# Patient Record
Sex: MALE | Race: WHITE | NOT HISPANIC OR LATINO | Employment: FULL TIME | URBAN - NONMETROPOLITAN AREA
[De-identification: names, ages, dates, MRNs, and addresses within clinical notes are randomized per-mention and may not be internally consistent; named-entity substitution may affect disease eponyms.]

---

## 2018-07-27 ENCOUNTER — HOSPITAL ENCOUNTER (OUTPATIENT)
Facility: HOSPITAL | Age: 63
Setting detail: HOSPITAL OUTPATIENT SURGERY
End: 2018-07-27
Attending: INTERNAL MEDICINE | Admitting: INTERNAL MEDICINE

## 2019-05-28 RX ORDER — NEBIVOLOL 10 MG/1
10 TABLET ORAL NIGHTLY
COMMUNITY

## 2019-05-30 ENCOUNTER — ANESTHESIA (OUTPATIENT)
Dept: GASTROENTEROLOGY | Facility: HOSPITAL | Age: 64
End: 2019-05-30

## 2019-05-30 ENCOUNTER — ANESTHESIA EVENT (OUTPATIENT)
Dept: GASTROENTEROLOGY | Facility: HOSPITAL | Age: 64
End: 2019-05-30

## 2019-05-30 ENCOUNTER — HOSPITAL ENCOUNTER (OUTPATIENT)
Facility: HOSPITAL | Age: 64
Setting detail: HOSPITAL OUTPATIENT SURGERY
Discharge: HOME | End: 2019-05-30
Attending: INTERNAL MEDICINE | Admitting: INTERNAL MEDICINE

## 2019-05-30 VITALS
TEMPERATURE: 96.9 F | RESPIRATION RATE: 17 BRPM | OXYGEN SATURATION: 94 % | DIASTOLIC BLOOD PRESSURE: 81 MMHG | SYSTOLIC BLOOD PRESSURE: 127 MMHG | BODY MASS INDEX: 32.43 KG/M2 | HEIGHT: 68 IN | WEIGHT: 214 LBS | HEART RATE: 62 BPM

## 2019-05-30 DIAGNOSIS — K22.2 STRICTURE OF ESOPHAGUS: ICD-10-CM

## 2019-05-30 DIAGNOSIS — Z86.010 PERSONAL HISTORY OF COLONIC POLYPS: ICD-10-CM

## 2019-05-30 PROCEDURE — 88305 TISSUE EXAM BY PATHOLOGIST: CPT | Performed by: PATHOLOGY

## 2019-05-30 PROCEDURE — 88341 IMHCHEM/IMCYTCHM EA ADD ANTB: CPT | Performed by: PATHOLOGY

## 2019-05-30 PROCEDURE — 88312 SPECIAL STAINS GROUP 1: CPT | Performed by: INTERNAL MEDICINE

## 2019-05-30 PROCEDURE — 88342 IMHCHEM/IMCYTCHM 1ST ANTB: CPT | Performed by: PATHOLOGY

## 2019-05-30 PROCEDURE — 88341 IMHCHEM/IMCYTCHM EA ADD ANTB: CPT | Performed by: INTERNAL MEDICINE

## 2019-05-30 PROCEDURE — 25010000002 FENTANYL CITRATE (PF) 100 MCG/2ML SOLUTION: Performed by: NURSE ANESTHETIST, CERTIFIED REGISTERED

## 2019-05-30 PROCEDURE — 88305 TISSUE EXAM BY PATHOLOGIST: CPT | Performed by: INTERNAL MEDICINE

## 2019-05-30 PROCEDURE — 88342 IMHCHEM/IMCYTCHM 1ST ANTB: CPT | Performed by: INTERNAL MEDICINE

## 2019-05-30 PROCEDURE — 25010000002 PROPOFOL 10 MG/ML EMULSION: Performed by: NURSE ANESTHETIST, CERTIFIED REGISTERED

## 2019-05-30 PROCEDURE — 88312 SPECIAL STAINS GROUP 1: CPT | Performed by: PATHOLOGY

## 2019-05-30 RX ORDER — PROPOFOL 10 MG/ML
VIAL (ML) INTRAVENOUS AS NEEDED
Status: DISCONTINUED | OUTPATIENT
Start: 2019-05-30 | End: 2019-05-30 | Stop reason: SURG

## 2019-05-30 RX ORDER — DEXTROSE AND SODIUM CHLORIDE 5; .45 G/100ML; G/100ML
30 INJECTION, SOLUTION INTRAVENOUS CONTINUOUS
Status: DISCONTINUED | OUTPATIENT
Start: 2019-05-30 | End: 2019-05-30 | Stop reason: HOSPADM

## 2019-05-30 RX ORDER — FENTANYL CITRATE 50 UG/ML
INJECTION, SOLUTION INTRAMUSCULAR; INTRAVENOUS AS NEEDED
Status: DISCONTINUED | OUTPATIENT
Start: 2019-05-30 | End: 2019-05-30 | Stop reason: SURG

## 2019-05-30 RX ORDER — LIDOCAINE HYDROCHLORIDE 20 MG/ML
INJECTION, SOLUTION INTRAVENOUS AS NEEDED
Status: DISCONTINUED | OUTPATIENT
Start: 2019-05-30 | End: 2019-05-30 | Stop reason: SURG

## 2019-05-30 RX ADMIN — PROPOFOL 30 MG: 10 INJECTION, EMULSION INTRAVENOUS at 10:23

## 2019-05-30 RX ADMIN — LIDOCAINE HYDROCHLORIDE 100 MG: 20 INJECTION, SOLUTION INTRAVENOUS at 10:18

## 2019-05-30 RX ADMIN — PROPOFOL 100 MG: 10 INJECTION, EMULSION INTRAVENOUS at 10:18

## 2019-05-30 RX ADMIN — PROPOFOL 20 MG: 10 INJECTION, EMULSION INTRAVENOUS at 10:26

## 2019-05-30 RX ADMIN — PROPOFOL 30 MG: 10 INJECTION, EMULSION INTRAVENOUS at 10:20

## 2019-05-30 RX ADMIN — PROPOFOL 20 MG: 10 INJECTION, EMULSION INTRAVENOUS at 10:25

## 2019-05-30 RX ADMIN — FENTANYL CITRATE 50 MCG: 50 INJECTION, SOLUTION INTRAMUSCULAR; INTRAVENOUS at 10:29

## 2019-05-30 RX ADMIN — PROPOFOL 30 MG: 10 INJECTION, EMULSION INTRAVENOUS at 10:38

## 2019-05-30 RX ADMIN — DEXTROSE AND SODIUM CHLORIDE: 5; 450 INJECTION, SOLUTION INTRAVENOUS at 10:12

## 2019-05-30 RX ADMIN — PROPOFOL 40 MG: 10 INJECTION, EMULSION INTRAVENOUS at 10:33

## 2019-05-30 RX ADMIN — FENTANYL CITRATE 50 MCG: 50 INJECTION, SOLUTION INTRAMUSCULAR; INTRAVENOUS at 10:34

## 2019-05-30 RX ADMIN — PROPOFOL 30 MG: 10 INJECTION, EMULSION INTRAVENOUS at 10:29

## 2019-05-30 RX ADMIN — PROPOFOL 30 MG: 10 INJECTION, EMULSION INTRAVENOUS at 10:43

## 2019-05-30 NOTE — ANESTHESIA PREPROCEDURE EVALUATION
Anesthesia Evaluation     no history of anesthetic complications:  NPO Solid Status: > 8 hours  NPO Liquid Status: > 2 hours           Airway   Mallampati: II  TM distance: >3 FB  Neck ROM: full  No difficulty expected  Dental - normal exam     Pulmonary - negative pulmonary ROS and normal exam   Cardiovascular - normal exam    (+) hypertension,       Neuro/Psych- negative ROS  GI/Hepatic/Renal/Endo      Musculoskeletal (-) negative ROS    Abdominal    Substance History      OB/GYN          Other                        Anesthesia Plan    ASA 2     MAC     intravenous induction   Anesthetic plan, all risks, benefits, and alternatives have been provided, discussed and informed consent has been obtained with: patient.

## 2019-05-30 NOTE — ANESTHESIA POSTPROCEDURE EVALUATION
Patient: Juan Antonio Shaw    Procedure Summary     Date:  05/30/19 Room / Location:  [facility] ENDOSCOPY 2 / [facility] ENDOSCOPY    Anesthesia Start:  1012 Anesthesia Stop:  1049    Procedures:       ESOPHAGOGASTRODUODENOSCOPY (N/A )      COLONOSCOPY (N/A ) Diagnosis:       Stricture of esophagus      Personal history of colonic polyps      (Stricture of esophagus [K22.2])      (Personal history of colonic polyps [Z86.010])    Surgeon:  Jarek Gordillo DO Provider:  Akosua Rose CRNA    Anesthesia Type:  MAC ASA Status:  2          Anesthesia Type: MAC  Last vitals  BP   131/67 (05/30/19 0901)   Temp   97 °F (36.1 °C) (05/30/19 0901)   Pulse   64 (05/30/19 0901)   Resp   18 (05/30/19 0901)     SpO2   98 % (05/30/19 0901)     Post Anesthesia Care and Evaluation    Patient location during evaluation: bedside  Patient participation: complete - patient participated  Level of consciousness: awake and alert  Pain management: adequate  Airway patency: patent  Anesthetic complications: No anesthetic complications  PONV Status: none  Cardiovascular status: acceptable  Respiratory status: acceptable  Hydration status: acceptable

## 2019-05-31 LAB
LAB AP CASE REPORT: NORMAL
LAB AP DIAGNOSIS COMMENT: NORMAL
LAB AP INTRADEPARTMENTAL CONSULT: NORMAL
LAB AP SPECIAL STAINS: NORMAL
PATH REPORT.FINAL DX SPEC: NORMAL
PATH REPORT.GROSS SPEC: NORMAL

## 2019-06-07 ENCOUNTER — CONSULT (OUTPATIENT)
Dept: SURGERY | Facility: CLINIC | Age: 64
End: 2019-06-07

## 2019-06-07 VITALS
SYSTOLIC BLOOD PRESSURE: 110 MMHG | BODY MASS INDEX: 33.04 KG/M2 | HEIGHT: 68 IN | TEMPERATURE: 98 F | WEIGHT: 218 LBS | DIASTOLIC BLOOD PRESSURE: 70 MMHG | HEART RATE: 77 BPM

## 2019-06-07 DIAGNOSIS — L98.9 SKIN LESION: Primary | ICD-10-CM

## 2019-06-07 PROCEDURE — 17110 DESTRUCTION B9 LES UP TO 14: CPT | Performed by: SURGERY

## 2019-06-07 NOTE — PROGRESS NOTES
Chief Complaint   Patient presents with   • Skin Lesion     lesion on back and neck        HPI  Irritated lesions on the right neck and questionable color change on the back.  Past Medical History:   Diagnosis Date   • Hypertension        Past Surgical History:   Procedure Laterality Date   • COLONOSCOPY N/A 5/30/2019    Procedure: COLONOSCOPY;  Surgeon: Jarek Gordillo DO;  Location: [facility] ENDOSCOPY;  Service: Gastroenterology   • ENDOSCOPY N/A 5/30/2019    Procedure: ESOPHAGOGASTRODUODENOSCOPY;  Surgeon: Jarek Gordillo DO;  Location: [facility] ENDOSCOPY;  Service: Gastroenterology   • PROSTATE SURGERY           Current Outpatient Medications:   •  nebivolol (BYSTOLIC) 10 MG tablet, Take 10 mg by mouth Daily., Disp: , Rfl:     No Known Allergies    No family history on file.    Social History     Socioeconomic History   • Marital status:      Spouse name: Not on file   • Number of children: Not on file   • Years of education: Not on file   • Highest education level: Not on file   Tobacco Use   • Smoking status: Never Smoker   • Smokeless tobacco: Never Used   Substance and Sexual Activity   • Alcohol use: Yes     Comment: rarely   • Drug use: No           Physical Exam  3 SKs on the right neck. Several benign areas on the back. Lesions on the neck are frozen with liquid N2    ASSESSMENT    Juan Antonio was seen today for skin lesion.    Diagnoses and all orders for this visit:    Skin lesion        PLAN    1. Recheck in 1 month if no resolution              This document has been electronically signed by Benson Wasserman MD on June 7, 2019 2:45 PM

## 2019-06-07 NOTE — PATIENT INSTRUCTIONS

## 2019-06-17 ENCOUNTER — HOSPITAL ENCOUNTER (OUTPATIENT)
Dept: CT IMAGING | Facility: HOSPITAL | Age: 64
Discharge: HOME | End: 2019-06-17
Admitting: INTERNAL MEDICINE

## 2019-06-17 ENCOUNTER — HOSPITAL ENCOUNTER (OUTPATIENT)
Dept: GENERAL RADIOLOGY | Facility: HOSPITAL | Age: 64
Discharge: HOME | End: 2019-06-17

## 2019-06-17 DIAGNOSIS — C21.0 ANAL CANCER (HCC): ICD-10-CM

## 2019-06-17 DIAGNOSIS — C44.520 SQUAMOUS CELL CARCINOMA OF ANAL SKIN: ICD-10-CM

## 2019-06-17 PROCEDURE — 71046 X-RAY EXAM CHEST 2 VIEWS: CPT

## 2019-06-17 PROCEDURE — 74177 CT ABD & PELVIS W/CONTRAST: CPT

## 2019-06-17 PROCEDURE — 25010000002 IOPAMIDOL 61 % SOLUTION: Performed by: INTERNAL MEDICINE

## 2019-06-17 RX ADMIN — IOPAMIDOL 80 ML: 612 INJECTION, SOLUTION INTRAVENOUS at 13:54

## 2019-06-18 ENCOUNTER — CONSULT (OUTPATIENT)
Dept: RADIATION ONCOLOGY | Facility: HOSPITAL | Age: 64
End: 2019-06-18

## 2019-06-18 ENCOUNTER — DOCUMENTATION (OUTPATIENT)
Dept: ONCOLOGY | Facility: CLINIC | Age: 64
End: 2019-06-18

## 2019-06-18 ENCOUNTER — HOSPITAL ENCOUNTER (OUTPATIENT)
Dept: RADIATION ONCOLOGY | Facility: HOSPITAL | Age: 64
Setting detail: RADIATION/ONCOLOGY SERIES
End: 2019-06-18

## 2019-06-18 VITALS
RESPIRATION RATE: 16 BRPM | DIASTOLIC BLOOD PRESSURE: 80 MMHG | TEMPERATURE: 97.8 F | HEART RATE: 66 BPM | BODY MASS INDEX: 33.04 KG/M2 | WEIGHT: 218 LBS | SYSTOLIC BLOOD PRESSURE: 137 MMHG | HEIGHT: 68 IN

## 2019-06-18 DIAGNOSIS — C21.0 ANAL CANCER (HCC): Primary | ICD-10-CM

## 2019-06-18 PROCEDURE — 77263 THER RADIOLOGY TX PLNG CPLX: CPT | Performed by: RADIOLOGY

## 2019-06-18 PROCEDURE — 99204 OFFICE O/P NEW MOD 45 MIN: CPT | Performed by: RADIOLOGY

## 2019-06-18 PROCEDURE — G0463 HOSPITAL OUTPT CLINIC VISIT: HCPCS

## 2019-06-18 PROCEDURE — G0463 HOSPITAL OUTPT CLINIC VISIT: HCPCS | Performed by: RADIOLOGY

## 2019-06-18 NOTE — PATIENT INSTRUCTIONS
Radiation Simulation  Radiation Simulation is a process where the radiation treatment fields are defined, filmed and drawn on your skin. The simulator is actually a computed tomography (CT) scanner that we use to contour your body. The images are then sent to the physics department where the team and your doctor -- arrange the radiation beams and make a customized plan. We will take special care to make your position as comfortable as possible while making sure treatment will be given the same every time. Since people come in all shapes and sizes, very specific patient measurements are important.  Treatment unit parameters are finalized and recorded. All setup information is documented to make your treatment record complete. This is an important part of the planning process. The CT simulation scan is not like a regular CT scan, instead we use it to contour the shape of your body and visualize its structures. You doctor can then arrange the beams and make a plan using a computer system.      The following are members of the radiation therapy team that is involved and you may see during your simulation:  Physician:  is in charge of your simulation and gives direction to the therapists in the simulator.  Radiation therapists: trained, certified, and qualified to participate in CT simulation and administer daily treatments.      Oncology nurse: is a registered nurse who specializes in the needs of patients receiving radiation treatments.   Medical physicist: specializes in the principles of radiation physics. He is responsible for maintaining all therapy equipment as well as overseeing the treatment planning process.  : is responsible for performing dose calculations as well as developing treatment plans in conjunction with the physician and physicist.    What to Wear  Since we will be marking on your skin, it is necessary that we expose the treatment site. We may also need you to remove other  clothing worn close to the treatment are so we can produce the same treatment every time. . For example, having a shirt rumpled underneath you or snugly fitting pantyhose around your waist may change your position slightly from day to day. We can easily avoid this problem by simply having you change into a gown.  Please leave your jewelry at home, especially if the simulation involves the head, neck, or chest region.   Choose clothing that is comfortable and easy to remove.   Please do not bother the skin markings unless your therapist tells you so. These markings may rub off slightly onto your clothing, especially during warm summer months, so consider wearing old clothing and underwear when you come for your appointment.  How to Prepare  There are no dietary restrictions prior to your simulation. You may eat and drink as usual, unless instructed otherwise.       Immobilization  Immobilization means “to prevent movement or to keep in place.” We use immobilization devices that do just that. These are treatment aids that are pre-made, or that we construct, to help maintain the desired treatment position throughout the entire course of therapy. Some of these aids, to name a few, are:  • Tape (simple masking, paper or silk tape)  • Foam sponges  • Specially designed headrests  • Acrylic molds  • Foam body molds  • Plaster casts      Keep in mind that your particular setup may not require any special devices, or it may require a combination of items. It depends on your treatment site, position and your ability to stay in a position for a limited amount of time. We try to anticipate which simulations will require special devices so that we correctly estimate the amount of time you will need to allow for your appointment.  Contrast  In order to better visualize organs and other anatomy in or around the treatment area, we may need to administer contrast material (radiographic dye) during simulation. Contrast can be given  intravenously (with a needle through the vein), by mouth or through a catheter inserted by the nurse or therapist. This procedure will be done for simulation only -- not for daily treatment -- and requires no special preparation prior to your appointment.  Marking the Fields  The therapist will take a few preliminary measurements. A rough sketch of the area may be marked on your skin by using markers directly on the skin with ink by the physician and/or the therapist. The simulation team will then leave the room. We will watch you and can hear you at all times.    Then a series of things may happen:  • Room lights may go on and off  • Red laser lights may appear  • The table will move into the scanner  • All kinds of noises will be audible  The CT scanner is a large hole in a machine that your body will pass through. There is plenty of room and you should not feel enclosed or claustrophobic.  Measurements  The physician will create a radiation therapy prescription, a written directive for the radiation team to follow. In order to carry this out, we must combine treatment field information with data taken about your body shape and size. Using the CT scan, the physician will contour the areas needed and generate the plan while you are at home.  Tattoos  In most cases, we do not apply tattoos to blanca pateints unless it is requested or you cant keep the marks on your skin. Marks that are needed are covered with a special clear dressing called Tegaderm.   Photographs  We will take your photograph on the day of simulation for two reasons: 1) for chart documentation, and 2) for a treatment set-up aid. We will take a facial picture for identification purposes and this will be part of your permanent treatment record. Each day before your treatment, we will ask you for your name and birth date for verification.  We will also take photographs of the treatment and setup fields. Since the treatment therapist may not be present  at the time of simulation, using photos along with roger makes daily setup easier. The same is true for the dosimetrist. Sometimes viewing a field on the patient via photograph can answer questions that arise during the calculation process. These photos are necessary and they remain a confidential part of your treatment record.  How Long Does Planning Take  Our Dosimetrist and Physicist will use the images captured during your CT simulation to plan your treatments. The planning process is very complex and takes a lot of time and detail.    Depending on the type of treatment you are going to receive, your radiation plan may take up to 2 weeks from the day of your simulation.   ? The radiation staff will call you when your plan is ready and you will be scheduled a date to start your radiation treatments.   ? We will also call you to update you on the progress of your plan as needed.   ? If we have not called you within 10 days from your simulation, please call us. The time between simulation and treatment in no way compromises the outcome of your therapy. These factors are considered by the radiation therapy team when planning your specific course of treatment.  Since the course of treatment can run from two to eight weeks, we constantly monitor each patient’s setup in a number of ways. If a change occurs, it may be necessary to schedule a return visit to the simulator on short notice. An example of this might be a weight gain or loss that would require new calculations. In order to interrupt your treatment schedule as little as possible, a simulation check that same day or the following morning may be appropriate. Again, this is routine and does not suggest a change in your health status. It simply means that we are doing our job in providing the best care we know how. It is important that we stay aware of all patient changes.  Our goal is to make every patient as knowledgeable about the radiation therapy process as  possible and comfortable enough to ask questions. If at any time during your many visits with us you are unsure as to what is happening around you, please do not hesitate to ask. At a time in your life when things are seemingly out of your control, knowing what to expect can be a powerful tool in becoming refocused and moving onward.      If you have any concerns or questions before your treatment gets started, please do not hesitate to call the Radiation staff at [phone]. Administration of Oral Contrast for CT Scan of Abdomen and Pelvis    · If you have an allergy to iodine, ask the  for barium in place of iodinated contrast.    1. Do not eat or drink 4 hours prior to scan.  You may take your medications with sips of water, except do not take your diabetic pill the morning of the test.    2. If you were given Redi-Cat barium, drink one bottle 2 hours prior to exam time and drink the 2nd bottle 30 minutes prior to exam time.    3.  If you were given Omnipaque, iodinated contrast, mix the bottle with 34 ounces (1 liter) of fluid.  You may use water, flavored water, or juice.    4. Drink half the amount 2 hours prior to your scan, then drink the remaining half 30 minutes prior to your scan.    Arrive at Outpatient Surgery entrance at [facility] 20 minutes prior to appointment time.    You will receive a phone call with an appointment for your CT scan.  Please call our office, if someone does not contact you within 3 days.    Sandy Musa RN  June 18, 2019  10:38 AM      Patient Instructions for CT Scan    · Your CT scan is being done without any oral contrast.  Your CT scan may be done with IV contrast, if you have an allergy to iodine--please tell your nurse.    · Do not eat or drink 4 hours prior to scan.     · You may take your medications with sips of water, except DO NOT take your diabetic pill the morning of the test.    Arrive at the Outpatient Surgery entrance at [facility] 20 minutes prior to appointment time.    You will receive a phone call with an appointment for your CT scan.  Please call our office, if someone does not contact you with 3 days.    Sandy Musa RN  June 18, 2019  10:38 AM

## 2019-06-18 NOTE — PROGRESS NOTES
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  ICD 10 Code: C21.0                                               History of Present Illness: Mr. Juan Antonio Shaw is a 64-year-old white male with a history of prostate cancer (Stage T2b, Ferdinand 3+3=6, PSA 7.36, s/p RALP on 5/16/18), atrial fibrillation, hypertension, esophageal stricture, and colon polyps, now with recently diagnosed squamous cell carcinoma of the anus.  He has been evaluated by Dr. Gordillo and has now been referred to our clinic to discuss radiation therapy recommendations.    Below is a summary of his relevant history.    5/15/2019 the patient contacted Dr. Pawel Aleman, his PCP, complaining of difficulty swallowing.  Dr. Aleman referred the patient to Dr. Jarek Gordillo for evaluation.    5/30/2019 the patient underwent an EGD and colonoscopy per Dr. Gordillo.      EGD revealed reflux esophagitis, which was biopsied, and benign-appearing esophageal stenosis, which was dilated by Dr. Gordillo.  Pathology: Chronic esophagitis, negative for fungi, viral cells, and dysplasia.     Colonoscopy revealed sessile polyps measuring 4-5 mm in the ascending colon (3), transverse colon (1), and ascending colon (1).  These were all removed with cold biopsy forceps.  An ulcerated, nonobstructing, non-circumferential 3 x 4 cm mass was noted in the anus, extending from the dentate line.  A biopsy was obtained.  Pathology:     Colon polyps: Benign pathology     Anal tumor: Invasive nonkeratinizing squamous cell carcinoma, poorly differentiated, strongly p16+.    6/3/2019 the patient was seen in follow-up by Dr. Gordillo.  Staging scans were ordered, and the patient was referred to Dr. Ly for treatment recommendations.  He was also referred to Dr. Wasserman for  evaluation of skin lesions on the neck and back.    6/7/2019 the patient was seen in consultation by Dr. Wasserman, who froze 3 seborrheic keratoses on the neck with liquid nitrogen.  Additional follow-up was planned if the lesions did not resolve.    6/17/2019 CT of the abdomen/pelvis revealed nonspecific mild thickening of the rectum, cholelithiasis, nonobstructing nephrolithiasis, and possible bilateral renal cysts.  No abdominal/pelvic masses or lymphadenopathy were identified.  No evidence of metastatic disease was noted.    The patient presents to our clinic today to discuss radiation therapy recommendations.  He is scheduled to be seen in consultation by Dr. Ly on 6/20/2019 for chemotherapy recommendations.    Today on exam, he has no complaints of rectal bleeding, rectal pain, or diarrhea.    This is a new problem to me.  (Old medical records were requested, reviewed, and summarized in the HPI)    Review of Systems   Gastrointestinal: Negative for anal bleeding, blood in stool, constipation, diarrhea and rectal pain.   All other systems reviewed and are negative.       Past Medical History:   Diagnosis Date   • Hypertension    • Prostate cancer (CMS/HCC) 05/16/2018   • Rectal cancer (CMS/HCC) 06/2019        Past Surgical History:   Procedure Laterality Date   • COLONOSCOPY N/A 5/30/2019    Procedure: COLONOSCOPY;  Surgeon: Jarek Gordillo DO;  Location: [facility] ENDOSCOPY;  Service: Gastroenterology   • ENDOSCOPY N/A 5/30/2019    Procedure: ESOPHAGOGASTRODUODENOSCOPY;  Surgeon: Jarek Gordillo DO;  Location: [facility] ENDOSCOPY;  Service: Gastroenterology   • PROSTATE SURGERY        Family History   Problem Relation Age of Onset   • Uterine cancer Mother         Social History     Socioeconomic History   • Marital status:      Spouse name: Jeffrey   • Number of children: 2   • Years of education: 15   • Highest education level: Not on file   Occupational History   • Occupation: [employer]   Tobacco Use   •  "Smoking status: Never Smoker   • Smokeless tobacco: Never Used   Substance and Sexual Activity   • Alcohol use: Yes     Comment: rarely   • Drug use: No   • Sexual activity: Defer      Allergies: Patient has no known allergies.     Prior to Admission medications    Medication Sig Start Date End Date Taking? Authorizing Provider   nebivolol (BYSTOLIC) 10 MG tablet Take 10 mg by mouth Daily.    Provider, MD Andreea      Pain: (on a scale of 0-10)     Pain Score    06/18/19 0842   PainSc: 0-No pain       Quality of Life:  100 - Full Activity  Advanced Care Plan: N     Physical Examination:  Vitals:     Vitals:    06/18/19 0842   BP: 137/80   Pulse: 66   Resp: 16   Temp: 97.8 °F (36.6 °C)       Height: 172.7 cm (68\")  Weight: Weight: 98.9 kg (218 lb)   Body mass index is 33.15 kg/m².      Constitutional: The patient is a well-developed, well-nourished white  male in no acute distress.  Alert and oriented ×3.  HEENT: Atraumatic. Normocephalic. No abnormalities noted.  Lymphatics: No cervical, supraclavicular, axillary, or inguinal lymphadenopathy is palpated.  CV: Regular rate and rhythm.  No murmurs, rubs, or gallops are appreciated.  Respiratory: Lungs clear to auscultation.  Breath sounds equal bilaterally.  GI: Abdomen soft, nontender, nondistended, with no hepatosplenomegaly or masses palpated.  Rectal: Digital rectal exam reveals normal rectal sphincter tone. No suspicious nodules palpated.   Extremities: No clubbing, cyanosis, or edema.  Neurologic: Cranial nerves II through XII are grossly intact, with no focal neurological deficits noted on exam.  Psychiatric: Alert and oriented x3. Normal affect, with no anxiety or depression noted.    Radiographs :     CT ABDOMEN PELVIS W CONTRAST  (6/17/19)   INDICATION: Anal malignancy   COMPARISON: None  FINDINGS:    - - - CT ABDOMEN - - -    THORAX (INFERIOR):   - LUNG BASES:  Clear   - PLEURA:  No fluid or mass   - HEART: Normal size, no pericardial fluid   - MISC:  " N/A    ABDOMEN:   - LIVER:  Normal size/contour, no ductal dilatation. Scattered  punctate calcifications are present, consistent with healed  granulomatous disease   - GB: Filled with gallstones   - CBD:  Grossly negative   - SPLEEN:  Normal size and contour. Granulomatous scarring   - PANCREAS:  Normal in size, contour, no focal mass    - VISCERA:  Normal caliber, no wall thickening   - MESENTERY: No mesenteric mass   - CAVITY:  No free abdominal fluid, no free intraperitoneal air   - BODY WALL:  With normal limits   - OSSEOUS:  Degenerative changes most prominently at L5-S1 where  there is almost complete loss of joint space and vacuum disc  phenomenon    RETROPERITONEUM:   - KIDNEYS:Normal size/contour, no collecting system dilation                    No evidence of an enhancing mass. Bilateral  nonobstructing calculi, and small possible cysts bilaterally.    - URETERS:  Normal course, caliber   - ADRENALS:  Normal size, contour   - MISC:  No sig. retroperitoneal adenopathy or mass   - VASCULAR:  Aorta / iliacs: wnl for age     - - - CT PELVIS - - -     - VISCERA:  Normal caliber small/large bowel, mild thickening of  the rectum, nonspecific   - MESENTERY:  No mass   - VASCULAR:  Within normal limits for age   - CAVITY:  No free fluid / air   - BLADDER:  Unremarkable   - OSSEOUS:  Within normal limits    - PROSTATE:  Grossly wnl  Small left inguinal hernia contains only fat.      IMPRESSION:  1. No definite evidence of metastasis. Mild thickening of the  rectum, may be related to treatment effect, though malignancy  cannot be excluded.   2. Cholelithiasis.   3. Nonobstructing nephrolithiasis.  4. Possible bilateral renal cysts, could be further evaluated and  characterized by ultrasound or multiphasic contrast enhanced CT  or MRI  5. Please see finding sections above for further details.     Chest PA and lateral   (6/17/19)   CLINICAL INDICATION: Squamous cell carcinoma anus   COMPARISON: Chest x-ray January 6,  2012.   FINDINGS: Cardiac silhouette is normal in size. Pulmonary  vascularity is unremarkable.    No focal infiltrate or consolidation.  No pleural effusion.  No  pneumothorax. There is benign calcified granulomata in the  mediastinum and both hilar regions.  Chronic elevation left diaphragm. This suggests diaphragmatic  eventration or diaphragmatic paralysis.   IMPRESSION:  CONCLUSION: Chronic elevation left diaphragm either diaphragmatic  eventration or diaphragmatic paralysis. Benign calcified  mediastinal and hilar lymph nodes. Lungs otherwise clear.    Pathology:     Tissue Pathology Exam: Collected:  5/30/2019    Dx:  Personal history of colonic polyps; S...   Component    Final Diagnosis   1.  MUCOSA, ESOPHAGUS:   CHRONIC ESOPHAGITIS.   NEGATIVE FOR FUNGI (GMS STAIN), VIRAL CELLS AND DYSPLASIA.      2.  MUCOSA, ANUS:   INVASIVE NONKERATINIZING SQUAMOUS CELL CARCINOMA, POORLY DIFFERENTIATED.   IMMUNOSTAIN STRONGLY POSITIVE FOR p16 (HPV MARKER).      3.  POLYP, TRANSVERSE COLON:   INFLAMMATORY POLYP.     4.  POLYP, ASCENDING COLON:   SESSILE SERRATED ADENOMA.      5.  POLYP, DESCENDING COLON:   NO SIGNIFICANT HISTOLOGIC ABNORMALITY.              Labs:   Lab Results   Component Value Date    BUN 34 (H) 07/11/2018    CREATININE 1 07/11/2018    EGFRIFAFRI 92 05/17/2018    K 4 07/11/2018    CO2 26 05/17/2018    CALCIUM 8.8 07/11/2018    ALBUMIN 3.5 07/11/2018    AST 17 07/11/2018    ALT 19 (L) 07/11/2018      WBC   Date Value Ref Range Status   05/09/2018 6.0 4.8 - 10.8 10*9/L Final     Hemoglobin   Date Value Ref Range Status   05/17/2018 13.0 (L) 14.0 - 18.0 g/dL Final     Hematocrit   Date Value Ref Range Status   05/17/2018 38.0 (L) 42 - 54 % Final     Platelets   Date Value Ref Range Status   05/09/2018 281 150 - 450 10*9/L Final      PSA   Date Value Ref Range Status   05/13/2019 <0.1 0.0 - 4.0 ng/mL Final             02/11/2019 <0.1 0.0 - 4.0 ng/mL Final             11/08/2018 <0.1 0.0 - 4.0 ng/mL Final    07/11/2018 0 0.0 - 4.0 ng/mL Final   04/17/2018 6.7 (H) 0.0 - 4.0 ng/mL Final   01/08/2018 7.36 (H) 0.0 - 4.0 ng/mL Final                   ASSESSMENT/PLAN: Mr. 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  I feel that he could benefit from a definitive course of concurrent chemoradiation therapy.  The NCCN Guidelines as well as the risks, benefits, and rationale for concurrent chemo/XRT were discussed at length with the patient.  He voices understanding and agrees to proceed with therapy.  I have discussed this case with Dr. Ly, who is scheduled to see the patient in consultation next week to discuss chemotherapy.  We plan to have the patient return to our clinic in the near future to begin radiation therapy planning.  We plan to deliver 5400 cGy in 30 fractions utilizing IMRT/IGRT radiation therapy.    Sincerely,        Brady Lopez MD  Radiation Oncology    Electronically signed by Brady Lopez MD 6/18/2019 10:12 AM     cc: Dr. Jarek Aleman

## 2019-06-20 ENCOUNTER — CONSULT (OUTPATIENT)
Dept: ONCOLOGY | Facility: CLINIC | Age: 64
End: 2019-06-20

## 2019-06-20 ENCOUNTER — DOCUMENTATION (OUTPATIENT)
Dept: ONCOLOGY | Facility: CLINIC | Age: 64
End: 2019-06-20

## 2019-06-20 ENCOUNTER — APPOINTMENT (OUTPATIENT)
Dept: ONCOLOGY | Facility: HOSPITAL | Age: 64
End: 2019-06-20

## 2019-06-20 VITALS
TEMPERATURE: 97.7 F | RESPIRATION RATE: 18 BRPM | HEIGHT: 68 IN | WEIGHT: 217.2 LBS | BODY MASS INDEX: 32.92 KG/M2 | HEART RATE: 84 BPM | DIASTOLIC BLOOD PRESSURE: 81 MMHG | OXYGEN SATURATION: 94 % | SYSTOLIC BLOOD PRESSURE: 147 MMHG

## 2019-06-20 DIAGNOSIS — C21.0 ANAL CANCER (HCC): ICD-10-CM

## 2019-06-20 DIAGNOSIS — C21.0 ANAL CANCER (HCC): Primary | ICD-10-CM

## 2019-06-20 DIAGNOSIS — C61 PROSTATE CANCER (HCC): ICD-10-CM

## 2019-06-20 LAB
ALBUMIN SERPL-MCNC: 4.2 G/DL (ref 3.5–5.2)
ALBUMIN/GLOB SERPL: 1.1 G/DL
ALP SERPL-CCNC: 87 U/L (ref 39–117)
ALT SERPL W P-5'-P-CCNC: 14 U/L (ref 1–41)
ANION GAP SERPL CALCULATED.3IONS-SCNC: 12 MMOL/L
AST SERPL-CCNC: 16 U/L (ref 1–40)
BASOPHILS # BLD AUTO: 0.03 10*3/MM3 (ref 0–0.2)
BASOPHILS NFR BLD AUTO: 0.4 % (ref 0–1.5)
BILIRUB SERPL-MCNC: 0.3 MG/DL (ref 0.2–1.2)
BUN BLD-MCNC: 25 MG/DL (ref 8–23)
BUN/CREAT SERPL: 27.8 (ref 7–25)
CALCIUM SPEC-SCNC: 9.6 MG/DL (ref 8.6–10.5)
CHLORIDE SERPL-SCNC: 98 MMOL/L (ref 98–107)
CO2 SERPL-SCNC: 27 MMOL/L (ref 22–29)
CREAT BLD-MCNC: 0.9 MG/DL (ref 0.76–1.27)
DEPRECATED RDW RBC AUTO: 41 FL (ref 37–54)
EOSINOPHIL # BLD AUTO: 0.09 10*3/MM3 (ref 0–0.4)
EOSINOPHIL NFR BLD AUTO: 1.1 % (ref 0.3–6.2)
ERYTHROCYTE [DISTWIDTH] IN BLOOD BY AUTOMATED COUNT: 12.4 % (ref 12.3–15.4)
GFR SERPL CREATININE-BSD FRML MDRD: 85 ML/MIN/1.73
GLOBULIN UR ELPH-MCNC: 3.9 GM/DL
GLUCOSE BLD-MCNC: 107 MG/DL (ref 65–99)
HCT VFR BLD AUTO: 45.9 % (ref 37.5–51)
HGB BLD-MCNC: 15.5 G/DL (ref 13–17.7)
HIV1+2 AB SER QL: NORMAL
IMM GRANULOCYTES # BLD AUTO: 0.03 10*3/MM3 (ref 0–0.05)
IMM GRANULOCYTES NFR BLD AUTO: 0.4 % (ref 0–0.5)
LYMPHOCYTES # BLD AUTO: 2.63 10*3/MM3 (ref 0.7–3.1)
LYMPHOCYTES NFR BLD AUTO: 32 % (ref 19.6–45.3)
MCH RBC QN AUTO: 30.2 PG (ref 26.6–33)
MCHC RBC AUTO-ENTMCNC: 33.8 G/DL (ref 31.5–35.7)
MCV RBC AUTO: 89.5 FL (ref 79–97)
MONOCYTES # BLD AUTO: 0.6 10*3/MM3 (ref 0.1–0.9)
MONOCYTES NFR BLD AUTO: 7.3 % (ref 5–12)
NEUTROPHILS # BLD AUTO: 4.84 10*3/MM3 (ref 1.7–7)
NEUTROPHILS NFR BLD AUTO: 58.8 % (ref 42.7–76)
NRBC BLD AUTO-RTO: 0 /100 WBC (ref 0–0.2)
PLATELET # BLD AUTO: 295 10*3/MM3 (ref 140–450)
PMV BLD AUTO: 9 FL (ref 6–12)
POTASSIUM BLD-SCNC: 4.1 MMOL/L (ref 3.5–5.2)
PROT SERPL-MCNC: 8.1 G/DL (ref 6–8.5)
RBC # BLD AUTO: 5.13 10*6/MM3 (ref 4.14–5.8)
SODIUM BLD-SCNC: 137 MMOL/L (ref 136–145)
WBC NRBC COR # BLD: 8.22 10*3/MM3 (ref 3.4–10.8)

## 2019-06-20 PROCEDURE — 1123F ACP DISCUSS/DSCN MKR DOCD: CPT | Performed by: INTERNAL MEDICINE

## 2019-06-20 PROCEDURE — G0432 EIA HIV-1/HIV-2 SCREEN: HCPCS | Performed by: INTERNAL MEDICINE

## 2019-06-20 PROCEDURE — 80053 COMPREHEN METABOLIC PANEL: CPT | Performed by: INTERNAL MEDICINE

## 2019-06-20 PROCEDURE — G9903 PT SCRN TBCO ID AS NON USER: HCPCS | Performed by: INTERNAL MEDICINE

## 2019-06-20 PROCEDURE — 99204 OFFICE O/P NEW MOD 45 MIN: CPT | Performed by: INTERNAL MEDICINE

## 2019-06-20 PROCEDURE — 85025 COMPLETE CBC W/AUTO DIFF WBC: CPT | Performed by: INTERNAL MEDICINE

## 2019-06-20 RX ORDER — ONDANSETRON 4 MG/1
4 TABLET, FILM COATED ORAL 4 TIMES DAILY PRN
Qty: 40 TABLET | Refills: 3 | Status: SHIPPED | OUTPATIENT
Start: 2019-06-20 | End: 2019-10-22

## 2019-06-20 NOTE — PROGRESS NOTES
DATE OF CONSULT: 6/20/2019      REQUESTING SOURCE:  Dr Gordillo      REASON FOR CONSULTATION: Squamous cell cancer of anus, prostatic adenocarcinoma      HISTORY OF PRESENT ILLNESS:    64-year-old male with medical problem consisting of hypertension, prostatic adenocarcinoma status post robotic prostatectomy done in May 2018 did not recommend any adjuvant radiation or Lupron started having difficulty swallowing around April 2019 which progressively got worse.  Patient was referred to Dr. Gordillo and underwent EGD and colonoscopy on May 30, 2019.  EGD showed esophagitis as well as intrinsic stenosis which was treated by Dr. Gordillo.  On colonoscopy patient was found to have large mass at anus which was biopsied.  Biopsy showed invasive squamous cell cancer positive for HPV.  Patient has been referred to [facility] for further evaluation and recommendation regarding newly diagnosed squamous cell cancer of anus.  Patient denies any history of HIV or hepatitis.  Denies any history of intravenous drug use.  Denies any new lymph node enlargement.  Denies any blood in the stool or constipation.  Denies any weight loss recently.  States his appetite is good.  Is any tingling or numbness affecting upper or lower extremity.    PAST MEDICAL HISTORY:    Past Medical History:   Diagnosis Date   • Hypertension    • Prostate cancer (CMS/HCC) 05/16/2018   • Rectal cancer (CMS/HCC) 06/2019       PAST SURGICAL HISTORY:  Past Surgical History:   Procedure Laterality Date   • COLONOSCOPY N/A 5/30/2019    Procedure: COLONOSCOPY;  Surgeon: Jarek Gordillo DO;  Location: [facility] ENDOSCOPY;  Service: Gastroenterology   • ENDOSCOPY N/A 5/30/2019    Procedure: ESOPHAGOGASTRODUODENOSCOPY;  Surgeon: Jarek Gordillo DO;  Location: [facility] ENDOSCOPY;  Service: Gastroenterology   • PROSTATE SURGERY         ALLERGIES:    No Known Allergies    SOCIAL HISTORY:   Social History     Tobacco Use   • Smoking status: Never Smoker   • Smokeless  "tobacco: Never Used   Substance Use Topics   • Alcohol use: Yes     Comment: rarely   • Drug use: No       CURRENT MEDICATIONS:    Current Outpatient Medications   Medication Sig Dispense Refill   • nebivolol (BYSTOLIC) 10 MG tablet Take 10 mg by mouth Daily.     • ondansetron (ZOFRAN) 4 MG tablet Take 1 tablet by mouth 4 (Four) Times a Day As Needed for Nausea or Vomiting. 40 tablet 3     No current facility-administered medications for this visit.         HOME MEDICATIONS:   Current Outpatient Medications on File Prior to Visit   Medication Sig Dispense Refill   • nebivolol (BYSTOLIC) 10 MG tablet Take 10 mg by mouth Daily.       No current facility-administered medications on file prior to visit.        FAMILY HISTORY:    Family History   Problem Relation Age of Onset   • Uterine cancer Mother          REVIEW OF SYSTEMS:      CONSTITUTIONAL:   Denies any fever, chills or weight loss.     EYES: No visual disturbances. No discharge. No new lesions    ENMT: States difficulty swallowing is significantly improved after recent EGD.  No epistaxis, mouth sores or difficulty swallowing.    RESPIRATORY:  No new shortness of breath. No new cough or hemoptysis.    CARDIOVASCULAR:  No chest pain or palpitations.    GASTROINTESTINAL:  No abdominal pain nausea, vomiting or blood in the stool.    GENITOURINARY: No Dysuria or Hematuria.    MUSCULOSKELETAL:  No new back pain or arthralgia.    LYMPHATICS:  Denies any abnormal swollen glands anywhere in the body.    NEUROLOGICAL : No tingling or numbness. No headache or dizziness. No seizures or balance problems.    SKIN: Erythematous skin lesion present on right side of neck.    ENDOCRINE : No new heat or cold intolerance. No new polyuria . No polydipsia.        PHYSICAL EXAMINATION:      VITAL SIGNS:  /81   Pulse 84   Temp 97.7 °F (36.5 °C)   Resp 18   Ht 171.5 cm (67.5\")   Wt 98.5 kg (217 lb 3.2 oz)   SpO2 94%   BMI 33.52 kg/m²       06/20/19  1339   Weight: 98.5 kg " (217 lb 3.2 oz)       ECOG performance status: 1    CONSTITUTIONAL:  Not in any distress.    EYES: Mild conjunctival Pallor. No Icterus. No Pterygium. Extraocular Movements intact.No ptosis.    ENMT:  Normocephalic, Atraumatic.No Facial Asymmetry noted.    NECK:  No adenopathy.Trachea midline. NO JVD.    RESPIRATORY:  Fair air entry bilateral. No rhonchi or wheezing.Fair respiratory effort.    CARDIOVASCULAR:  S1, S2. Regular rate and rhythm. No murmur or gallop appreciated.    ABDOMEN:  Soft, obese, nontender. Bowel sounds present in all four quadrants.  No Hepatosplenomegaly appreciated.    Rectal: rectal exam was performed in presence of registered nurse Sandra, Anal mass is palpable on rectal exam.    MUSCULOSKELETAL:  No edema.No Calf Tenderness.Normal range of motion.    NEUROLOGIC:    No  Motor or sensory deficit appreciated. Cranial Nerves 2-12 grossly intact.    SKIN : No new skin lesion identified. Skin is warm and dry to touch.    LYMPHATICS: No new enlarged lymph nodes in neck or supraclavicular area.    PSYCHIATRY: Alert, awake and oriented ×3.Normal affect.  Normal judgment.  Makes good eye contact.          DIAGNOSTIC DATA:    WBC   Date Value Ref Range Status   06/20/2019 8.22 3.40 - 10.80 10*3/mm3 Final   05/09/2018 6.0 4.8 - 10.8 10*9/L Final     RBC   Date Value Ref Range Status   06/20/2019 5.13 4.14 - 5.80 10*6/mm3 Final   05/09/2018 5.00 4.7 - 6.1 10*12/L Final     Hemoglobin   Date Value Ref Range Status   06/20/2019 15.5 13.0 - 17.7 g/dL Final   05/17/2018 13.0 (L) 14.0 - 18.0 g/dL Final     Hematocrit   Date Value Ref Range Status   06/20/2019 45.9 37.5 - 51.0 % Final   05/17/2018 38.0 (L) 42 - 54 % Final     MCV   Date Value Ref Range Status   06/20/2019 89.5 79.0 - 97.0 fL Final   05/09/2018 92.8 80 - 100 fL Final     MCH   Date Value Ref Range Status   06/20/2019 30.2 26.6 - 33.0 pg Final   05/09/2018 31.0 26 - 34 pg Final     MCHC   Date Value Ref Range Status   06/20/2019 33.8 31.5 -  35.7 g/dL Final   05/09/2018 33.5 31 - 36 g/dL Final     RDW   Date Value Ref Range Status   06/20/2019 12.4 12.3 - 15.4 % Final   05/09/2018 13.4 11.5 - 14.5 % Final     RDW-SD   Date Value Ref Range Status   06/20/2019 41.0 37.0 - 54.0 fl Final     MPV   Date Value Ref Range Status   06/20/2019 9.0 6.0 - 12.0 fL Final   05/09/2018 7.0 (L) 7.4 - 10.4 fL Final     Platelets   Date Value Ref Range Status   06/20/2019 295 140 - 450 10*3/mm3 Final   05/09/2018 281 150 - 450 10*9/L Final     Neutrophil Rel %   Date Value Ref Range Status   05/09/2018 52.0 35 - 80 % Final     Neutrophil %   Date Value Ref Range Status   06/20/2019 58.8 42.7 - 76.0 % Final     Lymphocyte Rel %   Date Value Ref Range Status   05/09/2018 37.0 18 - 44 % Final     Lymphocyte %   Date Value Ref Range Status   06/20/2019 32.0 19.6 - 45.3 % Final     Monocyte Rel %   Date Value Ref Range Status   05/09/2018 9.4 0 - 10 % Final     Monocyte %   Date Value Ref Range Status   06/20/2019 7.3 5.0 - 12.0 % Final     Eosinophil %   Date Value Ref Range Status   06/20/2019 1.1 0.3 - 6.2 % Final   05/09/2018 1.1 0 - 3 % Final     Basophil Rel %   Date Value Ref Range Status   05/09/2018 0.5 0 - 1 % Final     Basophil %   Date Value Ref Range Status   06/20/2019 0.4 0.0 - 1.5 % Final     Immature Grans %   Date Value Ref Range Status   06/20/2019 0.4 0.0 - 0.5 % Final     Neutrophils, Absolute   Date Value Ref Range Status   06/20/2019 4.84 1.70 - 7.00 10*3/mm3 Final     Lymphocytes Absolute   Date Value Ref Range Status   05/09/2018 2.2 0.8 - 4.8 10*9/L Final     Lymphocytes, Absolute   Date Value Ref Range Status   06/20/2019 2.63 0.70 - 3.10 10*3/mm3 Final     Monocytes Absolute   Date Value Ref Range Status   05/09/2018 0.6 0.2 - 0.9 10*9/L Final     Monocytes, Absolute   Date Value Ref Range Status   06/20/2019 0.60 0.10 - 0.90 10*3/mm3 Final     Eosinophil Abs   Date Value Ref Range Status   05/09/2018 0.1 0.0 - 0.8 10*9/L Final     Eosinophils,  Absolute   Date Value Ref Range Status   06/20/2019 0.09 0.00 - 0.40 10*3/mm3 Final     Basophils Absolute   Date Value Ref Range Status   05/09/2018 0.0 0.0 - 0.1 10*9/L Final     Basophils, Absolute   Date Value Ref Range Status   06/20/2019 0.03 0.00 - 0.20 10*3/mm3 Final     Immature Grans, Absolute   Date Value Ref Range Status   06/20/2019 0.03 0.00 - 0.05 10*3/mm3 Final     nRBC   Date Value Ref Range Status   06/20/2019 0.0 0.0 - 0.2 /100 WBC Final     Glucose   Date Value Ref Range Status   06/20/2019 107 (H) 65 - 99 mg/dL Final     Sodium   Date Value Ref Range Status   06/20/2019 137 136 - 145 mmol/L Final   07/11/2018 138 134 - 144 mmol/L Final   05/17/2018 135 (L) 136 - 145 mmol/L Final     Potassium   Date Value Ref Range Status   06/20/2019 4.1 3.5 - 5.2 mmol/L Final   07/11/2018 4 3.5 - 5.1 mmol/L Final   05/17/2018 4.3 3.3 - 5.0 mmol/L Final     CO2   Date Value Ref Range Status   06/20/2019 27.0 22.0 - 29.0 mmol/L Final     Total CO2   Date Value Ref Range Status   05/17/2018 26 20 - 31 mmol/L Final     Chloride   Date Value Ref Range Status   06/20/2019 98 98 - 107 mmol/L Final   07/11/2018 105 98 - 107 mmol/L Final   05/17/2018 104 99 - 111 mmol/L Final     Anion Gap   Date Value Ref Range Status   06/20/2019 12.0 mmol/L Final   05/17/2018 9 4 - 16 mmol/L Final     Creatinine   Date Value Ref Range Status   06/20/2019 0.90 0.76 - 1.27 mg/dL Final   07/11/2018 1 0.6 - 1.3 mg/dL Final   05/17/2018 1.0 0.6 - 1.3 mg/dL Final     BUN   Date Value Ref Range Status   06/20/2019 25 (H) 8 - 23 mg/dL Final   07/11/2018 34 (H) 7 - 18 mg/dL Final     BUN/Creatinine Ratio   Date Value Ref Range Status   06/20/2019 27.8 (H) 7.0 - 25.0 Final     Calcium   Date Value Ref Range Status   06/20/2019 9.6 8.6 - 10.5 mg/dL Final   07/11/2018 8.8 8.8 - 10.5 mg/dL Final     eGFR Non  Amer   Date Value Ref Range Status   06/20/2019 85 >60 mL/min/1.73 Final     Alkaline Phosphatase   Date Value Ref Range Status    06/20/2019 87 39 - 117 U/L Final   07/11/2018 69 46 - 116 U/L Final     Total Protein   Date Value Ref Range Status   06/20/2019 8.1 6.0 - 8.5 g/dL Final   07/11/2018 7.4 6.4 - 8.2 g/dL Final     ALT (SGPT)   Date Value Ref Range Status   06/20/2019 14 1 - 41 U/L Final   07/11/2018 19 (L) 30 - 65 U/L Final     AST (SGOT)   Date Value Ref Range Status   06/20/2019 16 1 - 40 U/L Final   07/11/2018 17 15 - 37 U/L Final     Total Bilirubin   Date Value Ref Range Status   06/20/2019 0.3 0.2 - 1.2 mg/dL Final   07/11/2018 0.3 0 - 1.0 mg/dL Final     Albumin   Date Value Ref Range Status   06/20/2019 4.20 3.50 - 5.20 g/dL Final   07/11/2018 3.5 3.4 - 5.0 g/dL Final     Globulin   Date Value Ref Range Status   06/20/2019 3.9 gm/dL Final     Lab Results   Component Value Date    [id number] 1,034 07/11/2018     No results found for: , LABCA2, AFPTM, HCGQUANT, , CHROMGRNA, [id number], CEA, REFLABREPO]    HIV-1 & HIV-2 Antibodies         Specimen Collected: 06/20/19 14:41 Last Resulted: 06/20/19 15:33                    Radiology Data :  CT of abdomen and pelvis with contrast done on June 17, 2019 showed:   - - - CT ABDOMEN - - -      THORAX (INFERIOR):   - LUNG BASES:  Clear   - PLEURA:  No fluid or mass   - HEART: Normal size, no pericardial fluid   - MISC:  N/A      ABDOMEN:   - LIVER:  Normal size/contour, no ductal dilatation. Scattered  punctate calcifications are present, consistent with healed  granulomatous disease   - GB: Filled with gallstones   - CBD:  Grossly negative   - SPLEEN:  Normal size and contour. Granulomatous scarring   - PANCREAS:  Normal in size, contour, no focal mass    - VISCERA:  Normal caliber, no wall thickening   - MESENTERY: No mesenteric mass   - CAVITY:  No free abdominal fluid, no free intraperitoneal air   - BODY WALL:  With normal limits   - OSSEOUS:  Degenerative changes most prominently at L5-S1 where  there is almost complete loss of joint space and vacuum  disc  phenomenon      RETROPERITONEUM:   - KIDNEYS:Normal size/contour, no collecting system dilation                    No evidence of an enhancing mass. Bilateral  nonobstructing calculi, and small possible cysts bilaterally.    - URETERS:  Normal course, caliber   - ADRENALS:  Normal size, contour   - MISC:  No sig. retroperitoneal adenopathy or mass   - VASCULAR:  Aorta / iliacs: wnl for age     - - - CT PELVIS - - -       - VISCERA:  Normal caliber small/large bowel, mild thickening of  the rectum, nonspecific   - MESENTERY:  No mass   - VASCULAR:  Within normal limits for age   - CAVITY:  No free fluid / air   - BLADDER:  Unremarkable   - OSSEOUS:  Within normal limits    - PROSTATE:  Grossly wnl  Small left inguinal hernia contains only fat.      IMPRESSION:  1. No definite evidence of metastasis. Mild thickening of the  rectum, may be related to treatment effect, though malignancy  cannot be excluded.   2. Cholelithiasis.   3. Nonobstructing nephrolithiasis.  4. Possible bilateral renal cysts, could be further evaluated and  characterized by ultrasound or multiphasic contrast enhanced CT  or MRI  5. Please see finding sections above for further details.         Chest x-ray PA/lateral done on June 17, 2019 showed:  IMPRESSION:  CONCLUSION: Chronic elevation left diaphragm either diaphragmatic  eventration or diaphragmatic paralysis. Benign calcified  mediastinal and hilar lymph nodes. Lungs otherwise clear.          Pathology :  Pathology report from May 30, 2019 showed:  Component    Final Diagnosis   1.  MUCOSA, ESOPHAGUS:   CHRONIC ESOPHAGITIS.   NEGATIVE FOR FUNGI (GMS STAIN), VIRAL CELLS AND DYSPLASIA.      2.  MUCOSA, ANUS:   INVASIVE NONKERATINIZING SQUAMOUS CELL CARCINOMA, POORLY DIFFERENTIATED.   IMMUNOSTAIN STRONGLY POSITIVE FOR p16 (HPV MARKER).      3.  POLYP, TRANSVERSE COLON:   INFLAMMATORY POLYP.     4.  POLYP, ASCENDING COLON:   SESSILE SERRATED ADENOMA.      5.  POLYP, DESCENDING COLON:   NO  SIGNIFICANT HISTOLOGIC ABNORMALITY.            Pathology report from 05/16/2018 at [facility] showed:  SPECIMEN:  A. PROSTATE      CLINICAL HISTORY:  NONE GIVEN  PRE-OP DIAGNOSIS:  ELEVATED PSA  POST-OP DIAGNOSIS:  PROCEDURE:  ROBOTIC ASSISTED PROSTATECTOMY    FINAL DIAGNOSIS:    RADICAL PROSTATECTOMY SPECIMEN:       Invasive carcinoma.       - Histologic type:  Adenocarcinoma, conventional.       - Histologic [place] score:  6 (3+3).       - Grade group:  1.       - Tumor quantitation:  Less than 1%. (Tumor present in 2 of 16 slides)       - Margins:  Clear.        - Bilateral involvement:  Present.       - Extra-prostatic extension:  Not identified.       - Seminal vesicle invasion:  Not identified.       - Perineural invasion:  Not identified.       - Angiolymphatic invasion:  Not identified.       - Other findings:  Benign glandular cyst near bladder margin.       - Pathologic staging:  pT2c.    GROSS:      Received is a 56 gram radical prostatectomy specimen with both seminal vesicles attached.  The prostate is 5.2 x 4.5 x 4.6 cm.  There is a 1 cm cystic structure present at the bladder margin on the right side.  Sections through the specimen demonstrate a somewhat nodular surface with increased firmness present primarily of the right posterior aspect.  Prostate from the right anterior margin is submitted as A1, right anterior lateral A2, right mid lateral A3, right posterior A4-A6, and right seminal vesicle A7.  A section of the left anterior prostate is submitted as A8, left anterior lateral A9, left mid lateral A10, left posterior A11-A13, and left seminal vesicle A14.  Sections of the cystic lesion are submitted as A15 and A16.       (AEE)    MICROSCOPIC:    Multiple sections of prostate from right and left lobes including margins are evaluated.  Most of the prostate features hyperplastic changes with chronic inflammation.  Two areas feature prostatic adenocarcinoma.  These are on slides A1 and  A12.  In both slides, the amount of tumor is relatively small with the margins being clear.  Perineural invasion is not noted.  There is no evidence of seminal vesicle invasion.  Sections from the bladder margin demonstrate a benign glandular type cyst.      Final Diagnosis performed by  Gilson Vaughn M.D.  Electronically signed 5/18/2018 9:01AM      ASSESSMENT AND PLAN:      1.  Invasive nonkeratinizing squamous cell cancer of anus, CT2 N0 M0, stage IIa diagnosed on May 30, 2019.(Problem is new to me and potentially life-threatening)  -Result of CT scan of abdomen and pelvis, pathology and staging were discussed with the patient today.  -It was discussed with as per NCCN guidelines, her standard recommendation for this kind of cancer is concurrent chemoradiation with 5-FU and mitomycin.  -Patient has already been evaluated by Dr. Lopez on June 18, 2019.  Case was discussed with Dr. Lopez as well.  -Chemotherapy side effects  Including but not limited to risk of low blood counts, risk of infection, need for blood transfusion,risk of bleeding, risk of kidney failure, risk of heart attack, risk of secondary blood cancer, diarrhea, nausea,vomitting ,risk of neuropathy and risk of allergic reaction which can be life-threatening were discussed with patient and family. We will also provide them some information today to read about the chemotherapy.  -Patient is scheduled to get CT of chest without contrast next week to complete staging work-up.  -Patient prefers getting port placement rather than PICC line due to his occupation.  Will refer him back to Dr. Wasserman for port placement.  -Plan is to start him on day 1 of 5-FU and mitomycin once radiation plan is ready.  -CBC CMP done earlier today is within normal limit.  HIV testing done today on June 20, 2019 was negative.    2.  Prostatic adenocarcinoma, PT2cN0, stage IIc  -Status post radical prostatectomy on May 16, 2018.  -Most recent PSA done in May 2019 was  <0.1  -We will need periodic PSA check to rule out recurrence which was discussed with patient.  -Outside pathology report was reviewed.    3.  Hypertension    4.  Health maintenance: Patient does not smoke.  Had a colonoscopy done on May 30, 2019 by Dr. Gordillo.    5. Advance Care Planning: For now patient remains full code and is able to make his decisions.  Patient has health care surrogate mentioned on chart.    6.  Pain assessment:  -Patient denies any pain today.        Thank you for this consultation.        Terry Ly MD  6/20/2019  4:03 PM          EMR Dragon/Transcription disclaimer:   Much of this encounter note is an electronic transcription/translation of spoken language to printed text. The electronic translation of spoken language may permit erroneous, or at times, nonsensical words or phrases to be inadvertently transcribed; Although I have reviewed the note for such errors, some may still exist.

## 2019-06-20 NOTE — PATIENT INSTRUCTIONS
Fluorouracil, 5-FU injection  What is this medicine?  FLUOROURACIL, 5-FU (flure oh YOOR a laurie) is a chemotherapy drug. It slows the growth of cancer cells. This medicine is used to treat many types of cancer like breast cancer, colon or rectal cancer, pancreatic cancer, and stomach cancer.  This medicine may be used for other purposes; ask your health care provider or pharmacist if you have questions.  COMMON BRAND NAME(S): Mihai  What should I tell my health care provider before I take this medicine?  They need to know if you have any of these conditions:  -blood disorders  -dihydropyrimidine dehydrogenase (DPD) deficiency  -infection (especially a virus infection such as chickenpox, cold sores, or herpes)  -kidney disease  -liver disease  -malnourished, poor nutrition  -recent or ongoing radiation therapy  -an unusual or allergic reaction to fluorouracil, other chemotherapy, other medicines, foods, dyes, or preservatives  -pregnant or trying to get pregnant  -breast-feeding  How should I use this medicine?  This drug is given as an infusion or injection into a vein. It is administered in a hospital or clinic by a specially trained health care professional.  Talk to your pediatrician regarding the use of this medicine in children. Special care may be needed.  Overdosage: If you think you have taken too much of this medicine contact a poison control center or emergency room at once.  NOTE: This medicine is only for you. Do not share this medicine with others.  What if I miss a dose?  It is important not to miss your dose. Call your doctor or health care professional if you are unable to keep an appointment.  What may interact with this medicine?  -allopurinol  -cimetidine  -dapsone  -digoxin  -hydroxyurea  -leucovorin  -levamisole  -medicines for seizures like ethotoin, fosphenytoin, phenytoin  -medicines to increase blood counts like filgrastim, pegfilgrastim, sargramostim  -medicines that treat or prevent blood  clots like warfarin, enoxaparin, and dalteparin  -methotrexate  -metronidazole  -pyrimethamine  -some other chemotherapy drugs like busulfan, cisplatin, estramustine, vinblastine  -trimethoprim  -trimetrexate  -vaccines  Talk to your doctor or health care professional before taking any of these medicines:  -acetaminophen  -aspirin  -ibuprofen  -ketoprofen  -naproxen  This list may not describe all possible interactions. Give your health care provider a list of all the medicines, herbs, non-prescription drugs, or dietary supplements you use. Also tell them if you smoke, drink alcohol, or use illegal drugs. Some items may interact with your medicine.  What should I watch for while using this medicine?  Visit your doctor for checks on your progress. This drug may make you feel generally unwell. This is not uncommon, as chemotherapy can affect healthy cells as well as cancer cells. Report any side effects. Continue your course of treatment even though you feel ill unless your doctor tells you to stop.  In some cases, you may be given additional medicines to help with side effects. Follow all directions for their use.  Call your doctor or health care professional for advice if you get a fever, chills or sore throat, or other symptoms of a cold or flu. Do not treat yourself. This drug decreases your body's ability to fight infections. Try to avoid being around people who are sick.  This medicine may increase your risk to bruise or bleed. Call your doctor or health care professional if you notice any unusual bleeding.  Be careful brushing and flossing your teeth or using a toothpick because you may get an infection or bleed more easily. If you have any dental work done, tell your dentist you are receiving this medicine.  Avoid taking products that contain aspirin, acetaminophen, ibuprofen, naproxen, or ketoprofen unless instructed by your doctor. These medicines may hide a fever.  Do not become pregnant while taking this  medicine. Women should inform their doctor if they wish to become pregnant or think they might be pregnant. There is a potential for serious side effects to an unborn child. Talk to your health care professional or pharmacist for more information. Do not breast-feed an infant while taking this medicine.  Men should inform their doctor if they wish to father a child. This medicine may lower sperm counts.  Do not treat diarrhea with over the counter products. Contact your doctor if you have diarrhea that lasts more than 2 days or if it is severe and watery.  This medicine can make you more sensitive to the sun. Keep out of the sun. If you cannot avoid being in the sun, wear protective clothing and use sunscreen. Do not use sun lamps or tanning beds/booths.  What side effects may I notice from receiving this medicine?  Side effects that you should report to your doctor or health care professional as soon as possible:  -allergic reactions like skin rash, itching or hives, swelling of the face, lips, or tongue  -low blood counts - this medicine may decrease the number of white blood cells, red blood cells and platelets. You may be at increased risk for infections and bleeding.  -signs of infection - fever or chills, cough, sore throat, pain or difficulty passing urine  -signs of decreased platelets or bleeding - bruising, pinpoint red spots on the skin, black, tarry stools, blood in the urine  -signs of decreased red blood cells - unusually weak or tired, fainting spells, lightheadedness  -breathing problems  -changes in vision  -chest pain  -mouth sores  -nausea and vomiting  -pain, swelling, redness at site where injected  -pain, tingling, numbness in the hands or feet  -redness, swelling, or sores on hands or feet  -stomach pain  -unusual bleeding  Side effects that usually do not require medical attention (report to your doctor or health care professional if they continue or are bothersome):  -changes in finger or  toe nails  -diarrhea  -dry or itchy skin  -hair loss  -headache  -loss of appetite  -sensitivity of eyes to the light  -stomach upset  -unusually teary eyes  This list may not describe all possible side effects. Call your doctor for medical advice about side effects. You may report side effects to FDA at 6-241-FDA-0892.  Where should I keep my medicine?  This drug is given in a hospital or clinic and will not be stored at home.  NOTE: This sheet is a summary. It may not cover all possible information. If you have questions about this medicine, talk to your doctor, pharmacist, or health care provider.  © 2019 [employer]/Gold Standard (2009-04-22 13:53:16)  Mitomycin injection  What is this medicine?  MITOMYCIN (mye toe MYE sin) is a chemotherapy drug. This medicine is used to treat cancer of the stomach and pancreas.  This medicine may be used for other purposes; ask your health care provider or pharmacist if you have questions.  COMMON BRAND NAME(S): Mutamycin  What should I tell my health care provider before I take this medicine?  They need to know if you have any of these conditions:  -anemia  -bleeding disorder  -infection (especially a virus infection such as chickenpox, cold sores, or herpes)  -kidney disease  -low blood counts like low platelets, red blood cells, white blood cells  -recent radiation therapy  -an unusual or allergic reaction to mitomycin, other chemotherapy agents, other medicines, foods, dyes, or preservatives  -pregnant or trying to get pregnant  -breast-feeding  How should I use this medicine?  This drug is given as an injection or infusion into a vein. It is administered in a hospital or clinic by a specially trained health care professional.  Talk to your pediatrician regarding the use of this medicine in children. Special care may be needed.  Overdosage: If you think you have taken too much of this medicine contact a poison control center or emergency room at once.  NOTE: This medicine is  only for you. Do not share this medicine with others.  What if I miss a dose?  It is important not to miss your dose. Call your doctor or health care professional if you are unable to keep an appointment.  What may interact with this medicine?  -medicines to increase blood counts like filgrastim, pegfilgrastim, sargramostim  -vaccines  This list may not describe all possible interactions. Give your health care provider a list of all the medicines, herbs, non-prescription drugs, or dietary supplements you use. Also tell them if you smoke, drink alcohol, or use illegal drugs. Some items may interact with your medicine.  What should I watch for while using this medicine?  Your condition will be monitored carefully while you are receiving this medicine. You will need important blood work done while you are taking this medicine.  This drug may make you feel generally unwell. This is not uncommon, as chemotherapy can affect healthy cells as well as cancer cells. Report any side effects. Continue your course of treatment even though you feel ill unless your doctor tells you to stop.  Call your doctor or health care professional for advice if you get a fever, chills or sore throat, or other symptoms of a cold or flu. Do not treat yourself. This drug decreases your body's ability to fight infections. Try to avoid being around people who are sick.  This medicine may increase your risk to bruise or bleed. Call your doctor or health care professional if you notice any unusual bleeding.  Be careful brushing and flossing your teeth or using a toothpick because you may get an infection or bleed more easily. If you have any dental work done, tell your dentist you are receiving this medicine.  Avoid taking products that contain aspirin, acetaminophen, ibuprofen, naproxen, or ketoprofen unless instructed by your doctor. These medicines may hide a fever.  Do not become pregnant while taking this medicine. Women should inform their  doctor if they wish to become pregnant or think they might be pregnant. There is a potential for serious side effects to an unborn child. Talk to your health care professional or pharmacist for more information. Do not breast-feed an infant while taking this medicine.  What side effects may I notice from receiving this medicine?  Side effects that you should report to your doctor or health care professional as soon as possible:  -allergic reactions like skin rash, itching or hives, swelling of the face, lips, or tongue  -low blood counts - this medicine may decrease the number of white blood cells, red blood cells and platelets. You may be at increased risk for infections and bleeding.  -signs of infection - fever or chills, cough, sore throat, pain or difficulty passing urine  -signs of decreased platelets or bleeding - bruising, pinpoint red spots on the skin, black, tarry stools, blood in the urine  -signs of decreased red blood cells - unusually weak or tired, fainting spells, lightheadedness  -breathing problems  -changes in vision  -chest pain  -confusion  -dry cough  -high blood pressure  -mouth sores  -pain, swelling, redness at site where injected  -pain, tingling, numbness in the hands or feet  -seizures  -swelling of the ankles, feet, hands  -trouble passing urine or change in the amount of urine  Side effects that usually do not require medical attention (report to your doctor or health care professional if they continue or are bothersome):  -diarrhea  -green to blue color of urine  -hair loss  -loss of appetite  -nausea, vomiting  This list may not describe all possible side effects. Call your doctor for medical advice about side effects. You may report side effects to FDA at 8-124-FDA-6389.  Where should I keep my medicine?  This drug is given in a hospital or clinic and will not be stored at home.  NOTE: This sheet is a summary. It may not cover all possible information. If you have questions about  this medicine, talk to your doctor, pharmacist, or health care provider.  © 2019 [employer]/Gold Standard (2009-06-25 11:16:23)

## 2019-06-21 NOTE — PROGRESS NOTES
New patient consultation.  Radiation oncology.  Distress score zero.  Oncology SW met in the exam room with patient subsequent to his initial rad onc consult.  Pt. Is alone for this visit. Pt. Newly diagnosed with anal cancer and known history of prostate cancer.      Pt. Presents calm, soft spoken, and cooperative with mood and affect within normal limits.  Pt. States no emotional distress and describes self as coping well.  64 year old patient is  and understood to be independently employed. SW offered feedback as to oncology supports and services.  Pt. Voiced no current SW needs and none identified. Ongoing support of team reinforced.  Pt. Encouraged to call should need arise.

## 2019-06-24 ENCOUNTER — OFFICE VISIT (OUTPATIENT)
Dept: SURGERY | Facility: CLINIC | Age: 64
End: 2019-06-24

## 2019-06-24 VITALS
HEIGHT: 68 IN | BODY MASS INDEX: 33.07 KG/M2 | DIASTOLIC BLOOD PRESSURE: 74 MMHG | SYSTOLIC BLOOD PRESSURE: 126 MMHG | WEIGHT: 218.2 LBS | HEART RATE: 78 BPM | TEMPERATURE: 98 F

## 2019-06-24 DIAGNOSIS — C21.0 ANAL CANCER (HCC): Primary | ICD-10-CM

## 2019-06-24 PROCEDURE — 99212 OFFICE O/P EST SF 10 MIN: CPT | Performed by: SURGERY

## 2019-06-24 RX ORDER — SODIUM CHLORIDE, SODIUM LACTATE, POTASSIUM CHLORIDE, CALCIUM CHLORIDE 600; 310; 30; 20 MG/100ML; MG/100ML; MG/100ML; MG/100ML
100 INJECTION, SOLUTION INTRAVENOUS CONTINUOUS
Status: CANCELLED | OUTPATIENT
Start: 2019-06-27

## 2019-06-24 RX ORDER — OXYBUTYNIN CHLORIDE 5 MG/1
1 TABLET ORAL 3 TIMES DAILY
COMMUNITY
Start: 2019-06-14 | End: 2021-07-20

## 2019-06-24 NOTE — PROGRESS NOTES
Chief Complaint   Patient presents with   • Mediport     Possible Mediport placement.        HPI  Hx of anal cancer. Bx by Dr. Gordillo. Cancer center requires port for chemotherapy.  Past Medical History:   Diagnosis Date   • Hypertension    • Prostate cancer (CMS/HCC) 05/16/2018   • Rectal cancer (CMS/HCC) 06/2019       Past Surgical History:   Procedure Laterality Date   • COLONOSCOPY N/A 5/30/2019    Procedure: COLONOSCOPY;  Surgeon: Jarek Gordillo DO;  Location: [facility] ENDOSCOPY;  Service: Gastroenterology   • ENDOSCOPY N/A 5/30/2019    Procedure: ESOPHAGOGASTRODUODENOSCOPY;  Surgeon: Jarek Gordillo DO;  Location: [facility] ENDOSCOPY;  Service: Gastroenterology   • PROSTATE SURGERY           Current Outpatient Medications:   •  oxybutynin (DITROPAN) 5 MG tablet, Take 1 tablet by mouth 3 (Three) Times a Day., Disp: , Rfl:   •  nebivolol (BYSTOLIC) 10 MG tablet, Take 10 mg by mouth Daily., Disp: , Rfl:   •  ondansetron (ZOFRAN) 4 MG tablet, Take 1 tablet by mouth 4 (Four) Times a Day As Needed for Nausea or Vomiting., Disp: 40 tablet, Rfl: 3    No Known Allergies    Family History   Problem Relation Age of Onset   • Uterine cancer Mother        Social History     Socioeconomic History   • Marital status:      Spouse name: Jeffrey   • Number of children: 2   • Years of education: 15   • Highest education level: Not on file   Occupational History   • Occupation: [employer]   Tobacco Use   • Smoking status: Never Smoker   • Smokeless tobacco: Never Used   Substance and Sexual Activity   • Alcohol use: Yes     Comment: rarely   • Drug use: No   • Sexual activity: Defer       Review of Systems   Constitutional: Negative for activity change, appetite change, chills and fever.   HENT: Negative for hearing loss, nosebleeds and trouble swallowing.    Cardiovascular: Negative for chest pain, palpitations and leg swelling.   Gastrointestinal: Negative for abdominal distention, abdominal pain, anal bleeding, blood in  stool, constipation, diarrhea, nausea, rectal pain and vomiting.   Endocrine: Negative for cold intolerance, heat intolerance, polydipsia and polyuria.   Genitourinary: Negative for decreased urine volume, difficulty urinating, dysuria, enuresis, frequency, hematuria and urgency.   Musculoskeletal: Negative for arthralgias, back pain, gait problem, myalgias and neck pain.   Skin: Negative for pallor, rash and wound.   Allergic/Immunologic: Negative for immunocompromised state.   Neurological: Negative for dizziness, seizures, weakness, light-headedness, numbness and headaches.   Psychiatric/Behavioral: Negative for agitation and behavioral problems. The patient is not nervous/anxious.        Physical Exam   Constitutional: He is oriented to person, place, and time. He appears well-developed and well-nourished. No distress.   Neck: Normal range of motion. Neck supple.   Cardiovascular: Normal rate and regular rhythm.   Pulmonary/Chest: Effort normal and breath sounds normal.   Neurological: He is alert and oriented to person, place, and time.   Skin: Skin is warm and dry. He is not diaphoretic.   Psychiatric: He has a normal mood and affect. His behavior is normal.   Vitals reviewed.        ASSESSMENT    Juan Antonio was seen today for mediport.    Diagnoses and all orders for this visit:    Anal cancer (CMS/[facility])        PLAN    1. Mediport placement with fluoroscopy and ultrasound    What are the indications that have led your doctor to the opinion that an operation is necessary?    A mediport is a device placed under the skin, that connects to a large vein in the chest or neck. It is used for the administration of fluid or medication.     What, if any, alternative treatments are available for your condition?    Peripheral veins or a peripherally placed PICC line may be used.    What will be the likely result if you don't have the operation?    It may not be possible to administer needed medications. Toxic medications may be  harmful to the veins if given in a peripheral vein.    What are the basic procedures involved in the operation?    After anesthesia, a needle is passed into a large vein in the neck or chest. Ultrasound may be used to find the veins of the neck, but not the chest. Once accessed, a wire is passed through the needle and into the central veins using fluoroscopy.  Sheath is passed over the wire and the wire is removed. The tubing is passed under the skin and through the sheath. The sheath is pealed away and the tubing is left in the vein. Placement is confirmed with xray.    What are the risks?    Risks of the procedure include but are not limited to bleeding, infection, pneumothorax, blood clots, poor wound healing, poor port function, skin erosion, and pain. Facial and arm swelling require immediate evaluation.    How is the operation expected to improve your health or quality of life?    IV access is more easily obtained.    Is hospitalization necessary and, if so, how long can you expect to be hospitalized?    The procedure is performed on an outpatient basis.    What can you expect during your recovery period?    Minimal pain is usually controlled with non-narcotic pain medication.    When can you expect to resume normal activities?    Normal activity may occur within a few days. The port may be accessed immediately for treatment.    Are there likely to be residual effects from the operation?    There are usually no residual effects of operation.    He understands, agrees, and desires to proceed.              This document has been electronically signed by Benson Wasserman MD on June 24, 2019 10:10 AM

## 2019-06-24 NOTE — PATIENT INSTRUCTIONS

## 2019-06-24 NOTE — PROGRESS NOTES
Oncology SW met with patient as he presents for his initial medical oncology Consult.  SW initially met pt on his first visit 6-18 with radiation oncology.  SW reintroduced self and inquired as to pt coping, questions, needs and support. Pt. Shared no immediate needs and none identified.  Pt. Processed feedback of the visit and the plan of treatment. SW offered emotional support, encouragement, and ongoing availability.

## 2019-06-25 ENCOUNTER — HOSPITAL ENCOUNTER (OUTPATIENT)
Dept: RADIATION ONCOLOGY | Facility: HOSPITAL | Age: 64
Discharge: HOME | End: 2019-06-25

## 2019-06-25 PROCEDURE — 77334 RADIATION TREATMENT AID(S): CPT | Performed by: RADIOLOGY

## 2019-06-25 PROCEDURE — 77470 SPECIAL RADIATION TREATMENT: CPT | Performed by: RADIOLOGY

## 2019-06-26 ENCOUNTER — APPOINTMENT (OUTPATIENT)
Dept: PREADMISSION TESTING | Facility: HOSPITAL | Age: 64
End: 2019-06-26

## 2019-06-26 ENCOUNTER — ANESTHESIA EVENT (OUTPATIENT)
Dept: PERIOP | Facility: HOSPITAL | Age: 64
End: 2019-06-26

## 2019-06-26 VITALS
RESPIRATION RATE: 18 BRPM | DIASTOLIC BLOOD PRESSURE: 82 MMHG | OXYGEN SATURATION: 95 % | WEIGHT: 215 LBS | BODY MASS INDEX: 32.58 KG/M2 | SYSTOLIC BLOOD PRESSURE: 134 MMHG | HEIGHT: 68 IN | HEART RATE: 66 BPM

## 2019-06-26 PROCEDURE — 93010 ELECTROCARDIOGRAM REPORT: CPT | Performed by: INTERNAL MEDICINE

## 2019-06-26 PROCEDURE — 93005 ELECTROCARDIOGRAM TRACING: CPT

## 2019-06-27 ENCOUNTER — ANESTHESIA (OUTPATIENT)
Dept: PERIOP | Facility: HOSPITAL | Age: 64
End: 2019-06-27

## 2019-06-27 ENCOUNTER — HOSPITAL ENCOUNTER (OUTPATIENT)
Facility: HOSPITAL | Age: 64
Setting detail: HOSPITAL OUTPATIENT SURGERY
Discharge: HOME | End: 2019-06-27
Attending: SURGERY | Admitting: SURGERY

## 2019-06-27 ENCOUNTER — APPOINTMENT (OUTPATIENT)
Dept: GENERAL RADIOLOGY | Facility: HOSPITAL | Age: 64
End: 2019-06-27

## 2019-06-27 ENCOUNTER — HOSPITAL ENCOUNTER (OUTPATIENT)
Dept: CT IMAGING | Facility: HOSPITAL | Age: 64
Discharge: HOME | End: 2019-06-27

## 2019-06-27 VITALS
HEIGHT: 68 IN | WEIGHT: 215.17 LBS | TEMPERATURE: 97 F | DIASTOLIC BLOOD PRESSURE: 80 MMHG | BODY MASS INDEX: 32.61 KG/M2 | RESPIRATION RATE: 18 BRPM | SYSTOLIC BLOOD PRESSURE: 136 MMHG | OXYGEN SATURATION: 96 % | HEART RATE: 65 BPM

## 2019-06-27 DIAGNOSIS — C21.0 ANAL CANCER (HCC): ICD-10-CM

## 2019-06-27 PROCEDURE — 76000 FLUOROSCOPY <1 HR PHYS/QHP: CPT

## 2019-06-27 PROCEDURE — 25010000002 PROPOFOL 10 MG/ML EMULSION: Performed by: NURSE ANESTHETIST, CERTIFIED REGISTERED

## 2019-06-27 PROCEDURE — 71250 CT THORAX DX C-: CPT

## 2019-06-27 PROCEDURE — 25010000002 MIDAZOLAM PER 1 MG: Performed by: NURSE ANESTHETIST, CERTIFIED REGISTERED

## 2019-06-27 PROCEDURE — 25010000002 FENTANYL CITRATE (PF) 100 MCG/2ML SOLUTION: Performed by: NURSE ANESTHETIST, CERTIFIED REGISTERED

## 2019-06-27 PROCEDURE — 36561 INSERT TUNNELED CV CATH: CPT | Performed by: SURGERY

## 2019-06-27 PROCEDURE — 76937 US GUIDE VASCULAR ACCESS: CPT | Performed by: SURGERY

## 2019-06-27 PROCEDURE — 25010000002 ONDANSETRON PER 1 MG: Performed by: NURSE ANESTHETIST, CERTIFIED REGISTERED

## 2019-06-27 PROCEDURE — C1788 PORT, INDWELLING, IMP: HCPCS | Performed by: SURGERY

## 2019-06-27 PROCEDURE — 25010000003 CEFAZOLIN PER 500 MG: Performed by: NURSE ANESTHETIST, CERTIFIED REGISTERED

## 2019-06-27 PROCEDURE — 25010000002 SUCCINYLCHOLINE PER 20 MG: Performed by: NURSE ANESTHETIST, CERTIFIED REGISTERED

## 2019-06-27 PROCEDURE — 77001 FLUOROGUIDE FOR VEIN DEVICE: CPT | Performed by: SURGERY

## 2019-06-27 DEVICE — POWERPORT M.R.I. IMPLANTABLE PORT WITH PRE-ATTACHED 9.6F  OPEN-ENDED SINGLE-LUMEN VENOUS CATHETER. INTERMEDIATE KIT (WITH SUTURE PLUGS)
Type: IMPLANTABLE DEVICE | Status: NON-FUNCTIONAL
Brand: POWERPORT M.R.I.

## 2019-06-27 RX ORDER — CEFAZOLIN SODIUM 1 G/3ML
INJECTION, POWDER, FOR SOLUTION INTRAMUSCULAR; INTRAVENOUS AS NEEDED
Status: DISCONTINUED | OUTPATIENT
Start: 2019-06-27 | End: 2019-06-27 | Stop reason: SURG

## 2019-06-27 RX ORDER — SODIUM CHLORIDE, SODIUM GLUCONATE, SODIUM ACETATE, POTASSIUM CHLORIDE, AND MAGNESIUM CHLORIDE 526; 502; 368; 37; 30 MG/100ML; MG/100ML; MG/100ML; MG/100ML; MG/100ML
INJECTION, SOLUTION INTRAVENOUS CONTINUOUS PRN
Status: DISCONTINUED | OUTPATIENT
Start: 2019-06-27 | End: 2019-06-27 | Stop reason: SURG

## 2019-06-27 RX ORDER — SODIUM CHLORIDE, SODIUM LACTATE, POTASSIUM CHLORIDE, CALCIUM CHLORIDE 600; 310; 30; 20 MG/100ML; MG/100ML; MG/100ML; MG/100ML
100 INJECTION, SOLUTION INTRAVENOUS CONTINUOUS
Status: DISCONTINUED | OUTPATIENT
Start: 2019-06-27 | End: 2019-06-27 | Stop reason: HOSPADM

## 2019-06-27 RX ORDER — LIDOCAINE HYDROCHLORIDE 20 MG/ML
INJECTION, SOLUTION INFILTRATION; PERINEURAL AS NEEDED
Status: DISCONTINUED | OUTPATIENT
Start: 2019-06-27 | End: 2019-06-27 | Stop reason: SURG

## 2019-06-27 RX ORDER — PROPOFOL 10 MG/ML
VIAL (ML) INTRAVENOUS AS NEEDED
Status: DISCONTINUED | OUTPATIENT
Start: 2019-06-27 | End: 2019-06-27 | Stop reason: SURG

## 2019-06-27 RX ORDER — FENTANYL CITRATE 50 UG/ML
INJECTION, SOLUTION INTRAMUSCULAR; INTRAVENOUS AS NEEDED
Status: DISCONTINUED | OUTPATIENT
Start: 2019-06-27 | End: 2019-06-27 | Stop reason: SURG

## 2019-06-27 RX ORDER — MIDAZOLAM HYDROCHLORIDE 1 MG/ML
INJECTION INTRAMUSCULAR; INTRAVENOUS AS NEEDED
Status: DISCONTINUED | OUTPATIENT
Start: 2019-06-27 | End: 2019-06-27 | Stop reason: SURG

## 2019-06-27 RX ORDER — SUCCINYLCHOLINE CHLORIDE 20 MG/ML
INJECTION INTRAMUSCULAR; INTRAVENOUS AS NEEDED
Status: DISCONTINUED | OUTPATIENT
Start: 2019-06-27 | End: 2019-06-27 | Stop reason: SURG

## 2019-06-27 RX ORDER — ONDANSETRON 2 MG/ML
INJECTION INTRAMUSCULAR; INTRAVENOUS AS NEEDED
Status: DISCONTINUED | OUTPATIENT
Start: 2019-06-27 | End: 2019-06-27 | Stop reason: SURG

## 2019-06-27 RX ORDER — HYDROCODONE BITARTRATE AND ACETAMINOPHEN 7.5; 325 MG/1; MG/1
1 TABLET ORAL EVERY 4 HOURS PRN
Status: DISCONTINUED | OUTPATIENT
Start: 2019-06-27 | End: 2019-06-27 | Stop reason: HOSPADM

## 2019-06-27 RX ADMIN — PROPOFOL 150 MG: 10 INJECTION, EMULSION INTRAVENOUS at 11:39

## 2019-06-27 RX ADMIN — HYDROCODONE BITARTRATE AND ACETAMINOPHEN 1 TABLET: 7.5; 325 TABLET ORAL at 13:56

## 2019-06-27 RX ADMIN — SODIUM CHLORIDE, POTASSIUM CHLORIDE, SODIUM LACTATE AND CALCIUM CHLORIDE 100 ML/HR: 600; 310; 30; 20 INJECTION, SOLUTION INTRAVENOUS at 08:54

## 2019-06-27 RX ADMIN — LIDOCAINE HYDROCHLORIDE 60 MG: 20 INJECTION, SOLUTION INFILTRATION; PERINEURAL at 11:38

## 2019-06-27 RX ADMIN — PROPOFOL 200 MG: 10 INJECTION, EMULSION INTRAVENOUS at 11:56

## 2019-06-27 RX ADMIN — SODIUM CHLORIDE, SODIUM GLUCONATE, SODIUM ACETATE, POTASSIUM CHLORIDE, AND MAGNESIUM CHLORIDE: 526; 502; 368; 37; 30 INJECTION, SOLUTION INTRAVENOUS at 12:18

## 2019-06-27 RX ADMIN — MIDAZOLAM HYDROCHLORIDE 2 MG: 2 INJECTION, SOLUTION INTRAMUSCULAR; INTRAVENOUS at 11:31

## 2019-06-27 RX ADMIN — SUCCINYLCHOLINE CHLORIDE 180 MG: 20 INJECTION, SOLUTION INTRAMUSCULAR; INTRAVENOUS at 11:56

## 2019-06-27 RX ADMIN — CEFAZOLIN SODIUM 1 G: 1 INJECTION, POWDER, FOR SOLUTION INTRAMUSCULAR; INTRAVENOUS at 11:41

## 2019-06-27 RX ADMIN — FENTANYL CITRATE 50 MCG: 50 INJECTION, SOLUTION INTRAMUSCULAR; INTRAVENOUS at 11:43

## 2019-06-27 RX ADMIN — ONDANSETRON 4 MG: 2 INJECTION INTRAMUSCULAR; INTRAVENOUS at 12:29

## 2019-06-27 NOTE — ANESTHESIA PROCEDURE NOTES
Airway  Urgency: elective    Airway not difficult    General Information and Staff    Patient location during procedure: OR    Indications and Patient Condition  Indications for airway management: airway protection    Preoxygenated: yes  MILS not maintained throughout  Mask difficulty assessment: 2 - vent by mask + OA or adjuvant +/- NMBA    Final Airway Details  Final airway type: endotracheal airway      Successful airway: ETT  Cuffed: yes   Successful intubation technique: direct laryngoscopy and video laryngoscopy  Facilitating devices/methods: cricoid pressure and intubating stylet  Endotracheal tube insertion site: oral  Blade: Merna  Blade size: 3  ETT size (mm): 7.5  Cormack-Lehane Classification: grade I - full view of glottis  Placement verified by: chest auscultation, capnometry and palpation of cuff   Cuff volume (mL): 10  Measured from: gums  ETT to gums (cm): 21  Number of attempts at approach: 1    Additional Comments  ETT placed due to unable to seat 4 and 5 LMA without air leak. 7.5 tube was slightly tight, 7.0 ETT may be a better option in the future.

## 2019-06-27 NOTE — ANESTHESIA PREPROCEDURE EVALUATION
Anesthesia Evaluation     Patient summary reviewed and Nursing notes reviewed   no history of anesthetic complications:  NPO Solid Status: > 8 hours  NPO Liquid Status: > 8 hours           Airway   Mallampati: II  TM distance: >3 FB  Neck ROM: full  possible difficult intubation  Dental    (+) poor dentation    Pulmonary - negative pulmonary ROS and normal exam    breath sounds clear to auscultation  (-) not a smoker    ROS comment: FINDINGS: Cardiac silhouette is normal in size. Pulmonary  vascularity is unremarkable.      No focal infiltrate or consolidation.  No pleural effusion.  No  pneumothorax. There is benign calcified granulomata in the  mediastinum and both hilar regions.  Chronic elevation left diaphragm. This suggests diaphragmatic  eventration or diaphragmatic paralysis.           IMPRESSION:  CONCLUSION: Chronic elevation left diaphragm either diaphragmatic  eventration or diaphragmatic paralysis. Benign calcified  mediastinal and hilar lymph nodes. Lungs otherwise clear.     Electronically signed by:  Pelon Carr MD  6/17/2019 3:23 PM CDT  Cardiovascular - normal exam    ECG reviewed  Rhythm: regular  Rate: normal    (+) hypertension,   (-) murmur    ROS comment: Normal sinus rhythm  Normal ECG  No previous ECGs available    Referred By:             Confirmed By:     Specimen Collected: 06/26/19 08:10          Neuro/Psych- negative ROS  GI/Hepatic/Renal/Endo    (+) obesity,       Musculoskeletal (-) negative ROS    Abdominal   (+) obese,    Substance History - negative use     OB/GYN negative ob/gyn ROS         Other      history of cancer (Prostate/rectal.)                    Anesthesia Plan    ASA 3     general     intravenous induction   Anesthetic plan, all risks, benefits, and alternatives have been provided, discussed and informed consent has been obtained with: patient and spouse/significant other.

## 2019-06-27 NOTE — ANESTHESIA POSTPROCEDURE EVALUATION
Patient: Juan Antonio Shaw    Procedure Summary     Date:  06/27/19 Room / Location:  [facility] OR 03 / [facility] OR    Anesthesia Start:  1132 Anesthesia Stop:  1246    Procedure:  INSERTION VENOUS ACCESS DEVICE (MEDIPORT)           (C-ARM#1) (N/A ) Diagnosis:       Anal cancer (CMS/HCC)      (Anal cancer (CMS/HCC) [C21.0])    Surgeon:  Benson Wasserman MD Provider:  Geraldo Grimm MD    Anesthesia Type:  general ASA Status:  3          Anesthesia Type: general  Last vitals  BP   112/69 (06/27/19 0840)   Temp   97.8 °F (36.6 °C) (06/27/19 0840)   Pulse   67 (06/27/19 0840)   Resp   18 (06/27/19 0840)     SpO2   94 % (06/27/19 0840)     Post Anesthesia Care and Evaluation    Patient location during evaluation: bedside  Patient participation: complete - patient cannot participate  Level of consciousness: awake  Pain score: 0  Pain management: adequate  Airway patency: patent  Anesthetic complications: No anesthetic complications  PONV Status: none  Cardiovascular status: acceptable  Respiratory status: acceptable  Hydration status: acceptable

## 2019-06-27 NOTE — ANESTHESIA PROCEDURE NOTES
Airway  Urgency: elective    Airway not difficult    General Information and Staff    Patient location during procedure: OR    Indications and Patient Condition  Indications for airway management: airway protection    Preoxygenated: yes  MILS not maintained throughout  Mask difficulty assessment: 0 - not attempted    Final Airway Details  Final airway type: supraglottic airway      Successful airway: I-gel  Size 4    Number of attempts at approach: 1    Additional Comments  LMA placed by JESSICA Armando under the supervision of BETTINA Mcrae CRNA. Lips, teeth and gums in pre-anesthetic condition after LMA placement.

## 2019-07-02 ENCOUNTER — HOSPITAL ENCOUNTER (OUTPATIENT)
Dept: RADIATION ONCOLOGY | Facility: HOSPITAL | Age: 64
Setting detail: RADIATION/ONCOLOGY SERIES
End: 2019-07-02

## 2019-07-08 ENCOUNTER — OFFICE VISIT (OUTPATIENT)
Dept: ONCOLOGY | Facility: CLINIC | Age: 64
End: 2019-07-08

## 2019-07-08 ENCOUNTER — LAB (OUTPATIENT)
Dept: ONCOLOGY | Facility: HOSPITAL | Age: 64
End: 2019-07-08

## 2019-07-08 ENCOUNTER — INFUSION (OUTPATIENT)
Dept: ONCOLOGY | Facility: HOSPITAL | Age: 64
End: 2019-07-08

## 2019-07-08 VITALS
TEMPERATURE: 98.1 F | WEIGHT: 216.4 LBS | SYSTOLIC BLOOD PRESSURE: 139 MMHG | BODY MASS INDEX: 32.8 KG/M2 | HEIGHT: 68 IN | DIASTOLIC BLOOD PRESSURE: 69 MMHG | RESPIRATION RATE: 18 BRPM | HEART RATE: 71 BPM

## 2019-07-08 DIAGNOSIS — C21.0 ANAL CANCER (HCC): Primary | ICD-10-CM

## 2019-07-08 DIAGNOSIS — M89.9 BONE LESION: ICD-10-CM

## 2019-07-08 DIAGNOSIS — Z45.2 ENCOUNTER FOR VENOUS ACCESS DEVICE CARE: ICD-10-CM

## 2019-07-08 DIAGNOSIS — C61 PROSTATE CANCER (HCC): ICD-10-CM

## 2019-07-08 LAB
ALBUMIN SERPL-MCNC: 4.3 G/DL (ref 3.5–5.2)
ALBUMIN/GLOB SERPL: 1.2 G/DL
ALP SERPL-CCNC: 84 U/L (ref 39–117)
ALT SERPL W P-5'-P-CCNC: 35 U/L (ref 1–41)
ANION GAP SERPL CALCULATED.3IONS-SCNC: 12 MMOL/L (ref 5–15)
AST SERPL-CCNC: 28 U/L (ref 1–40)
BASOPHILS # BLD AUTO: 0.02 10*3/MM3 (ref 0–0.2)
BASOPHILS NFR BLD AUTO: 0.3 % (ref 0–1.5)
BILIRUB SERPL-MCNC: 0.4 MG/DL (ref 0.2–1.2)
BUN BLD-MCNC: 23 MG/DL (ref 8–23)
BUN/CREAT SERPL: 28 (ref 7–25)
CALCIUM SPEC-SCNC: 9.6 MG/DL (ref 8.6–10.5)
CHLORIDE SERPL-SCNC: 100 MMOL/L (ref 98–107)
CO2 SERPL-SCNC: 28 MMOL/L (ref 22–29)
CREAT BLD-MCNC: 0.82 MG/DL (ref 0.76–1.27)
DEPRECATED RDW RBC AUTO: 40.8 FL (ref 37–54)
EOSINOPHIL # BLD AUTO: 0.05 10*3/MM3 (ref 0–0.4)
EOSINOPHIL NFR BLD AUTO: 0.8 % (ref 0.3–6.2)
ERYTHROCYTE [DISTWIDTH] IN BLOOD BY AUTOMATED COUNT: 12.5 % (ref 12.3–15.4)
GFR SERPL CREATININE-BSD FRML MDRD: 95 ML/MIN/1.73
GLOBULIN UR ELPH-MCNC: 3.7 GM/DL
GLUCOSE BLD-MCNC: 105 MG/DL (ref 65–99)
HCT VFR BLD AUTO: 44.6 % (ref 37.5–51)
HGB BLD-MCNC: 15 G/DL (ref 13–17.7)
IMM GRANULOCYTES # BLD AUTO: 0.02 10*3/MM3 (ref 0–0.05)
IMM GRANULOCYTES NFR BLD AUTO: 0.3 % (ref 0–0.5)
LYMPHOCYTES # BLD AUTO: 2.1 10*3/MM3 (ref 0.7–3.1)
LYMPHOCYTES NFR BLD AUTO: 33.4 % (ref 19.6–45.3)
MCH RBC QN AUTO: 29.9 PG (ref 26.6–33)
MCHC RBC AUTO-ENTMCNC: 33.6 G/DL (ref 31.5–35.7)
MCV RBC AUTO: 89 FL (ref 79–97)
MONOCYTES # BLD AUTO: 0.51 10*3/MM3 (ref 0.1–0.9)
MONOCYTES NFR BLD AUTO: 8.1 % (ref 5–12)
NEUTROPHILS # BLD AUTO: 3.59 10*3/MM3 (ref 1.7–7)
NEUTROPHILS NFR BLD AUTO: 57.1 % (ref 42.7–76)
NRBC BLD AUTO-RTO: 0 /100 WBC (ref 0–0.2)
PLATELET # BLD AUTO: 268 10*3/MM3 (ref 140–450)
PMV BLD AUTO: 8.7 FL (ref 6–12)
POTASSIUM BLD-SCNC: 4.2 MMOL/L (ref 3.5–5.2)
PROT SERPL-MCNC: 8 G/DL (ref 6–8.5)
RBC # BLD AUTO: 5.01 10*6/MM3 (ref 4.14–5.8)
SODIUM BLD-SCNC: 140 MMOL/L (ref 136–145)
WBC NRBC COR # BLD: 6.29 10*3/MM3 (ref 3.4–10.8)

## 2019-07-08 PROCEDURE — 99214 OFFICE O/P EST MOD 30 MIN: CPT | Performed by: INTERNAL MEDICINE

## 2019-07-08 PROCEDURE — 77338 DESIGN MLC DEVICE FOR IMRT: CPT | Performed by: RADIOLOGY

## 2019-07-08 PROCEDURE — 77300 RADIATION THERAPY DOSE PLAN: CPT | Performed by: RADIOLOGY

## 2019-07-08 PROCEDURE — 80053 COMPREHEN METABOLIC PANEL: CPT | Performed by: INTERNAL MEDICINE

## 2019-07-08 PROCEDURE — 77301 RADIOTHERAPY DOSE PLAN IMRT: CPT | Performed by: RADIOLOGY

## 2019-07-08 PROCEDURE — 25010000002 MITOMYCIN PER 5 MG: Performed by: INTERNAL MEDICINE

## 2019-07-08 PROCEDURE — 25010000002 DEXAMETHASONE SODIUM PHOSPHATE 100 MG/10ML SOLUTION: Performed by: INTERNAL MEDICINE

## 2019-07-08 PROCEDURE — 85025 COMPLETE CBC W/AUTO DIFF WBC: CPT | Performed by: INTERNAL MEDICINE

## 2019-07-08 PROCEDURE — G9903 PT SCRN TBCO ID AS NON USER: HCPCS | Performed by: INTERNAL MEDICINE

## 2019-07-08 PROCEDURE — G8731 PAIN NEG NO PLAN: HCPCS | Performed by: INTERNAL MEDICINE

## 2019-07-08 PROCEDURE — 1123F ACP DISCUSS/DSCN MKR DOCD: CPT | Performed by: INTERNAL MEDICINE

## 2019-07-08 PROCEDURE — 96375 TX/PRO/DX INJ NEW DRUG ADDON: CPT | Performed by: INTERNAL MEDICINE

## 2019-07-08 PROCEDURE — G0498 CHEMO EXTEND IV INFUS W/PUMP: HCPCS | Performed by: INTERNAL MEDICINE

## 2019-07-08 PROCEDURE — 25010000002 FLUOROURACIL PER 500 MG: Performed by: INTERNAL MEDICINE

## 2019-07-08 PROCEDURE — 96409 CHEMO IV PUSH SNGL DRUG: CPT | Performed by: INTERNAL MEDICINE

## 2019-07-08 RX ORDER — SODIUM CHLORIDE 9 MG/ML
250 INJECTION, SOLUTION INTRAVENOUS ONCE
Status: COMPLETED | OUTPATIENT
Start: 2019-07-08 | End: 2019-07-08

## 2019-07-08 RX ORDER — SODIUM CHLORIDE 9 MG/ML
250 INJECTION, SOLUTION INTRAVENOUS ONCE
Status: CANCELLED | OUTPATIENT
Start: 2019-07-08

## 2019-07-08 RX ORDER — SODIUM CHLORIDE 0.9 % (FLUSH) 0.9 %
10 SYRINGE (ML) INJECTION AS NEEDED
Status: CANCELLED | OUTPATIENT
Start: 2019-07-08

## 2019-07-08 RX ORDER — MITOMYCIN 20 MG/40ML
20 INJECTION, POWDER, LYOPHILIZED, FOR SOLUTION INTRAVENOUS ONCE
Status: CANCELLED | OUTPATIENT
Start: 2019-07-08

## 2019-07-08 RX ORDER — MITOMYCIN 20 MG/40ML
20 INJECTION, POWDER, LYOPHILIZED, FOR SOLUTION INTRAVENOUS ONCE
Status: COMPLETED | OUTPATIENT
Start: 2019-07-08 | End: 2019-07-08

## 2019-07-08 RX ORDER — SODIUM CHLORIDE 0.9 % (FLUSH) 0.9 %
10 SYRINGE (ML) INJECTION AS NEEDED
Status: DISCONTINUED | OUTPATIENT
Start: 2019-07-08 | End: 2019-07-08 | Stop reason: HOSPADM

## 2019-07-08 RX ADMIN — SODIUM CHLORIDE, PRESERVATIVE FREE 10 ML: 5 INJECTION INTRAVENOUS at 09:56

## 2019-07-08 RX ADMIN — SODIUM CHLORIDE 250 ML: 9 INJECTION, SOLUTION INTRAVENOUS at 11:12

## 2019-07-08 RX ADMIN — DEXAMETHASONE SODIUM PHOSPHATE 12 MG: 10 INJECTION, SOLUTION INTRAMUSCULAR; INTRAVENOUS at 11:30

## 2019-07-08 RX ADMIN — MITOMYCIN 20 MG: 20 INJECTION, POWDER, LYOPHILIZED, FOR SOLUTION INTRAVENOUS at 12:01

## 2019-07-08 RX ADMIN — FLUOROURACIL 8440 MG: 50 INJECTION, SOLUTION INTRAVENOUS at 12:45

## 2019-07-08 NOTE — PROGRESS NOTES
DATE OF VISIT: 7/8/2019      REASON FOR VISIT: Anal cancer, history of prostate cancer, sclerotic lesion of bone on CT scan of chest      HISTORY OF PRESENT ILLNESS:    64-year-old male with medical problem consisting of hypertension, prostatic adenocarcinoma status post robotic prostatectomy done in May 2018 was initially seen in consultation on June 20, 2019 for newly diagnosed squamous cell cancer of anus.  Since that clinic visit, patient had a port placement by Dr. Wasserman as well as had a CT of chest done for staging purpose.  Patient is here to start day 1 of 5-FU and mitomycin with radiation treatment today on July 8, 2019.  Denies any bleeding.  Denies any new lymph node enlargement.  Denies any new shortness of breath or chest pain.        PAST MEDICAL HISTORY:    Past Medical History:   Diagnosis Date   • Hypertension    • Prostate cancer (CMS/HCC) 05/16/2018   • Rectal cancer (CMS/[facility]) 06/2019       SOCIAL HISTORY:    Social History     Tobacco Use   • Smoking status: Never Smoker   • Smokeless tobacco: Never Used   Substance Use Topics   • Alcohol use: Yes     Comment: rarely   • Drug use: No       Surgical History :  Past Surgical History:   Procedure Laterality Date   • COLONOSCOPY N/A 5/30/2019    Procedure: COLONOSCOPY;  Surgeon: Jarek Gordillo DO;  Location: [facility] ENDOSCOPY;  Service: Gastroenterology   • ENDOSCOPY N/A 5/30/2019    Procedure: ESOPHAGOGASTRODUODENOSCOPY;  Surgeon: Jarek Gordillo DO;  Location: [facility] ENDOSCOPY;  Service: Gastroenterology   • PROSTATE SURGERY     • VENOUS ACCESS DEVICE (PORT) INSERTION N/A 6/27/2019    Procedure: INSERTION VENOUS ACCESS DEVICE (MEDIPORT)           (C-ARM#1);  Surgeon: Benson Wasserman MD;  Location: [facility] OR;  Service: General       ALLERGIES:    No Known Allergies      FAMILY HISTORY:  Family History   Problem Relation Age of Onset   • Uterine cancer Mother            REVIEW OF SYSTEMS:      CONSTITUTIONAL:   Denies any fever, chills or weight  "loss.      EYES: No visual disturbances. No discharge. No new lesions     ENMT: States difficulty swallowing is significantly improved after recent EGD.  No epistaxis, mouth sores or difficulty swallowing.     RESPIRATORY:  No new shortness of breath. No new cough or hemoptysis.     CARDIOVASCULAR:  No chest pain or palpitations.     GASTROINTESTINAL:  No abdominal pain nausea, vomiting or blood in the stool.     GENITOURINARY: No Dysuria or Hematuria.     MUSCULOSKELETAL:  No new back pain or arthralgia.     LYMPHATICS:  Denies any abnormal swollen glands anywhere in the body.     NEUROLOGICAL : No tingling or numbness. No headache or dizziness. No seizures or balance problems.     SKIN: Erythematous skin lesion present on right side of neck.     ENDOCRINE : No new heat or cold intolerance. No new polyuria . No polydipsia          PHYSICAL EXAMINATION:      VITAL SIGNS:  /69   Pulse 71   Temp 98.1 °F (36.7 °C) (Temporal)   Resp 18   Ht 172.7 cm (67.99\")   Wt 98.2 kg (216 lb 6.4 oz)   BMI 32.91 kg/m²       07/08/19  1033   Weight: 98.2 kg (216 lb 6.4 oz)         ECOG performance status: 1     CONSTITUTIONAL:  Not in any distress.     EYES: Mild conjunctival Pallor. No Icterus. No Pterygium. Extraocular Movements intact.No ptosis.     ENMT:  Normocephalic, Atraumatic.No Facial Asymmetry noted.     NECK:  No adenopathy.Trachea midline. NO JVD.     RESPIRATORY:  Fair air entry bilateral. No rhonchi or wheezing.Fair respiratory effort.     CARDIOVASCULAR:  S1, S2. Regular rate and rhythm. No murmur or gallop appreciated.  Port-A-Cath present on right chest wall.     ABDOMEN:  Soft, obese, nontender. Bowel sounds present in all four quadrants.  No Hepatosplenomegaly appreciated.     MUSCULOSKELETAL:  No edema.No Calf Tenderness.Normal range of motion.     NEUROLOGIC:    No  Motor or sensory deficit appreciated. Cranial Nerves 2-12 grossly intact.     SKIN : No new skin lesion identified. Skin is warm and dry " to touch.     LYMPHATICS: No new enlarged lymph nodes in neck or supraclavicular area.     PSYCHIATRY: Alert, awake and oriented ×3.Normal affect.  Normal judgment.  Makes good eye contact.          DIAGNOSTIC DATA:    Glucose   Date Value Ref Range Status   07/08/2019 105 (H) 65 - 99 mg/dL Final     Sodium   Date Value Ref Range Status   07/08/2019 140 136 - 145 mmol/L Final   07/11/2018 138 134 - 144 mmol/L Final   05/17/2018 135 (L) 136 - 145 mmol/L Final     Potassium   Date Value Ref Range Status   07/08/2019 4.2 3.5 - 5.2 mmol/L Final   07/11/2018 4 3.5 - 5.1 mmol/L Final   05/17/2018 4.3 3.3 - 5.0 mmol/L Final     CO2   Date Value Ref Range Status   07/08/2019 28.0 22.0 - 29.0 mmol/L Final     Total CO2   Date Value Ref Range Status   05/17/2018 26 20 - 31 mmol/L Final     Chloride   Date Value Ref Range Status   07/08/2019 100 98 - 107 mmol/L Final   07/11/2018 105 98 - 107 mmol/L Final   05/17/2018 104 99 - 111 mmol/L Final     Anion Gap   Date Value Ref Range Status   07/08/2019 12.0 5.0 - 15.0 mmol/L Final   05/17/2018 9 4 - 16 mmol/L Final     Creatinine   Date Value Ref Range Status   07/08/2019 0.82 0.76 - 1.27 mg/dL Final   07/11/2018 1 0.6 - 1.3 mg/dL Final   05/17/2018 1.0 0.6 - 1.3 mg/dL Final     BUN   Date Value Ref Range Status   07/08/2019 23 8 - 23 mg/dL Final   07/11/2018 34 (H) 7 - 18 mg/dL Final     BUN/Creatinine Ratio   Date Value Ref Range Status   07/08/2019 28.0 (H) 7.0 - 25.0 Final     Calcium   Date Value Ref Range Status   07/08/2019 9.6 8.6 - 10.5 mg/dL Final   07/11/2018 8.8 8.8 - 10.5 mg/dL Final     eGFR Non  Amer   Date Value Ref Range Status   07/08/2019 95 >60 mL/min/1.73 Final     Alkaline Phosphatase   Date Value Ref Range Status   07/08/2019 84 39 - 117 U/L Final   07/11/2018 69 46 - 116 U/L Final     Total Protein   Date Value Ref Range Status   07/08/2019 8.0 6.0 - 8.5 g/dL Final   07/11/2018 7.4 6.4 - 8.2 g/dL Final     ALT (SGPT)   Date Value Ref Range Status    07/08/2019 35 1 - 41 U/L Final   07/11/2018 19 (L) 30 - 65 U/L Final     AST (SGOT)   Date Value Ref Range Status   07/08/2019 28 1 - 40 U/L Final   07/11/2018 17 15 - 37 U/L Final     Total Bilirubin   Date Value Ref Range Status   07/08/2019 0.4 0.2 - 1.2 mg/dL Final   07/11/2018 0.3 0 - 1.0 mg/dL Final     Albumin   Date Value Ref Range Status   07/08/2019 4.30 3.50 - 5.20 g/dL Final   07/11/2018 3.5 3.4 - 5.0 g/dL Final     Globulin   Date Value Ref Range Status   07/08/2019 3.7 gm/dL Final     Lab Results   Component Value Date    WBC 6.29 07/08/2019    HGB 15.0 07/08/2019    HCT 44.6 07/08/2019    MCV 89.0 07/08/2019     07/08/2019     Lab Results   Component Value Date    NEUTROABS 3.59 07/08/2019    UNUCNZPG12 1,034 07/11/2018     No results found for: , LABCA2, AFPTM, HCGQUANT, , CHROMGRNA, [id number], CEA, REFLABREPO        HIV-1 & HIV-2 Antibodies          Specimen Collected: 06/20/19 14:41 Last Resulted: 06/20/19 15:33                         Radiology Data :  CT of chest without contrast done on June 27, 2019 was reviewed with Dr. Weller, discussed with the patient, it showed:  FINDINGS:     PULMONARY PARENCHYMA:      - air spaces: Negative    - interstitium: Grossly within normal limits for age    - misc: Tiny densely calcified granuloma in the right upper  lobe anteriorly measuring approximately 3 mm in greatest  dimension. Mild atelectasis in the left lung base. Left a  diaphragm is mildly elevated     MEDIASTINUM / CIRA:      - heart: Normal size, no pericardial fluid. Mild  atherosclerotic calcification of the coronary arteries.    - aorta/great vessels: Ascending thoracic aorta is dilated up  to 4.0 cm in greatest diameter. The visualized portion of the  descending aorta is normal in caliber.    - misc: No mediastinal mass / significant adenopathy. There are  densely calcified lymph nodes in the right paratracheal and the  precarinal space. There are smaller calcified nodes in the  right  hilum.     PLEURAL COMPARTMENT:      - misc: No pleural fluid or mass        MISC:      - inferior neck: Negative    - osseous/body wall: A few tiny sclerotic foci are seen in  multiple vertebral bodies, which are indeterminate, and these are  so small that they may be falsely negative on bone scan or PET  scan even if representing metastasis. These can be followed up on  future studies to evaluate for change.    - subdiaphramatic: Nonobstructing 1.1 cm calculus in the right  renal midpole. There may be a small cyst in the upper pole of the  right kidney. Small nonobstructing calculus measuring about 3 mm  is seen in the left kidney. Additional smaller calculi are  present in the lower pole. A tiny low-attenuation lesion in the  lower pole of the left kidney is too small to adequately  characterize. The gallbladder appears to be filled with calculi.  There is limited evaluation of the liver for metastases due to  noncontrast technique, but no specific lesions are identified.   Small hiatal hernia present.         CT of abdomen and pelvis with contrast done on June 17, 2019 showed:   - - - CT ABDOMEN - - -      THORAX (INFERIOR):   - LUNG BASES:  Clear   - PLEURA:  No fluid or mass   - HEART: Normal size, no pericardial fluid   - MISC:  N/A      ABDOMEN:   - LIVER:  Normal size/contour, no ductal dilatation. Scattered  punctate calcifications are present, consistent with healed  granulomatous disease   - GB: Filled with gallstones   - CBD:  Grossly negative   - SPLEEN:  Normal size and contour. Granulomatous scarring   - PANCREAS:  Normal in size, contour, no focal mass    - VISCERA:  Normal caliber, no wall thickening   - MESENTERY: No mesenteric mass   - CAVITY:  No free abdominal fluid, no free intraperitoneal air   - BODY WALL:  With normal limits   - OSSEOUS:  Degenerative changes most prominently at L5-S1 where  there is almost complete loss of joint space and vacuum  disc  phenomenon      RETROPERITONEUM:   - KIDNEYS:Normal size/contour, no collecting system dilation                    No evidence of an enhancing mass. Bilateral  nonobstructing calculi, and small possible cysts bilaterally.    - URETERS:  Normal course, caliber   - ADRENALS:  Normal size, contour   - MISC:  No sig. retroperitoneal adenopathy or mass   - VASCULAR:  Aorta / iliacs: wnl for age     - - - CT PELVIS - - -       - VISCERA:  Normal caliber small/large bowel, mild thickening of  the rectum, nonspecific   - MESENTERY:  No mass   - VASCULAR:  Within normal limits for age   - CAVITY:  No free fluid / air   - BLADDER:  Unremarkable   - OSSEOUS:  Within normal limits    - PROSTATE:  Grossly wnl  Small left inguinal hernia contains only fat.      IMPRESSION:  1. No definite evidence of metastasis. Mild thickening of the  rectum, may be related to treatment effect, though malignancy  cannot be excluded.   2. Cholelithiasis.   3. Nonobstructing nephrolithiasis.  4. Possible bilateral renal cysts, could be further evaluated and  characterized by ultrasound or multiphasic contrast enhanced CT  or MRI  5. Please see finding sections above for further details.            Chest x-ray PA/lateral done on June 17, 2019 showed:  IMPRESSION:  CONCLUSION: Chronic elevation left diaphragm either diaphragmatic  eventration or diaphragmatic paralysis. Benign calcified  mediastinal and hilar lymph nodes. Lungs otherwise clear.              Pathology :  Pathology report from May 30, 2019 showed:  Component     Final Diagnosis   1.  MUCOSA, ESOPHAGUS:   CHRONIC ESOPHAGITIS.   NEGATIVE FOR FUNGI (GMS STAIN), VIRAL CELLS AND DYSPLASIA.      2.  MUCOSA, ANUS:   INVASIVE NONKERATINIZING SQUAMOUS CELL CARCINOMA, POORLY DIFFERENTIATED.   IMMUNOSTAIN STRONGLY POSITIVE FOR p16 (HPV MARKER).      3.  POLYP, TRANSVERSE COLON:   INFLAMMATORY POLYP.     4.  POLYP, ASCENDING COLON:   SESSILE SERRATED ADENOMA.      5.  POLYP, DESCENDING  COLON:   NO SIGNIFICANT HISTOLOGIC ABNORMALITY.                Pathology report from 05/16/2018 at [facility] showed:  SPECIMEN:  A. PROSTATE      CLINICAL HISTORY:  NONE GIVEN  PRE-OP DIAGNOSIS:  ELEVATED PSA  POST-OP DIAGNOSIS:  PROCEDURE:  ROBOTIC ASSISTED PROSTATECTOMY    FINAL DIAGNOSIS:    RADICAL PROSTATECTOMY SPECIMEN:       Invasive carcinoma.       - Histologic type:  Adenocarcinoma, conventional.       - Histologic [place] score:  6 (3+3).       - Grade group:  1.       - Tumor quantitation:  Less than 1%. (Tumor present in 2 of 16 slides)       - Margins:  Clear.        - Bilateral involvement:  Present.       - Extra-prostatic extension:  Not identified.       - Seminal vesicle invasion:  Not identified.       - Perineural invasion:  Not identified.       - Angiolymphatic invasion:  Not identified.       - Other findings:  Benign glandular cyst near bladder margin.       - Pathologic staging:  pT2c.    GROSS:      Received is a 56 gram radical prostatectomy specimen with both seminal vesicles attached.  The prostate is 5.2 x 4.5 x 4.6 cm.  There is a 1 cm cystic structure present at the bladder margin on the right side.  Sections through the specimen demonstrate a somewhat nodular surface with increased firmness present primarily of the right posterior aspect.  Prostate from the right anterior margin is submitted as A1, right anterior lateral A2, right mid lateral A3, right posterior A4-A6, and right seminal vesicle A7.  A section of the left anterior prostate is submitted as A8, left anterior lateral A9, left mid lateral A10, left posterior A11-A13, and left seminal vesicle A14.  Sections of the cystic lesion are submitted as A15 and A16.       (AEE)    MICROSCOPIC:    Multiple sections of prostate from right and left lobes including margins are evaluated.  Most of the prostate features hyperplastic changes with chronic inflammation.  Two areas feature prostatic adenocarcinoma.  These are on  slides A1 and A12.  In both slides, the amount of tumor is relatively small with the margins being clear.  Perineural invasion is not noted.  There is no evidence of seminal vesicle invasion.  Sections from the bladder margin demonstrate a benign glandular type cyst.      Final Diagnosis performed by  Gilson Vaughn M.D.  Electronically signed 5/18/2018 9:01AM        ASSESSMENT AND PLAN:       1.  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  -Result of CT scan of abdomen and pelvis, pathology and staging were discussed with the patient today.  -It was discussed with as per NCCN guidelines, her standard recommendation for this kind of cancer is concurrent chemoradiation with 5-FU and mitomycin.  -Patient has  been evaluated by Dr. Lopez on June 18, 2019.    -Patient had a CT of chest without contrast done on June 27, 2019 that showed tiny sclerotic focus in vertebral body as well as healing fracture in rib.  Result of CT scan showing tiny sclerotic focus was discussed with patient.  Patient does admit that he has a trauma due to fall 3 months ago that can contribute to refracture.  Sclerotic focus there about 4 mm in size, PET/CT and bone scan will not detect those kind of tiny focus.  Plan is to repeat CT of chest in about 4 months that will be around November 2019 for follow-up.  -In view of no conclusive evidence of metastatic disease, plan is to treat patient as a localised anal cancer with concurrent 5-FU mitomycin with radiation starting today on July 8, 2019.  -Patient was provided information about 5-FU and mitomycin on June 20, 2019.  He does not have any question or concern.  -We will do weekly CBC and BMP while on chemoradiation.  -We will ask patient to return to clinic in 4 weeks with day 29 of chemotherapy.      2.  Prostatic adenocarcinoma, PT2cN0, stage IIc  -Status post radical prostatectomy on May 16, 2018.  -Most recent PSA done in May  2019 was <0.1  -We will need periodic PSA check to rule out recurrence which was discussed with patient.  -Outside pathology report was reviewed.     3.  Sclerotic bone focus on vertebral body as well as healing fracture of ribs:  -Patient had a CT of chest without contrast on June 29, 2019 that showed tiny sclerotic focus on vertebral body as well as healing fracture on rib.  Patient does admit to trauma to rib around April 2019.  -Sclerotic focus on deep there are 2 tiny measuring about 4 mm to 5 mm in size.  Bone scan or PET/CT would not detect those kind of tiny focus.  -We will do another CT of chest without contrast around November 2019 to follow-up on sclerotic focus.  This recommendation were discussed with patient today.      4.  Hypertension     5.  Health maintenance: Patient does not smoke.  Had a colonoscopy done on May 30, 2019 by Dr. Gordillo.     6. Advance Care Planning: For now patient remains full code and is able to make his decisions.  Patient has health care surrogate mentioned on chart.     7.  Pain assessment:  -Patient denies any pain today.             Terry Ly MD  7/8/2019  7:33 PM        EMR Dragon/Transcription disclaimer:   Much of this encounter note is an electronic transcription/translation of spoken language to printed text. The electronic translation of spoken language may permit erroneous, or at times, nonsensical words or phrases to be inadvertently transcribed; Although I have reviewed the note for such errors, some may still exist.

## 2019-07-08 NOTE — PATIENT INSTRUCTIONS
Mitomycin injection  What is this medicine?  MITOMYCIN (mye toe MYE sin) is a chemotherapy drug. This medicine is used to treat cancer of the stomach and pancreas.  This medicine may be used for other purposes; ask your health care provider or pharmacist if you have questions.  COMMON BRAND NAME(S): Mutamycin  What should I tell my health care provider before I take this medicine?  They need to know if you have any of these conditions:  -anemia  -bleeding disorder  -infection (especially a virus infection such as chickenpox, cold sores, or herpes)  -kidney disease  -low blood counts like low platelets, red blood cells, white blood cells  -recent radiation therapy  -an unusual or allergic reaction to mitomycin, other chemotherapy agents, other medicines, foods, dyes, or preservatives  -pregnant or trying to get pregnant  -breast-feeding  How should I use this medicine?  This drug is given as an injection or infusion into a vein. It is administered in a hospital or clinic by a specially trained health care professional.  Talk to your pediatrician regarding the use of this medicine in children. Special care may be needed.  Overdosage: If you think you have taken too much of this medicine contact a poison control center or emergency room at once.  NOTE: This medicine is only for you. Do not share this medicine with others.  What if I miss a dose?  It is important not to miss your dose. Call your doctor or health care professional if you are unable to keep an appointment.  What may interact with this medicine?  -medicines to increase blood counts like filgrastim, pegfilgrastim, sargramostim  -vaccines  This list may not describe all possible interactions. Give your health care provider a list of all the medicines, herbs, non-prescription drugs, or dietary supplements you use. Also tell them if you smoke, drink alcohol, or use illegal drugs. Some items may interact with your medicine.  What should I watch for while using  this medicine?  Your condition will be monitored carefully while you are receiving this medicine. You will need important blood work done while you are taking this medicine.  This drug may make you feel generally unwell. This is not uncommon, as chemotherapy can affect healthy cells as well as cancer cells. Report any side effects. Continue your course of treatment even though you feel ill unless your doctor tells you to stop.  Call your doctor or health care professional for advice if you get a fever, chills or sore throat, or other symptoms of a cold or flu. Do not treat yourself. This drug decreases your body's ability to fight infections. Try to avoid being around people who are sick.  This medicine may increase your risk to bruise or bleed. Call your doctor or health care professional if you notice any unusual bleeding.  Be careful brushing and flossing your teeth or using a toothpick because you may get an infection or bleed more easily. If you have any dental work done, tell your dentist you are receiving this medicine.  Avoid taking products that contain aspirin, acetaminophen, ibuprofen, naproxen, or ketoprofen unless instructed by your doctor. These medicines may hide a fever.  Do not become pregnant while taking this medicine. Women should inform their doctor if they wish to become pregnant or think they might be pregnant. There is a potential for serious side effects to an unborn child. Talk to your health care professional or pharmacist for more information. Do not breast-feed an infant while taking this medicine.  What side effects may I notice from receiving this medicine?  Side effects that you should report to your doctor or health care professional as soon as possible:  -allergic reactions like skin rash, itching or hives, swelling of the face, lips, or tongue  -low blood counts - this medicine may decrease the number of white blood cells, red blood cells and platelets. You may be at increased risk  for infections and bleeding.  -signs of infection - fever or chills, cough, sore throat, pain or difficulty passing urine  -signs of decreased platelets or bleeding - bruising, pinpoint red spots on the skin, black, tarry stools, blood in the urine  -signs of decreased red blood cells - unusually weak or tired, fainting spells, lightheadedness  -breathing problems  -changes in vision  -chest pain  -confusion  -dry cough  -high blood pressure  -mouth sores  -pain, swelling, redness at site where injected  -pain, tingling, numbness in the hands or feet  -seizures  -swelling of the ankles, feet, hands  -trouble passing urine or change in the amount of urine  Side effects that usually do not require medical attention (report to your doctor or health care professional if they continue or are bothersome):  -diarrhea  -green to blue color of urine  -hair loss  -loss of appetite  -nausea, vomiting  This list may not describe all possible side effects. Call your doctor for medical advice about side effects. You may report side effects to FDA at 4-385-FDA-0402.  Where should I keep my medicine?  This drug is given in a hospital or clinic and will not be stored at home.  NOTE: This sheet is a summary. It may not cover all possible information. If you have questions about this medicine, talk to your doctor, pharmacist, or health care provider.  © 2019 [employer] (2009-06-25 11:16:23)  Fluorouracil, 5-FU injection  What is this medicine?  FLUOROURACIL, 5-FU (flure oh YOOR a laurie) is a chemotherapy drug. It slows the growth of cancer cells. This medicine is used to treat many types of cancer like breast cancer, colon or rectal cancer, pancreatic cancer, and stomach cancer.  This medicine may be used for other purposes; ask your health care provider or pharmacist if you have questions.  COMMON BRAND NAME(S): Adrucil  What should I tell my health care provider before I take this medicine?  They need to know if you have  any of these conditions:  -blood disorders  -dihydropyrimidine dehydrogenase (DPD) deficiency  -infection (especially a virus infection such as chickenpox, cold sores, or herpes)  -kidney disease  -liver disease  -malnourished, poor nutrition  -recent or ongoing radiation therapy  -an unusual or allergic reaction to fluorouracil, other chemotherapy, other medicines, foods, dyes, or preservatives  -pregnant or trying to get pregnant  -breast-feeding  How should I use this medicine?  This drug is given as an infusion or injection into a vein. It is administered in a hospital or clinic by a specially trained health care professional.  Talk to your pediatrician regarding the use of this medicine in children. Special care may be needed.  Overdosage: If you think you have taken too much of this medicine contact a poison control center or emergency room at once.  NOTE: This medicine is only for you. Do not share this medicine with others.  What if I miss a dose?  It is important not to miss your dose. Call your doctor or health care professional if you are unable to keep an appointment.  What may interact with this medicine?  -allopurinol  -cimetidine  -dapsone  -digoxin  -hydroxyurea  -leucovorin  -levamisole  -medicines for seizures like ethotoin, fosphenytoin, phenytoin  -medicines to increase blood counts like filgrastim, pegfilgrastim, sargramostim  -medicines that treat or prevent blood clots like warfarin, enoxaparin, and dalteparin  -methotrexate  -metronidazole  -pyrimethamine  -some other chemotherapy drugs like busulfan, cisplatin, estramustine, vinblastine  -trimethoprim  -trimetrexate  -vaccines  Talk to your doctor or health care professional before taking any of these medicines:  -acetaminophen  -aspirin  -ibuprofen  -ketoprofen  -naproxen  This list may not describe all possible interactions. Give your health care provider a list of all the medicines, herbs, non-prescription drugs, or dietary supplements  you use. Also tell them if you smoke, drink alcohol, or use illegal drugs. Some items may interact with your medicine.  What should I watch for while using this medicine?  Visit your doctor for checks on your progress. This drug may make you feel generally unwell. This is not uncommon, as chemotherapy can affect healthy cells as well as cancer cells. Report any side effects. Continue your course of treatment even though you feel ill unless your doctor tells you to stop.  In some cases, you may be given additional medicines to help with side effects. Follow all directions for their use.  Call your doctor or health care professional for advice if you get a fever, chills or sore throat, or other symptoms of a cold or flu. Do not treat yourself. This drug decreases your body's ability to fight infections. Try to avoid being around people who are sick.  This medicine may increase your risk to bruise or bleed. Call your doctor or health care professional if you notice any unusual bleeding.  Be careful brushing and flossing your teeth or using a toothpick because you may get an infection or bleed more easily. If you have any dental work done, tell your dentist you are receiving this medicine.  Avoid taking products that contain aspirin, acetaminophen, ibuprofen, naproxen, or ketoprofen unless instructed by your doctor. These medicines may hide a fever.  Do not become pregnant while taking this medicine. Women should inform their doctor if they wish to become pregnant or think they might be pregnant. There is a potential for serious side effects to an unborn child. Talk to your health care professional or pharmacist for more information. Do not breast-feed an infant while taking this medicine.  Men should inform their doctor if they wish to father a child. This medicine may lower sperm counts.  Do not treat diarrhea with over the counter products. Contact your doctor if you have diarrhea that lasts more than 2 days or if it  is severe and watery.  This medicine can make you more sensitive to the sun. Keep out of the sun. If you cannot avoid being in the sun, wear protective clothing and use sunscreen. Do not use sun lamps or tanning beds/booths.  What side effects may I notice from receiving this medicine?  Side effects that you should report to your doctor or health care professional as soon as possible:  -allergic reactions like skin rash, itching or hives, swelling of the face, lips, or tongue  -low blood counts - this medicine may decrease the number of white blood cells, red blood cells and platelets. You may be at increased risk for infections and bleeding.  -signs of infection - fever or chills, cough, sore throat, pain or difficulty passing urine  -signs of decreased platelets or bleeding - bruising, pinpoint red spots on the skin, black, tarry stools, blood in the urine  -signs of decreased red blood cells - unusually weak or tired, fainting spells, lightheadedness  -breathing problems  -changes in vision  -chest pain  -mouth sores  -nausea and vomiting  -pain, swelling, redness at site where injected  -pain, tingling, numbness in the hands or feet  -redness, swelling, or sores on hands or feet  -stomach pain  -unusual bleeding  Side effects that usually do not require medical attention (report to your doctor or health care professional if they continue or are bothersome):  -changes in finger or toe nails  -diarrhea  -dry or itchy skin  -hair loss  -headache  -loss of appetite  -sensitivity of eyes to the light  -stomach upset  -unusually teary eyes  This list may not describe all possible side effects. Call your doctor for medical advice about side effects. You may report side effects to FDA at 1-799-FDA-5939.  Where should I keep my medicine?  This drug is given in a hospital or clinic and will not be stored at home.  NOTE: This sheet is a summary. It may not cover all possible information. If you have questions about this  medicine, talk to your doctor, pharmacist, or health care provider.  © 2019 [employer] Standard (2009-04-22 13:53:16)    Chemotherapy  Chemotherapy is the use of medicines to stop or slow the growth of cancer cells. Depending on the type and stage of your cancer, you may have chemotherapy to:  · Cure your cancer.  · Slow the progression of your cancer.  · Ease your cancer symptoms.  · Improve the benefits of radiation treatment.  · Shrink a tumor before surgery.  · Rid the body of cancer cells that remain after a tumor is surgically removed.    How is chemotherapy given?  Chemotherapy may be given:  · By mouth in liquid or pill form.  · Through a thin tube that is inserted into a vein or artery.  · By getting a shot.  · By rubbing a cream or ointment on your skin.  · Through liquids that are placed directly into various areas of the body, such as the abdomen, chest, or bladder.    How often is chemotherapy given?  Chemotherapy may be given continuously over time, or it may be given in cycles. For example, you may take the medicine for one week out of every month.  For how long will I need chemotherapy treatments?  The length of treatment depends on many factors, including:  · The type of cancer.  · Whether the cancer has spread.  · How you respond to the chemotherapy.  · Whether you develop side effects.    Some types of chemotherapy medicine are given only one time. Others are given for months, years, or for life.  What safety precautions must I take while on chemotherapy?  Chemotherapy medicines are very strong. They will be in all of your bodily fluids, including your urine, stool, saliva, sweat, tears, vaginal secretions, and semen. You must carefully follow some safety precautions to prevent harm to others while you are using these medicines. Here are some recommended precautions:  · Make sure that people who help care for you wear disposable gloves if they are going to come into contact with any of your  bodily fluids. Women who are pregnant or breastfeeding should not handle any of your bodily fluids.  · Wash any clothes, towels, and linens that may have your bodily fluids on them twice in a washing machine using very hot water.  · Dispose of adult diapers, tampons, and sanitary napkins by first sealing them in a plastic bag.  · Use a condom when having sex for at least 2 weeks after receiving your chemotherapy.  · Do not share beverages or food.  · Keep your chemotherapy medicines in their original bottles. Keep them in a high, safe location, away from children. Do not expose them to heat or moisture. Do not put them in containers with other types of medicines.  · Dispose of all wrappers for your chemotherapy medicines by sealing them in a separate plastic bag.  · Do not throw away extra medicine, and do not flush it down the toilet. Take medicine that you are not going to use to your health care provider's office where it can be disposed of properly.  · Follow your health care provider’s directions for the proper disposal of needles, IV tubing, and other medical supplies that have come into contact with your chemotherapy medicines.  · If you are issued a hazardous waste container, make sure you understand the directions for using it.  · Wash your hands thoroughly with warm water and soap after using the bathroom. Dry your hands with disposable paper towels.  · When using the toilet:  ? Flush it twice after each use, including after vomiting.  ? Close the lid of the toilet prior to flushing. This helps to avoid splashing.  ? Both men and women should sit to use the toilet. This helps avoid splashing.    What are the side effects of chemotherapy?  Side effects depend on a variety of factors, including:  · The specific type of chemotherapy medicine used.  · The dosage.  · How long the medicine is used for.  · Your overall health.    Some of the side effects you may experience include:  · Fatigue and decreased  energy.  · Decreased appetite.  · Changes in your sense of smell or taste.  · Nausea.  · Vomiting.  · Constipation or diarrhea.  · Hair loss.  · Increased susceptibility to infection.  · Easy bleeding.  · Mouth sores.  · Burning or tingling in the hands or feet.  · Memory problems.    This information is not intended to replace advice given to you by your health care provider. Make sure you discuss any questions you have with your health care provider.  Document Released: 10/14/2008 Document Revised: 07/07/2017 Document Reviewed: 05/26/2015  [employer] Patient Education © 2018 [employer]

## 2019-07-09 ENCOUNTER — HOSPITAL ENCOUNTER (OUTPATIENT)
Dept: RADIATION ONCOLOGY | Facility: HOSPITAL | Age: 64
Discharge: HOME | End: 2019-07-09

## 2019-07-09 PROCEDURE — 77427 RADIATION TX MANAGEMENT X5: CPT | Performed by: RADIOLOGY

## 2019-07-09 PROCEDURE — 77014 CHG CT GUIDANCE RADIATION THERAPY FLDS PLACEMENT: CPT | Performed by: RADIOLOGY

## 2019-07-09 PROCEDURE — 77386: CPT | Performed by: RADIOLOGY

## 2019-07-10 ENCOUNTER — RADIATION ONCOLOGY WEEKLY ASSESSMENT (OUTPATIENT)
Dept: RADIATION ONCOLOGY | Facility: HOSPITAL | Age: 64
End: 2019-07-10

## 2019-07-10 ENCOUNTER — HOSPITAL ENCOUNTER (OUTPATIENT)
Dept: RADIATION ONCOLOGY | Facility: HOSPITAL | Age: 64
Discharge: HOME | End: 2019-07-10

## 2019-07-10 VITALS
OXYGEN SATURATION: 97 % | BODY MASS INDEX: 33.75 KG/M2 | HEART RATE: 60 BPM | RESPIRATION RATE: 18 BRPM | DIASTOLIC BLOOD PRESSURE: 85 MMHG | WEIGHT: 221.9 LBS | TEMPERATURE: 97.2 F | SYSTOLIC BLOOD PRESSURE: 143 MMHG

## 2019-07-10 DIAGNOSIS — C21.0 ANAL CANCER (HCC): Primary | ICD-10-CM

## 2019-07-10 PROCEDURE — 77386: CPT | Performed by: RADIOLOGY

## 2019-07-10 PROCEDURE — 77014 CHG CT GUIDANCE RADIATION THERAPY FLDS PLACEMENT: CPT | Performed by: RADIOLOGY

## 2019-07-10 NOTE — PROGRESS NOTES
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  ICD 10 Code: C21.0      was seen today for an on treatment visit.  He is receiving radiation therapy to the pelvis/anus. He has received 360 cGy in 2 fractions out of a planned dose of 5400 cGy in 30 fractions.  He is receiving concurrent 5-FU/mitomycin chemotherapy per Dr. Ly, and began his 1st cycle on 7/8/2019.     Today on exam the patient is tolerating radiation therapy well and has no new disease or treatment-related complaints.                                             Vitals:     Vitals:    07/10/19 0856   BP: 143/85   Pulse: 60   Resp: 18   Temp: 97.2 °F (36.2 °C)   SpO2: 97%       Weight:   Wt Readings from Last 3 Encounters:   07/10/19 101 kg (221 lb 14.4 oz)   07/08/19 98.2 kg (216 lb 6.4 oz)   06/27/19 97.6 kg (215 lb 2.7 oz)      Pain:    Pain Score    07/10/19 0856   PainSc: 0-No pain         Plan: We plan to continue radiation therapy as prescribed.    Brady Lopez MD  Radiation Oncology    Electronically signed by Brady Lopez MD 7/10/2019  9:06 AM     cc: Dr. Jarek Aleman

## 2019-07-11 ENCOUNTER — HOSPITAL ENCOUNTER (OUTPATIENT)
Dept: RADIATION ONCOLOGY | Facility: HOSPITAL | Age: 64
Discharge: HOME | End: 2019-07-11

## 2019-07-11 PROCEDURE — 77014 CHG CT GUIDANCE RADIATION THERAPY FLDS PLACEMENT: CPT | Performed by: RADIOLOGY

## 2019-07-11 PROCEDURE — 77386: CPT | Performed by: RADIOLOGY

## 2019-07-12 ENCOUNTER — INFUSION (OUTPATIENT)
Dept: ONCOLOGY | Facility: HOSPITAL | Age: 64
End: 2019-07-12
Payer: COMMERCIAL

## 2019-07-12 ENCOUNTER — HOSPITAL ENCOUNTER (OUTPATIENT)
Dept: RADIATION ONCOLOGY | Facility: HOSPITAL | Age: 64
Discharge: HOME | End: 2019-07-12

## 2019-07-12 DIAGNOSIS — Z45.2 ENCOUNTER FOR VENOUS ACCESS DEVICE CARE: ICD-10-CM

## 2019-07-12 DIAGNOSIS — C21.0 ANAL CANCER (HCC): Primary | ICD-10-CM

## 2019-07-12 PROCEDURE — 77014 CHG CT GUIDANCE RADIATION THERAPY FLDS PLACEMENT: CPT | Performed by: RADIOLOGY

## 2019-07-12 PROCEDURE — 77386: CPT | Performed by: RADIOLOGY

## 2019-07-12 RX ORDER — SODIUM CHLORIDE 0.9 % (FLUSH) 0.9 %
10 SYRINGE (ML) INJECTION AS NEEDED
Status: CANCELLED | OUTPATIENT
Start: 2019-07-15

## 2019-07-12 RX ORDER — SODIUM CHLORIDE 0.9 % (FLUSH) 0.9 %
10 SYRINGE (ML) INJECTION AS NEEDED
Status: DISCONTINUED | OUTPATIENT
Start: 2019-07-12 | End: 2019-07-12 | Stop reason: HOSPADM

## 2019-07-12 RX ADMIN — SODIUM CHLORIDE, PRESERVATIVE FREE 10 ML: 5 INJECTION INTRAVENOUS at 14:38

## 2019-07-12 RX ADMIN — Medication 500 UNITS: at 14:38

## 2019-07-14 ENCOUNTER — HOSPITAL ENCOUNTER (INPATIENT)
Facility: HOSPITAL | Age: 64
LOS: 8 days | Discharge: HOME | End: 2019-07-22
Attending: EMERGENCY MEDICINE | Admitting: INTERNAL MEDICINE

## 2019-07-14 ENCOUNTER — APPOINTMENT (OUTPATIENT)
Dept: GENERAL RADIOLOGY | Facility: HOSPITAL | Age: 64
End: 2019-07-14

## 2019-07-14 DIAGNOSIS — I11.9 BENIGN HYPERTENSIVE HEART DISEASE WITHOUT HEART FAILURE: ICD-10-CM

## 2019-07-14 DIAGNOSIS — R78.81 BACTEREMIA DUE TO OTHER BACTERIA: ICD-10-CM

## 2019-07-14 DIAGNOSIS — L03.313 CELLULITIS OF CHEST WALL: Primary | ICD-10-CM

## 2019-07-14 DIAGNOSIS — N39.0 ACUTE UTI (URINARY TRACT INFECTION): ICD-10-CM

## 2019-07-14 DIAGNOSIS — Z09 POST CHEMO EVALUATION: ICD-10-CM

## 2019-07-14 DIAGNOSIS — Z45.2 ENCOUNTER FOR VENOUS ACCESS DEVICE CARE: ICD-10-CM

## 2019-07-14 DIAGNOSIS — R50.9 FEVER, UNSPECIFIED FEVER CAUSE: ICD-10-CM

## 2019-07-14 DIAGNOSIS — I10 ESSENTIAL HYPERTENSION, BENIGN: ICD-10-CM

## 2019-07-14 DIAGNOSIS — B96.89 BACTEREMIA DUE TO OTHER BACTERIA: ICD-10-CM

## 2019-07-14 DIAGNOSIS — C21.0 ANAL CANCER (HCC): ICD-10-CM

## 2019-07-14 DIAGNOSIS — C61 PROSTATE CANCER (HCC): ICD-10-CM

## 2019-07-14 DIAGNOSIS — I33.0 BACTERIAL ENDOCARDITIS, UNSPECIFIED CHRONICITY: ICD-10-CM

## 2019-07-14 DIAGNOSIS — M89.9 BONE LESION: ICD-10-CM

## 2019-07-14 LAB
ALBUMIN SERPL-MCNC: 4 G/DL (ref 3.5–5.2)
ALBUMIN/GLOB SERPL: 1.2 G/DL
ALP SERPL-CCNC: 83 U/L (ref 39–117)
ALT SERPL W P-5'-P-CCNC: 102 U/L (ref 1–41)
ANION GAP SERPL CALCULATED.3IONS-SCNC: 11 MMOL/L (ref 5–15)
AST SERPL-CCNC: 77 U/L (ref 1–40)
BACTERIA UR QL AUTO: ABNORMAL /HPF
BASOPHILS # BLD AUTO: 0.01 10*3/MM3 (ref 0–0.2)
BASOPHILS NFR BLD AUTO: 0.2 % (ref 0–1.5)
BILIRUB SERPL-MCNC: 0.5 MG/DL (ref 0.2–1.2)
BILIRUB UR QL STRIP: NEGATIVE
BUN BLD-MCNC: 18 MG/DL (ref 8–23)
BUN/CREAT SERPL: 24.7 (ref 7–25)
CALCIUM SPEC-SCNC: 9 MG/DL (ref 8.6–10.5)
CHLORIDE SERPL-SCNC: 99 MMOL/L (ref 98–107)
CLARITY UR: CLEAR
CO2 SERPL-SCNC: 28 MMOL/L (ref 22–29)
COLOR UR: YELLOW
CREAT BLD-MCNC: 0.73 MG/DL (ref 0.76–1.27)
D-LACTATE SERPL-SCNC: 0.9 MMOL/L (ref 0.5–2)
DEPRECATED RDW RBC AUTO: 41 FL (ref 37–54)
EOSINOPHIL # BLD AUTO: 0.01 10*3/MM3 (ref 0–0.4)
EOSINOPHIL NFR BLD AUTO: 0.2 % (ref 0.3–6.2)
ERYTHROCYTE [DISTWIDTH] IN BLOOD BY AUTOMATED COUNT: 12.5 % (ref 12.3–15.4)
GFR SERPL CREATININE-BSD FRML MDRD: 109 ML/MIN/1.73
GLOBULIN UR ELPH-MCNC: 3.4 GM/DL
GLUCOSE BLD-MCNC: 112 MG/DL (ref 65–99)
GLUCOSE UR STRIP-MCNC: NEGATIVE MG/DL
HCT VFR BLD AUTO: 41.9 % (ref 37.5–51)
HGB BLD-MCNC: 13.9 G/DL (ref 13–17.7)
HGB UR QL STRIP.AUTO: NEGATIVE
HOLD SPECIMEN: NORMAL
HOLD SPECIMEN: NORMAL
HYALINE CASTS UR QL AUTO: ABNORMAL /LPF
IMM GRANULOCYTES # BLD AUTO: 0.02 10*3/MM3 (ref 0–0.05)
IMM GRANULOCYTES NFR BLD AUTO: 0.4 % (ref 0–0.5)
KETONES UR QL STRIP: NEGATIVE
LEUKOCYTE ESTERASE UR QL STRIP.AUTO: ABNORMAL
LYMPHOCYTES # BLD AUTO: 0.32 10*3/MM3 (ref 0.7–3.1)
LYMPHOCYTES NFR BLD AUTO: 6 % (ref 19.6–45.3)
MCH RBC QN AUTO: 29.9 PG (ref 26.6–33)
MCHC RBC AUTO-ENTMCNC: 33.2 G/DL (ref 31.5–35.7)
MCV RBC AUTO: 90.1 FL (ref 79–97)
MONOCYTES # BLD AUTO: 0.08 10*3/MM3 (ref 0.1–0.9)
MONOCYTES NFR BLD AUTO: 1.5 % (ref 5–12)
NEUTROPHILS # BLD AUTO: 4.91 10*3/MM3 (ref 1.7–7)
NEUTROPHILS NFR BLD AUTO: 91.7 % (ref 42.7–76)
NITRITE UR QL STRIP: NEGATIVE
NRBC BLD AUTO-RTO: 0 /100 WBC (ref 0–0.2)
PH UR STRIP.AUTO: 7 [PH] (ref 5–9)
PLATELET # BLD AUTO: 175 10*3/MM3 (ref 140–450)
PMV BLD AUTO: 9 FL (ref 6–12)
POTASSIUM BLD-SCNC: 3.9 MMOL/L (ref 3.5–5.2)
PROT SERPL-MCNC: 7.4 G/DL (ref 6–8.5)
PROT UR QL STRIP: ABNORMAL
RBC # BLD AUTO: 4.65 10*6/MM3 (ref 4.14–5.8)
RBC # UR: ABNORMAL /HPF
REF LAB TEST METHOD: ABNORMAL
SODIUM BLD-SCNC: 138 MMOL/L (ref 136–145)
SP GR UR STRIP: 1.02 (ref 1–1.03)
SQUAMOUS #/AREA URNS HPF: ABNORMAL /HPF
UROBILINOGEN UR QL STRIP: ABNORMAL
WBC NRBC COR # BLD: 5.35 10*3/MM3 (ref 3.4–10.8)
WBC UR QL AUTO: ABNORMAL /HPF
WHOLE BLOOD HOLD SPECIMEN: NORMAL
WHOLE BLOOD HOLD SPECIMEN: NORMAL

## 2019-07-14 PROCEDURE — 87186 SC STD MICRODIL/AGAR DIL: CPT | Performed by: EMERGENCY MEDICINE

## 2019-07-14 PROCEDURE — 71045 X-RAY EXAM CHEST 1 VIEW: CPT

## 2019-07-14 PROCEDURE — 25010000002 ONDANSETRON PER 1 MG: Performed by: EMERGENCY MEDICINE

## 2019-07-14 PROCEDURE — 25010000002 ENOXAPARIN PER 10 MG: Performed by: INTERNAL MEDICINE

## 2019-07-14 PROCEDURE — 25010000002 VANCOMYCIN 5 G RECONSTITUTED SOLUTION: Performed by: EMERGENCY MEDICINE

## 2019-07-14 PROCEDURE — 80053 COMPREHEN METABOLIC PANEL: CPT | Performed by: EMERGENCY MEDICINE

## 2019-07-14 PROCEDURE — 87147 CULTURE TYPE IMMUNOLOGIC: CPT | Performed by: EMERGENCY MEDICINE

## 2019-07-14 PROCEDURE — 87077 CULTURE AEROBIC IDENTIFY: CPT | Performed by: EMERGENCY MEDICINE

## 2019-07-14 PROCEDURE — 87150 DNA/RNA AMPLIFIED PROBE: CPT | Performed by: EMERGENCY MEDICINE

## 2019-07-14 PROCEDURE — 85025 COMPLETE CBC W/AUTO DIFF WBC: CPT | Performed by: EMERGENCY MEDICINE

## 2019-07-14 PROCEDURE — 25010000002 PIPERACILLIN-TAZOBACTAM: Performed by: EMERGENCY MEDICINE

## 2019-07-14 PROCEDURE — 99284 EMERGENCY DEPT VISIT MOD MDM: CPT

## 2019-07-14 PROCEDURE — 81001 URINALYSIS AUTO W/SCOPE: CPT | Performed by: EMERGENCY MEDICINE

## 2019-07-14 PROCEDURE — 83605 ASSAY OF LACTIC ACID: CPT | Performed by: EMERGENCY MEDICINE

## 2019-07-14 PROCEDURE — 87040 BLOOD CULTURE FOR BACTERIA: CPT | Performed by: EMERGENCY MEDICINE

## 2019-07-14 PROCEDURE — 87086 URINE CULTURE/COLONY COUNT: CPT | Performed by: EMERGENCY MEDICINE

## 2019-07-14 RX ORDER — DOCUSATE SODIUM 100 MG/1
100 CAPSULE, LIQUID FILLED ORAL 2 TIMES DAILY
Status: DISCONTINUED | OUTPATIENT
Start: 2019-07-14 | End: 2019-07-22 | Stop reason: HOSPADM

## 2019-07-14 RX ORDER — SODIUM CHLORIDE 0.9 % (FLUSH) 0.9 %
3-10 SYRINGE (ML) INJECTION AS NEEDED
Status: DISCONTINUED | OUTPATIENT
Start: 2019-07-14 | End: 2019-07-22 | Stop reason: HOSPADM

## 2019-07-14 RX ORDER — OXYBUTYNIN CHLORIDE 5 MG/1
5 TABLET ORAL 3 TIMES DAILY
Status: DISCONTINUED | OUTPATIENT
Start: 2019-07-14 | End: 2019-07-22 | Stop reason: HOSPADM

## 2019-07-14 RX ORDER — ONDANSETRON 2 MG/ML
4 INJECTION INTRAMUSCULAR; INTRAVENOUS EVERY 6 HOURS PRN
Status: DISCONTINUED | OUTPATIENT
Start: 2019-07-14 | End: 2019-07-22 | Stop reason: HOSPADM

## 2019-07-14 RX ORDER — FAMOTIDINE 40 MG/1
40 TABLET, FILM COATED ORAL DAILY
Status: DISCONTINUED | OUTPATIENT
Start: 2019-07-15 | End: 2019-07-22 | Stop reason: HOSPADM

## 2019-07-14 RX ORDER — SODIUM CHLORIDE 0.9 % (FLUSH) 0.9 %
3 SYRINGE (ML) INJECTION EVERY 12 HOURS SCHEDULED
Status: DISCONTINUED | OUTPATIENT
Start: 2019-07-14 | End: 2019-07-22 | Stop reason: HOSPADM

## 2019-07-14 RX ORDER — MORPHINE SULFATE 2 MG/ML
1 INJECTION, SOLUTION INTRAMUSCULAR; INTRAVENOUS EVERY 4 HOURS PRN
Status: DISCONTINUED | OUTPATIENT
Start: 2019-07-14 | End: 2019-07-22 | Stop reason: HOSPADM

## 2019-07-14 RX ORDER — ONDANSETRON 2 MG/ML
4 INJECTION INTRAMUSCULAR; INTRAVENOUS ONCE
Status: COMPLETED | OUTPATIENT
Start: 2019-07-14 | End: 2019-07-14

## 2019-07-14 RX ORDER — ACETAMINOPHEN 500 MG
1000 TABLET ORAL ONCE
Status: COMPLETED | OUTPATIENT
Start: 2019-07-14 | End: 2019-07-14

## 2019-07-14 RX ORDER — ONDANSETRON 4 MG/1
4 TABLET, FILM COATED ORAL EVERY 6 HOURS PRN
Status: DISCONTINUED | OUTPATIENT
Start: 2019-07-14 | End: 2019-07-22 | Stop reason: HOSPADM

## 2019-07-14 RX ORDER — SODIUM CHLORIDE 0.9 % (FLUSH) 0.9 %
10 SYRINGE (ML) INJECTION AS NEEDED
Status: DISCONTINUED | OUTPATIENT
Start: 2019-07-14 | End: 2019-07-22 | Stop reason: HOSPADM

## 2019-07-14 RX ORDER — NALOXONE HCL 0.4 MG/ML
0.4 VIAL (ML) INJECTION
Status: DISCONTINUED | OUTPATIENT
Start: 2019-07-14 | End: 2019-07-22 | Stop reason: HOSPADM

## 2019-07-14 RX ORDER — NEBIVOLOL 5 MG/1
10 TABLET ORAL NIGHTLY
Status: DISCONTINUED | OUTPATIENT
Start: 2019-07-14 | End: 2019-07-22 | Stop reason: HOSPADM

## 2019-07-14 RX ADMIN — DOCUSATE SODIUM 100 MG: 100 CAPSULE, LIQUID FILLED ORAL at 23:32

## 2019-07-14 RX ADMIN — NEBIVOLOL HYDROCHLORIDE 10 MG: 5 TABLET ORAL at 23:27

## 2019-07-14 RX ADMIN — ACETAMINOPHEN 1000 MG: 500 TABLET ORAL at 19:47

## 2019-07-14 RX ADMIN — OXYBUTYNIN CHLORIDE 5 MG: 5 TABLET ORAL at 23:27

## 2019-07-14 RX ADMIN — SODIUM CHLORIDE 1000 ML: 900 INJECTION, SOLUTION INTRAVENOUS at 19:55

## 2019-07-14 RX ADMIN — PIPERACILLIN SODIUM,TAZOBACTAM SODIUM 3.38 G: 3; .375 INJECTION, POWDER, FOR SOLUTION INTRAVENOUS at 19:55

## 2019-07-14 RX ADMIN — Medication 10 ML: at 19:12

## 2019-07-14 RX ADMIN — ONDANSETRON 4 MG: 2 INJECTION INTRAMUSCULAR; INTRAVENOUS at 19:47

## 2019-07-14 RX ADMIN — VANCOMYCIN HYDROCHLORIDE 2000 MG: 5 INJECTION, POWDER, LYOPHILIZED, FOR SOLUTION INTRAVENOUS at 21:59

## 2019-07-14 RX ADMIN — ENOXAPARIN SODIUM 40 MG: 40 INJECTION SUBCUTANEOUS at 23:27

## 2019-07-15 ENCOUNTER — DOCUMENTATION (OUTPATIENT)
Dept: ONCOLOGY | Facility: CLINIC | Age: 64
End: 2019-07-15

## 2019-07-15 ENCOUNTER — TELEPHONE (OUTPATIENT)
Dept: ONCOLOGY | Facility: HOSPITAL | Age: 64
End: 2019-07-15

## 2019-07-15 LAB
ANION GAP SERPL CALCULATED.3IONS-SCNC: 10 MMOL/L (ref 5–15)
BACTERIA BLD CULT: ABNORMAL
BASOPHILS # BLD AUTO: 0.01 10*3/MM3 (ref 0–0.2)
BASOPHILS NFR BLD AUTO: 0.3 % (ref 0–1.5)
BUN BLD-MCNC: 15 MG/DL (ref 8–23)
BUN/CREAT SERPL: 23.1 (ref 7–25)
CALCIUM SPEC-SCNC: 8.6 MG/DL (ref 8.6–10.5)
CHLORIDE SERPL-SCNC: 103 MMOL/L (ref 98–107)
CO2 SERPL-SCNC: 25 MMOL/L (ref 22–29)
CREAT BLD-MCNC: 0.65 MG/DL (ref 0.76–1.27)
DEPRECATED RDW RBC AUTO: 41.1 FL (ref 37–54)
EOSINOPHIL # BLD AUTO: 0 10*3/MM3 (ref 0–0.4)
EOSINOPHIL NFR BLD AUTO: 0 % (ref 0.3–6.2)
ERYTHROCYTE [DISTWIDTH] IN BLOOD BY AUTOMATED COUNT: 12.6 % (ref 12.3–15.4)
GFR SERPL CREATININE-BSD FRML MDRD: 124 ML/MIN/1.73
GLUCOSE BLD-MCNC: 116 MG/DL (ref 65–99)
HCT VFR BLD AUTO: 38.3 % (ref 37.5–51)
HGB BLD-MCNC: 12.8 G/DL (ref 13–17.7)
HOLD SPECIMEN: NORMAL
IMM GRANULOCYTES # BLD AUTO: 0.02 10*3/MM3 (ref 0–0.05)
IMM GRANULOCYTES NFR BLD AUTO: 0.7 % (ref 0–0.5)
LYMPHOCYTES # BLD AUTO: 0.31 10*3/MM3 (ref 0.7–3.1)
LYMPHOCYTES NFR BLD AUTO: 10.4 % (ref 19.6–45.3)
MCH RBC QN AUTO: 29.8 PG (ref 26.6–33)
MCHC RBC AUTO-ENTMCNC: 33.4 G/DL (ref 31.5–35.7)
MCV RBC AUTO: 89.1 FL (ref 79–97)
MONOCYTES # BLD AUTO: 0.06 10*3/MM3 (ref 0.1–0.9)
MONOCYTES NFR BLD AUTO: 2 % (ref 5–12)
NEUTROPHILS # BLD AUTO: 2.59 10*3/MM3 (ref 1.7–7)
NEUTROPHILS NFR BLD AUTO: 86.6 % (ref 42.7–76)
NRBC BLD AUTO-RTO: 0 /100 WBC (ref 0–0.2)
PLATELET # BLD AUTO: 152 10*3/MM3 (ref 140–450)
PMV BLD AUTO: 9.2 FL (ref 6–12)
POTASSIUM BLD-SCNC: 3.7 MMOL/L (ref 3.5–5.2)
RBC # BLD AUTO: 4.3 10*6/MM3 (ref 4.14–5.8)
SODIUM BLD-SCNC: 138 MMOL/L (ref 136–145)
WBC NRBC COR # BLD: 2.99 10*3/MM3 (ref 3.4–10.8)

## 2019-07-15 PROCEDURE — 25010000002 PIPERACILLIN SOD-TAZOBACTAM PER 1 G: Performed by: INTERNAL MEDICINE

## 2019-07-15 PROCEDURE — 77336 RADIATION PHYSICS CONSULT: CPT | Performed by: RADIOLOGY

## 2019-07-15 PROCEDURE — 80048 BASIC METABOLIC PNL TOTAL CA: CPT | Performed by: INTERNAL MEDICINE

## 2019-07-15 PROCEDURE — 87040 BLOOD CULTURE FOR BACTERIA: CPT | Performed by: FAMILY MEDICINE

## 2019-07-15 PROCEDURE — 25010000002 ENOXAPARIN PER 10 MG: Performed by: INTERNAL MEDICINE

## 2019-07-15 PROCEDURE — 85025 COMPLETE CBC W/AUTO DIFF WBC: CPT | Performed by: INTERNAL MEDICINE

## 2019-07-15 PROCEDURE — 77386: CPT | Performed by: RADIOLOGY

## 2019-07-15 PROCEDURE — 77014 CHG CT GUIDANCE RADIATION THERAPY FLDS PLACEMENT: CPT | Performed by: RADIOLOGY

## 2019-07-15 PROCEDURE — 25010000002 VANCOMYCIN 5 G RECONSTITUTED SOLUTION: Performed by: PHYSICIAN ASSISTANT

## 2019-07-15 RX ORDER — ACETAMINOPHEN 325 MG/1
650 TABLET ORAL EVERY 4 HOURS PRN
Status: DISCONTINUED | OUTPATIENT
Start: 2019-07-15 | End: 2019-07-22 | Stop reason: HOSPADM

## 2019-07-15 RX ADMIN — DOCUSATE SODIUM 100 MG: 100 CAPSULE, LIQUID FILLED ORAL at 10:10

## 2019-07-15 RX ADMIN — DOCUSATE SODIUM 100 MG: 100 CAPSULE, LIQUID FILLED ORAL at 21:57

## 2019-07-15 RX ADMIN — ACETAMINOPHEN 650 MG: 325 TABLET, FILM COATED ORAL at 15:36

## 2019-07-15 RX ADMIN — VANCOMYCIN HYDROCHLORIDE 1500 MG: 5 INJECTION, POWDER, LYOPHILIZED, FOR SOLUTION INTRAVENOUS at 12:00

## 2019-07-15 RX ADMIN — FAMOTIDINE 40 MG: 40 TABLET ORAL at 10:10

## 2019-07-15 RX ADMIN — NEBIVOLOL HYDROCHLORIDE 10 MG: 5 TABLET ORAL at 21:57

## 2019-07-15 RX ADMIN — ACETAMINOPHEN 650 MG: 325 TABLET, FILM COATED ORAL at 22:08

## 2019-07-15 RX ADMIN — OXYBUTYNIN CHLORIDE 5 MG: 5 TABLET ORAL at 21:57

## 2019-07-15 RX ADMIN — SODIUM CHLORIDE, PRESERVATIVE FREE 3 ML: 5 INJECTION INTRAVENOUS at 21:58

## 2019-07-15 RX ADMIN — SODIUM CHLORIDE, PRESERVATIVE FREE 500 UNITS: 5 INJECTION INTRAVENOUS at 11:01

## 2019-07-15 RX ADMIN — LIDOCAINE HYDROCHLORIDE 10 ML: 20 SOLUTION ORAL; TOPICAL at 12:53

## 2019-07-15 RX ADMIN — ACETAMINOPHEN 650 MG: 325 TABLET, FILM COATED ORAL at 02:20

## 2019-07-15 RX ADMIN — VANCOMYCIN HYDROCHLORIDE 1500 MG: 5 INJECTION, POWDER, LYOPHILIZED, FOR SOLUTION INTRAVENOUS at 22:08

## 2019-07-15 RX ADMIN — PIPERACILLIN SODIUM,TAZOBACTAM SODIUM 3.38 G: 3; .375 INJECTION, POWDER, FOR SOLUTION INTRAVENOUS at 01:55

## 2019-07-15 RX ADMIN — PIPERACILLIN SODIUM,TAZOBACTAM SODIUM 3.38 G: 3; .375 INJECTION, POWDER, FOR SOLUTION INTRAVENOUS at 18:11

## 2019-07-15 RX ADMIN — ENOXAPARIN SODIUM 40 MG: 40 INJECTION SUBCUTANEOUS at 22:09

## 2019-07-15 RX ADMIN — PIPERACILLIN SODIUM,TAZOBACTAM SODIUM 3.38 G: 3; .375 INJECTION, POWDER, FOR SOLUTION INTRAVENOUS at 12:06

## 2019-07-15 NOTE — TELEPHONE ENCOUNTER
----- Message from Megan Barry RN sent at 7/8/2019 11:17 AM CDT -----  Regarding: CHEMO CALL BACK  Mitomycin and fluorouracil

## 2019-07-15 NOTE — NURSING NOTE
Dr. Lopez requested that I see pt today and determine if pt is feeling well enough for radiation treatment today. Pt states he is feeling well today and is not wanting to delay his treatment. Will notify radiation therapist and they will send for pt today for treatment.

## 2019-07-15 NOTE — ED PROVIDER NOTES
Subjective   64 years old male with history of hypertension, prostate cancer, low-lying colorectal cancer currently on chemo and radiation presented in the ER with sudden onset fever with a T-max of 101 this morning along with soreness around the [employer] area with some redness.  He also has nausea but no vomiting.  No cough, shortness of breath.  No abdominal pain.        History provided by:  Patient  Fever   Max temp prior to arrival:  101  Severity:  Moderate  Onset quality:  Sudden  Timing:  Constant  Progression:  Unchanged  Chronicity:  New  Relieved by:  Nothing  Worsened by:  Nothing  Associated symptoms: chills, nausea, rash and sore throat    Associated symptoms: no congestion, no cough and no headaches    Nausea   The primary symptoms include fever, nausea and rash. Primary symptoms do not include abdominal pain.   The illness is also significant for chills. The illness does not include back pain.       Review of Systems   Constitutional: Positive for chills and fever.   HENT: Positive for sore throat. Negative for congestion and sinus pressure.    Respiratory: Negative for cough.    Gastrointestinal: Positive for nausea. Negative for abdominal pain.   Genitourinary: Negative for flank pain.   Musculoskeletal: Negative for back pain.   Skin: Positive for color change and rash.   Neurological: Negative for syncope and headaches.   Psychiatric/Behavioral: Negative for agitation.       Past Medical History:   Diagnosis Date   • Hypertension    • Prostate cancer (CMS/HCC) 05/16/2018   • Rectal cancer (CMS/HCC) 06/2019       No Known Allergies    Past Surgical History:   Procedure Laterality Date   • COLONOSCOPY N/A 5/30/2019    Procedure: COLONOSCOPY;  Surgeon: Jarek Gordillo DO;  Location: [facility] ENDOSCOPY;  Service: Gastroenterology   • ENDOSCOPY N/A 5/30/2019    Procedure: ESOPHAGOGASTRODUODENOSCOPY;  Surgeon: Jarek Gordillo DO;  Location: [facility] ENDOSCOPY;  Service: Gastroenterology   • PROSTATE  SURGERY     • VENOUS ACCESS DEVICE (PORT) INSERTION N/A 6/27/2019    Procedure: INSERTION VENOUS ACCESS DEVICE (MEDIPORT)           (C-ARM#1);  Surgeon: Benson Wasserman MD;  Location: [facility];  Service: General       Family History   Problem Relation Age of Onset   • Uterine cancer Mother        Social History     Socioeconomic History   • Marital status:      Spouse name: Jeffrey   • Number of children: 2   • Years of education: 15   • Highest education level: Not on file   Occupational History   • Occupation: [employer]   Tobacco Use   • Smoking status: Never Smoker   • Smokeless tobacco: Never Used   Substance and Sexual Activity   • Alcohol use: Yes     Comment: rarely   • Drug use: No   • Sexual activity: Defer           Objective   Physical Exam   Constitutional: He is oriented to person, place, and time. He appears well-developed and well-nourished.   HENT:   Head: Normocephalic and atraumatic.   Right Ear: External ear normal.   Left Ear: External ear normal.   Nose: Nose normal.   Mouth/Throat: Oropharynx is clear and moist.   Eyes: Conjunctivae and EOM are normal. Pupils are equal, round, and reactive to light.   Neck: Normal range of motion. Neck supple.   Cardiovascular: Normal rate, regular rhythm and normal heart sounds.   Pulmonary/Chest: Effort normal and breath sounds normal. No stridor. No respiratory distress. He has no wheezes.       Abdominal: Soft. There is no tenderness.   Musculoskeletal: Normal range of motion. He exhibits no tenderness.   Neurological: He is alert and oriented to person, place, and time. He exhibits normal muscle tone.   Skin: Skin is warm. Capillary refill takes less than 2 seconds. There is erythema.   Psychiatric: He has a normal mood and affect.   Nursing note and vitals reviewed.      Procedures           ED Course                  MDM  Number of Diagnoses or Management Options  Cellulitis of chest wall:   Fever, unspecified fever cause:   Diagnosis management  comments: Is given IV fluids along with Tylenol and Zofran on reevaluation feeling better.  Patient is immunocompromise with fever of 101 and cellulitis around poor area, started on broad-spectrum antibiotics.  Discussed with Dr. Lucio & patient is admitted.       Amount and/or Complexity of Data Reviewed  Clinical lab tests: ordered and reviewed  Tests in the radiology section of CPT®: ordered and reviewed  Discuss the patient with other providers: yes      Labs Reviewed   COMPREHENSIVE METABOLIC PANEL - Abnormal; Notable for the following components:       Result Value    Glucose 112 (*)     Creatinine 0.73 (*)     ALT (SGPT) 102 (*)     AST (SGOT) 77 (*)     All other components within normal limits    Narrative:     GFR Normal >60  Chronic Kidney Disease <60  Kidney Failure <15   URINALYSIS W/ MICROSCOPIC IF INDICATED (NO CULTURE) - Abnormal; Notable for the following components:    Protein, UA 30 mg/dL (1+) (*)     Leuk Esterase, UA Small (1+) (*)     All other components within normal limits   CBC WITH AUTO DIFFERENTIAL - Abnormal; Notable for the following components:    Neutrophil % 91.7 (*)     Lymphocyte % 6.0 (*)     Monocyte % 1.5 (*)     Eosinophil % 0.2 (*)     Lymphocytes, Absolute 0.32 (*)     Monocytes, Absolute 0.08 (*)     All other components within normal limits   URINALYSIS, MICROSCOPIC ONLY - Abnormal; Notable for the following components:    RBC, UA 0-2 (*)     WBC, UA 21-30 (*)     Bacteria, UA 1+ (*)     All other components within normal limits   LACTIC ACID, PLASMA - Normal   BLOOD CULTURE   BLOOD CULTURE   URINE CULTURE   RAINBOW DRAW    Narrative:     The following orders were created for panel order [place] Draw.  Procedure                               Abnormality         Status                     ---------                               -----------         ------                     Light Blue Top[[id number]]                                   Final result               Green Top  (Gel)[[id number]]                                  Final result               Lavender Top[[id number]]                                     Final result               Gold Top - SST[[id number]]                                   Final result                 Please view results for these tests on the individual orders.   BASIC METABOLIC PANEL   CBC WITH AUTO DIFFERENTIAL   CBC AND DIFFERENTIAL    Narrative:     The following orders were created for panel order CBC & Differential.  Procedure                               Abnormality         Status                     ---------                               -----------         ------                     CBC Auto Differential[[id number]]        Abnormal            Final result                 Please view results for these tests on the individual orders.   LIGHT BLUE TOP   GREEN TOP   LAVENDER TOP   GOLD TOP - SST   EXTRA TUBES    Narrative:     The following orders were created for panel order Extra Tubes.  Procedure                               Abnormality         Status                     ---------                               -----------         ------                     Nunez Top[[id number]]                                         Final result                 Please view results for these tests on the individual orders.   GRAY TOP       Ct Chest Without Contrast    Result Date: 6/27/2019  Narrative: PROCEDURE: CT CHEST WO CONTRAST INDICATION: Colorectal cancer staging COMPARISON: None  TECHNIQUE:   - reconstructions: Axial, coronal, sagittal   - contrast:  oral:  None                      intravenous: None This exam was performed according to our departmental dose-optimization program, which includes automated exposure control, adjustment of the mA and/or kV according to patient size and/or use of iterative reconstruction technique. DLP is 357.5 FINDINGS: PULMONARY PARENCHYMA:    - air spaces: Negative   - interstitium: Grossly within normal limits for age   - misc:  Tiny densely calcified granuloma in the right upper lobe anteriorly measuring approximately 3 mm in greatest dimension. Mild atelectasis in the left lung base. Left a diaphragm is mildly elevated MEDIASTINUM / CIRA:    - heart: Normal size, no pericardial fluid. Mild atherosclerotic calcification of the coronary arteries.   - aorta/great vessels: Ascending thoracic aorta is dilated up to 4.0 cm in greatest diameter. The visualized portion of the descending aorta is normal in caliber.   - misc: No mediastinal mass / significant adenopathy. There are densely calcified lymph nodes in the right paratracheal and the precarinal space. There are smaller calcified nodes in the right hilum.  PLEURAL COMPARTMENT:    - misc: No pleural fluid or mass    MISC:    - inferior neck: Negative   - osseous/body wall: A few tiny sclerotic foci are seen in multiple vertebral bodies, which are indeterminate, and these are so small that they may be falsely negative on bone scan or PET scan even if representing metastasis. These can be followed up on future studies to evaluate for change.   - subdiaphramatic: Nonobstructing 1.1 cm calculus in the right renal midpole. There may be a small cyst in the upper pole of the right kidney. Small nonobstructing calculus measuring about 3 mm is seen in the left kidney. Additional smaller calculi are present in the lower pole. A tiny low-attenuation lesion in the lower pole of the left kidney is too small to adequately characterize. The gallbladder appears to be filled with calculi. There is limited evaluation of the liver for metastases due to noncontrast technique, but no specific lesions are identified. Small hiatal hernia present.     Impression: 1. Limited evaluation due to noncontrast technique. This particularly limits evaluation of the liver, as visualized. 2. Granulomatous scarring. 3. Atherosclerotic calcification 4. Small hiatal hernia 5. Cholelithiasis 6. Probable bilateral renal cysts 7.  Bilateral nonobstructing renal calculi 8. Ascending thoracic aortic ectasia 9. Please see findings section above for further details Electronically signed by:  Parul Prado MD  6/27/2019 2:09 PM CDT Workstation: [id number]    Ct Abdomen Pelvis With Contrast    Result Date: 6/17/2019  Narrative: PROCEDURE: CT ABDOMEN PELVIS W CONTRAST INDICATION: Anal malignancy COMPARISON: None  TECHNIQUE:  - reconstructions:  Axial, coronal, sagittal  - contrast:  oral:  No     intravenous:  80 mL of Isovue-300  This exam was performed according to our departmental dose-optimization program, which includes automated exposure control, adjustment of the mA and/or kV according to patient size and/or use of iterative reconstruction technique. DLP is 473.8 FINDINGS:   - - - CT ABDOMEN - - -   THORAX (INFERIOR):  - LUNG BASES:  Clear  - PLEURA:  No fluid or mass  - HEART: Normal size, no pericardial fluid  - MISC:  N/A   ABDOMEN:  - LIVER:  Normal size/contour, no ductal dilatation. Scattered punctate calcifications are present, consistent with healed granulomatous disease  - GB: Filled with gallstones  - CBD:  Grossly negative  - SPLEEN:  Normal size and contour. Granulomatous scarring  - PANCREAS:  Normal in size, contour, no focal mass  - VISCERA:  Normal caliber, no wall thickening  - MESENTERY: No mesenteric mass  - CAVITY:  No free abdominal fluid, no free intraperitoneal air  - BODY WALL:  With normal limits  - OSSEOUS:  Degenerative changes most prominently at L5-S1 where there is almost complete loss of joint space and vacuum disc phenomenon  RETROPERITONEUM:  - KIDNEYS:Normal size/contour, no collecting system dilation                   No evidence of an enhancing mass. Bilateral nonobstructing calculi, and small possible cysts bilaterally.  - URETERS:  Normal course, caliber  - ADRENALS:  Normal size, contour  - MISC:  No sig. retroperitoneal adenopathy or mass  - VASCULAR:  Aorta / iliacs: wnl for age  - - - CT PELVIS - - -  -  VISCERA:  Normal caliber small/large bowel, mild thickening of the rectum, nonspecific  - MESENTERY:  No mass  - VASCULAR:  Within normal limits for age  - CAVITY:  No free fluid / air  - BLADDER:  Unremarkable  - OSSEOUS:  Within normal limits  - PROSTATE:  Grossly wnl Small left inguinal hernia contains only fat.      Impression: 1. No definite evidence of metastasis. Mild thickening of the rectum, may be related to treatment effect, though malignancy cannot be excluded. 2. Cholelithiasis. 3. Nonobstructing nephrolithiasis. 4. Possible bilateral renal cysts, could be further evaluated and characterized by ultrasound or multiphasic contrast enhanced CT or MRI 5. Please see finding sections above for further details. Electronically signed by:  Parul Prado MD  6/17/2019 4:58 PM CDT Workstation: [id number]    Xr Chest 1 View    Result Date: 7/14/2019  Narrative: PROCEDURE: Portable chest x-ray TECHNIQUE: Single AP view of the chest COMPARISON: 6/27/2019 and 6/17/2019. HISTORY: fever, L03.313 Cellulitis of chest wall R50.9 Fever, unspecified FINDINGS:  Life-support devices: Right-sided Port-A-Cath appears to be stable in position terminating in the region of the SVC. Lungs/pleura: Ill-defined somewhat linear in the left lung base could be due to atelectasis or pneumonia. The lungs otherwise appear clear. The left hemidiaphragm remains elevated and unchanged compared to the prior study. Heart, hilar and mediastinal structures: Calcified right paratracheal lymph nodes unchanged. Prominence of the hilar structures appear unchanged.     Impression: CONCLUSION: Ill-defined somewhat linear in the left lung base could be due to atelectasis or pneumonia. Electronically signed by:  Derrick Weller MD  7/14/2019 9:12 PM CDT Workstation: [id number]    Xr Chest Pa & Lateral    Result Date: 6/17/2019  Narrative: Chest PA and lateral CLINICAL INDICATION: Squamous cell carcinoma anus COMPARISON: Chest x-ray January 6, 2012. FINDINGS:  Cardiac silhouette is normal in size. Pulmonary vascularity is unremarkable. No focal infiltrate or consolidation.  No pleural effusion.  No pneumothorax. There is benign calcified granulomata in the mediastinum and both hilar regions. Chronic elevation left diaphragm. This suggests diaphragmatic eventration or diaphragmatic paralysis.     Impression: CONCLUSION: Chronic elevation left diaphragm either diaphragmatic eventration or diaphragmatic paralysis. Benign calcified mediastinal and hilar lymph nodes. Lungs otherwise clear. Electronically signed by:  Pelon Carr MD  6/17/2019 3:23 PM CDT Workstation: [id number]    Xr Chest Post Cva Port    Result Date: 6/27/2019  Narrative: PORTABLE CHEST HISTORY: Port-A-Cath placement. Portable AP upright film of the chest was obtained at 12:41 PM. COMPARISON: June 17, 2019 EKG leads. Right internal jugular Chlohi-n-Aktf catheter in place with tip projected over the superior vena cava. No pneumothorax. Bilateral linear atelectasis. Old granulomatous disease is present. The heart is not enlarged. The pulmonary vasculature is not increased. No pleural effusion. No acute osseous abnormality. Degenerative changes are present in the thoracic spine.     Impression: CONCLUSION: Right internal jugular [id number]-[id number]-Pdxo catheter in place with tip projected over the superior vena cava. No pneumothorax. [id number] Electronically signed by:  Tripp Ayon MD  6/27/2019 1:02 PM CDT Workstation: [id number]          Final diagnoses:   Cellulitis of chest wall   Fever, unspecified fever cause            Dmitry Gardner MD  07/15/19 0436

## 2019-07-15 NOTE — PAYOR COMM NOTE
"Erika Briseno  P: [phone]  F: [phone]    Ref#[id number]      Juan Antonio Barbosa (64 y.o. Male)     Date of Birth Social Security Number Address Home Phone MRN    1955  [id number] [facility] [address] [phone] [id number]    Mandaeism Marital Status          Confucianist        Admission Date Admission Type Admitting Provider Attending Provider Department, Room/Bed    7/14/19 Emergency Gilson Lucio MD Ol[place]kan, David B, MD [address], 405/1    Discharge Date Discharge Disposition Discharge Destination                       Attending Provider:  Gilson Lucio MD    Allergies:  No Known Allergies    Isolation:  None   Infection:  None   Code Status:  CPR    Ht:  172.7 cm (68\")   Wt:  99.3 kg (219 lb)    Admission Cmt:  None   Principal Problem:  None                Active Insurance as of 7/14/2019     Primary Coverage     Payor Plan Insurance Group Employer/Plan Group    [facility] BLUE CROSS [facility] [employer] EMPLOYEE [id number]     Payor Plan Address Payor Plan Phone Number Payor Plan Fax Number Effective Dates    [address] [phone]  1/1/2015 - None Entered    [place] [address]       Subscriber Name Subscriber Birth Date Member ID       JACKIE BARBOSA [birth year]29[birth year]1960 [id number]                 Emergency Contacts      (Rel.) Home Phone Work Phone Mobile Phone    hilario Barbosa (Spouse) [phone] -- [phone]               History & Physical      Gilson Lucio MD at 7/14/2019 10:04 PM                [facility] Medicine Admission      Date of Admission: 7/14/2019      Primary Care Physician: Sukumar Aleman MD      Chief Complaint: I have fever and chills    HPI:  The patient is a 64-year old  male who came to the emergency department in the company of his family because of fever and chills which he has had for several hours.  According to him, he has a history of rectal " and anal cancer and he is undergoing chemo radiation therapy.  His last chemotherapy was administered on Monday, though he continues to have radiation therapy on Monday to Friday.      On further questioning, the patient reveals that he experiences some discomfort in the anterior right side of his chest wall where he has a Port-A-Cath.  There is also erythema around the area extending towards his neck.  He has experienced fever, chills, and nausea.    The patient was seen in the emergency department, and provisionally diagnosed with infected Port-A-Cath.  He was commenced on parenteral antibiotic therapy after samples has been taking for septic work-up.      Past Medical History:  has a past medical history of Hypertension, Prostate cancer (CMS/Formerly [facility]) (05/16/2018), and Rectal cancer (CMS/Formerly [facility]) (06/2019).    Past Surgical History:  has a past surgical history that includes Prostate surgery; Esophagogastroduodenoscopy (N/A, 5/30/2019); Colonoscopy (N/A, 5/30/2019); and Venous Access Device (Port) (N/A, 6/27/2019).    Family History: family history includes Uterine cancer in his mother.    Social History:  reports that he has never smoked. He has never used smokeless tobacco. He reports that he drinks alcohol. He reports that he does not use drugs.    Allergies: No Known Allergies    Medications:   Prior to Admission medications    Medication Sig Start Date End Date Taking? Authorizing Provider   nebivolol (BYSTOLIC) 10 MG tablet Take 10 mg by mouth Every Night.   Yes Andreea Chery MD   ondansetron (ZOFRAN) 4 MG tablet Take 1 tablet by mouth 4 (Four) Times a Day As Needed for Nausea or Vomiting. 6/20/19  Yes Terry Ly MD   oxybutynin (DITROPAN) 5 MG tablet Take 1 tablet by mouth 3 (Three) Times a Day. 6/14/19   ProviderAndreea MD       Review of Systems:  Review of Systems   Otherwise complete ROS is negative except as mentioned above.    Physical Exam:   Temp:  [100.8 °F (38.2 °C)-101.3 °F (38.5 °C)]  101.3 °F (38.5 °C)  Heart Rate:  [60-98] 98  Resp:  [20] 20  BP: (132-154)/(68-85) 132/68  Physical exam:  Constitutional:  Well-developed and well-nourished.  No respiratory distress.      HENT:  Head:  Normocephalic and atraumatic.  Mouth:  Moist mucous membranes.    Eyes:  Conjunctivae and EOM are normal.  Pupils are equal, round.  No scleral icterus.    Neck:  Neck supple.  No JVD present.  There is some erythema noted in the root of the neck on the right side anteriorly.  Cardiovascular:  Normal rate, regular rhythm and normal heart sounds with no murmur.  Pulmonary/Chest: There is tenderness elicited on the anterior chest wall towards the right side in the region of the Port-A-Cath.  The region also appears rather erythematous compared to the surrounding skin.  No respiratory distress, no wheezes, no crackles, with normal breath sounds and good air movement.  Abdominal:  Soft.  Bowel sounds are normal.  No distension and no tenderness.   Musculoskeletal:  No edema, no tenderness, and no deformity.  No red or swollen joints anywhere.    Neurological:  Alert and oriented to person, place, and time.  No cranial nerve deficit.  No tongue deviation.  No facial droop.  No slurred speech.   Skin:  Skin is warm and dry. No rash noted, except as noted above. No pallor.   Peripheral vascular: No clubbing, no cyanosis, no edema.    Results Reviewed:  I have personally reviewed current lab, radiology, and data and agree with results.  Lab Results (last 24 hours)     Procedure Component Value Units Date/Time    Blood Culture - Blood, Arm, Right [[id number]] Collected:  07/14/19 2011    Specimen:  Blood from Arm, Right Updated:  07/14/19 2054    Extra Tubes [[id number]] Collected:  07/14/19 2004    Specimen:  Blood, Venous Line Updated:  07/14/19 2015    Narrative:       The following orders were created for panel order Extra Tubes.  Procedure                               Abnormality         Status                      ---------                               -----------         ------                     Nunez Top[[id number]]                                         In process                   Please view results for these tests on the individual orders.    Nunez Top [[id number]] Collected:  07/14/19 2004    Specimen:  Blood Updated:  07/14/19 2015    [place] Draw [[id number]] Collected:  07/14/19 1911    Specimen:  Blood Updated:  07/14/19 2015    Narrative:       The following orders were created for panel order [place] Draw.  Procedure                               Abnormality         Status                     ---------                               -----------         ------                     Light Blue Top[[id number]]                                   Final result               Green Top (Gel)[[id number]]                                  Final result               Lavender Top[[id number]]                                     Final result               Gold Top - SST[[id number]]                                   Final result                 Please view results for these tests on the individual orders.    Gold Top - SST [[id number]] Collected:  07/14/19 1911    Specimen:  Blood Updated:  07/14/19 2015     Extra Tube Hold for add-ons.     Comment: Auto resulted.       Light Blue Top [[id number]] Collected:  07/14/19 1911    Specimen:  Blood Updated:  07/14/19 2015     Extra Tube hold for add-on     Comment: Auto resulted       Green Top (Gel) [[id number]] Collected:  07/14/19 1911    Specimen:  Blood Updated:  07/14/19 2015     Extra Tube Hold for add-ons.     Comment: Auto resulted.       Lavender Top [[id number]] Collected:  07/14/19 1911    Specimen:  Blood Updated:  07/14/19 2015     Extra Tube hold for add-on     Comment: Auto resulted       Blood Culture - Blood, Arm, Left [[id number]] Collected:  07/14/19 2004    Specimen:  Blood from Arm, Left Updated:  07/14/19 2009    Urinalysis, Microscopic Only - Urine, Clean Catch [[id number]]   (Abnormal) Collected:  07/14/19 1912    Specimen:  Urine, Clean Catch Updated:  07/14/19 1946     RBC, UA 0-2 /HPF      WBC, UA 21-30 /HPF      Bacteria, UA 1+ /HPF      Squamous Epithelial Cells, UA None Seen /HPF      Hyaline Casts, UA 0-2 /LPF      Methodology Automated Microscopy    Urinalysis With Microscopic If Indicated (No Culture) - Urine, Clean Catch [[id number]]  (Abnormal) Collected:  07/14/19 1912    Specimen:  Urine, Clean Catch Updated:  07/14/19 1946     Color, UA Yellow     Appearance, UA Clear     pH, UA 7.0     Specific Gravity, UA 1.020     Glucose, UA Negative     Ketones, UA Negative     Bilirubin, UA Negative     Blood, UA Negative     Protein, UA 30 mg/dL (1+)     Leuk Esterase, UA Small (1+)     Nitrite, UA Negative     Urobilinogen, UA 1.0 E.U./dL    Comprehensive Metabolic Panel [[id number]]  (Abnormal) Collected:  07/14/19 1911    Specimen:  Blood Updated:  07/14/19 1944     Glucose 112 mg/dL      BUN 18 mg/dL      Creatinine 0.73 mg/dL      Sodium 138 mmol/L      Potassium 3.9 mmol/L      Chloride 99 mmol/L      CO2 28.0 mmol/L      Calcium 9.0 mg/dL      Total Protein 7.4 g/dL      Albumin 4.00 g/dL      ALT (SGPT) 102 U/L      AST (SGOT) 77 U/L      Alkaline Phosphatase 83 U/L      Total Bilirubin 0.5 mg/dL      eGFR Non African Amer 109 mL/min/1.73      Globulin 3.4 gm/dL      A/G Ratio 1.2 g/dL      BUN/Creatinine Ratio 24.7     Anion Gap 11.0 mmol/L     Narrative:       GFR Normal >60  Chronic Kidney Disease <60  Kidney Failure <15    Urine Culture - Urine, Urine, Clean Catch [[id number]] Collected:  07/14/19 1912    Specimen:  Urine, Clean Catch Updated:  07/14/19 1937    Lactic Acid, Plasma [[id number]]  (Normal) Collected:  07/14/19 1911    Specimen:  Blood Updated:  07/14/19 1929     Lactate 0.9 mmol/L     CBC & Differential [[id number]] Collected:  07/14/19 1911    Specimen:  Blood Updated:  07/14/19 1917    Narrative:       The following orders were created for panel order CBC &  Differential.  Procedure                               Abnormality         Status                     ---------                               -----------         ------                     CBC Auto Differential[[id number]]        Abnormal            Final result                 Please view results for these tests on the individual orders.    CBC Auto Differential [[id number]]  (Abnormal) Collected:  07/14/19 1911    Specimen:  Blood Updated:  07/14/19 1917     WBC 5.35 10*3/mm3      RBC 4.65 10*6/mm3      Hemoglobin 13.9 g/dL      Hematocrit 41.9 %      MCV 90.1 fL      MCH 29.9 pg      MCHC 33.2 g/dL      RDW 12.5 %      RDW-SD 41.0 fl      MPV 9.0 fL      Platelets 175 10*3/mm3      Neutrophil % 91.7 %      Lymphocyte % 6.0 %      Monocyte % 1.5 %      Eosinophil % 0.2 %      Basophil % 0.2 %      Immature Grans % 0.4 %      Neutrophils, Absolute 4.91 10*3/mm3      Lymphocytes, Absolute 0.32 10*3/mm3      Monocytes, Absolute 0.08 10*3/mm3      Eosinophils, Absolute 0.01 10*3/mm3      Basophils, Absolute 0.01 10*3/mm3      Immature Grans, Absolute 0.02 10*3/mm3      nRBC 0.0 /100 WBC         Imaging Results (last 24 hours)     Procedure Component Value Units Date/Time    XR Chest 1 View [[id number]] Collected:  07/14/19 2034     Updated:  07/14/19 2113    Narrative:       PROCEDURE: Portable chest x-ray    TECHNIQUE: Single AP view of the chest    COMPARISON: 6/27/2019 and 6/17/2019.    HISTORY: fever, L03.313 Cellulitis of chest wall R50.9 Fever,  unspecified    FINDINGS:     Life-support devices: Right-sided Port-A-Cath appears to be  stable in position terminating in the region of the SVC.    Lungs/pleura: Ill-defined somewhat linear in the left lung base  could be due to atelectasis or pneumonia. The lungs otherwise  appear clear. The left hemidiaphragm remains elevated and  unchanged compared to the prior study.    Heart, hilar and mediastinal structures: Calcified right  paratracheal lymph nodes unchanged.  Prominence of the hilar  structures appear unchanged.      Impression:       CONCLUSION:  Ill-defined somewhat linear in the left lung base could be due to  atelectasis or pneumonia.    Electronically signed by:  Derrick Weller MD  7/14/2019 9:12 PM CDT  Workstation: [id number]            Assessment:    Active Hospital Problems    Diagnosis   • Cellulitis of chest wall           Plan:  This is most likely an infected Port-A-Cath.  Culture results are pending as of this time  Continue with vancomycin and Zosyn parenterally  Patient may need to get the port removed if the blood cultures positive.  General surgeon has been consulted by the he was not called overnight.  Review and reconcile home medications  Monitor labs  IV fluids PRN  Antipyretics PRN    I discussed the patients findings and my recommendations with patient and family.     Gilson Lucio MD  07/14/19  10:04 PM                  Electronically signed by Gilson Lucio MD at 7/15/2019  5:28 AM          Emergency Department Notes      Katty Douglas RN at 7/14/2019  6:32 PM        Immunosuppressant precautions placed outside of pts room.      Katty Douglas RN  07/14/19 1605      Electronically signed by Katty Douglas RN at 7/14/2019  6:51 PM     Dmitry Gardner MD at 7/14/2019  7:17 PM          Subjective   64 years old male with history of hypertension, prostate cancer, low-lying colorectal cancer currently on chemo and radiation presented in the ER with sudden onset fever with a T-max of 101 this morning along with soreness around the PowerPort area with some redness.  He also has nausea but no vomiting.  No cough, shortness of breath.  No abdominal pain.        History provided by:  Patient  Fever   Max temp prior to arrival:  101  Severity:  Moderate  Onset quality:  Sudden  Timing:  Constant  Progression:  Unchanged  Chronicity:  New  Relieved by:  Nothing  Worsened by:  Nothing  Associated symptoms: chills, nausea, rash and sore throat     Associated symptoms: no congestion, no cough and no headaches    Nausea   The primary symptoms include fever, nausea and rash. Primary symptoms do not include abdominal pain.   The illness is also significant for chills. The illness does not include back pain.       Review of Systems   Constitutional: Positive for chills and fever.   HENT: Positive for sore throat. Negative for congestion and sinus pressure.    Respiratory: Negative for cough.    Gastrointestinal: Positive for nausea. Negative for abdominal pain.   Genitourinary: Negative for flank pain.   Musculoskeletal: Negative for back pain.   Skin: Positive for color change and rash.   Neurological: Negative for syncope and headaches.   Psychiatric/Behavioral: Negative for agitation.       Past Medical History:   Diagnosis Date   • Hypertension    • Prostate cancer (CMS/HCC) 05/16/2018   • Rectal cancer (CMS/HCC) 06/2019       No Known Allergies    Past Surgical History:   Procedure Laterality Date   • COLONOSCOPY N/A 5/30/2019    Procedure: COLONOSCOPY;  Surgeon: Jarek Gordillo DO;  Location: [facility] ENDOSCOPY;  Service: Gastroenterology   • ENDOSCOPY N/A 5/30/2019    Procedure: ESOPHAGOGASTRODUODENOSCOPY;  Surgeon: Jarek Gordillo DO;  Location: [facility] ENDOSCOPY;  Service: Gastroenterology   • PROSTATE SURGERY     • VENOUS ACCESS DEVICE (PORT) INSERTION N/A 6/27/2019    Procedure: INSERTION VENOUS ACCESS DEVICE (MEDIPORT)           (C-ARM#1);  Surgeon: Benson Wasserman MD;  Location: [facility] OR;  Service: General       Family History   Problem Relation Age of Onset   • Uterine cancer Mother        Social History     Socioeconomic History   • Marital status:      Spouse name: Jeffrey   • Number of children: 2   • Years of education: 15   • Highest education level: Not on file   Occupational History   • Occupation: [employer]   Tobacco Use   • Smoking status: Never Smoker   • Smokeless tobacco: Never Used   Substance and Sexual Activity   • Alcohol use:  Yes     Comment: rarely   • Drug use: No   • Sexual activity: Defer           Objective   Physical Exam   Constitutional: He is oriented to person, place, and time. He appears well-developed and well-nourished.   HENT:   Head: Normocephalic and atraumatic.   Right Ear: External ear normal.   Left Ear: External ear normal.   Nose: Nose normal.   Mouth/Throat: Oropharynx is clear and moist.   Eyes: Conjunctivae and EOM are normal. Pupils are equal, round, and reactive to light.   Neck: Normal range of motion. Neck supple.   Cardiovascular: Normal rate, regular rhythm and normal heart sounds.   Pulmonary/Chest: Effort normal and breath sounds normal. No stridor. No respiratory distress. He has no wheezes.       Abdominal: Soft. There is no tenderness.   Musculoskeletal: Normal range of motion. He exhibits no tenderness.   Neurological: He is alert and oriented to person, place, and time. He exhibits normal muscle tone.   Skin: Skin is warm. Capillary refill takes less than 2 seconds. There is erythema.   Psychiatric: He has a normal mood and affect.   Nursing note and vitals reviewed.      Procedures          ED Course                  MDM  Number of Diagnoses or Management Options  Cellulitis of chest wall:   Fever, unspecified fever cause:   Diagnosis management comments: Is given IV fluids along with Tylenol and Zofran on reevaluation feeling better.  Patient is immunocompromise with fever of 101 and cellulitis around poor area, started on broad-spectrum antibiotics.  Discussed with Dr. Lucio & patient is admitted.       Amount and/or Complexity of Data Reviewed  Clinical lab tests: ordered and reviewed  Tests in the radiology section of CPT®:  ordered and reviewed  Discuss the patient with other providers: yes      Labs Reviewed   COMPREHENSIVE METABOLIC PANEL - Abnormal; Notable for the following components:       Result Value    Glucose 112 (*)     Creatinine 0.73 (*)     ALT (SGPT) 102 (*)     AST (SGOT) 77  (*)     All other components within normal limits    Narrative:     GFR Normal >60  Chronic Kidney Disease <60  Kidney Failure <15   URINALYSIS W/ MICROSCOPIC IF INDICATED (NO CULTURE) - Abnormal; Notable for the following components:    Protein, UA 30 mg/dL (1+) (*)     Leuk Esterase, UA Small (1+) (*)     All other components within normal limits   CBC WITH AUTO DIFFERENTIAL - Abnormal; Notable for the following components:    Neutrophil % 91.7 (*)     Lymphocyte % 6.0 (*)     Monocyte % 1.5 (*)     Eosinophil % 0.2 (*)     Lymphocytes, Absolute 0.32 (*)     Monocytes, Absolute 0.08 (*)     All other components within normal limits   URINALYSIS, MICROSCOPIC ONLY - Abnormal; Notable for the following components:    RBC, UA 0-2 (*)     WBC, UA 21-30 (*)     Bacteria, UA 1+ (*)     All other components within normal limits   LACTIC ACID, PLASMA - Normal   BLOOD CULTURE   BLOOD CULTURE   URINE CULTURE   RAINBOW DRAW    Narrative:     The following orders were created for panel order [place] Draw.  Procedure                               Abnormality         Status                     ---------                               -----------         ------                     Light Blue Top[[id number]]                                   Final result               Green Top (Gel)[[id number]]                                  Final result               Lavender Top[[id number]]                                     Final result               Gold Top - SST[[id number]]                                   Final result                 Please view results for these tests on the individual orders.   BASIC METABOLIC PANEL   CBC WITH AUTO DIFFERENTIAL   CBC AND DIFFERENTIAL    Narrative:     The following orders were created for panel order CBC & Differential.  Procedure                               Abnormality         Status                     ---------                               -----------         ------                     CBC Auto  Differential[[id number]]        Abnormal            Final result                 Please view results for these tests on the individual orders.   LIGHT BLUE TOP   GREEN TOP   LAVENDER TOP   GOLD TOP - SST   EXTRA TUBES    Narrative:     The following orders were created for panel order Extra Tubes.  Procedure                               Abnormality         Status                     ---------                               -----------         ------                     Nunez Top[[id number]]                                         Final result                 Please view results for these tests on the individual orders.   GRAY TOP       Ct Chest Without Contrast    Result Date: 6/27/2019  Narrative: PROCEDURE: CT CHEST WO CONTRAST INDICATION: Colorectal cancer staging COMPARISON: None  TECHNIQUE:   - reconstructions: Axial, coronal, sagittal   - contrast:  oral:  None                      intravenous: None This exam was performed according to our departmental dose-optimization program, which includes automated exposure control, adjustment of the mA and/or kV according to patient size and/or use of iterative reconstruction technique. DLP is 357.5 FINDINGS: PULMONARY PARENCHYMA:    - air spaces: Negative   - interstitium: Grossly within normal limits for age   - misc: Tiny densely calcified granuloma in the right upper lobe anteriorly measuring approximately 3 mm in greatest dimension. Mild atelectasis in the left lung base. Left a diaphragm is mildly elevated MEDIASTINUM / CIRA:    - heart: Normal size, no pericardial fluid. Mild atherosclerotic calcification of the coronary arteries.   - aorta/great vessels: Ascending thoracic aorta is dilated up to 4.0 cm in greatest diameter. The visualized portion of the descending aorta is normal in caliber.   - misc: No mediastinal mass / significant adenopathy. There are densely calcified lymph nodes in the right paratracheal and the precarinal space. There are smaller calcified  nodes in the right hilum.  PLEURAL COMPARTMENT:    - misc: No pleural fluid or mass    MISC:    - inferior neck: Negative   - osseous/body wall: A few tiny sclerotic foci are seen in multiple vertebral bodies, which are indeterminate, and these are so small that they may be falsely negative on bone scan or PET scan even if representing metastasis. These can be followed up on future studies to evaluate for change.   - subdiaphramatic: Nonobstructing 1.1 cm calculus in the right renal midpole. There may be a small cyst in the upper pole of the right kidney. Small nonobstructing calculus measuring about 3 mm is seen in the left kidney. Additional smaller calculi are present in the lower pole. A tiny low-attenuation lesion in the lower pole of the left kidney is too small to adequately characterize. The gallbladder appears to be filled with calculi. There is limited evaluation of the liver for metastases due to noncontrast technique, but no specific lesions are identified. Small hiatal hernia present.     Impression: 1. Limited evaluation due to noncontrast technique. This particularly limits evaluation of the liver, as visualized. 2. Granulomatous scarring. 3. Atherosclerotic calcification 4. Small hiatal hernia 5. Cholelithiasis 6. Probable bilateral renal cysts 7. Bilateral nonobstructing renal calculi 8. Ascending thoracic aortic ectasia 9. Please see findings section above for further details Electronically signed by:  Parul Prado MD  6/27/2019 2:09 PM CDT Workstation: [id number]    Ct Abdomen Pelvis With Contrast    Result Date: 6/17/2019  Narrative: PROCEDURE: CT ABDOMEN PELVIS W CONTRAST INDICATION: Anal malignancy COMPARISON: None  TECHNIQUE:  - reconstructions:  Axial, coronal, sagittal  - contrast:  oral:  No     intravenous:  80 mL of Isovue-300  This exam was performed according to our departmental dose-optimization program, which includes automated exposure control, adjustment of the mA and/or kV according  to patient size and/or use of iterative reconstruction technique. DLP is 473.8 FINDINGS:   - - - CT ABDOMEN - - -   THORAX (INFERIOR):  - LUNG BASES:  Clear  - PLEURA:  No fluid or mass  - HEART: Normal size, no pericardial fluid  - MISC:  N/A   ABDOMEN:  - LIVER:  Normal size/contour, no ductal dilatation. Scattered punctate calcifications are present, consistent with healed granulomatous disease  - GB: Filled with gallstones  - CBD:  Grossly negative  - SPLEEN:  Normal size and contour. Granulomatous scarring  - PANCREAS:  Normal in size, contour, no focal mass  - VISCERA:  Normal caliber, no wall thickening  - MESENTERY: No mesenteric mass  - CAVITY:  No free abdominal fluid, no free intraperitoneal air  - BODY WALL:  With normal limits  - OSSEOUS:  Degenerative changes most prominently at L5-S1 where there is almost complete loss of joint space and vacuum disc phenomenon  RETROPERITONEUM:  - KIDNEYS:Normal size/contour, no collecting system dilation                   No evidence of an enhancing mass. Bilateral nonobstructing calculi, and small possible cysts bilaterally.  - URETERS:  Normal course, caliber  - ADRENALS:  Normal size, contour  - MISC:  No sig. retroperitoneal adenopathy or mass  - VASCULAR:  Aorta / iliacs: wnl for age  - - - CT PELVIS - - -  - VISCERA:  Normal caliber small/large bowel, mild thickening of the rectum, nonspecific  - MESENTERY:  No mass  - VASCULAR:  Within normal limits for age  - CAVITY:  No free fluid / air  - BLADDER:  Unremarkable  - OSSEOUS:  Within normal limits  - PROSTATE:  Grossly wnl Small left inguinal hernia contains only fat.      Impression: 1. No definite evidence of metastasis. Mild thickening of the rectum, may be related to treatment effect, though malignancy cannot be excluded. 2. Cholelithiasis. 3. Nonobstructing nephrolithiasis. 4. Possible bilateral renal cysts, could be further evaluated and characterized by ultrasound or multiphasic contrast enhanced CT or  MRI 5. Please see finding sections above for further details. Electronically signed by:  Parul Prado MD  6/17/2019 4:58 PM CDT Workstation: [id number]    Xr Chest 1 View    Result Date: 7/14/2019  Narrative: PROCEDURE: Portable chest x-ray TECHNIQUE: Single AP view of the chest COMPARISON: 6/27/2019 and 6/17/2019. HISTORY: fever, L03.313 Cellulitis of chest wall R50.9 Fever, unspecified FINDINGS:  Life-support devices: Right-sided Port-A-Cath appears to be stable in position terminating in the region of the SVC. Lungs/pleura: Ill-defined somewhat linear in the left lung base could be due to atelectasis or pneumonia. The lungs otherwise appear clear. The left hemidiaphragm remains elevated and unchanged compared to the prior study. Heart, hilar and mediastinal structures: Calcified right paratracheal lymph nodes unchanged. Prominence of the hilar structures appear unchanged.     Impression: CONCLUSION: Ill-defined somewhat linear in the left lung base could be due to atelectasis or pneumonia. Electronically signed by:  Derrick Weller MD  7/14/2019 9:12 PM CDT Workstation: [id number]    Xr Chest Pa & Lateral    Result Date: 6/17/2019  Narrative: Chest PA and lateral CLINICAL INDICATION: Squamous cell carcinoma anus COMPARISON: Chest x-ray January 6, 2012. FINDINGS: Cardiac silhouette is normal in size. Pulmonary vascularity is unremarkable. No focal infiltrate or consolidation.  No pleural effusion.  No pneumothorax. There is benign calcified granulomata in the mediastinum and both hilar regions. Chronic elevation left diaphragm. This suggests diaphragmatic eventration or diaphragmatic paralysis.     Impression: CONCLUSION: Chronic elevation left diaphragm either diaphragmatic eventration or diaphragmatic paralysis. Benign calcified mediastinal and hilar lymph nodes. Lungs otherwise clear. Electronically signed by:  Pelon Carr MD  6/17/2019 3:23 PM CDT Workstation: [id number]    Xr Chest Post Cva Port    Result Date:  6/27/2019  Narrative: PORTABLE CHEST HISTORY: Port-A-Cath placement. Portable AP upright film of the chest was obtained at 12:41 PM. COMPARISON: June 17, 2019 EKG leads. Right internal jugular Zebdyu-w-Rmwy catheter in place with tip projected over the superior vena cava. No pneumothorax. Bilateral linear atelectasis. Old granulomatous disease is present. The heart is not enlarged. The pulmonary vasculature is not increased. No pleural effusion. No acute osseous abnormality. Degenerative changes are present in the thoracic spine.     Impression: CONCLUSION: Right internal jugular Eaqqwn-j-Hgra catheter in place with tip projected over the superior vena cava. No pneumothorax. [id number] Electronically signed by:  Tripp Ayon MD  6/27/2019 1:02 PM CDT Workstation: [id number]          Final diagnoses:   Cellulitis of chest wall   Fever, unspecified fever cause            Dmitry Gardner MD  07/15/19 0436      Electronically signed by Dmitry Gardner MD at 7/15/2019  4:36 AM       Hospital Medications (all)       Dose Frequency Start End    acetaminophen (TYLENOL) tablet 1,000 mg 1,000 mg Once 7/14/2019 7/14/2019    Sig - Route: Take 2 tablets by mouth 1 (One) Time. - Oral    acetaminophen (TYLENOL) tablet 650 mg 650 mg Every 4 Hours PRN 7/15/2019     Sig - Route: Take 2 tablets by mouth Every 4 (Four) Hours As Needed for Mild Pain  or Fever. - Oral    docusate sodium (COLACE) capsule 100 mg 100 mg 2 Times Daily 7/14/2019     Sig - Route: Take 1 capsule by mouth 2 (Two) Times a Day. - Oral    enoxaparin (LOVENOX) syringe 40 mg 40 mg Every 24 Hours 7/14/2019     Sig - Route: Inject 0.4 mL under the skin into the appropriate area as directed Daily. - Subcutaneous    famotidine (PEPCID) tablet 40 mg 40 mg Daily 7/15/2019     Sig - Route: Take 1 tablet by mouth Daily. - Oral    morphine injection 1 mg 1 mg Every 4 Hours PRN 7/14/2019 7/24/2019    Sig - Route: Infuse 0.5 mL into a venous catheter Every 4 (Four) Hours As Needed for  "Moderate Pain . - Intravenous    Linked Group 1:  \"And\" Linked Group Details        naloxone (NARCAN) injection 0.4 mg 0.4 mg Every 5 Minutes PRN 7/14/2019     Sig - Route: Infuse 1 mL into a venous catheter Every 5 (Five) Minutes As Needed for Respiratory Depression. - Intravenous    Linked Group 1:  \"And\" Linked Group Details        nebivolol (BYSTOLIC) tablet 10 mg 10 mg Nightly 7/14/2019     Sig - Route: Take 2 tablets by mouth Every Night. - Oral    ondansetron (ZOFRAN) injection 4 mg 4 mg Once 7/14/2019 7/14/2019    Sig - Route: Infuse 2 mL into a venous catheter 1 (One) Time. - Intravenous    ondansetron (ZOFRAN) injection 4 mg 4 mg Every 6 Hours PRN 7/14/2019     Sig - Route: Infuse 2 mL into a venous catheter Every 6 (Six) Hours As Needed for Nausea or Vomiting. - Intravenous    Linked Group 2:  \"Or\" Linked Group Details        ondansetron (ZOFRAN) tablet 4 mg 4 mg Every 6 Hours PRN 7/14/2019     Sig - Route: Take 1 tablet by mouth Every 6 (Six) Hours As Needed for Nausea or Vomiting. - Oral    Linked Group 2:  \"Or\" Linked Group Details        oxybutynin (DITROPAN) tablet 5 mg 5 mg 3 Times Daily 7/14/2019     Sig - Route: Take 1 tablet by mouth 3 (Three) Times a Day. - Oral    Pharmacy to dose vancomycin  Continuous PRN 7/15/2019 7/25/2019    Sig - Route: Continuous As Needed for Consult. - Does not apply    piperacillin-tazobactam (ZOSYN) 3.375 g/100 mL 0.9% NS IVPB (mbp) 3.375 g Once 7/14/2019 7/14/2019    Sig - Route: Infuse 100 mL into a venous catheter 1 (One) Time. - Intravenous    piperacillin-tazobactam (ZOSYN) 3.375 g/100 mL 0.9% NS IVPB (mbp) 3.375 g Every 8 Hours 7/15/2019 7/22/2019    Sig - Route: Infuse 100 mL into a venous catheter Every 8 (Eight) Hours. - Intravenous    sodium chloride 0.9 % bolus 1,000 mL 1,000 mL Once 7/14/2019 7/14/2019    Sig - Route: Infuse 1,000 mL into a venous catheter 1 (One) Time. - Intravenous    sodium chloride 0.9 % flush 10 mL 10 mL As Needed 7/14/2019     Sig - " Route: Infuse 10 mL into a venous catheter As Needed for Line Care. - Intravenous    Cosign for Ordering: Accepted by Dmitry Gardner MD on 7/14/2019  7:14 PM    sodium chloride 0.9 % flush 3 mL 3 mL Every 12 Hours Scheduled 7/14/2019     Sig - Route: Infuse 3 mL into a venous catheter Every 12 (Twelve) Hours. - Intravenous    sodium chloride 0.9 % flush 3-10 mL 3-10 mL As Needed 7/14/2019     Sig - Route: Infuse 3-10 mL into a venous catheter As Needed for Line Care. - Intravenous    vancomycin 2000 mg/500 mL 0.9% NS IVPB ([facility]) 20 mg/kg × 100 kg Once 7/14/2019 7/14/2019    Sig - Route: Infuse 500 mL into a venous catheter 1 (One) Time. - Intravenous    Pharmacy Consult (Discontinued)  Continuous PRN 7/15/2019 7/15/2019    Sig - Route: Continuous As Needed for Consult (Infected port, skin/soft tissue, dose for 10 days). - Does not apply    Cosign for Ordering: Accepted by Nikos Lowe MD on 7/15/2019  8:24 AM    Pharmacy to dose vancomycin (Discontinued)  Continuous PRN 7/14/2019 7/15/2019    Sig - Route: Continuous As Needed for Consult. - Does not apply    piperacillin-tazobactam (ZOSYN) 3.375 g/100 mL 0.9% NS IVPB (mbp) (Discontinued) 3.375 g Once 7/14/2019 7/14/2019    Sig - Route: Infuse 100 mL into a venous catheter 1 (One) Time. - Intravenous    Reason for Discontinue: Duplicate order            Lab Results (last 24 hours)     Procedure Component Value Units Date/Time    Basic Metabolic Panel [[id number]]  (Abnormal) Collected:  07/15/19 0548    Specimen:  Blood Updated:  07/15/19 0645     Glucose 116 mg/dL      BUN 15 mg/dL      Creatinine 0.65 mg/dL      Sodium 138 mmol/L      Potassium 3.7 mmol/L      Chloride 103 mmol/L      CO2 25.0 mmol/L      Calcium 8.6 mg/dL      eGFR Non African Amer 124 mL/min/1.73      BUN/Creatinine Ratio 23.1     Anion Gap 10.0 mmol/L     Narrative:       GFR Normal >60  Chronic Kidney Disease <60  Kidney Failure <15    CBC Auto Differential [[id number]]  (Abnormal) Collected:   07/15/19 0548    Specimen:  Blood Updated:  07/15/19 0623     WBC 2.99 10*3/mm3      RBC 4.30 10*6/mm3      Hemoglobin 12.8 g/dL      Hematocrit 38.3 %      MCV 89.1 fL      MCH 29.8 pg      MCHC 33.4 g/dL      RDW 12.6 %      RDW-SD 41.1 fl      MPV 9.2 fL      Platelets 152 10*3/mm3      Neutrophil % 86.6 %      Lymphocyte % 10.4 %      Monocyte % 2.0 %      Eosinophil % 0.0 %      Basophil % 0.3 %      Immature Grans % 0.7 %      Neutrophils, Absolute 2.59 10*3/mm3      Lymphocytes, Absolute 0.31 10*3/mm3      Monocytes, Absolute 0.06 10*3/mm3      Eosinophils, Absolute 0.00 10*3/mm3      Basophils, Absolute 0.01 10*3/mm3      Immature Grans, Absolute 0.02 10*3/mm3      nRBC 0.0 /100 WBC     Extra Tubes [[id number]] Collected:  07/14/19 2004    Specimen:  Blood, Venous Line Updated:  07/15/19 0015    Narrative:       The following orders were created for panel order Extra Tubes.  Procedure                               Abnormality         Status                     ---------                               -----------         ------                     Nunez Top[[id number]]                                         Final result                 Please view results for these tests on the individual orders.    Nunez Top [[id number]] Collected:  07/14/19 2004    Specimen:  Blood Updated:  07/15/19 0015     Extra Tube Hold for add-ons.     Comment: Auto resulted.       Blood Culture - Blood, Arm, Right [[id number]] Collected:  07/14/19 2011    Specimen:  Blood from Arm, Right Updated:  07/14/19 2054    [place] Draw [[id number]] Collected:  07/14/19 1911    Specimen:  Blood Updated:  07/14/19 2015    Narrative:       The following orders were created for panel order [place] Draw.  Procedure                               Abnormality         Status                     ---------                               -----------         ------                     Light Blue Top[[id number]]                                   Final result                Green Top (Gel)[[id number]]                                  Final result               Lavender Top[[id number]]                                     Final result               Gold Top - SST[[id number]]                                   Final result                 Please view results for these tests on the individual orders.    Gold Top - SST [[id number]] Collected:  07/14/19 1911    Specimen:  Blood Updated:  07/14/19 2015     Extra Tube Hold for add-ons.     Comment: Auto resulted.       Light Blue Top [[id number]] Collected:  07/14/19 1911    Specimen:  Blood Updated:  07/14/19 2015     Extra Tube hold for add-on     Comment: Auto resulted       Green Top (Gel) [[id number]] Collected:  07/14/19 1911    Specimen:  Blood Updated:  07/14/19 2015     Extra Tube Hold for add-ons.     Comment: Auto resulted.       Lavender Top [[id number]] Collected:  07/14/19 1911    Specimen:  Blood Updated:  07/14/19 2015     Extra Tube hold for add-on     Comment: Auto resulted       Blood Culture - Blood, Arm, Left [[id number]] Collected:  07/14/19 2004    Specimen:  Blood from Arm, Left Updated:  07/14/19 2009    Urinalysis, Microscopic Only - Urine, Clean Catch [[id number]]  (Abnormal) Collected:  07/14/19 1912    Specimen:  Urine, Clean Catch Updated:  07/14/19 1946     RBC, UA 0-2 /HPF      WBC, UA 21-30 /HPF      Bacteria, UA 1+ /HPF      Squamous Epithelial Cells, UA None Seen /HPF      Hyaline Casts, UA 0-2 /LPF      Methodology Automated Microscopy    Urinalysis With Microscopic If Indicated (No Culture) - Urine, Clean Catch [[id number]]  (Abnormal) Collected:  07/14/19 1912    Specimen:  Urine, Clean Catch Updated:  07/14/19 1946     Color, UA Yellow     Appearance, UA Clear     pH, UA 7.0     Specific Gravity, UA 1.020     Glucose, UA Negative     Ketones, UA Negative     Bilirubin, UA Negative     Blood, UA Negative     Protein, UA 30 mg/dL (1+)     Leuk Esterase, UA Small (1+)     Nitrite, UA Negative     Urobilinogen,  UA 1.0 E.U./dL    Comprehensive Metabolic Panel [[id number]]  (Abnormal) Collected:  07/14/19 1911    Specimen:  Blood Updated:  07/14/19 1944     Glucose 112 mg/dL      BUN 18 mg/dL      Creatinine 0.73 mg/dL      Sodium 138 mmol/L      Potassium 3.9 mmol/L      Chloride 99 mmol/L      CO2 28.0 mmol/L      Calcium 9.0 mg/dL      Total Protein 7.4 g/dL      Albumin 4.00 g/dL      ALT (SGPT) 102 U/L      AST (SGOT) 77 U/L      Alkaline Phosphatase 83 U/L      Total Bilirubin 0.5 mg/dL      eGFR Non African Amer 109 mL/min/1.73      Globulin 3.4 gm/dL      A/G Ratio 1.2 g/dL      BUN/Creatinine Ratio 24.7     Anion Gap 11.0 mmol/L     Narrative:       GFR Normal >60  Chronic Kidney Disease <60  Kidney Failure <15    Urine Culture - Urine, Urine, Clean Catch [[id number]] Collected:  07/14/19 1912    Specimen:  Urine, Clean Catch Updated:  07/14/19 1937    Lactic Acid, Plasma [[id number]]  (Normal) Collected:  07/14/19 1911    Specimen:  Blood Updated:  07/14/19 1929     Lactate 0.9 mmol/L     CBC & Differential [[id number]] Collected:  07/14/19 1911    Specimen:  Blood Updated:  07/14/19 1917    Narrative:       The following orders were created for panel order CBC & Differential.  Procedure                               Abnormality         Status                     ---------                               -----------         ------                     CBC Auto Differential[[id number]]        Abnormal            Final result                 Please view results for these tests on the individual orders.    CBC Auto Differential [[id number]]  (Abnormal) Collected:  07/14/19 1911    Specimen:  Blood Updated:  07/14/19 1917     WBC 5.35 10*3/mm3      RBC 4.65 10*6/mm3      Hemoglobin 13.9 g/dL      Hematocrit 41.9 %      MCV 90.1 fL      MCH 29.9 pg      MCHC 33.2 g/dL      RDW 12.5 %      RDW-SD 41.0 fl      MPV 9.0 fL      Platelets 175 10*3/mm3      Neutrophil % 91.7 %      Lymphocyte % 6.0 %      Monocyte % 1.5 %       Eosinophil % 0.2 %      Basophil % 0.2 %      Immature Grans % 0.4 %      Neutrophils, Absolute 4.91 10*3/mm3      Lymphocytes, Absolute 0.32 10*3/mm3      Monocytes, Absolute 0.08 10*3/mm3      Eosinophils, Absolute 0.01 10*3/mm3      Basophils, Absolute 0.01 10*3/mm3      Immature Grans, Absolute 0.02 10*3/mm3      nRBC 0.0 /100 WBC         Imaging Results (last 24 hours)     Procedure Component Value Units Date/Time    XR Chest 1 View [[id number]] Collected:  07/14/19 2034     Updated:  07/14/19 2113    Narrative:       PROCEDURE: Portable chest x-ray    TECHNIQUE: Single AP view of the chest    COMPARISON: 6/27/2019 and 6/17/2019.    HISTORY: fever, L03.313 Cellulitis of chest wall R50.9 Fever,  unspecified    FINDINGS:     Life-support devices: Right-sided Port-A-Cath appears to be  stable in position terminating in the region of the SVC.    Lungs/pleura: Ill-defined somewhat linear in the left lung base  could be due to atelectasis or pneumonia. The lungs otherwise  appear clear. The left hemidiaphragm remains elevated and  unchanged compared to the prior study.    Heart, hilar and mediastinal structures: Calcified right  paratracheal lymph nodes unchanged. Prominence of the hilar  structures appear unchanged.      Impression:       CONCLUSION:  Ill-defined somewhat linear in the left lung base could be due to  atelectasis or pneumonia.    Electronically signed by:  Derrick Weller MD  7/14/2019 9:12 PM CDT  Workstation: [id number]        ECG/EMG Results (last 24 hours)     ** No results found for the last 24 hours. **        Physician Progress Notes (last 24 hours) (Notes from 7/14/2019  8:45 AM through 7/15/2019  8:45 AM)     No notes of this type exist for this encounter.        Consult Notes (last 24 hours) (Notes from 7/14/2019  8:45 AM through 7/15/2019  8:45 AM)     No notes of this type exist for this encounter.

## 2019-07-15 NOTE — PLAN OF CARE
Problem: Patient Care Overview  Goal: Plan of Care Review  Outcome: Ongoing (interventions implemented as appropriate)   07/15/19 0130   Coping/Psychosocial   Plan of Care Reviewed With patient   Plan of Care Review   Progress no change   OTHER   Outcome Summary Pt new admit to unit; presented with redness of mediport site and elevated temp; monitoring vital signs, WNL at this time      Goal: Individualization and Mutuality  Outcome: Ongoing (interventions implemented as appropriate)    Goal: Discharge Needs Assessment  Outcome: Ongoing (interventions implemented as appropriate)      Problem: Infection, Risk/Actual (Adult)  Goal: Infection Prevention/Resolution  Outcome: Ongoing (interventions implemented as appropriate)      Problem: Oncology Care (Adult)  Goal: Signs and Symptoms of Listed Potential Problems Will be Absent, Minimized or Managed (Oncology Care)  Outcome: Ongoing (interventions implemented as appropriate)      Problem: Chemotherapy Effects (Adult)  Goal: Signs and Symptoms of Listed Potential Problems Will be Absent, Minimized or Managed (Chemotherapy Effects)  Outcome: Ongoing (interventions implemented as appropriate)      Problem: Skin and Soft Tissue Infection (Adult)  Goal: Signs and Symptoms of Listed Potential Problems Will be Absent, Minimized or Managed (Skin and Soft Tissue Infection)  Outcome: Ongoing (interventions implemented as appropriate)

## 2019-07-15 NOTE — H&P
[facility] Medicine Admission      Date of Admission: 7/14/2019      Primary Care Physician: Sukumar Aleman MD      Chief Complaint: I have fever and chills    HPI:  The patient is a 64-year old  male who came to the emergency department in the company of his family because of fever and chills which he has had for several hours.  According to him, he has a history of rectal and anal cancer and he is undergoing chemo radiation therapy.  His last chemotherapy was administered on Monday, though he continues to have radiation therapy on Monday to Friday.      On further questioning, the patient reveals that he experiences some discomfort in the anterior right side of his chest wall where he has a Port-A-Cath.  There is also erythema around the area extending towards his neck.  He has experienced fever, chills, and nausea.    The patient was seen in the emergency department, and provisionally diagnosed with infected Port-A-Cath.  He was commenced on parenteral antibiotic therapy after samples has been taking for septic work-up.      Past Medical History:  has a past medical history of Hypertension, Prostate cancer (CMS/[facility]) (05/16/2018), and Rectal cancer (CMS/[facility]) (06/2019).    Past Surgical History:  has a past surgical history that includes Prostate surgery; Esophagogastroduodenoscopy (N/A, 5/30/2019); Colonoscopy (N/A, 5/30/2019); and Venous Access Device (Port) (N/A, 6/27/2019).    Family History: family history includes Uterine cancer in his mother.    Social History:  reports that he has never smoked. He has never used smokeless tobacco. He reports that he drinks alcohol. He reports that he does not use drugs.    Allergies: No Known Allergies    Medications:   Prior to Admission medications    Medication Sig Start Date End Date Taking? Authorizing Provider   nebivolol (BYSTOLIC) 10 MG tablet Take 10 mg by mouth Every Night.   Yes Provider, MD Andreea    ondansetron (ZOFRAN) 4 MG tablet Take 1 tablet by mouth 4 (Four) Times a Day As Needed for Nausea or Vomiting. 6/20/19  Yes Terry Ly MD   oxybutynin (DITROPAN) 5 MG tablet Take 1 tablet by mouth 3 (Three) Times a Day. 6/14/19   Provider, MD Andreea       Review of Systems:  Review of Systems   Otherwise complete ROS is negative except as mentioned above.    Physical Exam:   Temp:  [100.8 °F (38.2 °C)-101.3 °F (38.5 °C)] 101.3 °F (38.5 °C)  Heart Rate:  [60-98] 98  Resp:  [20] 20  BP: (132-154)/(68-85) 132/68  Physical exam:  Constitutional:  Well-developed and well-nourished.  No respiratory distress.      HENT:  Head:  Normocephalic and atraumatic.  Mouth:  Moist mucous membranes.    Eyes:  Conjunctivae and EOM are normal.  Pupils are equal, round.  No scleral icterus.    Neck:  Neck supple.  No JVD present.  There is some erythema noted in the root of the neck on the right side anteriorly.  Cardiovascular:  Normal rate, regular rhythm and normal heart sounds with no murmur.  Pulmonary/Chest: There is tenderness elicited on the anterior chest wall towards the right side in the region of the Port-A-Cath.  The region also appears rather erythematous compared to the surrounding skin.  No respiratory distress, no wheezes, no crackles, with normal breath sounds and good air movement.  Abdominal:  Soft.  Bowel sounds are normal.  No distension and no tenderness.   Musculoskeletal:  No edema, no tenderness, and no deformity.  No red or swollen joints anywhere.    Neurological:  Alert and oriented to person, place, and time.  No cranial nerve deficit.  No tongue deviation.  No facial droop.  No slurred speech.   Skin:  Skin is warm and dry. No rash noted, except as noted above. No pallor.   Peripheral vascular: No clubbing, no cyanosis, no edema.    Results Reviewed:  I have personally reviewed current lab, radiology, and data and agree with results.  Lab Results (last 24 hours)     Procedure Component Value  Units Date/Time    Blood Culture - Blood, Arm, Right [[id number]] Collected:  07/14/19 2011    Specimen:  Blood from Arm, Right Updated:  07/14/19 2054    Extra Tubes [[id number]] Collected:  07/14/19 2004    Specimen:  Blood, Venous Line Updated:  07/14/19 2015    Narrative:       The following orders were created for panel order Extra Tubes.  Procedure                               Abnormality         Status                     ---------                               -----------         ------                     Nunez Top[[id number]]                                         In process                   Please view results for these tests on the individual orders.    Nunez Top [[id number]] Collected:  07/14/19 2004    Specimen:  Blood Updated:  07/14/19 2015    [place] Draw [[id number]] Collected:  07/14/19 1911    Specimen:  Blood Updated:  07/14/19 2015    Narrative:       The following orders were created for panel order [place] Draw.  Procedure                               Abnormality         Status                     ---------                               -----------         ------                     Light Blue Top[[id number]]                                   Final result               Green Top (Gel)[[id number]]                                  Final result               Lavender Top[[id number]]                                     Final result               Gold Top - SST[[id number]]                                   Final result                 Please view results for these tests on the individual orders.    Gold Top - SST [[id number]] Collected:  07/14/19 1911    Specimen:  Blood Updated:  07/14/19 2015     Extra Tube Hold for add-ons.     Comment: Auto resulted.       Light Blue Top [[id number]] Collected:  07/14/19 1911    Specimen:  Blood Updated:  07/14/19 2015     Extra Tube hold for add-on     Comment: Auto resulted       Green Top (Gel) [[id number]] Collected:  07/14/19 1911    Specimen:  Blood Updated:   07/14/19 2015     Extra Tube Hold for add-ons.     Comment: Auto resulted.       Lavender Top [[id number]] Collected:  07/14/19 1911    Specimen:  Blood Updated:  07/14/19 2015     Extra Tube hold for add-on     Comment: Auto resulted       Blood Culture - Blood, Arm, Left [[id number]] Collected:  07/14/19 2004    Specimen:  Blood from Arm, Left Updated:  07/14/19 2009    Urinalysis, Microscopic Only - Urine, Clean Catch [[id number]]  (Abnormal) Collected:  07/14/19 1912    Specimen:  Urine, Clean Catch Updated:  07/14/19 1946     RBC, UA 0-2 /HPF      WBC, UA 21-30 /HPF      Bacteria, UA 1+ /HPF      Squamous Epithelial Cells, UA None Seen /HPF      Hyaline Casts, UA 0-2 /LPF      Methodology Automated Microscopy    Urinalysis With Microscopic If Indicated (No Culture) - Urine, Clean Catch [[id number]]  (Abnormal) Collected:  07/14/19 1912    Specimen:  Urine, Clean Catch Updated:  07/14/19 1946     Color, UA Yellow     Appearance, UA Clear     pH, UA 7.0     Specific Gravity, UA 1.020     Glucose, UA Negative     Ketones, UA Negative     Bilirubin, UA Negative     Blood, UA Negative     Protein, UA 30 mg/dL (1+)     Leuk Esterase, UA Small (1+)     Nitrite, UA Negative     Urobilinogen, UA 1.0 E.U./dL    Comprehensive Metabolic Panel [[id number]]  (Abnormal) Collected:  07/14/19 1911    Specimen:  Blood Updated:  07/14/19 1944     Glucose 112 mg/dL      BUN 18 mg/dL      Creatinine 0.73 mg/dL      Sodium 138 mmol/L      Potassium 3.9 mmol/L      Chloride 99 mmol/L      CO2 28.0 mmol/L      Calcium 9.0 mg/dL      Total Protein 7.4 g/dL      Albumin 4.00 g/dL      ALT (SGPT) 102 U/L      AST (SGOT) 77 U/L      Alkaline Phosphatase 83 U/L      Total Bilirubin 0.5 mg/dL      eGFR Non African Amer 109 mL/min/1.73      Globulin 3.4 gm/dL      A/G Ratio 1.2 g/dL      BUN/Creatinine Ratio 24.7     Anion Gap 11.0 mmol/L     Narrative:       GFR Normal >60  Chronic Kidney Disease <60  Kidney Failure <15    Urine Culture - Urine,  Urine, Clean Catch [[id number]] Collected:  07/14/19 1912    Specimen:  Urine, Clean Catch Updated:  07/14/19 1937    Lactic Acid, Plasma [[id number]]  (Normal) Collected:  07/14/19 1911    Specimen:  Blood Updated:  07/14/19 1929     Lactate 0.9 mmol/L     CBC & Differential [[id number]] Collected:  07/14/19 1911    Specimen:  Blood Updated:  07/14/19 1917    Narrative:       The following orders were created for panel order CBC & Differential.  Procedure                               Abnormality         Status                     ---------                               -----------         ------                     CBC Auto Differential[[id number]]        Abnormal            Final result                 Please view results for these tests on the individual orders.    CBC Auto Differential [[id number]]  (Abnormal) Collected:  07/14/19 1911    Specimen:  Blood Updated:  07/14/19 1917     WBC 5.35 10*3/mm3      RBC 4.65 10*6/mm3      Hemoglobin 13.9 g/dL      Hematocrit 41.9 %      MCV 90.1 fL      MCH 29.9 pg      MCHC 33.2 g/dL      RDW 12.5 %      RDW-SD 41.0 fl      MPV 9.0 fL      Platelets 175 10*3/mm3      Neutrophil % 91.7 %      Lymphocyte % 6.0 %      Monocyte % 1.5 %      Eosinophil % 0.2 %      Basophil % 0.2 %      Immature Grans % 0.4 %      Neutrophils, Absolute 4.91 10*3/mm3      Lymphocytes, Absolute 0.32 10*3/mm3      Monocytes, Absolute 0.08 10*3/mm3      Eosinophils, Absolute 0.01 10*3/mm3      Basophils, Absolute 0.01 10*3/mm3      Immature Grans, Absolute 0.02 10*3/mm3      nRBC 0.0 /100 WBC         Imaging Results (last 24 hours)     Procedure Component Value Units Date/Time    XR Chest 1 View [[id number]] Collected:  07/14/19 2034     Updated:  07/14/19 2113    Narrative:       PROCEDURE: Portable chest x-ray    TECHNIQUE: Single AP view of the chest    COMPARISON: 6/27/2019 and 6/17/2019.    HISTORY: fever, L03.313 Cellulitis of chest wall R50.9 Fever,  unspecified    FINDINGS:     Life-support  devices: Right-sided Port-A-Cath appears to be  stable in position terminating in the region of the SVC.    Lungs/pleura: Ill-defined somewhat linear in the left lung base  could be due to atelectasis or pneumonia. The lungs otherwise  appear clear. The left hemidiaphragm remains elevated and  unchanged compared to the prior study.    Heart, hilar and mediastinal structures: Calcified right  paratracheal lymph nodes unchanged. Prominence of the hilar  structures appear unchanged.      Impression:       CONCLUSION:  Ill-defined somewhat linear in the left lung base could be due to  atelectasis or pneumonia.    Electronically signed by:  Derrick Weller MD  7/14/2019 9:12 PM CDT  Workstation: [id number]            Assessment:    Active Hospital Problems    Diagnosis   • Cellulitis of chest wall           Plan:  This is most likely an infected Port-A-Cath.  Culture results are pending as of this time  Continue with vancomycin and Zosyn parenterally  Patient may need to get the port removed if the blood cultures positive.  General surgeon has been consulted by the he was not called overnight.  Review and reconcile home medications  Monitor labs  IV fluids PRN  Antipyretics PRN    I discussed the patients findings and my recommendations with patient and family.     Gilson Lucio MD  07/14/19  10:04 PM

## 2019-07-15 NOTE — NURSING NOTE
Per physician order at this time, I accessed pt's port per policy, wasted 10cc of blood, then collected another 10cc for blood culture. Lab at bedside and was handed sample for culture. Flushed pt's port with 20cc of NS and followed with 5cc flush of heparin before deaccessing. Pt had scant amount of bleeding back from access site that I then cleaned with alcohol wipe and covered with a dry 4x4 and paper tape.

## 2019-07-15 NOTE — PROGRESS NOTES
"Pharmacokinetics by Pharmacy - Vancomycin Initial Consult    Juan Antonio Shaw is a 64 y.o. male being initiated on vancomycin for skin and soft tissue infection. Patient is also receiving Zosyn.      Objective:     [Ht: 172.7 cm (68\"); Wt: 99.3 kg (219 lb)]     Lab Results   Component Value Date    WBC 2.99 (L) 07/15/2019    WBC 5.35 07/14/2019    WBC 6.29 07/08/2019    WBC 6.0 05/09/2018      Lab Results   Component Value Date    LACTATE 0.9 07/14/2019      Temp Readings from Last 1 Encounters:   07/15/19 99.1 °F (37.3 °C) (Oral)     Estimated Creatinine Clearance: 132.9 mL/min (A) (by C-G formula based on SCr of 0.65 mg/dL (L)).   Lab Results   Component Value Date    CREATININE 0.65 (L) 07/15/2019    CREATININE 0.73 (L) 07/14/2019    CREATININE 0.82 07/08/2019    CREATININE 1 07/11/2018    CREATININE 1.0 05/17/2018    CREATININE 0.9 05/09/2018       Baseline culture results:  Microbiology Results (last 10 days)       ** No results found for the last 240 hours. **               Assessment  Patient has been febrile this morning  WBC are low at 2.99  There are no cultures to follow at this time  Goal vancomycin AUC/trough: Trough of 10-15  Pt did receive loading dose of 2g    Plan  1. Give vancomycin 1500mg IV Q12  2. Will order vancomycin trough prior to the 5th dose  3. Pharmacy will monitor renal function and adjust dose accordingly.    Neto Barry RPH  07/15/19 8:57 AM     "

## 2019-07-15 NOTE — PROGRESS NOTES
[facility] Medicine Services  INPATIENT PROGRESS NOTE    Length of Stay: 1  Date of Admission: 7/14/2019  Primary Care Physician: Sukumar Aleman MD    Subjective   Chief Complaint/HPI: This 64-year-old  male with history of recent port placement reported to the emergency department with complaints of fever, chills, and redness around his port site.  Patient evaluated by general surgery, Dr. Wasserman, who recommends continued antibiotic treatment and monitoring.  Have also discussed with Dr. Ly who states that the patient does not have another active chemotherapy regimen due for 3 weeks.  Patient was due to get radiation today, Dr. Ly will discuss with Dr. Lopez of radiation oncology.  Patient states he feels much better.    Review of Systems   Constitutional: Positive for chills and fever.   Respiratory: Negative for shortness of breath.    Cardiovascular: Negative for chest pain and palpitations.   Gastrointestinal: Negative for abdominal pain, nausea and vomiting.   Genitourinary: Negative for dysuria.   Neurological: Negative for dizziness and light-headedness.      All pertinent negatives and positives are as above. All other systems have been reviewed and are negative unless otherwise stated.     Objective    Temp:  [99 °F (37.2 °C)-101.6 °F (38.7 °C)] 99 °F (37.2 °C)  Heart Rate:  [] 76  Resp:  [18-20] 18  BP: (114-154)/(58-85) 114/58    Physical Exam   Constitutional: He is oriented to person, place, and time. He appears well-developed and well-nourished.   HENT:   Head: Normocephalic and atraumatic.   Eyes: Conjunctivae are normal.   Cardiovascular: Normal rate and regular rhythm.   Pulmonary/Chest: Effort normal and breath sounds normal.   Abdominal: Soft. Bowel sounds are normal. He exhibits no distension. There is no tenderness.   Musculoskeletal: He exhibits no edema.   Neurological: He is alert and oriented to person, place, and time.    Skin: Skin is warm and dry. Capillary refill takes less than 2 seconds. There is erythema.        Erythema along right clavicle and pectoralis muscle   Psychiatric: He has a normal mood and affect. His behavior is normal.     Results Review:  I have reviewed the labs, radiology results, and diagnostic studies.    Laboratory Data:   Results from last 7 days   Lab Units 07/15/19  0548 07/14/19  1911   SODIUM mmol/L 138 138   POTASSIUM mmol/L 3.7 3.9   CHLORIDE mmol/L 103 99   CO2 mmol/L 25.0 28.0   BUN mg/dL 15 18   CREATININE mg/dL 0.65* 0.73*   GLUCOSE mg/dL 116* 112*   CALCIUM mg/dL 8.6 9.0   BILIRUBIN mg/dL  --  0.5   ALK PHOS U/L  --  83   ALT (SGPT) U/L  --  102*   AST (SGOT) U/L  --  77*   ANION GAP mmol/L 10.0 11.0     Estimated Creatinine Clearance: 132.9 mL/min (A) (by C-G formula based on SCr of 0.65 mg/dL (L)).          Results from last 7 days   Lab Units 07/15/19  0548 07/14/19 1911   WBC 10*3/mm3 2.99* 5.35   HEMOGLOBIN g/dL 12.8* 13.9   HEMATOCRIT % 38.3 41.9   PLATELETS 10*3/mm3 152 175           Culture Data:   No results found for: BLOODCX  No results found for: URINECX  No results found for: RESPCX  No results found for: WOUNDCX  No results found for: STOOLCX  No components found for: BODYFLD    Radiology Data:   Imaging Results (last 24 hours)     Procedure Component Value Units Date/Time    XR Chest 1 View [[id number]] Collected:  07/14/19 2034     Updated:  07/14/19 2113    Narrative:       PROCEDURE: Portable chest x-ray    TECHNIQUE: Single AP view of the chest    COMPARISON: 6/27/2019 and 6/17/2019.    HISTORY: fever, L03.313 Cellulitis of chest wall R50.9 Fever,  unspecified    FINDINGS:     Life-support devices: Right-sided Port-A-Cath appears to be  stable in position terminating in the region of the SVC.    Lungs/pleura: Ill-defined somewhat linear in the left lung base  could be due to atelectasis or pneumonia. The lungs otherwise  appear clear. The left hemidiaphragm remains elevated  and  unchanged compared to the prior study.    Heart, hilar and mediastinal structures: Calcified right  paratracheal lymph nodes unchanged. Prominence of the hilar  structures appear unchanged.      Impression:       CONCLUSION:  Ill-defined somewhat linear in the left lung base could be due to  atelectasis or pneumonia.    Electronically signed by:  Derrick Weller MD  7/14/2019 9:12 PM CDT  Workstation: [id number]          I have reviewed the patient's current medications.     Assessment/Plan     Active Hospital Problems    Diagnosis   • Cellulitis of chest wall   Anal cancer  History of prostate cancer  Sclerotic lesion of the bone on CT scan of chest      Plan:  Continue per general surgery, continue per hematology/oncology, continue per radiation oncology, continue as hospital course dictates.                      This document has been electronically signed by DEMETRIUS Zhao on July 15, 2019 10:57 AM

## 2019-07-16 LAB
ALBUMIN SERPL-MCNC: 3.4 G/DL (ref 3.5–5.2)
ALBUMIN/GLOB SERPL: 1.1 G/DL
ALP SERPL-CCNC: 79 U/L (ref 39–117)
ALT SERPL W P-5'-P-CCNC: 172 U/L (ref 1–41)
ANION GAP SERPL CALCULATED.3IONS-SCNC: 11 MMOL/L (ref 5–15)
AST SERPL-CCNC: 117 U/L (ref 1–40)
BACTERIA SPEC AEROBE CULT: ABNORMAL
BILIRUB SERPL-MCNC: 0.3 MG/DL (ref 0.2–1.2)
BUN BLD-MCNC: 14 MG/DL (ref 8–23)
BUN/CREAT SERPL: 20.9 (ref 7–25)
CALCIUM SPEC-SCNC: 8.3 MG/DL (ref 8.6–10.5)
CHLORIDE SERPL-SCNC: 103 MMOL/L (ref 98–107)
CO2 SERPL-SCNC: 26 MMOL/L (ref 22–29)
CREAT BLD-MCNC: 0.67 MG/DL (ref 0.76–1.27)
DEPRECATED RDW RBC AUTO: 41.6 FL (ref 37–54)
EOSINOPHIL # BLD MANUAL: 0.09 10*3/MM3 (ref 0–0.4)
EOSINOPHIL NFR BLD MANUAL: 3 % (ref 0.3–6.2)
ERYTHROCYTE [DISTWIDTH] IN BLOOD BY AUTOMATED COUNT: 12.7 % (ref 12.3–15.4)
GFR SERPL CREATININE-BSD FRML MDRD: 120 ML/MIN/1.73
GLOBULIN UR ELPH-MCNC: 3.1 GM/DL
GLUCOSE BLD-MCNC: 121 MG/DL (ref 65–99)
HCT VFR BLD AUTO: 37.5 % (ref 37.5–51)
HGB BLD-MCNC: 12.5 G/DL (ref 13–17.7)
LYMPHOCYTES # BLD MANUAL: 0.74 10*3/MM3 (ref 0.7–3.1)
LYMPHOCYTES NFR BLD MANUAL: 25 % (ref 19.6–45.3)
LYMPHOCYTES NFR BLD MANUAL: 5 % (ref 5–12)
MCH RBC QN AUTO: 29.9 PG (ref 26.6–33)
MCHC RBC AUTO-ENTMCNC: 33.3 G/DL (ref 31.5–35.7)
MCV RBC AUTO: 89.7 FL (ref 79–97)
MONOCYTES # BLD AUTO: 0.15 10*3/MM3 (ref 0.1–0.9)
NEUTROPHILS # BLD AUTO: 1.95 10*3/MM3 (ref 1.7–7)
NEUTROPHILS NFR BLD MANUAL: 65 % (ref 42.7–76)
NEUTS BAND NFR BLD MANUAL: 1 % (ref 0–5)
PLATELET # BLD AUTO: 130 10*3/MM3 (ref 140–450)
PMV BLD AUTO: 9 FL (ref 6–12)
POTASSIUM BLD-SCNC: 3.9 MMOL/L (ref 3.5–5.2)
PROT SERPL-MCNC: 6.5 G/DL (ref 6–8.5)
RBC # BLD AUTO: 4.18 10*6/MM3 (ref 4.14–5.8)
RBC MORPH BLD: NORMAL
SMALL PLATELETS BLD QL SMEAR: NORMAL
SODIUM BLD-SCNC: 140 MMOL/L (ref 136–145)
VANCOMYCIN TROUGH SERPL-MCNC: 10.5 MCG/ML (ref 5–20)
VARIANT LYMPHS NFR BLD MANUAL: 1 % (ref 0–5)
WBC MORPH BLD: NORMAL
WBC NRBC COR # BLD: 2.95 10*3/MM3 (ref 3.4–10.8)

## 2019-07-16 PROCEDURE — 77386: CPT | Performed by: RADIOLOGY

## 2019-07-16 PROCEDURE — 25010000002 ENOXAPARIN PER 10 MG: Performed by: INTERNAL MEDICINE

## 2019-07-16 PROCEDURE — 25010000002 VANCOMYCIN 5 G RECONSTITUTED SOLUTION: Performed by: PHYSICIAN ASSISTANT

## 2019-07-16 PROCEDURE — 80202 ASSAY OF VANCOMYCIN: CPT | Performed by: INTERNAL MEDICINE

## 2019-07-16 PROCEDURE — 77427 RADIATION TX MANAGEMENT X5: CPT | Performed by: RADIOLOGY

## 2019-07-16 PROCEDURE — 85025 COMPLETE CBC W/AUTO DIFF WBC: CPT | Performed by: PHYSICIAN ASSISTANT

## 2019-07-16 PROCEDURE — 85007 BL SMEAR W/DIFF WBC COUNT: CPT | Performed by: PHYSICIAN ASSISTANT

## 2019-07-16 PROCEDURE — 80053 COMPREHEN METABOLIC PANEL: CPT | Performed by: PHYSICIAN ASSISTANT

## 2019-07-16 PROCEDURE — 77014 CHG CT GUIDANCE RADIATION THERAPY FLDS PLACEMENT: CPT | Performed by: RADIOLOGY

## 2019-07-16 PROCEDURE — 25010000002 PIPERACILLIN SOD-TAZOBACTAM PER 1 G: Performed by: INTERNAL MEDICINE

## 2019-07-16 RX ADMIN — PIPERACILLIN SODIUM,TAZOBACTAM SODIUM 3.38 G: 3; .375 INJECTION, POWDER, FOR SOLUTION INTRAVENOUS at 01:37

## 2019-07-16 RX ADMIN — DOCUSATE SODIUM 100 MG: 100 CAPSULE, LIQUID FILLED ORAL at 07:56

## 2019-07-16 RX ADMIN — DOCUSATE SODIUM 100 MG: 100 CAPSULE, LIQUID FILLED ORAL at 20:43

## 2019-07-16 RX ADMIN — SODIUM CHLORIDE, PRESERVATIVE FREE 3 ML: 5 INJECTION INTRAVENOUS at 20:46

## 2019-07-16 RX ADMIN — VANCOMYCIN HYDROCHLORIDE 1500 MG: 5 INJECTION, POWDER, LYOPHILIZED, FOR SOLUTION INTRAVENOUS at 22:15

## 2019-07-16 RX ADMIN — NEBIVOLOL HYDROCHLORIDE 10 MG: 5 TABLET ORAL at 20:43

## 2019-07-16 RX ADMIN — OXYBUTYNIN CHLORIDE 5 MG: 5 TABLET ORAL at 20:43

## 2019-07-16 RX ADMIN — FAMOTIDINE 40 MG: 40 TABLET ORAL at 07:56

## 2019-07-16 RX ADMIN — PIPERACILLIN SODIUM,TAZOBACTAM SODIUM 3.38 G: 3; .375 INJECTION, POWDER, FOR SOLUTION INTRAVENOUS at 18:22

## 2019-07-16 RX ADMIN — VANCOMYCIN HYDROCHLORIDE 1500 MG: 5 INJECTION, POWDER, LYOPHILIZED, FOR SOLUTION INTRAVENOUS at 11:49

## 2019-07-16 RX ADMIN — ACETAMINOPHEN 650 MG: 325 TABLET, FILM COATED ORAL at 04:28

## 2019-07-16 RX ADMIN — ENOXAPARIN SODIUM 40 MG: 40 INJECTION SUBCUTANEOUS at 22:15

## 2019-07-16 RX ADMIN — SODIUM CHLORIDE, PRESERVATIVE FREE 3 ML: 5 INJECTION INTRAVENOUS at 07:56

## 2019-07-16 RX ADMIN — PIPERACILLIN SODIUM,TAZOBACTAM SODIUM 3.38 G: 3; .375 INJECTION, POWDER, FOR SOLUTION INTRAVENOUS at 11:49

## 2019-07-16 NOTE — MEDICAL STUDENT
GENERAL SURGERY PROGRESS NOTE     LOS: 2 days     Chief Complaint:     Redness and tenderness around Port-A-Cath    Interval History:     Mr. Shaw is a 65 YO man admitted for erythema and tenderness around his Port-A-Cath. Pt is afebrile, vitals WNL. Pt receiving chemotherapy for rectal/anal cancer. WBC count is 2.99. Blood cultures drawn 7/15 showed Staph aureus growth, not MRSA. Pt placed on Vancomycin and Zosyn. Erythema has since regressed and pt reports feeling better this morning.    Feeds - Regular diet  Analgesia - Tylenol  Sedation - none  Thromboembolic ppx- Lovenox  Head of bed elevated 30 degrees  Ulcer ppx - Pepcid  Glucose - 116; none  Spontaneous breathing  Bowels - BM yesterday (7/15); pt reports Docusate helped  Indwelling catheters - none  De-escalation of ABX - Zosyn/vanc    Medication Review:     !Vancomycin Level Draw Needed  Does not apply Once   docusate sodium 100 mg Oral BID   enoxaparin 40 mg Subcutaneous Q24H   famotidine 40 mg Oral Daily   nebivolol 10 mg Oral Nightly   oxybutynin 5 mg Oral TID   piperacillin-tazobactam 3.375 g Intravenous Q8H   sodium chloride 3 mL Intravenous Q12H   vancomycin 1,500 mg Intravenous Q12H         Pharmacy to dose vancomycin    Objective     Vital Signs:  Temp:  [98.7 °F (37.1 °C)-100.6 °F (38.1 °C)] 99.1 °F (37.3 °C)  Heart Rate:  [72-81] 72  Resp:  [18] 18  BP: (105-135)/(58-70) 105/68    Intake/Output Summary (Last 24 hours) at 7/16/2019 0521  Last data filed at 7/16/2019 0137  Gross per 24 hour   Intake 1080 ml   Output --   Net 1080 ml       Physical Exam   Constitutional: He appears well-developed and well-nourished. No distress.   Neck:       Cardiovascular: Normal rate and normal heart sounds.   Pulmonary/Chest: Effort normal and breath sounds normal. No respiratory distress.   Skin: Skin is warm and dry.            Results Review:    Results from last 7 days   Lab Units 07/15/19  0548 07/14/19  1911   SODIUM mmol/L 138 138   POTASSIUM mmol/L 3.7  3.9   CHLORIDE mmol/L 103 99   CO2 mmol/L 25.0 28.0   BUN mg/dL 15 18   CREATININE mg/dL 0.65* 0.73*   GLUCOSE mg/dL 116* 112*   CALCIUM mg/dL 8.6 9.0     Results from last 7 days   Lab Units 07/15/19  0548 07/14/19  1911   WBC 10*3/mm3 2.99* 5.35   HEMOGLOBIN g/dL 12.8* 13.9   HEMATOCRIT % 38.3 41.9   PLATELETS 10*3/mm3 152 175       Assessment:    Cellulitis of chest wall    Cellulitis improving. Has regressed within the outline marked yesterday. Pt reports feeling better overall.    Plan:    Continue present care and monitor erythema/tenderness.      Renzo Finley  Port became red after use for chemotherapy. Cx ii peripheral blood is positive for staph. Port culture is pending. Would continue current IV antibiotics. Area is less red.  This document has been electronically signed by Renzo Finley, Medical Student on July 16, 2019 5:21 AM

## 2019-07-16 NOTE — PROGRESS NOTES
[facility] Medicine Services  INPATIENT PROGRESS NOTE    Length of Stay: 2  Date of Admission: 7/14/2019  Primary Care Physician: Sukumar Aleman MD    Subjective   Please note that all previous progress notes, lab findings, radiographical findings, medication changes, and physical exam findings have been noted and updated as appropriate.    7/16/2019: Patient states he feels much better and denies fever and chills today.  Does still have erythema at the port site.  Blood cultures positive and final cultures and sensitivities are pending.  General surgery note from today states plan is to continue to monitor and continue empiric antibiotics.  Patient will also undergo radiation treatment as scheduled today.    Chief Complaint/HPI: This 64-year-old  male with history of recent port placement reported to the emergency department with complaints of fever, chills, and redness around his port site.  Patient evaluated by general surgery, Dr. Wasserman, who recommends continued antibiotic treatment and monitoring.  Have also discussed with Dr. Ly who states that the patient does not have another active chemotherapy regimen due for 3 weeks.  Patient was due to get radiation today, Dr. Ly will discuss with Dr. Lopez of radiation oncology.  Patient states he feels much better.    Review of Systems   Constitutional: Negative for chills and fever.   Respiratory: Negative for shortness of breath.    Cardiovascular: Negative for chest pain and palpitations.   Gastrointestinal: Negative for abdominal pain, nausea and vomiting.   Genitourinary: Negative for dysuria.   Neurological: Negative for dizziness and light-headedness.   Psychiatric/Behavioral: Negative for confusion.      All pertinent negatives and positives are as above. All other systems have been reviewed and are negative unless otherwise stated.     Objective    Temp:  [97.7 °F (36.5 °C)-100.6 °F (38.1 °C)] 97.7 °F  (36.5 °C)  Heart Rate:  [70-81] 70  Resp:  [18] 18  BP: (105-135)/(60-81) 126/70    Physical Exam   Constitutional: He is oriented to person, place, and time. He appears well-developed and well-nourished.   HENT:   Head: Normocephalic and atraumatic.   Eyes: Conjunctivae are normal.   Cardiovascular: Normal rate and regular rhythm.   Pulmonary/Chest: Effort normal and breath sounds normal.   Abdominal: Soft. Bowel sounds are normal. He exhibits no distension. There is no tenderness.   Musculoskeletal: He exhibits no edema.   Neurological: He is alert and oriented to person, place, and time.   Skin: Skin is warm and dry. Capillary refill takes less than 2 seconds. There is erythema.        Erythema along right clavicle and pectoralis muscle   Psychiatric: He has a normal mood and affect. His behavior is normal.     Results Review:  I have reviewed the labs, radiology results, and diagnostic studies.    Laboratory Data:   Results from last 7 days   Lab Units 07/16/19 0545 07/15/19  0548 07/14/19  1911   SODIUM mmol/L 140 138 138   POTASSIUM mmol/L 3.9 3.7 3.9   CHLORIDE mmol/L 103 103 99   CO2 mmol/L 26.0 25.0 28.0   BUN mg/dL 14 15 18   CREATININE mg/dL 0.67* 0.65* 0.73*   GLUCOSE mg/dL 121* 116* 112*   CALCIUM mg/dL 8.3* 8.6 9.0   BILIRUBIN mg/dL 0.3  --  0.5   ALK PHOS U/L 79  --  83   ALT (SGPT) U/L 172*  --  102*   AST (SGOT) U/L 117*  --  77*   ANION GAP mmol/L 11.0 10.0 11.0     Estimated Creatinine Clearance: 130.6 mL/min (A) (by C-G formula based on SCr of 0.67 mg/dL (L)).          Results from last 7 days   Lab Units 07/16/19 0545 07/15/19  0548 07/14/19  1911   WBC 10*3/mm3 2.95* 2.99* 5.35   HEMOGLOBIN g/dL 12.5* 12.8* 13.9   HEMATOCRIT % 37.5 38.3 41.9   PLATELETS 10*3/mm3 130* 152 175           Culture Data:   Blood Culture   Date Value Ref Range Status   07/15/2019 No growth at 24 hours  Preliminary   07/14/2019 No growth at 24 hours  Preliminary   07/14/2019 Abnormal Stain (A)  Preliminary     Urine  Culture   Date Value Ref Range Status   07/14/2019 >100,000 CFU/mL Gram Negative Bacilli (A)  Preliminary     No results found for: RESPCX  No results found for: WOUNDCX  No results found for: STOOLCX  No components found for: BODYFLD    Radiology Data:   Imaging Results (last 24 hours)     ** No results found for the last 24 hours. **          I have reviewed the patient's current medications.     Assessment/Plan     Active Hospital Problems    Diagnosis   • Cellulitis of chest wall   Anal cancer  History of prostate cancer  Sclerotic lesion of the bone on CT scan of chest      Plan:  Continue per general surgery, continue per hematology/oncology, continue per radiation oncology, continue as hospital course dictates.  Follow cultures and sensitivities.                      This document has been electronically signed by DEMETRIUS Zhao on July 16, 2019 12:39 PM

## 2019-07-16 NOTE — PLAN OF CARE
Problem: Patient Care Overview  Goal: Plan of Care Review  Outcome: Ongoing (interventions implemented as appropriate)   07/16/19 0346   Coping/Psychosocial   Plan of Care Reviewed With patient   Plan of Care Review   Progress no change   OTHER   Outcome Summary Pt resting at this time; will continue to monior.       Problem: Infection, Risk/Actual (Adult)  Goal: Identify Related Risk Factors and Signs and Symptoms  Outcome: Ongoing (interventions implemented as appropriate)    Goal: Infection Prevention/Resolution  Outcome: Ongoing (interventions implemented as appropriate)      Problem: Oncology Care (Adult)  Goal: Signs and Symptoms of Listed Potential Problems Will be Absent, Minimized or Managed (Oncology Care)  Outcome: Ongoing (interventions implemented as appropriate)      Problem: Chemotherapy Effects (Adult)  Goal: Signs and Symptoms of Listed Potential Problems Will be Absent, Minimized or Managed (Chemotherapy Effects)  Outcome: Ongoing (interventions implemented as appropriate)      Problem: Skin and Soft Tissue Infection (Adult)  Goal: Signs and Symptoms of Listed Potential Problems Will be Absent, Minimized or Managed (Skin and Soft Tissue Infection)  Outcome: Ongoing (interventions implemented as appropriate)

## 2019-07-17 ENCOUNTER — RADIATION ONCOLOGY WEEKLY ASSESSMENT (OUTPATIENT)
Dept: RADIATION ONCOLOGY | Facility: HOSPITAL | Age: 64
End: 2019-07-17

## 2019-07-17 ENCOUNTER — APPOINTMENT (OUTPATIENT)
Dept: CARDIOLOGY | Facility: HOSPITAL | Age: 64
End: 2019-07-17

## 2019-07-17 VITALS
DIASTOLIC BLOOD PRESSURE: 88 MMHG | HEIGHT: 68 IN | RESPIRATION RATE: 16 BRPM | WEIGHT: 216 LBS | TEMPERATURE: 97.1 F | HEART RATE: 70 BPM | SYSTOLIC BLOOD PRESSURE: 129 MMHG | BODY MASS INDEX: 32.74 KG/M2

## 2019-07-17 DIAGNOSIS — C21.0 ANAL CANCER (HCC): Primary | ICD-10-CM

## 2019-07-17 LAB
ALBUMIN SERPL-MCNC: 3.4 G/DL (ref 3.5–5.2)
ALBUMIN/GLOB SERPL: 1.1 G/DL
ALP SERPL-CCNC: 96 U/L (ref 39–117)
ALT SERPL W P-5'-P-CCNC: 196 U/L (ref 1–41)
ANION GAP SERPL CALCULATED.3IONS-SCNC: 10 MMOL/L (ref 5–15)
AST SERPL-CCNC: 112 U/L (ref 1–40)
BASOPHILS # BLD AUTO: 0.01 10*3/MM3 (ref 0–0.2)
BASOPHILS NFR BLD AUTO: 0.4 % (ref 0–1.5)
BH CV ECHO MEAS - ACS: 2.4 CM
BH CV ECHO MEAS - AO MAX PG (FULL): 2.9 MMHG
BH CV ECHO MEAS - AO MAX PG: 7.4 MMHG
BH CV ECHO MEAS - AO MEAN PG (FULL): 2 MMHG
BH CV ECHO MEAS - AO MEAN PG: 4 MMHG
BH CV ECHO MEAS - AO ROOT AREA (BSA CORRECTED): 1.9
BH CV ECHO MEAS - AO ROOT AREA: 11.9 CM^2
BH CV ECHO MEAS - AO ROOT DIAM: 3.9 CM
BH CV ECHO MEAS - AO V2 MAX: 136 CM/SEC
BH CV ECHO MEAS - AO V2 MEAN: 91.5 CM/SEC
BH CV ECHO MEAS - AO V2 VTI: 28.3 CM
BH CV ECHO MEAS - ASC AORTA: 3.9 CM
BH CV ECHO MEAS - AVA(I,A): 3.9 CM^2
BH CV ECHO MEAS - AVA(I,D): 3.9 CM^2
BH CV ECHO MEAS - AVA(V,A): 3.8 CM^2
BH CV ECHO MEAS - AVA(V,D): 3.8 CM^2
BH CV ECHO MEAS - BSA(HAYCOCK): 2.2 M^2
BH CV ECHO MEAS - BSA: 2.1 M^2
BH CV ECHO MEAS - BZI_BMI: 32.5 KILOGRAMS/M^2
BH CV ECHO MEAS - BZI_METRIC_HEIGHT: 172.7 CM
BH CV ECHO MEAS - BZI_METRIC_WEIGHT: 97.1 KG
BH CV ECHO MEAS - EDV(CUBED): 90.5 ML
BH CV ECHO MEAS - EDV(TEICH): 92 ML
BH CV ECHO MEAS - EF(CUBED): 61.4 %
BH CV ECHO MEAS - EF(TEICH): 53.1 %
BH CV ECHO MEAS - ESV(CUBED): 35 ML
BH CV ECHO MEAS - ESV(TEICH): 43.2 ML
BH CV ECHO MEAS - FS: 27.2 %
BH CV ECHO MEAS - IVS/LVPW: 0.86
BH CV ECHO MEAS - IVSD: 0.81 CM
BH CV ECHO MEAS - LA DIMENSION: 4.4 CM
BH CV ECHO MEAS - LA/AO: 1.1
BH CV ECHO MEAS - LV MASS(C)D: 127.2 GRAMS
BH CV ECHO MEAS - LV MASS(C)DI: 60.5 GRAMS/M^2
BH CV ECHO MEAS - LV MAX PG: 4.5 MMHG
BH CV ECHO MEAS - LV MEAN PG: 2 MMHG
BH CV ECHO MEAS - LV V1 MAX: 106 CM/SEC
BH CV ECHO MEAS - LV V1 MEAN: 65.2 CM/SEC
BH CV ECHO MEAS - LV V1 VTI: 22.2 CM
BH CV ECHO MEAS - LVIDD: 4.5 CM
BH CV ECHO MEAS - LVIDS: 3.3 CM
BH CV ECHO MEAS - LVOT AREA (M): 4.9 CM^2
BH CV ECHO MEAS - LVOT AREA: 4.9 CM^2
BH CV ECHO MEAS - LVOT DIAM: 2.5 CM
BH CV ECHO MEAS - LVPWD: 0.94 CM
BH CV ECHO MEAS - MR MAX PG: 36.5 MMHG
BH CV ECHO MEAS - MR MAX VEL: 302 CM/SEC
BH CV ECHO MEAS - MV A MAX VEL: 63.1 CM/SEC
BH CV ECHO MEAS - MV DEC SLOPE: 382 CM/SEC^2
BH CV ECHO MEAS - MV E MAX VEL: 85.9 CM/SEC
BH CV ECHO MEAS - MV E/A: 1.4
BH CV ECHO MEAS - MV P1/2T MAX VEL: 93.6 CM/SEC
BH CV ECHO MEAS - MV P1/2T: 71.8 MSEC
BH CV ECHO MEAS - MVA P1/2T LCG: 2.4 CM^2
BH CV ECHO MEAS - MVA(P1/2T): 3.1 CM^2
BH CV ECHO MEAS - PA MAX PG (FULL): 1.5 MMHG
BH CV ECHO MEAS - PA MAX PG: 3.5 MMHG
BH CV ECHO MEAS - PA V2 MAX: 93.7 CM/SEC
BH CV ECHO MEAS - PI END-D VEL: 119 CM/SEC
BH CV ECHO MEAS - RAP SYSTOLE: 5 MMHG
BH CV ECHO MEAS - RV MAX PG: 2.1 MMHG
BH CV ECHO MEAS - RV MEAN PG: 1 MMHG
BH CV ECHO MEAS - RV V1 MAX: 71.7 CM/SEC
BH CV ECHO MEAS - RV V1 MEAN: 50.5 CM/SEC
BH CV ECHO MEAS - RV V1 VTI: 19.3 CM
BH CV ECHO MEAS - RVSP: 32.9 MMHG
BH CV ECHO MEAS - SI(AO): 160.7 ML/M^2
BH CV ECHO MEAS - SI(CUBED): 26.4 ML/M^2
BH CV ECHO MEAS - SI(LVOT): 51.8 ML/M^2
BH CV ECHO MEAS - SI(TEICH): 23.2 ML/M^2
BH CV ECHO MEAS - SV(AO): 338.1 ML
BH CV ECHO MEAS - SV(CUBED): 55.6 ML
BH CV ECHO MEAS - SV(LVOT): 109 ML
BH CV ECHO MEAS - SV(TEICH): 48.8 ML
BH CV ECHO MEAS - TR MAX VEL: 264 CM/SEC
BILIRUB SERPL-MCNC: 0.5 MG/DL (ref 0.2–1.2)
BUN BLD-MCNC: 12 MG/DL (ref 8–23)
BUN/CREAT SERPL: 15.4 (ref 7–25)
CALCIUM SPEC-SCNC: 8.3 MG/DL (ref 8.6–10.5)
CHLORIDE SERPL-SCNC: 102 MMOL/L (ref 98–107)
CO2 SERPL-SCNC: 27 MMOL/L (ref 22–29)
CREAT BLD-MCNC: 0.78 MG/DL (ref 0.76–1.27)
DEPRECATED RDW RBC AUTO: 41.7 FL (ref 37–54)
EOSINOPHIL # BLD AUTO: 0.06 10*3/MM3 (ref 0–0.4)
EOSINOPHIL # BLD MANUAL: 0.07 10*3/MM3 (ref 0–0.4)
EOSINOPHIL NFR BLD AUTO: 2.5 % (ref 0.3–6.2)
EOSINOPHIL NFR BLD MANUAL: 3 % (ref 0.3–6.2)
ERYTHROCYTE [DISTWIDTH] IN BLOOD BY AUTOMATED COUNT: 12.6 % (ref 12.3–15.4)
GFR SERPL CREATININE-BSD FRML MDRD: 101 ML/MIN/1.73
GLOBULIN UR ELPH-MCNC: 3.1 GM/DL
GLUCOSE BLD-MCNC: 120 MG/DL (ref 65–99)
HCT VFR BLD AUTO: 37.8 % (ref 37.5–51)
HGB BLD-MCNC: 12.3 G/DL (ref 13–17.7)
IMM GRANULOCYTES # BLD AUTO: 0 10*3/MM3 (ref 0–0.05)
IMM GRANULOCYTES NFR BLD AUTO: 0 % (ref 0–0.5)
LYMPHOCYTES # BLD AUTO: 0.77 10*3/MM3 (ref 0.7–3.1)
LYMPHOCYTES # BLD MANUAL: 0.61 10*3/MM3 (ref 0.7–3.1)
LYMPHOCYTES NFR BLD AUTO: 31.7 % (ref 19.6–45.3)
LYMPHOCYTES NFR BLD MANUAL: 25 % (ref 19.6–45.3)
LYMPHOCYTES NFR BLD MANUAL: 3 % (ref 5–12)
MAXIMAL PREDICTED HEART RATE: 157 BPM
MCH RBC QN AUTO: 29.5 PG (ref 26.6–33)
MCHC RBC AUTO-ENTMCNC: 32.5 G/DL (ref 31.5–35.7)
MCV RBC AUTO: 90.6 FL (ref 79–97)
MONOCYTES # BLD AUTO: 0.07 10*3/MM3 (ref 0.1–0.9)
MONOCYTES # BLD AUTO: 0.15 10*3/MM3 (ref 0.1–0.9)
MONOCYTES NFR BLD AUTO: 6.2 % (ref 5–12)
NEUTROPHILS # BLD AUTO: 1.44 10*3/MM3 (ref 1.7–7)
NEUTROPHILS # BLD AUTO: 1.63 10*3/MM3 (ref 1.7–7)
NEUTROPHILS NFR BLD AUTO: 59.2 % (ref 42.7–76)
NEUTROPHILS NFR BLD MANUAL: 66 % (ref 42.7–76)
NEUTS BAND NFR BLD MANUAL: 1 % (ref 0–5)
NRBC BLD AUTO-RTO: 0 /100 WBC (ref 0–0.2)
PLATELET # BLD AUTO: 128 10*3/MM3 (ref 140–450)
PMV BLD AUTO: 8.8 FL (ref 6–12)
POTASSIUM BLD-SCNC: 3.7 MMOL/L (ref 3.5–5.2)
PROT SERPL-MCNC: 6.5 G/DL (ref 6–8.5)
RBC # BLD AUTO: 4.17 10*6/MM3 (ref 4.14–5.8)
RBC MORPH BLD: NORMAL
SMALL PLATELETS BLD QL SMEAR: ABNORMAL
SODIUM BLD-SCNC: 139 MMOL/L (ref 136–145)
STRESS TARGET HR: 133 BPM
VARIANT LYMPHS NFR BLD MANUAL: 2 % (ref 0–5)
WBC MORPH BLD: NORMAL
WBC NRBC COR # BLD: 2.43 10*3/MM3 (ref 3.4–10.8)

## 2019-07-17 PROCEDURE — 80053 COMPREHEN METABOLIC PANEL: CPT | Performed by: PHYSICIAN ASSISTANT

## 2019-07-17 PROCEDURE — 77014 CHG CT GUIDANCE RADIATION THERAPY FLDS PLACEMENT: CPT | Performed by: RADIOLOGY

## 2019-07-17 PROCEDURE — 85025 COMPLETE CBC W/AUTO DIFF WBC: CPT | Performed by: PHYSICIAN ASSISTANT

## 2019-07-17 PROCEDURE — 93306 TTE W/DOPPLER COMPLETE: CPT

## 2019-07-17 PROCEDURE — 25010000002 ENOXAPARIN PER 10 MG: Performed by: INTERNAL MEDICINE

## 2019-07-17 PROCEDURE — 77386: CPT | Performed by: RADIOLOGY

## 2019-07-17 PROCEDURE — 25010000002 PIPERACILLIN SOD-TAZOBACTAM PER 1 G: Performed by: INTERNAL MEDICINE

## 2019-07-17 PROCEDURE — 85007 BL SMEAR W/DIFF WBC COUNT: CPT | Performed by: PHYSICIAN ASSISTANT

## 2019-07-17 RX ADMIN — ENOXAPARIN SODIUM 40 MG: 40 INJECTION SUBCUTANEOUS at 22:06

## 2019-07-17 RX ADMIN — DOCUSATE SODIUM 100 MG: 100 CAPSULE, LIQUID FILLED ORAL at 08:03

## 2019-07-17 RX ADMIN — PIPERACILLIN SODIUM,TAZOBACTAM SODIUM 3.38 G: 3; .375 INJECTION, POWDER, FOR SOLUTION INTRAVENOUS at 11:59

## 2019-07-17 RX ADMIN — OXYBUTYNIN CHLORIDE 5 MG: 5 TABLET ORAL at 20:13

## 2019-07-17 RX ADMIN — SODIUM CHLORIDE, PRESERVATIVE FREE 3 ML: 5 INJECTION INTRAVENOUS at 08:03

## 2019-07-17 RX ADMIN — PIPERACILLIN SODIUM,TAZOBACTAM SODIUM 3.38 G: 3; .375 INJECTION, POWDER, FOR SOLUTION INTRAVENOUS at 01:09

## 2019-07-17 RX ADMIN — SODIUM CHLORIDE, PRESERVATIVE FREE 3 ML: 5 INJECTION INTRAVENOUS at 20:13

## 2019-07-17 RX ADMIN — DOCUSATE SODIUM 100 MG: 100 CAPSULE, LIQUID FILLED ORAL at 20:12

## 2019-07-17 RX ADMIN — PIPERACILLIN SODIUM,TAZOBACTAM SODIUM 3.38 G: 3; .375 INJECTION, POWDER, FOR SOLUTION INTRAVENOUS at 18:50

## 2019-07-17 RX ADMIN — FAMOTIDINE 40 MG: 40 TABLET ORAL at 08:03

## 2019-07-17 RX ADMIN — NEBIVOLOL HYDROCHLORIDE 10 MG: 5 TABLET ORAL at 20:12

## 2019-07-17 NOTE — PROGRESS NOTES
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  ICD 10 Code: C21.0      was seen today for an on treatment visit.  He is receiving radiation therapy to the pelvis/anus. He has received 1260 cGy in 7 fractions out of a planned dose of 5400 cGy in 30 fractions.  He is receiving concurrent 5-FU/mitomycin chemotherapy per Dr. Ly, and received his 1st cycle on 7/8-12/2019.     Mr. Shaw is currently admitted to [facility] due to cellulitis at his port site.  He is currently on IV antibiotics, but the erythema around the port site continues to improve, and the patient hopes to go home tomorrow.  Today on exam the patient is tolerating radiation therapy well and has no new disease or treatment-related complaints.                                             Vitals:     Vitals:    07/17/19 0958   BP: 129/88   Pulse: 70   Resp: 16   Temp: 97.1 °F (36.2 °C)       Weight:   Wt Readings from Last 3 Encounters:   07/17/19 101 kg (222 lb)   07/17/19 98 kg (216 lb)   07/10/19 101 kg (221 lb 14.4 oz)      Pain:    Pain Score    07/17/19 0958   PainSc: 0-No pain         Plan: We plan to continue radiation therapy as prescribed.    Brady Lopez MD  Radiation Oncology    Electronically signed by Brady Lopez MD 7/17/2019  10:09 AM     cc: Dr. Jarek Aleman

## 2019-07-17 NOTE — PLAN OF CARE
Problem: Patient Care Overview  Goal: Plan of Care Review  Outcome: Ongoing (interventions implemented as appropriate)   07/17/19 0051   Coping/Psychosocial   Plan of Care Reviewed With patient   Plan of Care Review   Progress no change   OTHER   Outcome Summary Pt has no complaints of pain at this time; will continue to monitor.        Problem: Infection, Risk/Actual (Adult)  Goal: Identify Related Risk Factors and Signs and Symptoms  Outcome: Outcome(s) achieved Date Met: 07/17/19    Goal: Infection Prevention/Resolution  Outcome: Ongoing (interventions implemented as appropriate)      Problem: Oncology Care (Adult)  Goal: Signs and Symptoms of Listed Potential Problems Will be Absent, Minimized or Managed (Oncology Care)  Outcome: Ongoing (interventions implemented as appropriate)      Problem: Chemotherapy Effects (Adult)  Goal: Signs and Symptoms of Listed Potential Problems Will be Absent, Minimized or Managed (Chemotherapy Effects)  Outcome: Ongoing (interventions implemented as appropriate)      Problem: Skin and Soft Tissue Infection (Adult)  Goal: Signs and Symptoms of Listed Potential Problems Will be Absent, Minimized or Managed (Skin and Soft Tissue Infection)  Outcome: Ongoing (interventions implemented as appropriate)

## 2019-07-17 NOTE — PROGRESS NOTES
[facility] Medicine Services  INPATIENT PROGRESS NOTE    Length of Stay: 3  Date of Admission: 7/14/2019  Primary Care Physician: Sukumar Aleman MD    Subjective   Please note that all previous progress notes, lab findings, radiographical findings, medication changes, and physical exam findings have been noted and updated as appropriate.    7/17/2019: Erythema around port site has improved.  Erythema is receding.  Discussed with Keara Elise, Pharm.D., who recommends de-escalation of antibiotics given that the staph species is not MRSA and most likely MSSA.  At this time Zosyn will cover the E. coli of the urinary tract infection culture as well as MSSA.  Will continue to monitor for MSSA sensitivity for further de-escalation as appropriate.  Patient denies any complaints including fever, chills, fatigue, nausea, vomiting, chest pain, dizziness.    7/16/2019: Patient states he feels much better and denies fever and chills today.  Does still have erythema at the port site.  Blood cultures positive and final cultures and sensitivities are pending.  General surgery note from today states plan is to continue to monitor and continue empiric antibiotics.  Patient will also undergo radiation treatment as scheduled today.    Chief Complaint/HPI: This 64-year-old  male with history of recent port placement reported to the emergency department with complaints of fever, chills, and redness around his port site.  Patient evaluated by general surgery, Dr. Wasserman, who recommends continued antibiotic treatment and monitoring.  Have also discussed with Dr. Ly who states that the patient does not have another active chemotherapy regimen due for 3 weeks.  Patient was due to get radiation today, Dr. Ly will discuss with Dr. Lopez of radiation oncology.  Patient states he feels much better.    Review of Systems   Constitutional: Negative for chills and fever.   Respiratory:  Negative for shortness of breath.    Cardiovascular: Negative for chest pain and palpitations.   Gastrointestinal: Negative for abdominal pain, nausea and vomiting.   Genitourinary: Negative for dysuria.   Neurological: Negative for dizziness and light-headedness.   Psychiatric/Behavioral: Negative for confusion.      All pertinent negatives and positives are as above. All other systems have been reviewed and are negative unless otherwise stated.     Objective    Temp:  [97.6 °F (36.4 °C)-98.9 °F (37.2 °C)] 97.6 °F (36.4 °C)  Heart Rate:  [63-88] 88  Resp:  [18] 18  BP: (112-130)/(62-77) 130/74    Physical Exam   Constitutional: He is oriented to person, place, and time. He appears well-developed and well-nourished.   HENT:   Head: Normocephalic and atraumatic.   Eyes: Conjunctivae are normal.   Cardiovascular: Normal rate and regular rhythm.   Pulmonary/Chest: Effort normal and breath sounds normal.   Abdominal: Soft. Bowel sounds are normal. He exhibits no distension. There is no tenderness.   Musculoskeletal: He exhibits no edema.   Neurological: He is alert and oriented to person, place, and time.   Skin: Skin is warm and dry. Capillary refill takes less than 2 seconds. There is erythema.        Erythema has receded to below clavicle and demonstrates some streaking toward right axillary region   Psychiatric: He has a normal mood and affect. His behavior is normal.     Results Review:  I have reviewed the labs, radiology results, and diagnostic studies.    Laboratory Data:   Results from last 7 days   Lab Units 07/17/19  0531 07/16/19  0545 07/15/19  0548 07/14/19  1911   SODIUM mmol/L 139 140 138 138   POTASSIUM mmol/L 3.7 3.9 3.7 3.9   CHLORIDE mmol/L 102 103 103 99   CO2 mmol/L 27.0 26.0 25.0 28.0   BUN mg/dL 12 14 15 18   CREATININE mg/dL 0.78 0.67* 0.65* 0.73*   GLUCOSE mg/dL 120* 121* 116* 112*   CALCIUM mg/dL 8.3* 8.3* 8.6 9.0   BILIRUBIN mg/dL 0.5 0.3  --  0.5   ALK PHOS U/L 96 79  --  83   ALT (SGPT) U/L  196* 172*  --  102*   AST (SGOT) U/L 112* 117*  --  77*   ANION GAP mmol/L 10.0 11.0 10.0 11.0     Estimated Creatinine Clearance: 111.6 mL/min (by C-G formula based on SCr of 0.78 mg/dL).          Results from last 7 days   Lab Units 07/17/19  0532 07/16/19  0545 07/15/19  0548 07/14/19  1911   WBC 10*3/mm3 2.43* 2.95* 2.99* 5.35   HEMOGLOBIN g/dL 12.3* 12.5* 12.8* 13.9   HEMATOCRIT % 37.8 37.5 38.3 41.9   PLATELETS 10*3/mm3 128* 130* 152 175           Culture Data:   Blood Culture   Date Value Ref Range Status   07/15/2019 No growth at 24 hours  Preliminary   07/14/2019 No growth at 2 days  Preliminary   07/14/2019 Staphylococcus aureus (A)  Preliminary     Comment:       Infectious disease consultation is highly recommended to rule out distant foci of infection.     Urine Culture   Date Value Ref Range Status   07/14/2019 >100,000 CFU/mL Escherichia coli (A)  Final     No results found for: RESPCX  No results found for: WOUNDCX  No results found for: STOOLCX  No components found for: BODYFLD    Radiology Data:   Imaging Results (last 24 hours)     ** No results found for the last 24 hours. **          I have reviewed the patient's current medications.     Assessment/Plan     Active Hospital Problems    Diagnosis   • Cellulitis of chest wall   Anal cancer  History of prostate cancer  Sclerotic lesion of the bone on CT scan of chest      Plan: Discontinue vancomycin upon recommendations of pharmacy, continue radiation, echocardiogram to rule up vegetation, monitor platelets, continue as hospital course dictates and as recommended by general surgery.                      This document has been electronically signed by DEMETRIUS Zhao on July 17, 2019 9:39 AM

## 2019-07-17 NOTE — MEDICAL STUDENT
GENERAL SURGERY PROGRESS NOTE     LOS: 3 days         Interval History:     Mr. Shaw is a 63 YO man admitted for erythema and tenderness around his Port-A-Cath. Pt is afebrile, vitals WNL. Pt receiving chemotherapy for rectal/anal cancer. Most recent WBC count is 2.43. Blood cultures drawn from left arm on 7/15 showed Staph aureus growth, not MRSA. Pt started on Vancomycin and Zosyn. Pt noted that erythema increased last night, but looked better this morning. No significant change in area from yesterday.     Feeds - Regular diet  Analgesia - Tylenol  Sedation - none  Thromboembolic ppx- Lovenox  Head of bed elevated 30 degrees  Ulcer ppx - Pepcid  Glucose - 120; none  Spontaneous breathing  Bowels - BM +  Indwelling catheters - none  De-escalation of ABX - Zosyn/vancomycin        Medication Review:     !Vancomycin Level Draw Needed  Does not apply Once   docusate sodium 100 mg Oral BID   enoxaparin 40 mg Subcutaneous Q24H   famotidine 40 mg Oral Daily   nebivolol 10 mg Oral Nightly   oxybutynin 5 mg Oral TID   piperacillin-tazobactam 3.375 g Intravenous Q8H   sodium chloride 3 mL Intravenous Q12H   vancomycin 1,500 mg Intravenous Q12H         Pharmacy to dose vancomycin    Objective     Vital Signs:  Temp:  [97.6 °F (36.4 °C)-98.9 °F (37.2 °C)] 97.6 °F (36.4 °C)  Heart Rate:  [63-84] 63  Resp:  [18] 18  BP: (110-128)/(62-81) 112/62    Intake/Output Summary (Last 24 hours) at 7/17/2019 0610  Last data filed at 7/17/2019 0109  Gross per 24 hour   Intake 1320 ml   Output --   Net 1320 ml       Physical Exam   Constitutional: He appears well-developed and well-nourished. No distress.   Neck:       Cardiovascular: Normal rate and normal heart sounds.   No murmur heard.  Pulmonary/Chest: Effort normal and breath sounds normal. No respiratory distress.   Skin: Skin is warm and dry.       Results Review:    Results from last 7 days   Lab Units 07/17/19  0531 07/16/19  0545 07/15/19  0548   SODIUM mmol/L 139 140 138    POTASSIUM mmol/L 3.7 3.9 3.7   CHLORIDE mmol/L 102 103 103   CO2 mmol/L 27.0 26.0 25.0   BUN mg/dL 12 14 15   CREATININE mg/dL 0.78 0.67* 0.65*   GLUCOSE mg/dL 120* 121* 116*   CALCIUM mg/dL 8.3* 8.3* 8.6     Results from last 7 days   Lab Units 07/17/19  0532 07/16/19  0545 07/15/19  0548   WBC 10*3/mm3 2.43* 2.95* 2.99*   HEMOGLOBIN g/dL 12.3* 12.5* 12.8*   HEMATOCRIT % 37.8 37.5 38.3   PLATELETS 10*3/mm3 128* 130* 152       Assessment:    Cellulitis of chest wall    Pt presents similar to yesterday with erythema and tenderness diminished. ABX seem to be working.    Plan:    Continue present care.   I am still concerned about the redness- would continue IV antibiotics for now    Renzo Finley      This document has been electronically signed by Renzo Finley, Medical Student on July 17, 2019 6:10 AM

## 2019-07-18 ENCOUNTER — DOCUMENTATION (OUTPATIENT)
Dept: NUTRITION | Facility: HOSPITAL | Age: 64
End: 2019-07-18

## 2019-07-18 PROBLEM — N39.0 ACUTE UTI (URINARY TRACT INFECTION): Status: ACTIVE | Noted: 2019-07-18

## 2019-07-18 LAB
ALBUMIN SERPL-MCNC: 3.6 G/DL (ref 3.5–5.2)
ALBUMIN/GLOB SERPL: 1.1 G/DL
ALP SERPL-CCNC: 109 U/L (ref 39–117)
ALT SERPL W P-5'-P-CCNC: 186 U/L (ref 1–41)
ANION GAP SERPL CALCULATED.3IONS-SCNC: 10 MMOL/L (ref 5–15)
AST SERPL-CCNC: 85 U/L (ref 1–40)
BACTERIA SPEC AEROBE CULT: ABNORMAL
BASOPHILS # BLD AUTO: 0.01 10*3/MM3 (ref 0–0.2)
BASOPHILS NFR BLD AUTO: 0.3 % (ref 0–1.5)
BILIRUB SERPL-MCNC: 0.6 MG/DL (ref 0.2–1.2)
BUN BLD-MCNC: 15 MG/DL (ref 8–23)
BUN/CREAT SERPL: 20.3 (ref 7–25)
CALCIUM SPEC-SCNC: 8.8 MG/DL (ref 8.6–10.5)
CHLORIDE SERPL-SCNC: 100 MMOL/L (ref 98–107)
CO2 SERPL-SCNC: 28 MMOL/L (ref 22–29)
CREAT BLD-MCNC: 0.74 MG/DL (ref 0.76–1.27)
DEPRECATED RDW RBC AUTO: 40.8 FL (ref 37–54)
EOSINOPHIL # BLD AUTO: 0.09 10*3/MM3 (ref 0–0.4)
EOSINOPHIL NFR BLD AUTO: 3.1 % (ref 0.3–6.2)
ERYTHROCYTE [DISTWIDTH] IN BLOOD BY AUTOMATED COUNT: 12.5 % (ref 12.3–15.4)
GFR SERPL CREATININE-BSD FRML MDRD: 107 ML/MIN/1.73
GLOBULIN UR ELPH-MCNC: 3.4 GM/DL
GLUCOSE BLD-MCNC: 114 MG/DL (ref 65–99)
GRAM STN SPEC: ABNORMAL
HCT VFR BLD AUTO: 37.8 % (ref 37.5–51)
HGB BLD-MCNC: 13 G/DL (ref 13–17.7)
IMM GRANULOCYTES # BLD AUTO: 0.01 10*3/MM3 (ref 0–0.05)
IMM GRANULOCYTES NFR BLD AUTO: 0.3 % (ref 0–0.5)
ISOLATED FROM: ABNORMAL
LYMPHOCYTES # BLD AUTO: 0.82 10*3/MM3 (ref 0.7–3.1)
LYMPHOCYTES NFR BLD AUTO: 28.6 % (ref 19.6–45.3)
MCH RBC QN AUTO: 30.6 PG (ref 26.6–33)
MCHC RBC AUTO-ENTMCNC: 34.4 G/DL (ref 31.5–35.7)
MCV RBC AUTO: 88.9 FL (ref 79–97)
MONOCYTES # BLD AUTO: 0.26 10*3/MM3 (ref 0.1–0.9)
MONOCYTES NFR BLD AUTO: 9.1 % (ref 5–12)
NEUTROPHILS # BLD AUTO: 1.68 10*3/MM3 (ref 1.7–7)
NEUTROPHILS NFR BLD AUTO: 58.6 % (ref 42.7–76)
NRBC BLD AUTO-RTO: 0 /100 WBC (ref 0–0.2)
PLATELET # BLD AUTO: 125 10*3/MM3 (ref 140–450)
PMV BLD AUTO: 8.7 FL (ref 6–12)
POTASSIUM BLD-SCNC: 3.9 MMOL/L (ref 3.5–5.2)
PROT SERPL-MCNC: 7 G/DL (ref 6–8.5)
RBC # BLD AUTO: 4.25 10*6/MM3 (ref 4.14–5.8)
SODIUM BLD-SCNC: 138 MMOL/L (ref 136–145)
WBC NRBC COR # BLD: 2.87 10*3/MM3 (ref 3.4–10.8)

## 2019-07-18 PROCEDURE — 80053 COMPREHEN METABOLIC PANEL: CPT | Performed by: PHYSICIAN ASSISTANT

## 2019-07-18 PROCEDURE — 25010000002 CEFAZOLIN PER 500 MG: Performed by: NURSE PRACTITIONER

## 2019-07-18 PROCEDURE — 77014 CHG CT GUIDANCE RADIATION THERAPY FLDS PLACEMENT: CPT | Performed by: RADIOLOGY

## 2019-07-18 PROCEDURE — 25010000002 PIPERACILLIN SOD-TAZOBACTAM PER 1 G: Performed by: INTERNAL MEDICINE

## 2019-07-18 PROCEDURE — 77386: CPT | Performed by: RADIOLOGY

## 2019-07-18 PROCEDURE — 99024 POSTOP FOLLOW-UP VISIT: CPT | Performed by: SURGERY

## 2019-07-18 PROCEDURE — 85025 COMPLETE CBC W/AUTO DIFF WBC: CPT | Performed by: PHYSICIAN ASSISTANT

## 2019-07-18 RX ORDER — BUPIVACAINE HCL/0.9 % NACL/PF 0.1 %
2 PLASTIC BAG, INJECTION (ML) EPIDURAL EVERY 8 HOURS
Status: DISCONTINUED | OUTPATIENT
Start: 2019-07-18 | End: 2019-07-22

## 2019-07-18 RX ADMIN — SODIUM CHLORIDE, PRESERVATIVE FREE 3 ML: 5 INJECTION INTRAVENOUS at 21:33

## 2019-07-18 RX ADMIN — OXYBUTYNIN CHLORIDE 5 MG: 5 TABLET ORAL at 21:33

## 2019-07-18 RX ADMIN — PIPERACILLIN SODIUM,TAZOBACTAM SODIUM 3.38 G: 3; .375 INJECTION, POWDER, FOR SOLUTION INTRAVENOUS at 03:01

## 2019-07-18 RX ADMIN — SODIUM CHLORIDE, PRESERVATIVE FREE 3 ML: 5 INJECTION INTRAVENOUS at 09:52

## 2019-07-18 RX ADMIN — DOCUSATE SODIUM 100 MG: 100 CAPSULE, LIQUID FILLED ORAL at 21:34

## 2019-07-18 RX ADMIN — PIPERACILLIN SODIUM,TAZOBACTAM SODIUM 3.38 G: 3; .375 INJECTION, POWDER, FOR SOLUTION INTRAVENOUS at 09:51

## 2019-07-18 RX ADMIN — CEFAZOLIN SODIUM 2 G: 10 INJECTION, POWDER, FOR SOLUTION INTRAVENOUS at 17:23

## 2019-07-18 RX ADMIN — NEBIVOLOL HYDROCHLORIDE 10 MG: 5 TABLET ORAL at 21:33

## 2019-07-18 RX ADMIN — OXYBUTYNIN CHLORIDE 5 MG: 5 TABLET ORAL at 17:22

## 2019-07-18 RX ADMIN — OXYBUTYNIN CHLORIDE 5 MG: 5 TABLET ORAL at 09:48

## 2019-07-18 RX ADMIN — DOCUSATE SODIUM 100 MG: 100 CAPSULE, LIQUID FILLED ORAL at 09:48

## 2019-07-18 RX ADMIN — FAMOTIDINE 40 MG: 40 TABLET ORAL at 09:48

## 2019-07-18 NOTE — PAYOR COMM NOTE
"Leena Rangel RN [facility]  [phone] phone  [phone] fax  Continued Stay Review  Auth# [id number]      Juan Antonio Barbosa (64 y.o. Male)     Date of Birth Social Security Number Address Home Phone MRN    1955  [address] [address] [phone] [id number]    [employer] orth[employer] Marital Status          [place]        Admission Date Admission Type Admitting Provider Attending Provider Department, Room/Bed    7/14/19 Emergency Gilson Lucio MD Ol[place]kan, David B, MD [address], 405/1    Discharge Date Discharge Disposition Discharge Destination                       Attending Provider:  Gilson Lucio MD    Allergies:  No Known Allergies    Isolation:  None   Infection:  None   Code Status:  CPR    Ht:  172.7 cm (68\")   Wt:  102 kg (225 lb 4.8 oz)    Admission Cmt:  None   Principal Problem:  None                Active Insurance as of 7/14/2019     Primary Coverage     Payor Plan Insurance Group Employer/Plan Group    [facility] BLUE CROSS [employer] EMPLOYEE [id number]     Payor Plan Address Payor Plan Phone Number Payor Plan Fax Number Effective Dates    [address] [phone]  1/1/2015 - None Entered    [place] [id number]       Subscriber Name Subscriber Birth Date Member ID       JACKIE BARBOSA [birth year]29[birth year]1960 [id number]                 Emergency Contacts      (Rel.) Home Phone Work Phone Mobile Phone    hilario Barbosa (Spouse) [phone] -- [phone]               Physician Progress Notes (last 48 hours) (Notes from 7/16/2019  1:07 PM through 7/18/2019  1:07 PM)      Bandar Watkins PA at 7/17/2019  9:39 AM              [facility] Medicine Services  INPATIENT PROGRESS NOTE    Length of Stay: 3  Date of Admission: 7/14/2019  Primary Care Physician: Sukumar Aleman MD    Subjective   Please note that all previous progress notes, lab findings, " radiographical findings, medication changes, and physical exam findings have been noted and updated as appropriate.    7/17/2019: Erythema around port site has improved.  Erythema is receding.  Discussed with Keara Elise, Pharm.D., who recommends de-escalation of antibiotics given that the staph species is not MRSA and most likely MSSA.  At this time Zosyn will cover the E. coli of the urinary tract infection culture as well as MSSA.  Will continue to monitor for MSSA sensitivity for further de-escalation as appropriate.  Patient denies any complaints including fever, chills, fatigue, nausea, vomiting, chest pain, dizziness.    7/16/2019: Patient states he feels much better and denies fever and chills today.  Does still have erythema at the port site.  Blood cultures positive and final cultures and sensitivities are pending.  General surgery note from today states plan is to continue to monitor and continue empiric antibiotics.  Patient will also undergo radiation treatment as scheduled today.    Chief Complaint/HPI: This 64-year-old  male with history of recent port placement reported to the emergency department with complaints of fever, chills, and redness around his port site.  Patient evaluated by general surgery, Dr. Wasserman, who recommends continued antibiotic treatment and monitoring.  Have also discussed with Dr. Ly who states that the patient does not have another active chemotherapy regimen due for 3 weeks.  Patient was due to get radiation today, Dr. Ly will discuss with Dr. Lopez of radiation oncology.  Patient states he feels much better.    Review of Systems   Constitutional: Negative for chills and fever.   Respiratory: Negative for shortness of breath.    Cardiovascular: Negative for chest pain and palpitations.   Gastrointestinal: Negative for abdominal pain, nausea and vomiting.   Genitourinary: Negative for dysuria.   Neurological: Negative for dizziness and light-headedness.    Psychiatric/Behavioral: Negative for confusion.      All pertinent negatives and positives are as above. All other systems have been reviewed and are negative unless otherwise stated.     Objective    Temp:  [97.6 °F (36.4 °C)-98.9 °F (37.2 °C)] 97.6 °F (36.4 °C)  Heart Rate:  [63-88] 88  Resp:  [18] 18  BP: (112-130)/(62-77) 130/74    Physical Exam   Constitutional: He is oriented to person, place, and time. He appears well-developed and well-nourished.   HENT:   Head: Normocephalic and atraumatic.   Eyes: Conjunctivae are normal.   Cardiovascular: Normal rate and regular rhythm.   Pulmonary/Chest: Effort normal and breath sounds normal.   Abdominal: Soft. Bowel sounds are normal. He exhibits no distension. There is no tenderness.   Musculoskeletal: He exhibits no edema.   Neurological: He is alert and oriented to person, place, and time.   Skin: Skin is warm and dry. Capillary refill takes less than 2 seconds. There is erythema.        Erythema has receded to below clavicle and demonstrates some streaking toward right axillary region   Psychiatric: He has a normal mood and affect. His behavior is normal.     Results Review:  I have reviewed the labs, radiology results, and diagnostic studies.    Laboratory Data:   Results from last 7 days   Lab Units 07/17/19  0531 07/16/19  0545 07/15/19  0548 07/14/19  1911   SODIUM mmol/L 139 140 138 138   POTASSIUM mmol/L 3.7 3.9 3.7 3.9   CHLORIDE mmol/L 102 103 103 99   CO2 mmol/L 27.0 26.0 25.0 28.0   BUN mg/dL 12 14 15 18   CREATININE mg/dL 0.78 0.67* 0.65* 0.73*   GLUCOSE mg/dL 120* 121* 116* 112*   CALCIUM mg/dL 8.3* 8.3* 8.6 9.0   BILIRUBIN mg/dL 0.5 0.3  --  0.5   ALK PHOS U/L 96 79  --  83   ALT (SGPT) U/L 196* 172*  --  102*   AST (SGOT) U/L 112* 117*  --  77*   ANION GAP mmol/L 10.0 11.0 10.0 11.0     Estimated Creatinine Clearance: 111.6 mL/min (by C-G formula based on SCr of 0.78 mg/dL).          Results from last 7 days   Lab Units 07/17/19  0532 07/16/19  0545  07/15/19  0548 07/14/19  1911   WBC 10*3/mm3 2.43* 2.95* 2.99* 5.35   HEMOGLOBIN g/dL 12.3* 12.5* 12.8* 13.9   HEMATOCRIT % 37.8 37.5 38.3 41.9   PLATELETS 10*3/mm3 128* 130* 152 175           Culture Data:   Blood Culture   Date Value Ref Range Status   07/15/2019 No growth at 24 hours  Preliminary   07/14/2019 No growth at 2 days  Preliminary   07/14/2019 Staphylococcus aureus (A)  Preliminary     Comment:       Infectious disease consultation is highly recommended to rule out distant foci of infection.     Urine Culture   Date Value Ref Range Status   07/14/2019 >100,000 CFU/mL Escherichia coli (A)  Final     No results found for: RESPCX  No results found for: WOUNDCX  No results found for: STOOLCX  No components found for: BODYFLD    Radiology Data:   Imaging Results (last 24 hours)     ** No results found for the last 24 hours. **          I have reviewed the patient's current medications.     Assessment/Plan     Active Hospital Problems    Diagnosis   • Cellulitis of chest wall   Anal cancer  History of prostate cancer  Sclerotic lesion of the bone on CT scan of chest      Plan: Discontinue vancomycin upon recommendations of pharmacy, continue radiation, echocardiogram to rule up vegetation, monitor platelets, continue as hospital course dictates and as recommended by general surgery.                      This document has been electronically signed by DEMETRIUS Zhao on July 17, 2019 9:39 AM        Electronically signed by Bandar Watkins PA at 7/17/2019  9:45 AM          Medical Student Notes (last 48 hours) (Notes from 7/16/2019  1:07 PM through 7/18/2019  1:07 PM)      Benson Wasserman MD at 7/18/2019  5:30 AM          GENERAL SURGERY PROGRESS NOTE     LOS: 4 days         Interval History:     Mr. Shaw is a 63 YO man admitted for erythema and tenderness around his Port-A-Cath. Pt is afebrile, vitals WNL. Pt receiving chemotherapy for rectal/anal cancer. Most recent WBC count is 2.43 on 7/17. Blood  "cultures drawn from left arm on 7/15 showed Staph aureus growth, not MRSA. Vancomycin course finished, pt remains on Zosyn.  Swelling and tenderness appreciated surrounding port, along with induration around line adjacent to clavicle. Pt notes that he \"feels great.\"     Feeds - Regular diet  Analgesia - Tylenol  Sedation - none  Thromboembolic ppx- Lovenox  Head of bed elevated 30 degrees  Ulcer ppx - Pepcid  Glucose - 120; none  Spontaneous breathing  Bowels - BM +  Indwelling catheters - none  De-escalation of ABX - Zosyn        Medication Review:     docusate sodium 100 mg Oral BID   enoxaparin 40 mg Subcutaneous Q24H   famotidine 40 mg Oral Daily   nebivolol 10 mg Oral Nightly   oxybutynin 5 mg Oral TID   piperacillin-tazobactam 3.375 g Intravenous Q8H   sodium chloride 3 mL Intravenous Q12H        Objective     Vital Signs:  Temp:  [96.9 °F (36.1 °C)-98.2 °F (36.8 °C)] 97.8 °F (36.6 °C)  Heart Rate:  [59-88] 59  Resp:  [16-18] 18  BP: (110-131)/(68-88) 121/68    Intake/Output Summary (Last 24 hours) at 7/18/2019 0530  Last data filed at 7/18/2019 0301  Gross per 24 hour   Intake 820 ml   Output --   Net 820 ml       Physical Exam   Constitutional: He appears well-developed and well-nourished. No distress.   Neck:       Cardiovascular: Normal rate and normal heart sounds.   No murmur heard.  Pulmonary/Chest: Effort normal and breath sounds normal. No respiratory distress.   Skin: He is not diaphoretic.       Results Review:    Results from last 7 days   Lab Units 07/17/19  0531 07/16/19  0545 07/15/19  0548   SODIUM mmol/L 139 140 138   POTASSIUM mmol/L 3.7 3.9 3.7   CHLORIDE mmol/L 102 103 103   CO2 mmol/L 27.0 26.0 25.0   BUN mg/dL 12 14 15   CREATININE mg/dL 0.78 0.67* 0.65*   GLUCOSE mg/dL 120* 121* 116*   CALCIUM mg/dL 8.3* 8.3* 8.6     Results from last 7 days   Lab Units 07/17/19  0532 07/16/19  0545 07/15/19  0548   WBC 10*3/mm3 2.43* 2.95* 2.99*   HEMOGLOBIN g/dL 12.3* 12.5* 12.8*   HEMATOCRIT % 37.8 " 37.5 38.3   PLATELETS 10*3/mm3 128* 130* 152       Assessment:    Cellulitis of chest wall    Erythema and swelling failing to regress. Area appears similar to yesterday.    Plan:    Re-draw blood cultures and re-assess ABX treatment.  Will try to preserve port but clearly this may not be possible      Renzo Finley      This document has been electronically signed by Renzo Finley, Medical Student on July 18, 2019 5:30 AM      Electronically signed by Benson Wasserman MD at 7/18/2019  9:03 AM     Benson Wasserman MD at 7/17/2019  6:10 AM          GENERAL SURGERY PROGRESS NOTE     LOS: 3 days         Interval History:     Mr. Shaw is a 65 YO man admitted for erythema and tenderness around his Port-A-Cath. Pt is afebrile, vitals WNL. Pt receiving chemotherapy for rectal/anal cancer. Most recent WBC count is 2.43. Blood cultures drawn from left arm on 7/15 showed Staph aureus growth, not MRSA. Pt started on Vancomycin and Zosyn. Pt noted that erythema increased last night, but looked better this morning. No significant change in area from yesterday.     Feeds - Regular diet  Analgesia - Tylenol  Sedation - none  Thromboembolic ppx- Lovenox  Head of bed elevated 30 degrees  Ulcer ppx - Pepcid  Glucose - 120; none  Spontaneous breathing  Bowels - BM +  Indwelling catheters - none  De-escalation of ABX - Zosyn/vancomycin        Medication Review:     !Vancomycin Level Draw Needed  Does not apply Once   docusate sodium 100 mg Oral BID   enoxaparin 40 mg Subcutaneous Q24H   famotidine 40 mg Oral Daily   nebivolol 10 mg Oral Nightly   oxybutynin 5 mg Oral TID   piperacillin-tazobactam 3.375 g Intravenous Q8H   sodium chloride 3 mL Intravenous Q12H   vancomycin 1,500 mg Intravenous Q12H         Pharmacy to dose vancomycin    Objective     Vital Signs:  Temp:  [97.6 °F (36.4 °C)-98.9 °F (37.2 °C)] 97.6 °F (36.4 °C)  Heart Rate:  [63-84] 63  Resp:  [18] 18  BP: (110-128)/(62-81) 112/62    Intake/Output Summary (Last 24 hours)  at 7/17/2019 0610  Last data filed at 7/17/2019 0109  Gross per 24 hour   Intake 1320 ml   Output --   Net 1320 ml       Physical Exam   Constitutional: He appears well-developed and well-nourished. No distress.   Neck:       Cardiovascular: Normal rate and normal heart sounds.   No murmur heard.  Pulmonary/Chest: Effort normal and breath sounds normal. No respiratory distress.   Skin: Skin is warm and dry.       Results Review:    Results from last 7 days   Lab Units 07/17/19  0531 07/16/19  0545 07/15/19  0548   SODIUM mmol/L 139 140 138   POTASSIUM mmol/L 3.7 3.9 3.7   CHLORIDE mmol/L 102 103 103   CO2 mmol/L 27.0 26.0 25.0   BUN mg/dL 12 14 15   CREATININE mg/dL 0.78 0.67* 0.65*   GLUCOSE mg/dL 120* 121* 116*   CALCIUM mg/dL 8.3* 8.3* 8.6     Results from last 7 days   Lab Units 07/17/19  0532 07/16/19  0545 07/15/19  0548   WBC 10*3/mm3 2.43* 2.95* 2.99*   HEMOGLOBIN g/dL 12.3* 12.5* 12.8*   HEMATOCRIT % 37.8 37.5 38.3   PLATELETS 10*3/mm3 128* 130* 152       Assessment:    Cellulitis of chest wall    Pt presents similar to yesterday with erythema and tenderness diminished. ABX seem to be working.    Plan:    Continue present care.   I am still concerned about the redness- would continue IV antibiotics for now    Renzo Finley      This document has been electronically signed by Renzo Finley, Medical Student on July 17, 2019 6:10 AM      Electronically signed by Benson Wasserman MD at 7/17/2019  8:18 AM

## 2019-07-18 NOTE — PAYOR COMM NOTE
"Juan Antonio Barbosa (64 y.o. Male)     Date of Birth Social Security Number Address Home Phone MRN    1955  [address] [facility] [phone] [id number]    Judaism Marital Status          Buddhist        Admission Date Admission Type Admitting Provider Attending Provider Department, Room/Bed    7/14/19 Emergency Gilson Lucio MD [place]kan, David B, MD [address], 405/1    Discharge Date Discharge Disposition Discharge Destination                       Attending Provider:  Gilson Lucio MD    Allergies:  No Known Allergies    Isolation:  None   Infection:  None   Code Status:  CPR    Ht:  172.7 cm (68\")   Wt:  102 kg (225 lb 4.8 oz)    Admission Cmt:  None   Principal Problem:  None                Active Insurance as of 7/14/2019     Primary Coverage     Payor Plan Insurance Group Employer/Plan Group    [facility] [employer] [facility] [employer] EMPL[employer] [id number]     Payor Plan Address Payor Plan Phone Number Payor Plan Fax Number Effective Dates    [address] [phone]  1/1/2015 - None Entered    [place] [address]       Subscriber Name Subscriber Birth Date Member ID       JACKIE BARBOSA 1/29/1959 [id number]               Leena Rangel RN [facility]  [phone] phone  [phone] fax  Continued Stay Review  Pending Auth# [id number]  Thank you!          Emergency Contacts      (Rel.) Home Phone Work Phone Mobile Phone    Colinhilario (Spouse) [phone] -- [phone]             Physician Progress Notes (last 48 hours) (Notes from 7/16/2019 10:22 AM through 7/18/2019 10:22 AM)      Bandar Watkins PA at 7/17/2019  9:39 AM              [facility] Medicine Services  INPATIENT PROGRESS NOTE    Length of Stay: 3  Date of Admission: 7/14/2019  Primary Care Physician: Sukumar Aleman MD    Subjective   Please note that all previous progress notes, lab " findings, radiographical findings, medication changes, and physical exam findings have been noted and updated as appropriate.    7/17/2019: Erythema around port site has improved.  Erythema is receding.  Discussed with Keara Elise, Pharm.D., who recommends de-escalation of antibiotics given that the staph species is not MRSA and most likely MSSA.  At this time Zosyn will cover the E. coli of the urinary tract infection culture as well as MSSA.  Will continue to monitor for MSSA sensitivity for further de-escalation as appropriate.  Patient denies any complaints including fever, chills, fatigue, nausea, vomiting, chest pain, dizziness.    7/16/2019: Patient states he feels much better and denies fever and chills today.  Does still have erythema at the port site.  Blood cultures positive and final cultures and sensitivities are pending.  General surgery note from today states plan is to continue to monitor and continue empiric antibiotics.  Patient will also undergo radiation treatment as scheduled today.    Chief Complaint/HPI: This 64-year-old  male with history of recent port placement reported to the emergency department with complaints of fever, chills, and redness around his port site.  Patient evaluated by general surgery, Dr. Wasserman, who recommends continued antibiotic treatment and monitoring.  Have also discussed with Dr. Ly who states that the patient does not have another active chemotherapy regimen due for 3 weeks.  Patient was due to get radiation today, Dr. Ly will discuss with Dr. Lopez of radiation oncology.  Patient states he feels much better.    Review of Systems   Constitutional: Negative for chills and fever.   Respiratory: Negative for shortness of breath.    Cardiovascular: Negative for chest pain and palpitations.   Gastrointestinal: Negative for abdominal pain, nausea and vomiting.   Genitourinary: Negative for dysuria.   Neurological: Negative for dizziness and  light-headedness.   Psychiatric/Behavioral: Negative for confusion.      All pertinent negatives and positives are as above. All other systems have been reviewed and are negative unless otherwise stated.     Objective    Temp:  [97.6 °F (36.4 °C)-98.9 °F (37.2 °C)] 97.6 °F (36.4 °C)  Heart Rate:  [63-88] 88  Resp:  [18] 18  BP: (112-130)/(62-77) 130/74    Physical Exam   Constitutional: He is oriented to person, place, and time. He appears well-developed and well-nourished.   HENT:   Head: Normocephalic and atraumatic.   Eyes: Conjunctivae are normal.   Cardiovascular: Normal rate and regular rhythm.   Pulmonary/Chest: Effort normal and breath sounds normal.   Abdominal: Soft. Bowel sounds are normal. He exhibits no distension. There is no tenderness.   Musculoskeletal: He exhibits no edema.   Neurological: He is alert and oriented to person, place, and time.   Skin: Skin is warm and dry. Capillary refill takes less than 2 seconds. There is erythema.        Erythema has receded to below clavicle and demonstrates some streaking toward right axillary region   Psychiatric: He has a normal mood and affect. His behavior is normal.     Results Review:  I have reviewed the labs, radiology results, and diagnostic studies.    Laboratory Data:   Results from last 7 days   Lab Units 07/17/19  0531 07/16/19  0545 07/15/19  0548 07/14/19  1911   SODIUM mmol/L 139 140 138 138   POTASSIUM mmol/L 3.7 3.9 3.7 3.9   CHLORIDE mmol/L 102 103 103 99   CO2 mmol/L 27.0 26.0 25.0 28.0   BUN mg/dL 12 14 15 18   CREATININE mg/dL 0.78 0.67* 0.65* 0.73*   GLUCOSE mg/dL 120* 121* 116* 112*   CALCIUM mg/dL 8.3* 8.3* 8.6 9.0   BILIRUBIN mg/dL 0.5 0.3  --  0.5   ALK PHOS U/L 96 79  --  83   ALT (SGPT) U/L 196* 172*  --  102*   AST (SGOT) U/L 112* 117*  --  77*   ANION GAP mmol/L 10.0 11.0 10.0 11.0     Estimated Creatinine Clearance: 111.6 mL/min (by C-G formula based on SCr of 0.78 mg/dL).          Results from last 7 days   Lab Units  07/17/19  0532 07/16/19  0545 07/15/19  0548 07/14/19  1911   WBC 10*3/mm3 2.43* 2.95* 2.99* 5.35   HEMOGLOBIN g/dL 12.3* 12.5* 12.8* 13.9   HEMATOCRIT % 37.8 37.5 38.3 41.9   PLATELETS 10*3/mm3 128* 130* 152 175           Culture Data:   Blood Culture   Date Value Ref Range Status   07/15/2019 No growth at 24 hours  Preliminary   07/14/2019 No growth at 2 days  Preliminary   07/14/2019 Staphylococcus aureus (A)  Preliminary     Comment:       Infectious disease consultation is highly recommended to rule out distant foci of infection.     Urine Culture   Date Value Ref Range Status   07/14/2019 >100,000 CFU/mL Escherichia coli (A)  Final     No results found for: RESPCX  No results found for: WOUNDCX  No results found for: STOOLCX  No components found for: BODYFLD    Radiology Data:   Imaging Results (last 24 hours)     ** No results found for the last 24 hours. **          I have reviewed the patient's current medications.     Assessment/Plan     Active Hospital Problems    Diagnosis   • Cellulitis of chest wall   Anal cancer  History of prostate cancer  Sclerotic lesion of the bone on CT scan of chest      Plan: Discontinue vancomycin upon recommendations of pharmacy, continue radiation, echocardiogram to rule up vegetation, monitor platelets, continue as hospital course dictates and as recommended by general surgery.                      This document has been electronically signed by DEMETRIUS Zhao on July 17, 2019 9:39 AM        Electronically signed by Bandar Watkins PA at 7/17/2019  9:45 AM     Bandar Watkins PA at 7/16/2019 12:39 PM     Attestation signed by Nikos Lowe MD at 7/17/2019  6:36 AM    I personally evaluated and examined the patient in conjunction with Bandar Watkins PA-C and agree with the assessment, treatment plan, and disposition of the patient as recorded.    Exam:  General: A&O x3  CV: S1 + S2          This document has been electronically signed by Nikos Lowe MD on  July 17, 2019 6:35 AM                            [facility] Medicine Services  INPATIENT PROGRESS NOTE    Length of Stay: 2  Date of Admission: 7/14/2019  Primary Care Physician: Sukumar Aleman MD    Subjective   Please note that all previous progress notes, lab findings, radiographical findings, medication changes, and physical exam findings have been noted and updated as appropriate.    7/16/2019: Patient states he feels much better and denies fever and chills today.  Does still have erythema at the port site.  Blood cultures positive and final cultures and sensitivities are pending.  General surgery note from today states plan is to continue to monitor and continue empiric antibiotics.  Patient will also undergo radiation treatment as scheduled today.    Chief Complaint/HPI: This 64-year-old  male with history of recent port placement reported to the emergency department with complaints of fever, chills, and redness around his port site.  Patient evaluated by general surgery, Dr. Wasserman, who recommends continued antibiotic treatment and monitoring.  Have also discussed with Dr. Ly who states that the patient does not have another active chemotherapy regimen due for 3 weeks.  Patient was due to get radiation today, Dr. Ly will discuss with Dr. Lopez of radiation oncology.  Patient states he feels much better.    Review of Systems   Constitutional: Negative for chills and fever.   Respiratory: Negative for shortness of breath.    Cardiovascular: Negative for chest pain and palpitations.   Gastrointestinal: Negative for abdominal pain, nausea and vomiting.   Genitourinary: Negative for dysuria.   Neurological: Negative for dizziness and light-headedness.   Psychiatric/Behavioral: Negative for confusion.      All pertinent negatives and positives are as above. All other systems have been reviewed and are negative unless otherwise stated.     Objective    Temp:   [97.7 °F (36.5 °C)-100.6 °F (38.1 °C)] 97.7 °F (36.5 °C)  Heart Rate:  [70-81] 70  Resp:  [18] 18  BP: (105-135)/(60-81) 126/70    Physical Exam   Constitutional: He is oriented to person, place, and time. He appears well-developed and well-nourished.   HENT:   Head: Normocephalic and atraumatic.   Eyes: Conjunctivae are normal.   Cardiovascular: Normal rate and regular rhythm.   Pulmonary/Chest: Effort normal and breath sounds normal.   Abdominal: Soft. Bowel sounds are normal. He exhibits no distension. There is no tenderness.   Musculoskeletal: He exhibits no edema.   Neurological: He is alert and oriented to person, place, and time.   Skin: Skin is warm and dry. Capillary refill takes less than 2 seconds. There is erythema.        Erythema along right clavicle and pectoralis muscle   Psychiatric: He has a normal mood and affect. His behavior is normal.     Results Review:  I have reviewed the labs, radiology results, and diagnostic studies.    Laboratory Data:   Results from last 7 days   Lab Units 07/16/19 0545 07/15/19  0548 07/14/19  1911   SODIUM mmol/L 140 138 138   POTASSIUM mmol/L 3.9 3.7 3.9   CHLORIDE mmol/L 103 103 99   CO2 mmol/L 26.0 25.0 28.0   BUN mg/dL 14 15 18   CREATININE mg/dL 0.67* 0.65* 0.73*   GLUCOSE mg/dL 121* 116* 112*   CALCIUM mg/dL 8.3* 8.6 9.0   BILIRUBIN mg/dL 0.3  --  0.5   ALK PHOS U/L 79  --  83   ALT (SGPT) U/L 172*  --  102*   AST (SGOT) U/L 117*  --  77*   ANION GAP mmol/L 11.0 10.0 11.0     Estimated Creatinine Clearance: 130.6 mL/min (A) (by C-G formula based on SCr of 0.67 mg/dL (L)).          Results from last 7 days   Lab Units 07/16/19 0545 07/15/19  0548 07/14/19 1911   WBC 10*3/mm3 2.95* 2.99* 5.35   HEMOGLOBIN g/dL 12.5* 12.8* 13.9   HEMATOCRIT % 37.5 38.3 41.9   PLATELETS 10*3/mm3 130* 152 175           Culture Data:   Blood Culture   Date Value Ref Range Status   07/15/2019 No growth at 24 hours  Preliminary   07/14/2019 No growth at 24 hours  Preliminary  "  07/14/2019 Abnormal Stain (A)  Preliminary     Urine Culture   Date Value Ref Range Status   07/14/2019 >100,000 CFU/mL Gram Negative Bacilli (A)  Preliminary     No results found for: RESPCX  No results found for: WOUNDCX  No results found for: STOOLCX  No components found for: BODYFLD    Radiology Data:   Imaging Results (last 24 hours)     ** No results found for the last 24 hours. **          I have reviewed the patient's current medications.     Assessment/Plan     Active Hospital Problems    Diagnosis   • Cellulitis of chest wall   Anal cancer  History of prostate cancer  Sclerotic lesion of the bone on CT scan of chest      Plan:  Continue per general surgery, continue per hematology/oncology, continue per radiation oncology, continue as hospital course dictates.  Follow cultures and sensitivities.                      This document has been electronically signed by DEMETRIUS Zhao on July 16, 2019 12:39 PM        Electronically signed by Nikos Lowe MD at 7/17/2019  6:36 AM          Medical Student Notes (last 48 hours) (Notes from 7/16/2019 10:22 AM through 7/18/2019 10:22 AM)      Benson Wasserman MD at 7/18/2019  5:30 AM          GENERAL SURGERY PROGRESS NOTE     LOS: 4 days         Interval History:     Mr. Shaw is a 65 YO man admitted for erythema and tenderness around his Port-A-Cath. Pt is afebrile, vitals WNL. Pt receiving chemotherapy for rectal/anal cancer. Most recent WBC count is 2.43 on 7/17. Blood cultures drawn from left arm on 7/15 showed Staph aureus growth, not MRSA. Vancomycin course finished, pt remains on Zosyn.  Swelling and tenderness appreciated surrounding port, along with induration around line adjacent to clavicle. Pt notes that he \"feels great.\"     Feeds - Regular diet  Analgesia - Tylenol  Sedation - none  Thromboembolic ppx- Lovenox  Head of bed elevated 30 degrees  Ulcer ppx - Pepcid  Glucose - 120; none  Spontaneous breathing  Bowels - BM +  Indwelling catheters - " none  De-escalation of ABX - Zosyn        Medication Review:     docusate sodium 100 mg Oral BID   enoxaparin 40 mg Subcutaneous Q24H   famotidine 40 mg Oral Daily   nebivolol 10 mg Oral Nightly   oxybutynin 5 mg Oral TID   piperacillin-tazobactam 3.375 g Intravenous Q8H   sodium chloride 3 mL Intravenous Q12H        Objective     Vital Signs:  Temp:  [96.9 °F (36.1 °C)-98.2 °F (36.8 °C)] 97.8 °F (36.6 °C)  Heart Rate:  [59-88] 59  Resp:  [16-18] 18  BP: (110-131)/(68-88) 121/68    Intake/Output Summary (Last 24 hours) at 7/18/2019 0530  Last data filed at 7/18/2019 0301  Gross per 24 hour   Intake 820 ml   Output --   Net 820 ml       Physical Exam   Constitutional: He appears well-developed and well-nourished. No distress.   Neck:       Cardiovascular: Normal rate and normal heart sounds.   No murmur heard.  Pulmonary/Chest: Effort normal and breath sounds normal. No respiratory distress.   Skin: He is not diaphoretic.       Results Review:    Results from last 7 days   Lab Units 07/17/19  0531 07/16/19  0545 07/15/19  0548   SODIUM mmol/L 139 140 138   POTASSIUM mmol/L 3.7 3.9 3.7   CHLORIDE mmol/L 102 103 103   CO2 mmol/L 27.0 26.0 25.0   BUN mg/dL 12 14 15   CREATININE mg/dL 0.78 0.67* 0.65*   GLUCOSE mg/dL 120* 121* 116*   CALCIUM mg/dL 8.3* 8.3* 8.6     Results from last 7 days   Lab Units 07/17/19  0532 07/16/19  0545 07/15/19  0548   WBC 10*3/mm3 2.43* 2.95* 2.99*   HEMOGLOBIN g/dL 12.3* 12.5* 12.8*   HEMATOCRIT % 37.8 37.5 38.3   PLATELETS 10*3/mm3 128* 130* 152       Assessment:    Cellulitis of chest wall    Erythema and swelling failing to regress. Area appears similar to yesterday.    Plan:    Re-draw blood cultures and re-assess ABX treatment.  Will try to preserve port but clearly this may not be possible      Renzo Finley      This document has been electronically signed by Renzo Finley, Medical Student on July 18, 2019 5:30 AM      Electronically signed by Benson Wasserman MD at 7/18/2019  9:03  Benson Valdivia MD at 7/17/2019  6:10 AM          GENERAL SURGERY PROGRESS NOTE     LOS: 3 days         Interval History:     Mr. Shaw is a 65 YO man admitted for erythema and tenderness around his Port-A-Cath. Pt is afebrile, vitals WNL. Pt receiving chemotherapy for rectal/anal cancer. Most recent WBC count is 2.43. Blood cultures drawn from left arm on 7/15 showed Staph aureus growth, not MRSA. Pt started on Vancomycin and Zosyn. Pt noted that erythema increased last night, but looked better this morning. No significant change in area from yesterday.     Feeds - Regular diet  Analgesia - Tylenol  Sedation - none  Thromboembolic ppx- Lovenox  Head of bed elevated 30 degrees  Ulcer ppx - Pepcid  Glucose - 120; none  Spontaneous breathing  Bowels - BM +  Indwelling catheters - none  De-escalation of ABX - Zosyn/vancomycin        Medication Review:     !Vancomycin Level Draw Needed  Does not apply Once   docusate sodium 100 mg Oral BID   enoxaparin 40 mg Subcutaneous Q24H   famotidine 40 mg Oral Daily   nebivolol 10 mg Oral Nightly   oxybutynin 5 mg Oral TID   piperacillin-tazobactam 3.375 g Intravenous Q8H   sodium chloride 3 mL Intravenous Q12H   vancomycin 1,500 mg Intravenous Q12H         Pharmacy to dose vancomycin    Objective     Vital Signs:  Temp:  [97.6 °F (36.4 °C)-98.9 °F (37.2 °C)] 97.6 °F (36.4 °C)  Heart Rate:  [63-84] 63  Resp:  [18] 18  BP: (110-128)/(62-81) 112/62    Intake/Output Summary (Last 24 hours) at 7/17/2019 0610  Last data filed at 7/17/2019 0109  Gross per 24 hour   Intake 1320 ml   Output --   Net 1320 ml       Physical Exam   Constitutional: He appears well-developed and well-nourished. No distress.   Neck:       Cardiovascular: Normal rate and normal heart sounds.   No murmur heard.  Pulmonary/Chest: Effort normal and breath sounds normal. No respiratory distress.   Skin: Skin is warm and dry.       Results Review:    Results from last 7 days   Lab Units 07/17/19  7561  07/16/19  0545 07/15/19  0548   SODIUM mmol/L 139 140 138   POTASSIUM mmol/L 3.7 3.9 3.7   CHLORIDE mmol/L 102 103 103   CO2 mmol/L 27.0 26.0 25.0   BUN mg/dL 12 14 15   CREATININE mg/dL 0.78 0.67* 0.65*   GLUCOSE mg/dL 120* 121* 116*   CALCIUM mg/dL 8.3* 8.3* 8.6     Results from last 7 days   Lab Units 07/17/19  0532 07/16/19  0545 07/15/19  0548   WBC 10*3/mm3 2.43* 2.95* 2.99*   HEMOGLOBIN g/dL 12.3* 12.5* 12.8*   HEMATOCRIT % 37.8 37.5 38.3   PLATELETS 10*3/mm3 128* 130* 152       Assessment:    Cellulitis of chest wall    Pt presents similar to yesterday with erythema and tenderness diminished. ABX seem to be working.    Plan:    Continue present care.   I am still concerned about the redness- would continue IV antibiotics for now    Renzo Finley      This document has been electronically signed by Renzo Finley, Medical Student on July 17, 2019 6:10 AM      Electronically signed by Benson Wasserman MD at 7/17/2019  8:18 AM       Consult Notes (last 48 hours) (Notes from 7/16/2019 10:22 AM through 7/18/2019 10:22 AM)     No notes of this type exist for this encounter.

## 2019-07-18 NOTE — PROGRESS NOTES
Adult Outpatient Nutrition  Assessment    Patient Name:  Juan Antonio Shaw  YOB: 1955  MRN: [id number]    Assessment Date:  7/18/2019   CHART REVIEW  Juan Antonio Shaw   1955  64 y.o.  Wt: 225 lb  Ht: 68 in  Dx: anal cancer  Tx: radiation + chemo  Labs: alb 3.6    PATIENT/FAMILY COMMENTS   Weight Change: stable  Diet: regular  Food Allergies: nkfa   Appetite/Intake: good  Supplements: na  Meal Preparation: pt + wife  Nutrition Concerns:  * in hospital at present due to staph infection (hopes to be dismissed tomorrow)  * taste change  * continues to require stool softener-- pt understands that rad to pelvis may replace stool  softeners.   * mouth sensitive (MD ordered oral was--is effective))    No problems with nause/vomiting/chewing/diarrhea.        GOAL(s)  -to optimize nutrition in attempt to prevent loss of LBM        EDUCATION  Discussed  -importance of nutrition/adequate protein  -importance of staying hydrated  -food safety  -taste hints  -hints for diarrhea (if needed in future)      Patient receptive to suggestions--agreed to call on dietitian as needed. Difficult to keep up with written material while in hospital, thus will provide when dismissed from hospital.              Stephie Art RD                    Anthropometrics     Row Name 07/18/19 0506          Anthropometrics    Weight  102 kg (225 lb 4.8 oz)                       Electronically signed by:  Stephie Art RD  07/18/19 1:02 PM

## 2019-07-18 NOTE — PROGRESS NOTES
[facility]  INPATIENT PROGRESS NOTE    Length of Stay: 4  Date of Admission: 7/14/2019  Primary Care Physician: Sukumar Aleman MD    Subjective   Chief Complaint: No complaints    HPI: This is a 64-year-old  male with past medical history of rectal and anal cancer (currently undergoing chemotherapy and radiation therapy) that reported to be HCM on 7/14/2019 with complaints of fever, chills, nausea and discomfort in the anterior right side of the chest above his Port-A-Cath.  Patient states that he received his port approximately 2 weeks ago.  Erythema was noted around the Port-A-Cath extending towards his neck.  The patient is being followed by Dr. Wasserman.  Patient does not have another active chemotherapy regimen due for 3 weeks.  The patient underwent radiation on 7/17/2019.  Antibiotics were de-escalated given that the staph species is not MRSA and most likely MSSA.  Patient was also noted to have a E. coli urinary tract infection.  Echocardiogram was ordered to rule out vegetation.  Possible mass noted tricuspid valve.  Dr. Santiago has been consulted for possible DAVIDA.    Review of Systems   Constitutional: Negative for activity change and fatigue.   HENT: Negative for ear pain and sore throat.    Eyes: Negative for pain and discharge.   Respiratory: Negative for cough and shortness of breath.    Cardiovascular: Negative for chest pain and palpitations.   Gastrointestinal: Negative for abdominal pain and nausea.   Endocrine: Negative for cold intolerance and heat intolerance.   Genitourinary: Negative for difficulty urinating and dysuria.   Musculoskeletal: Negative for arthralgias and gait problem.   Skin: Negative for color change and rash.        Erythema noted to right side of chest over port site.   Neurological: Negative for dizziness and weakness.   Psychiatric/Behavioral: Negative for agitation and confusion.        Objective    Temp:   [97.1 °F (36.2 °C)-98.2 °F (36.8 °C)] 97.7 °F (36.5 °C)  Heart Rate:  [59-85] 77  Resp:  [18-20] 20  BP: (110-130)/(68-75) 118/69    Physical Exam   Constitutional: He is oriented to person, place, and time. He appears well-developed and well-nourished.   HENT:   Head: Normocephalic and atraumatic.   Eyes: EOM are normal. Pupils are equal, round, and reactive to light.   Neck: Normal range of motion. Neck supple.   Cardiovascular: Normal rate and regular rhythm.   Pulmonary/Chest: Effort normal and breath sounds normal.   Abdominal: Soft. Bowel sounds are normal.   Musculoskeletal: Normal range of motion.   Neurological: He is alert and oriented to person, place, and time.   Skin: Skin is warm and dry.   Erythema noted over right chest wall above port site.    Psychiatric: He has a normal mood and affect. His behavior is normal.     Results Review:  I have reviewed the labs, radiology results, and diagnostic studies.    Laboratory Data:   Results from last 7 days   Lab Units 07/18/19  0542 07/17/19  0531 07/16/19  0545   SODIUM mmol/L 138 139 140   POTASSIUM mmol/L 3.9 3.7 3.9   CHLORIDE mmol/L 100 102 103   CO2 mmol/L 28.0 27.0 26.0   BUN mg/dL 15 12 14   CREATININE mg/dL 0.74* 0.78 0.67*   GLUCOSE mg/dL 114* 120* 121*   CALCIUM mg/dL 8.8 8.3* 8.3*   BILIRUBIN mg/dL 0.6 0.5 0.3   ALK PHOS U/L 109 96 79   ALT (SGPT) U/L 186* 196* 172*   AST (SGOT) U/L 85* 112* 117*   ANION GAP mmol/L 10.0 10.0 11.0     Estimated Creatinine Clearance: 118.2 mL/min (A) (by C-G formula based on SCr of 0.74 mg/dL (L)).          Results from last 7 days   Lab Units 07/18/19  0542 07/17/19  0532 07/16/19  0545 07/15/19  0548 07/14/19  1911   WBC 10*3/mm3 2.87* 2.43* 2.95* 2.99* 5.35   HEMOGLOBIN g/dL 13.0 12.3* 12.5* 12.8* 13.9   HEMATOCRIT % 37.8 37.8 37.5 38.3 41.9   PLATELETS 10*3/mm3 125* 128* 130* 152 175           Culture Data:   No results found for: BLOODCX  No results found for: URINECX  No results found for: RESPCX  No results  found for: WOUNDCX  No results found for: STOOLCX  No components found for: BODYFLD    Radiology Data:   Imaging Results (last 24 hours)     ** No results found for the last 24 hours. **          I have reviewed the patient's current medications.     Assessment/Plan     Active Hospital Problems    Diagnosis POA   • Acute UTI (urinary tract infection) [N39.0] Unknown   • Cellulitis of chest wall [L03.313] Yes   • Prostate cancer (CMS/HCC) [C61] Yes       Plan:    1.  Cellulitis, right chest wall:  Per pharmacy recommendation, Ancef to cover cellulitis and uti.  Discontinue Zosyn.  General surgery following.  Repeat blood cultures ordered per surgery.    2.  Urinary tract infection, E. coli: Ancef.  3.  Prostate/anal cancer:  Next chemotherapy scheduled in 3 weeks.  Patient is continuing radiation.  Last radiation 7/17/2019.    4.  Leukopenia secondary to recent chemotherapy:  Will continue to monitor the need for neutropenic precautions.        Discharge Planning: I expect patient to be discharged to home in 1-2 days.      This document has been electronically signed by SUHAIL Maldonado on July 18, 2019 1:57 PM

## 2019-07-18 NOTE — PROGRESS NOTES
He now has redness that is progressing down toward the axilla.  Afraid that this is not going to be salvageable and I will plan to remove the port tomorrow.  Procedure was explained with the risks of bleeding infection.

## 2019-07-18 NOTE — MEDICAL STUDENT
"GENERAL SURGERY PROGRESS NOTE     LOS: 4 days         Interval History:     Mr. Shaw is a 65 YO man admitted for erythema and tenderness around his Port-A-Cath. Pt is afebrile, vitals WNL. Pt receiving chemotherapy for rectal/anal cancer. Most recent WBC count is 2.43 on 7/17. Blood cultures drawn from left arm on 7/15 showed Staph aureus growth, not MRSA. Vancomycin course finished, pt remains on Zosyn. Swelling and tenderness appreciated surrounding port, along with induration around line adjacent to clavicle. Pt notes that he \"feels great.\"     Feeds - Regular diet  Analgesia - Tylenol  Sedation - none  Thromboembolic ppx- Lovenox  Head of bed elevated 30 degrees  Ulcer ppx - Pepcid  Glucose - 120; none  Spontaneous breathing  Bowels - BM +  Indwelling catheters - none  De-escalation of ABX - Zosyn        Medication Review:     docusate sodium 100 mg Oral BID   enoxaparin 40 mg Subcutaneous Q24H   famotidine 40 mg Oral Daily   nebivolol 10 mg Oral Nightly   oxybutynin 5 mg Oral TID   piperacillin-tazobactam 3.375 g Intravenous Q8H   sodium chloride 3 mL Intravenous Q12H        Objective     Vital Signs:  Temp:  [96.9 °F (36.1 °C)-98.2 °F (36.8 °C)] 97.8 °F (36.6 °C)  Heart Rate:  [59-88] 59  Resp:  [16-18] 18  BP: (110-131)/(68-88) 121/68    Intake/Output Summary (Last 24 hours) at 7/18/2019 0530  Last data filed at 7/18/2019 0301  Gross per 24 hour   Intake 820 ml   Output --   Net 820 ml       Physical Exam   Constitutional: He appears well-developed and well-nourished. No distress.   Neck:       Cardiovascular: Normal rate and normal heart sounds.   No murmur heard.  Pulmonary/Chest: Effort normal and breath sounds normal. No respiratory distress.   Skin: He is not diaphoretic.       Results Review:    Results from last 7 days   Lab Units 07/17/19  0531 07/16/19  0545 07/15/19  0548   SODIUM mmol/L 139 140 138   POTASSIUM mmol/L 3.7 3.9 3.7   CHLORIDE mmol/L 102 103 103   CO2 mmol/L 27.0 26.0 25.0   BUN mg/dL " 12 14 15   CREATININE mg/dL 0.78 0.67* 0.65*   GLUCOSE mg/dL 120* 121* 116*   CALCIUM mg/dL 8.3* 8.3* 8.6     Results from last 7 days   Lab Units 07/17/19  0532 07/16/19  0545 07/15/19  0548   WBC 10*3/mm3 2.43* 2.95* 2.99*   HEMOGLOBIN g/dL 12.3* 12.5* 12.8*   HEMATOCRIT % 37.8 37.5 38.3   PLATELETS 10*3/mm3 128* 130* 152       Assessment:    Cellulitis of chest wall    Erythema and swelling failing to regress. Area appears similar to yesterday.    Plan:    Re-draw blood cultures and re-assess ABX treatment.  Will try to preserve port but clearly this may not be possible      Renzo Finley      This document has been electronically signed by Renzo Finley, Medical Student on July 18, 2019 5:30 AM

## 2019-07-18 NOTE — PLAN OF CARE
Problem: Patient Care Overview  Goal: Plan of Care Review  Outcome: Ongoing (interventions implemented as appropriate)   07/18/19 0119   Coping/Psychosocial   Plan of Care Reviewed With patient   Plan of Care Review   Progress no change   OTHER   Outcome Summary Pt has no new complaints at this time; Will continue to monitor.       Problem: Infection, Risk/Actual (Adult)  Goal: Infection Prevention/Resolution  Outcome: Ongoing (interventions implemented as appropriate)      Problem: Oncology Care (Adult)  Goal: Signs and Symptoms of Listed Potential Problems Will be Absent, Minimized or Managed (Oncology Care)  Outcome: Ongoing (interventions implemented as appropriate)      Problem: Chemotherapy Effects (Adult)  Goal: Signs and Symptoms of Listed Potential Problems Will be Absent, Minimized or Managed (Chemotherapy Effects)  Outcome: Ongoing (interventions implemented as appropriate)      Problem: Skin and Soft Tissue Infection (Adult)  Goal: Signs and Symptoms of Listed Potential Problems Will be Absent, Minimized or Managed (Skin and Soft Tissue Infection)  Outcome: Ongoing (interventions implemented as appropriate)

## 2019-07-19 ENCOUNTER — ANESTHESIA (OUTPATIENT)
Dept: PERIOP | Facility: HOSPITAL | Age: 64
End: 2019-07-19

## 2019-07-19 ENCOUNTER — ANESTHESIA EVENT (OUTPATIENT)
Dept: PERIOP | Facility: HOSPITAL | Age: 64
End: 2019-07-19

## 2019-07-19 ENCOUNTER — APPOINTMENT (OUTPATIENT)
Dept: CARDIOLOGY | Facility: HOSPITAL | Age: 64
End: 2019-07-19

## 2019-07-19 LAB
ALBUMIN SERPL-MCNC: 3.7 G/DL (ref 3.5–5.2)
ALBUMIN/GLOB SERPL: 1.1 G/DL
ALP SERPL-CCNC: 110 U/L (ref 39–117)
ALT SERPL W P-5'-P-CCNC: 165 U/L (ref 1–41)
ANION GAP SERPL CALCULATED.3IONS-SCNC: 10 MMOL/L (ref 5–15)
AST SERPL-CCNC: 71 U/L (ref 1–40)
BACTERIA SPEC AEROBE CULT: NORMAL
BASOPHILS # BLD AUTO: 0.01 10*3/MM3 (ref 0–0.2)
BASOPHILS NFR BLD AUTO: 0.4 % (ref 0–1.5)
BILIRUB SERPL-MCNC: 0.5 MG/DL (ref 0.2–1.2)
BUN BLD-MCNC: 15 MG/DL (ref 8–23)
BUN/CREAT SERPL: 23.1 (ref 7–25)
CALCIUM SPEC-SCNC: 8.9 MG/DL (ref 8.6–10.5)
CHLORIDE SERPL-SCNC: 101 MMOL/L (ref 98–107)
CO2 SERPL-SCNC: 28 MMOL/L (ref 22–29)
CREAT BLD-MCNC: 0.65 MG/DL (ref 0.76–1.27)
DEPRECATED RDW RBC AUTO: 39.7 FL (ref 37–54)
EOSINOPHIL # BLD AUTO: 0.08 10*3/MM3 (ref 0–0.4)
EOSINOPHIL NFR BLD AUTO: 2.9 % (ref 0.3–6.2)
ERYTHROCYTE [DISTWIDTH] IN BLOOD BY AUTOMATED COUNT: 12.4 % (ref 12.3–15.4)
GFR SERPL CREATININE-BSD FRML MDRD: 124 ML/MIN/1.73
GLOBULIN UR ELPH-MCNC: 3.4 GM/DL
GLUCOSE BLD-MCNC: 107 MG/DL (ref 65–99)
HCT VFR BLD AUTO: 38.8 % (ref 37.5–51)
HGB BLD-MCNC: 13.4 G/DL (ref 13–17.7)
IMM GRANULOCYTES # BLD AUTO: 0.01 10*3/MM3 (ref 0–0.05)
IMM GRANULOCYTES NFR BLD AUTO: 0.4 % (ref 0–0.5)
LYMPHOCYTES # BLD AUTO: 0.72 10*3/MM3 (ref 0.7–3.1)
LYMPHOCYTES NFR BLD AUTO: 25.8 % (ref 19.6–45.3)
MCH RBC QN AUTO: 30.6 PG (ref 26.6–33)
MCHC RBC AUTO-ENTMCNC: 34.5 G/DL (ref 31.5–35.7)
MCV RBC AUTO: 88.6 FL (ref 79–97)
MONOCYTES # BLD AUTO: 0.28 10*3/MM3 (ref 0.1–0.9)
MONOCYTES NFR BLD AUTO: 10 % (ref 5–12)
NEUTROPHILS # BLD AUTO: 1.69 10*3/MM3 (ref 1.7–7)
NEUTROPHILS NFR BLD AUTO: 60.5 % (ref 42.7–76)
NRBC BLD AUTO-RTO: 0 /100 WBC (ref 0–0.2)
PLATELET # BLD AUTO: 116 10*3/MM3 (ref 140–450)
PMV BLD AUTO: 8.6 FL (ref 6–12)
POTASSIUM BLD-SCNC: 4 MMOL/L (ref 3.5–5.2)
PROT SERPL-MCNC: 7.1 G/DL (ref 6–8.5)
RBC # BLD AUTO: 4.38 10*6/MM3 (ref 4.14–5.8)
SODIUM BLD-SCNC: 139 MMOL/L (ref 136–145)
WBC NRBC COR # BLD: 2.79 10*3/MM3 (ref 3.4–10.8)

## 2019-07-19 PROCEDURE — 80053 COMPREHEN METABOLIC PANEL: CPT | Performed by: NURSE PRACTITIONER

## 2019-07-19 PROCEDURE — 77014 CHG CT GUIDANCE RADIATION THERAPY FLDS PLACEMENT: CPT | Performed by: RADIOLOGY

## 2019-07-19 PROCEDURE — 93325 DOPPLER ECHO COLOR FLOW MAPG: CPT

## 2019-07-19 PROCEDURE — 93320 DOPPLER ECHO COMPLETE: CPT

## 2019-07-19 PROCEDURE — 25010000002 CEFAZOLIN PER 500 MG: Performed by: NURSE PRACTITIONER

## 2019-07-19 PROCEDURE — 25010000002 FENTANYL CITRATE (PF) 100 MCG/2ML SOLUTION: Performed by: INTERNAL MEDICINE

## 2019-07-19 PROCEDURE — 25010000002 MIDAZOLAM PER 1 MG: Performed by: INTERNAL MEDICINE

## 2019-07-19 PROCEDURE — 93312 ECHO TRANSESOPHAGEAL: CPT

## 2019-07-19 PROCEDURE — 85025 COMPLETE CBC W/AUTO DIFF WBC: CPT | Performed by: NURSE PRACTITIONER

## 2019-07-19 PROCEDURE — B24BZZ4 ULTRASONOGRAPHY OF HEART WITH AORTA, TRANSESOPHAGEAL: ICD-10-PCS | Performed by: INTERNAL MEDICINE

## 2019-07-19 PROCEDURE — 77386: CPT | Performed by: RADIOLOGY

## 2019-07-19 RX ORDER — FENTANYL CITRATE 50 UG/ML
INJECTION, SOLUTION INTRAMUSCULAR; INTRAVENOUS
Status: COMPLETED | OUTPATIENT
Start: 2019-07-19 | End: 2019-07-19

## 2019-07-19 RX ORDER — MIDAZOLAM HYDROCHLORIDE 1 MG/ML
INJECTION INTRAMUSCULAR; INTRAVENOUS
Status: COMPLETED | OUTPATIENT
Start: 2019-07-19 | End: 2019-07-19

## 2019-07-19 RX ADMIN — SODIUM CHLORIDE, PRESERVATIVE FREE 3 ML: 5 INJECTION INTRAVENOUS at 20:31

## 2019-07-19 RX ADMIN — FAMOTIDINE 40 MG: 40 TABLET ORAL at 08:45

## 2019-07-19 RX ADMIN — FENTANYL CITRATE 25 MCG: 50 INJECTION, SOLUTION INTRAMUSCULAR; INTRAVENOUS at 14:18

## 2019-07-19 RX ADMIN — CEFAZOLIN SODIUM 2 G: 10 INJECTION, POWDER, FOR SOLUTION INTRAVENOUS at 02:59

## 2019-07-19 RX ADMIN — OXYBUTYNIN CHLORIDE 5 MG: 5 TABLET ORAL at 17:34

## 2019-07-19 RX ADMIN — MIDAZOLAM HYDROCHLORIDE 1 MG: 2 INJECTION, SOLUTION INTRAMUSCULAR; INTRAVENOUS at 14:17

## 2019-07-19 RX ADMIN — CEFAZOLIN SODIUM 2 G: 10 INJECTION, POWDER, FOR SOLUTION INTRAVENOUS at 10:22

## 2019-07-19 RX ADMIN — SODIUM CHLORIDE, PRESERVATIVE FREE 3 ML: 5 INJECTION INTRAVENOUS at 08:45

## 2019-07-19 RX ADMIN — OXYBUTYNIN CHLORIDE 5 MG: 5 TABLET ORAL at 20:31

## 2019-07-19 RX ADMIN — MIDAZOLAM HYDROCHLORIDE 1 MG: 2 INJECTION, SOLUTION INTRAMUSCULAR; INTRAVENOUS at 14:19

## 2019-07-19 RX ADMIN — NEBIVOLOL HYDROCHLORIDE 10 MG: 5 TABLET ORAL at 20:31

## 2019-07-19 RX ADMIN — CEFAZOLIN SODIUM 2 G: 10 INJECTION, POWDER, FOR SOLUTION INTRAVENOUS at 17:34

## 2019-07-19 RX ADMIN — OXYBUTYNIN CHLORIDE 5 MG: 5 TABLET ORAL at 08:45

## 2019-07-19 NOTE — CONSULTS
Cardiology Consultation Note.        Patient Name: Juan Antonio Shaw  Age/Sex: 64 y.o. male  : 1955  MRN: [id number]    Date of consultation: 2019  Consulting Physician: Alfie Santiago MD  Primary care Physician: Sukumar Aleman MD  Requesting Physician:  Coco Elizabeth APRN     Reason for consultation: Positive blood cultures      Subjective:       Chief Complaint: Right infraclavicular discomfort.      History of Present Illness:  Juan Antonio Shaw is a 64 y.o. male     Body mass index is 34.26 kg/m².  With a past medical history significant for arterial hypertension, hypertensive heart disease, preserved left ventricular systolic function with an ejection fraction of 55%, history of prostate carcinoma, history of anal carcinoma status post radiation and chemo who underwent a Mediport placement in the right infraclavicular area few weeks ago.  Patient subsequently had received chemotherapy.  Patient had noticed right infraclavicular discomfort which had progressively increased.  Of note patient had blood drawn at the [facility] including chemotherapy administration in the right Mediport area.    Patient was hospitalized and had positive blood culture.  Patient transthoracic echocardiogram had revealed questionable tricuspid valve echodensity.  Due to the patient positive blood cultures with echodensity noted in the tricuspid valve cardiology was consulted to rule out endocarditis.    Patient on questioning denies any fever chill patient denies any hemoptysis hematuria bright red blood per rectum.  Patient does complain of having right infraclavicular discomfort.    Patient 10 point review of system except for what stated in the history of present illness and negative.      Past Medical History:  1.  Positive blood cultures with bacteremia.  2.  Arterial hypertension.  3.  Hypertensive heart disease.  4.  Preserved left ventricular systolic function with an ejection fraction of 55%.  5.  History  of prostate carcinoma.  6.  History of adenocarcinoma on chemo.  7.  Status post right Mediport placement for chemotherapy.      Past Surgical History:  1.  Colonoscopy.  2.  Esophagogastroduodenoscopy.  3.  Prostate surgery.  4.  Right infraclavicular Mediport placement.      Family History: Family history significant for uterine carcinoma      Social History: Denies any tobacco alcohol intake.       Cardiac Risk Factors:   1.  Male.  2.  Arterial hypertension.    Allergies:  No Known Allergies    Medication:  Medications Prior to Admission   Medication Sig Dispense Refill Last Dose   • nebivolol (BYSTOLIC) 10 MG tablet Take 10 mg by mouth Every Night.   7/13/2019 at 2100   • ondansetron (ZOFRAN) 4 MG tablet Take 1 tablet by mouth 4 (Four) Times a Day As Needed for Nausea or Vomiting. 40 tablet 3 7/13/2019 at Unknown time   • oxybutynin (DITROPAN) 5 MG tablet Take 1 tablet by mouth 3 (Three) Times a Day.   Taking           Review of Systems:       Constitutional:  Denies recent weight loss, weight gain, fever or chills, no change in exercise tolerance.     HENT:  Denies any hearing loss, epistaxis, hoarseness, or difficulty speaking.     Eyes: Wears eyeglasses or contact lenses     Respiratory:  Denies dyspnea with exertion,no cough, wheezing, or hemoptysis.     Cardiovascular: Negative for palpitations, chest pain, orthopnea, PND, peripheral edema, syncope, or claudication.     Gastrointestinal:  Denies change in bowel habits, dyspepsia, ulcer disease, hematochezia, or melena.  No nausea, no vomiting, no hematemesis, no diarrhea or constipation.    Endocrine: Negative for cold intolerance, heat intolerance, polydipsia, polyphagia or polyuria. Denies any history of weight change or unintended weight loss.    Genitourinary: Negative for hematuria.  No frequent urination or nocturia.      Musculoskeletal: Denies any history of arthritic symptoms or back problems .  No joint pain, joint stiffness, joint swelling,  muscle pain, muscle weakness or neck pain.    Skin:  Denies any change in hair or nails, rashes, or skin lesions.     Allergic/Immunologic: Negative.  Negative for environmental allergies, food allergies or immunocompromised state.     Neurological:  Denies any history of recurrent headaches, strokes, TIA, or seizure disorder.     Hematological: Denies excessive bleeding, easy bruising, fatigue, lymphadenopathy or petechiae or any bleeding disorders.     Psychiatric/Behavioral: Denies any history of depression, substance abuse, or change in cognitive function. Denies any psychomotor reaction or tangential thought.  No depression, homicidal ideations or suicidal ideations.          Objective:     Objective:  Temp:  [96.9 °F (36.1 °C)-98.2 °F (36.8 °C)] 96.9 °F (36.1 °C)  Heart Rate:  [59-85] 76  Resp:  [18-20] 20  BP: (110-121)/(66-72) 119/66      Body mass index is 34.26 kg/m².           Physical Exam:   General Appearance:    Alert, oriented, cooperative, in no acute distress.   Head:    Normocephalic, atraumatic, without obvious abnormality.   Eyes:           LUIS ALFREDO.  Lids and lashes normal, conjunctivae and sclerae normal, no icterus, no pallor.   Ears:    Ears appear intact with no abnormalities noted.   Throat:   Mucous membranes pink and moist.   Neck:   Supple, trachea midline, no carotid bruit, no organomegaly or JVD.   Lungs:     Clear to auscultation and percussion, respirations regular, even and unlabored. No wheezes, rales or rhonchi.    Heart:    Regular rhythm and normal rate, normal S1 and S2, no            murmur, no gallop, no rub, no click.   Abdomen:     Soft, non-tender, non-distended, no guarding, no rebound tenderness, normal bowel sounds in all four quadrants, no masses, liver and spleen nonpalpable.   Genitalia:    Deferred.   Extremities:   Moves all extremities well, no edema, no cyanosis, no              redness, no clubbing.   Pulses:   Pulses palpable and equal bilaterally.   Skin:    Moist and warm. No bleeding, bruising or rash.   Neurologic/Psychiatric:   Alert and oriented to person, place, and time.  Motor, power and tone in upper and lower extremities are grossly intact. No focal neurological deficits. Normal cognitive function. No psychomotor reaction or tangential thought. No depression, homicidal ideations and suicidal ideations.       Medication Review:   Current Facility-Administered Medications   Medication Dose Route Frequency Provider Last Rate Last Dose   • acetaminophen (TYLENOL) tablet 650 mg  650 mg Oral Q4H PRN Gilson Lucio MD   650 mg at 07/16/19 0428   • ceFAZolin in 0.9% normal saline (ANCEF) IVPB solution 2 g  2 g Intravenous Q8H Coco Elizabeth APRN 200 mL/hr at 07/18/19 1723 2 g at 07/18/19 1723   • docusate sodium (COLACE) capsule 100 mg  100 mg Oral BID Gilson Lucio MD   100 mg at 07/18/19 0948   • enoxaparin (LOVENOX) syringe 40 mg  40 mg Subcutaneous Q24H Gilson Lucio MD   40 mg at 07/17/19 2206   • famotidine (PEPCID) tablet 40 mg  40 mg Oral Daily Gilson Lucio MD   40 mg at 07/18/19 0948   • magic mouthwash with nystatin oral supsension 10 mL  10 mL Swish & Swallow Q4H PRN Bandar Watkins PA   10 mL at 07/15/19 1253   • morphine injection 1 mg  1 mg Intravenous Q4H PRN Gilson Lucoi MD        And   • naloxone (NARCAN) injection 0.4 mg  0.4 mg Intravenous Q5 Min PRN Gilson Lucio MD       • nebivolol (BYSTOLIC) tablet 10 mg  10 mg Oral Nightly Gilson Lucio MD   10 mg at 07/17/19 2012   • ondansetron (ZOFRAN) tablet 4 mg  4 mg Oral Q6H PRN Gilson Lucio MD        Or   • ondansetron (ZOFRAN) injection 4 mg  4 mg Intravenous Q6H PRN Gilson Lucio MD       • oxybutynin (DITROPAN) tablet 5 mg  5 mg Oral TID Gilson Lucio MD   5 mg at 07/18/19 1722   • sodium chloride 0.9 % flush 10 mL  10 mL Intravenous PRN Dmitry Gardner MD   10 mL at 07/14/19 1912   • sodium chloride 0.9 % flush 3 mL  3 mL Intravenous Q12H  Gilson Lucio MD   3 mL at 07/18/19 0952   • sodium chloride 0.9 % flush 3-10 mL  3-10 mL Intravenous PRN Gilson Lucio MD           Lab Review:     Results from last 7 days   Lab Units 07/18/19  0542   SODIUM mmol/L 138   POTASSIUM mmol/L 3.9   CHLORIDE mmol/L 100   CO2 mmol/L 28.0   BUN mg/dL 15   CREATININE mg/dL 0.74*   CALCIUM mg/dL 8.8   BILIRUBIN mg/dL 0.6   ALK PHOS U/L 109   ALT (SGPT) U/L 186*   AST (SGOT) U/L 85*   GLUCOSE mg/dL 114*             Results from last 7 days   Lab Units 07/18/19  0542   WBC 10*3/mm3 2.87*   HEMOGLOBIN g/dL 13.0   HEMATOCRIT % 37.8   PLATELETS 10*3/mm3 125*                           EKG:   ECG/EMG Results (last 24 hours)     ** No results found for the last 24 hours. **          ECHO:  Results for orders placed during the hospital encounter of 07/14/19   Adult Transthoracic Echo Complete W/ Cont if Necessary Per Protocol    Narrative · Left atrial cavity size is mildly dilated.  · Left ventricular wall thickness is consistent with mild concentric   hypertrophy.  · Mild tricuspid valve regurgitation is present.           Imaging:  Imaging Results (last 24 hours)     ** No results found for the last 24 hours. **          I personally viewed and interpreted the patient's EKG/Telemetry data.    Assessment:   1.  Bacteremia.  2.  Questionable tricuspid valve echodensity.  3.  Arterial hypertension.  4.  Hypertensive heart disease.  5.  Infected right Mediport.          Plan:   1.  Bacteremia with right Mediport infection.  Patient transthoracic echocardiogram had revealed echodensity in the tricuspid valve.  Patient was recommended a transesophageal echocardiogram to further evaluate the tricuspid valve and to rule out vegetation.  Patient has not had any chest pain.  Risk-benefit treatment option for the transesophageal echocardiogram were discussed with the patient and informed consent was obtained.  Plan was to proceed with a transesophageal echocardiogram in the  morning.  2.  Arterial hypertension.  Patient blood pressure has remained labile.  Patient would be continued on the home dose of beta-blocker.  3.  Hypertensive heart disease.  Clinically at the present time patient not in congestive heart failure.  4.  Adenocarcinoma status post chemotherapy.  Patient has been followed at the [facility].  5.  Bacteremia with right Mediport possible infection.  Patient was evaluated by Dr. Wasserman and is to undergo explantation of the Mediport.  Patient is currently on antibiotic.    Thank you for the consultation.    The above plan of management were discussed with the patient.      Time: time spent in face-to-face evaluation of greater than 55 minutes and interacting and formulating examining and discussing the plan with the patient with 50% of greater time spent in face-to-face interaction.    Alfie Santiago MD  07/18/19  8:36 PM  Dictated utilizing Dragon dictation.

## 2019-07-19 NOTE — PROGRESS NOTES
Patient was brought to the cardiac catheterization lab.  Patient underwent a transesophageal echocardiogram without any difficulty.  Patient mitral valve was structurally intact without any evidence of any vegetation.  The aortic valve was trileaflet with normal integrity without any evidence of any vegetation.  The tricuspid valve was structurally intact without any evidence of any vegetation.  Patient had preserved left ventricular systolic function.    Final impression: No evidence of any vegetation noted on any valve.    Patient was transferred to the floor in stable condition.

## 2019-07-19 NOTE — PLAN OF CARE
Problem: Patient Care Overview  Goal: Plan of Care Review  Outcome: Ongoing (interventions implemented as appropriate)   07/19/19 4374   Coping/Psychosocial   Plan of Care Reviewed With patient   Plan of Care Review   Progress no change   OTHER   Outcome Summary Pt went to his radiation treatment this morning; pt is currently in cath lab for a DAVIDA; pt will continue to be NPO for mediport removal this afternoon; v/s stable; no complaints; will continue to monitor      Goal: Individualization and Mutuality  Outcome: Ongoing (interventions implemented as appropriate)    Goal: Discharge Needs Assessment  Outcome: Ongoing (interventions implemented as appropriate)    Goal: Interprofessional Rounds/Family Conf  Outcome: Ongoing (interventions implemented as appropriate)      Problem: Infection, Risk/Actual (Adult)  Goal: Infection Prevention/Resolution  Outcome: Ongoing (interventions implemented as appropriate)      Problem: Oncology Care (Adult)  Goal: Signs and Symptoms of Listed Potential Problems Will be Absent, Minimized or Managed (Oncology Care)  Outcome: Ongoing (interventions implemented as appropriate)      Problem: Chemotherapy Effects (Adult)  Goal: Signs and Symptoms of Listed Potential Problems Will be Absent, Minimized or Managed (Chemotherapy Effects)  Outcome: Ongoing (interventions implemented as appropriate)      Problem: Skin and Soft Tissue Infection (Adult)  Goal: Signs and Symptoms of Listed Potential Problems Will be Absent, Minimized or Managed (Skin and Soft Tissue Infection)  Outcome: Ongoing (interventions implemented as appropriate)

## 2019-07-19 NOTE — PLAN OF CARE
Problem: Patient Care Overview  Goal: Plan of Care Review  Outcome: Ongoing (interventions implemented as appropriate)   07/19/19 0415   Coping/Psychosocial   Plan of Care Reviewed With patient   Plan of Care Review   Progress no change   OTHER   Outcome Summary Pt NPO at midnight for procedure in the AM. VSS. No other complaints at this time.       Problem: Infection, Risk/Actual (Adult)  Goal: Infection Prevention/Resolution  Outcome: Ongoing (interventions implemented as appropriate)      Problem: Oncology Care (Adult)  Goal: Signs and Symptoms of Listed Potential Problems Will be Absent, Minimized or Managed (Oncology Care)  Outcome: Ongoing (interventions implemented as appropriate)      Problem: Chemotherapy Effects (Adult)  Goal: Signs and Symptoms of Listed Potential Problems Will be Absent, Minimized or Managed (Chemotherapy Effects)  Outcome: Ongoing (interventions implemented as appropriate)      Problem: Skin and Soft Tissue Infection (Adult)  Goal: Signs and Symptoms of Listed Potential Problems Will be Absent, Minimized or Managed (Skin and Soft Tissue Infection)  Outcome: Ongoing (interventions implemented as appropriate)

## 2019-07-19 NOTE — PROGRESS NOTES
GENERAL SURGERY PROGRESS NOTE     LOS: 5 days     Chief Complaint:     Pain and erythema at Port - A - Cath site    Interval History:     Pt appears to be doing better. He is impressed with the progression of site. It appears less red this morning. There is no longer erythema radiating down to the axilla. He reports no pain with the site. Currently NPO in preparation for surgery to remove port this morning. Denies N/V/D. Is urinating well and had a BM yesterday.     Medication Review:     ceFAZolin 2 g Intravenous Q8H   docusate sodium 100 mg Oral BID   enoxaparin 40 mg Subcutaneous Q24H   famotidine 40 mg Oral Daily   nebivolol 10 mg Oral Nightly   oxybutynin 5 mg Oral TID   sodium chloride 3 mL Intravenous Q12H        Objective     Vital Signs:  Temp:  [96.9 °F (36.1 °C)-98.1 °F (36.7 °C)] 97.5 °F (36.4 °C)  Heart Rate:  [73-85] 73  Resp:  [18-20] 18  BP: (116-128)/(66-79) 123/66  No intake or output data in the 24 hours ending 07/19/19 0540    Physical Exam   Constitutional: He is oriented to person, place, and time. He appears well-developed and well-nourished.   Cardiovascular: Normal rate, regular rhythm, normal heart sounds and intact distal pulses.   Pulmonary/Chest: Effort normal and breath sounds normal.   Abdominal: Soft. Bowel sounds are normal. He exhibits no distension. There is no tenderness.   Neurological: He is alert and oriented to person, place, and time.   Skin: Skin is warm and dry.   Vitals reviewed.      Results Review:    Results from last 7 days   Lab Units 07/18/19  0542 07/17/19  0531 07/16/19  0545   SODIUM mmol/L 138 139 140   POTASSIUM mmol/L 3.9 3.7 3.9   CHLORIDE mmol/L 100 102 103   CO2 mmol/L 28.0 27.0 26.0   BUN mg/dL 15 12 14   CREATININE mg/dL 0.74* 0.78 0.67*   GLUCOSE mg/dL 114* 120* 121*   CALCIUM mg/dL 8.8 8.3* 8.3*     Results from last 7 days   Lab Units 07/18/19  0542 07/17/19  0532 07/16/19  0545   WBC 10*3/mm3 2.87* 2.43* 2.95*   HEMOGLOBIN g/dL 13.0 12.3* 12.5*    HEMATOCRIT % 37.8 37.8 37.5   PLATELETS 10*3/mm3 125* 128* 130*       Assessment:    Prostate cancer (CMS/HCC)    Cellulitis of chest wall    Acute UTI (urinary tract infection)      Patients seems improved from last evening.     Plan:    The wound appears to be still very red.  I will therefore remove the port later on today.  Procedures been explained with the risks of bleeding and infection.          This document has been electronically signed by Savanna Garcia, Medical Student on July 19, 2019 5:40 AM

## 2019-07-19 NOTE — PAYOR COMM NOTE
"Leena Rangel RN [facility]  [phone] phone  [phone] fax  Continued Stay Review  Auth# [id number]      Juan Antonio Barbosa (64 y.o. Male)     Date of Birth Social Security Number Address Home Phone MRN    1955  [id number] [phone] [id number]    Taoist Marital Status          Anabaptism        Admission Date Admission Type Admitting Provider Attending Provider Department, Room/Bed    7/14/19 Emergency Gilson Lucio MD [place], David B, MD [address], 405/1    Discharge Date Discharge Disposition Discharge Destination                       Attending Provider:  Gilson Lucio MD    Allergies:  No Known Allergies    Isolation:  None   Infection:  None   Code Status:  CPR    Ht:  172.7 cm (68\")   Wt:  103 kg (226 lb)    Admission Cmt:  None   Principal Problem:  None                Active Insurance as of 7/14/2019     Primary Coverage     Payor Plan Insurance Group Employer/Plan Group    [employer] EMPLOYEE [id number]     Payor Plan Address Payor Plan Phone Number Payor Plan Fax Number Effective Dates    [address] [phone]  1/1/2015 - None Entered    [place] [address]       Subscriber Name Subscriber Birth Date Member ID       JACKIE BARBOSA 1/29/1960 [id number]                 Emergency Contacts      (Rel.) Home Phone Work Phone Mobile Phone    hilario Barbosa (Spouse) [phone] -- [phone]               Physician Progress Notes (last 24 hours) (Notes from 7/18/2019 11:19 AM through 7/19/2019 11:19 AM)      Benson Wasserman MD at 7/19/2019  5:40 AM          GENERAL SURGERY PROGRESS NOTE     LOS: 5 days     Chief Complaint:     Pain and erythema at Port - A - Cath site    Interval History:     Pt appears to be doing better. He is impressed with the progression of site. It appears less red this morning. There is no longer erythema radiating down to " the axilla. He reports no pain with the site. Currently NPO in preparation for surgery to remove port this morning. Denies N/V/D. Is urinating well and had a BM yesterday.     Medication Review:     ceFAZolin 2 g Intravenous Q8H   docusate sodium 100 mg Oral BID   enoxaparin 40 mg Subcutaneous Q24H   famotidine 40 mg Oral Daily   nebivolol 10 mg Oral Nightly   oxybutynin 5 mg Oral TID   sodium chloride 3 mL Intravenous Q12H        Objective     Vital Signs:  Temp:  [96.9 °F (36.1 °C)-98.1 °F (36.7 °C)] 97.5 °F (36.4 °C)  Heart Rate:  [73-85] 73  Resp:  [18-20] 18  BP: (116-128)/(66-79) 123/66  No intake or output data in the 24 hours ending 07/19/19 0540    Physical Exam   Constitutional: He is oriented to person, place, and time. He appears well-developed and well-nourished.   Cardiovascular: Normal rate, regular rhythm, normal heart sounds and intact distal pulses.   Pulmonary/Chest: Effort normal and breath sounds normal.   Abdominal: Soft. Bowel sounds are normal. He exhibits no distension. There is no tenderness.   Neurological: He is alert and oriented to person, place, and time.   Skin: Skin is warm and dry.   Vitals reviewed.      Results Review:    Results from last 7 days   Lab Units 07/18/19  0542 07/17/19  0531 07/16/19  0545   SODIUM mmol/L 138 139 140   POTASSIUM mmol/L 3.9 3.7 3.9   CHLORIDE mmol/L 100 102 103   CO2 mmol/L 28.0 27.0 26.0   BUN mg/dL 15 12 14   CREATININE mg/dL 0.74* 0.78 0.67*   GLUCOSE mg/dL 114* 120* 121*   CALCIUM mg/dL 8.8 8.3* 8.3*     Results from last 7 days   Lab Units 07/18/19  0542 07/17/19  0532 07/16/19  0545   WBC 10*3/mm3 2.87* 2.43* 2.95*   HEMOGLOBIN g/dL 13.0 12.3* 12.5*   HEMATOCRIT % 37.8 37.8 37.5   PLATELETS 10*3/mm3 125* 128* 130*       Assessment:    Prostate cancer (CMS/HCC)    Cellulitis of chest wall    Acute UTI (urinary tract infection)      Patients seems improved from last evening.     Plan:    The wound appears to be still very red.  I will therefore  remove the port later on today.  Procedures been explained with the risks of bleeding and infection.          This document has been electronically signed by Savanna Garcia, Medical Student on July 19, 2019 5:40 AM        Electronically signed by Benson Wasserman MD at 7/19/2019  8:18 AM     Benson Wasserman MD at 7/18/2019  6:25 PM        He now has redness that is progressing down toward the axilla.  Afraid that this is not going to be salvageable and I will plan to remove the port tomorrow.  Procedure was explained with the risks of bleeding infection.    Electronically signed by Benson Wasserman MD at 7/18/2019  6:26 PM     Coco Elizabeth APRN at 7/18/2019  1:56 PM              [facility] Medicine Services  INPATIENT PROGRESS NOTE    Length of Stay: 4  Date of Admission: 7/14/2019  Primary Care Physician: Sukumar Aleman MD    Subjective   Chief Complaint: No complaints    HPI: This is a 64-year-old  male with past medical history of rectal and anal cancer (currently undergoing chemotherapy and radiation therapy) that reported to be HCM on 7/14/2019 with complaints of fever, chills, nausea and discomfort in the anterior right side of the chest above his Port-A-Cath.  Patient states that he received his port approximately 2 weeks ago.  Erythema was noted around the Port-A-Cath extending towards his neck.  The patient is being followed by Dr. Wasserman.  Patient does not have another active chemotherapy regimen due for 3 weeks.  The patient underwent radiation on 7/17/2019.  Antibiotics were de-escalated given that the staph species is not MRSA and most likely MSSA.  Patient was also noted to have a E. coli urinary tract infection.  Echocardiogram was ordered to rule out vegetation.  Possible mass noted tricuspid valve.  Dr. Santiago has been consulted for possible DAVIDA.    Review of Systems   Constitutional: Negative for activity change and fatigue.   HENT: Negative for ear pain and  sore throat.    Eyes: Negative for pain and discharge.   Respiratory: Negative for cough and shortness of breath.    Cardiovascular: Negative for chest pain and palpitations.   Gastrointestinal: Negative for abdominal pain and nausea.   Endocrine: Negative for cold intolerance and heat intolerance.   Genitourinary: Negative for difficulty urinating and dysuria.   Musculoskeletal: Negative for arthralgias and gait problem.   Skin: Negative for color change and rash.        Erythema noted to right side of chest over port site.   Neurological: Negative for dizziness and weakness.   Psychiatric/Behavioral: Negative for agitation and confusion.        Objective    Temp:  [97.1 °F (36.2 °C)-98.2 °F (36.8 °C)] 97.7 °F (36.5 °C)  Heart Rate:  [59-85] 77  Resp:  [18-20] 20  BP: (110-130)/(68-75) 118/69    Physical Exam   Constitutional: He is oriented to person, place, and time. He appears well-developed and well-nourished.   HENT:   Head: Normocephalic and atraumatic.   Eyes: EOM are normal. Pupils are equal, round, and reactive to light.   Neck: Normal range of motion. Neck supple.   Cardiovascular: Normal rate and regular rhythm.   Pulmonary/Chest: Effort normal and breath sounds normal.   Abdominal: Soft. Bowel sounds are normal.   Musculoskeletal: Normal range of motion.   Neurological: He is alert and oriented to person, place, and time.   Skin: Skin is warm and dry.   Erythema noted over right chest wall above port site.    Psychiatric: He has a normal mood and affect. His behavior is normal.     Results Review:  I have reviewed the labs, radiology results, and diagnostic studies.    Laboratory Data:   Results from last 7 days   Lab Units 07/18/19  0542 07/17/19  0531 07/16/19  0545   SODIUM mmol/L 138 139 140   POTASSIUM mmol/L 3.9 3.7 3.9   CHLORIDE mmol/L 100 102 103   CO2 mmol/L 28.0 27.0 26.0   BUN mg/dL 15 12 14   CREATININE mg/dL 0.74* 0.78 0.67*   GLUCOSE mg/dL 114* 120* 121*   CALCIUM mg/dL 8.8 8.3* 8.3*    BILIRUBIN mg/dL 0.6 0.5 0.3   ALK PHOS U/L 109 96 79   ALT (SGPT) U/L 186* 196* 172*   AST (SGOT) U/L 85* 112* 117*   ANION GAP mmol/L 10.0 10.0 11.0     Estimated Creatinine Clearance: 118.2 mL/min (A) (by C-G formula based on SCr of 0.74 mg/dL (L)).          Results from last 7 days   Lab Units 07/18/19  0542 07/17/19  0532 07/16/19  0545 07/15/19  0548 07/14/19  1911   WBC 10*3/mm3 2.87* 2.43* 2.95* 2.99* 5.35   HEMOGLOBIN g/dL 13.0 12.3* 12.5* 12.8* 13.9   HEMATOCRIT % 37.8 37.8 37.5 38.3 41.9   PLATELETS 10*3/mm3 125* 128* 130* 152 175           Culture Data:   No results found for: BLOODCX  No results found for: URINECX  No results found for: RESPCX  No results found for: WOUNDCX  No results found for: STOOLCX  No components found for: BODYFLD    Radiology Data:   Imaging Results (last 24 hours)     ** No results found for the last 24 hours. **          I have reviewed the patient's current medications.     Assessment/Plan     Active Hospital Problems    Diagnosis POA   • Acute UTI (urinary tract infection) [N39.0] Unknown   • Cellulitis of chest wall [L03.313] Yes   • Prostate cancer (CMS/HCC) [C61] Yes       Plan:    1.  Cellulitis, right chest wall:  Per pharmacy recommendation, Ancef to cover cellulitis and uti.  Discontinue Zosyn.  General surgery following.  Repeat blood cultures ordered per surgery.    2.  Urinary tract infection, E. coli: Ancef.  3.  Prostate/anal cancer:  Next chemotherapy scheduled in 3 weeks.  Patient is continuing radiation.  Last radiation 7/17/2019.    4.  Leukopenia secondary to recent chemotherapy:  Will continue to monitor the need for neutropenic precautions.        Discharge Planning: I expect patient to be discharged to home in 1-2 days.      This document has been electronically signed by SUHAIL Maldonado on July 18, 2019 1:57 PM          Electronically signed by Coco Elizabeth APRN at 7/18/2019  2:55 PM       Medical Student Notes (last 24 hours) (Notes from  7/18/2019 11:19 AM through 7/19/2019 11:19 AM)     No notes of this type exist for this encounter.

## 2019-07-19 NOTE — PROGRESS NOTES
Adult Nutrition  Assessment    Patient Name:  Juan Antonio Shaw  YOB: 1955  MRN: [id number]  Admit Date:  7/14/2019    Assessment Date:  7/19/2019    Comments:  Pt seen for LOS. Admit w/ UTI and cellulitis Rt chest wall at port site. MD notes leukopenia d/t radiation for rectal/anal cancer, last treatment 7/17 and/or today. Pt is npo for scheduled port removal. Has been receiving cardiac diet with intakes mostly >75%. Current BMI 34. RD to follow hospital course.    Reason for Assessment     Row Name 07/19/19 1448          Reason for Assessment    Reason For Assessment  per organizational policy     Diagnosis  cancer diagnosis/related complications             Labs/Tests/Procedures/Meds     Row Name 07/19/19 5246          Labs/Procedures/Meds    Lab Results Reviewed  reviewed     Lab Results Comments  Glu 107-121, aLB 3,7, PLATELETS LOW, alt ELEVATED        Diagnostic Tests/Procedures    Diagnostic Test/Procedure Reviewed  reviewed     Diagnostic Test/Procedures Comments  ECHO, DAVIDA        Medications    Pertinent Medications Reviewed  reviewed           Estimated/Assessed Needs     Row Name 07/19/19 4959          Calculation Measurements    Weight Used For Calculations  103 kg (226 lb)        Estimated/Assessed Needs    Additional Documentation  Calorie Requirements (Group);KCAL/KG (Group);Protein Requirements (Group);Fluid Requirements (Group)        Calorie Requirements    Estimated Calorie Requirement (kcal/day)  2000        KCAL/KG    KCAL/KG  20 Kcal/Kg (kcal)     20 Kcal/Kg (kcal)  2050.26        Protein Requirements    Est Protein Requirement Amount (gms/kg)  0.8 gm protein     Estimated Protein Requirements (gms/day)  82.01        Fluid Requirements    Estimated Fluid Requirements (mL/day)  2000     RDA Method (mL)  2000     Mackenzie-Peewee Method (over 20 kg)  3550.26         Nutrition Prescription Ordered     Row Name 07/19/19 7760          Nutrition Prescription PO    Current PO Diet  NPO          Evaluation of Received Nutrient/Fluid Intake     Row Name 07/19/19 1335          Calculation Measurements    Weight Used For Calculations  103 kg (226 lb)         Evaluation of Prescribed Nutrient/Fluid Intake     Row Name 07/19/19 1335          Calculation Measurements    Weight Used For Calculations  103 kg (226 lb)             Electronically signed by:  Pricila Nicolas RD  07/19/19 1:39 PM

## 2019-07-19 NOTE — PROGRESS NOTES
[facility] Medicine Services  INPATIENT PROGRESS NOTE    Length of Stay: 5  Date of Admission: 7/14/2019  Primary Care Physician: Sukumar Aleman MD    Subjective   Chief Complaint: No complaints    HPI:       7/19/2019: Patient underwent radiation this morning and is scheduled for port removal today by Dr. Wasserman.  Dr. Santiago evaluated the patient for suggestive tricuspid valve echodensity and the patient was recommended for DAVIDA.    This is a 64-year-old  male with past medical history of rectal and anal cancer (currently undergoing chemotherapy and radiation therapy) that reported to be HCM on 7/14/2019 with complaints of fever, chills, nausea and discomfort in the anterior right side of the chest above his Port-A-Cath.  Patient states that he received his port approximately 2 weeks ago.  Erythema was noted around the Port-A-Cath extending towards his neck.  The patient is being followed by Dr. Wasserman.  Patient does not have another active chemotherapy regimen due for 3 weeks.  The patient underwent radiation on 7/17/2019.  Antibiotics were de-escalated given that the staph species is not MRSA and most likely MSSA.  Patient was also noted to have a E. coli urinary tract infection.  Echocardiogram was ordered to rule out vegetation.  Possible mass noted tricuspid valve.  Dr. Santiago has been consulted for possible DAVIDA.    Review of Systems   Constitutional: Negative for activity change and fatigue.   HENT: Negative for ear pain and sore throat.    Eyes: Negative for pain and discharge.   Respiratory: Negative for cough and shortness of breath.    Cardiovascular: Negative for chest pain and palpitations.   Gastrointestinal: Negative for abdominal pain and nausea.   Endocrine: Negative for cold intolerance and heat intolerance.   Genitourinary: Negative for difficulty urinating and dysuria.   Musculoskeletal: Negative for arthralgias and gait problem.   Skin: Negative  for color change and rash.        Erythema noted to right side of chest over port site.   Neurological: Negative for dizziness and weakness.   Psychiatric/Behavioral: Negative for agitation and confusion.        Objective    Temp:  [96.9 °F (36.1 °C)-98.3 °F (36.8 °C)] 98.3 °F (36.8 °C)  Heart Rate:  [63-82] 68  Resp:  [16-20] 18  BP: (115-128)/(66-79) 115/70    Physical Exam   Constitutional: He is oriented to person, place, and time. He appears well-developed and well-nourished.   HENT:   Head: Normocephalic and atraumatic.   Eyes: EOM are normal. Pupils are equal, round, and reactive to light.   Neck: Normal range of motion. Neck supple.   Cardiovascular: Normal rate and regular rhythm.   Pulmonary/Chest: Effort normal and breath sounds normal.   Abdominal: Soft. Bowel sounds are normal.   Musculoskeletal: Normal range of motion.   Neurological: He is alert and oriented to person, place, and time.   Skin: Skin is warm and dry.   Erythema noted over right chest wall above port site.    Psychiatric: He has a normal mood and affect. His behavior is normal.     Results Review:  I have reviewed the labs, radiology results, and diagnostic studies.    Laboratory Data:   Results from last 7 days   Lab Units 07/19/19  0555 07/18/19  0542 07/17/19  0531   SODIUM mmol/L 139 138 139   POTASSIUM mmol/L 4.0 3.9 3.7   CHLORIDE mmol/L 101 100 102   CO2 mmol/L 28.0 28.0 27.0   BUN mg/dL 15 15 12   CREATININE mg/dL 0.65* 0.74* 0.78   GLUCOSE mg/dL 107* 114* 120*   CALCIUM mg/dL 8.9 8.8 8.3*   BILIRUBIN mg/dL 0.5 0.6 0.5   ALK PHOS U/L 110 109 96   ALT (SGPT) U/L 165* 186* 196*   AST (SGOT) U/L 71* 85* 112*   ANION GAP mmol/L 10.0 10.0 10.0     Estimated Creatinine Clearance: 135.2 mL/min (A) (by C-G formula based on SCr of 0.65 mg/dL (L)).          Results from last 7 days   Lab Units 07/19/19  0555 07/18/19  0542 07/17/19  0532 07/16/19  0545 07/15/19  0548   WBC 10*3/mm3 2.79* 2.87* 2.43* 2.95* 2.99*   HEMOGLOBIN g/dL 13.4 13.0  12.3* 12.5* 12.8*   HEMATOCRIT % 38.8 37.8 37.8 37.5 38.3   PLATELETS 10*3/mm3 116* 125* 128* 130* 152           Culture Data:   No results found for: BLOODCX  No results found for: URINECX  No results found for: RESPCX  No results found for: WOUNDCX  No results found for: STOOLCX  No components found for: BODYFLD    Radiology Data:   Imaging Results (last 24 hours)     ** No results found for the last 24 hours. **          I have reviewed the patient's current medications.     Assessment/Plan     Active Hospital Problems    Diagnosis POA   • Acute UTI (urinary tract infection) [N39.0] Unknown   • Cellulitis of chest wall [L03.313] Yes   • Prostate cancer (CMS/HCC) [C61] Yes       Plan:    1.  Cellulitis, right chest wall:  Per pharmacy recommendation, Ancef to cover cellulitis and uti.  Discontinue Zosyn.  General surgery following.  Repeat blood cultures ordered per surgery.    2.  Urinary tract infection, E. coli: Ancef.  3.  Prostate/anal cancer:  Next chemotherapy scheduled in 3 weeks.  Patient is continuing radiation.  Last radiation 7/19/2019.    4.  Leukopenia secondary to recent chemotherapy:  Will continue to monitor the need for neutropenic precautions.        Discharge Planning: I expect patient to be discharged to home in 1-2 days.      This document has been electronically signed by SUHAIL Maldonado on July 19, 2019 12:05 PM

## 2019-07-19 NOTE — PROGRESS NOTES
Patient transthoracic echocardiogram was suggestive of tricuspid valve echodensity.  Patient was recommended a transesophageal echocardiogram.  Risk-benefit treatment option for the transesophageal echocardiogram were discussed with the patient and informed consent was obtained.  Patient will will be kept n.p.o. and would proceed with a transesophageal echocardiogram.    Patient ASA classification was #2 patient Mallampati score #2.

## 2019-07-20 PROBLEM — N39.0 ACUTE UTI (URINARY TRACT INFECTION): Status: RESOLVED | Noted: 2019-07-18 | Resolved: 2019-07-20

## 2019-07-20 LAB
ALBUMIN SERPL-MCNC: 3.5 G/DL (ref 3.5–5.2)
ALBUMIN/GLOB SERPL: 1.1 G/DL
ALP SERPL-CCNC: 103 U/L (ref 39–117)
ALT SERPL W P-5'-P-CCNC: 116 U/L (ref 1–41)
ANION GAP SERPL CALCULATED.3IONS-SCNC: 11 MMOL/L (ref 5–15)
AST SERPL-CCNC: 52 U/L (ref 1–40)
BACTERIA SPEC AEROBE CULT: NORMAL
BASOPHILS # BLD AUTO: 0.01 10*3/MM3 (ref 0–0.2)
BASOPHILS NFR BLD AUTO: 0.4 % (ref 0–1.5)
BILIRUB SERPL-MCNC: 0.3 MG/DL (ref 0.2–1.2)
BUN BLD-MCNC: 17 MG/DL (ref 8–23)
BUN/CREAT SERPL: 22.7 (ref 7–25)
CALCIUM SPEC-SCNC: 8.7 MG/DL (ref 8.6–10.5)
CHLORIDE SERPL-SCNC: 101 MMOL/L (ref 98–107)
CO2 SERPL-SCNC: 27 MMOL/L (ref 22–29)
CREAT BLD-MCNC: 0.75 MG/DL (ref 0.76–1.27)
DEPRECATED RDW RBC AUTO: 39.8 FL (ref 37–54)
EOSINOPHIL # BLD AUTO: 0.07 10*3/MM3 (ref 0–0.4)
EOSINOPHIL NFR BLD AUTO: 3.1 % (ref 0.3–6.2)
ERYTHROCYTE [DISTWIDTH] IN BLOOD BY AUTOMATED COUNT: 12.3 % (ref 12.3–15.4)
GFR SERPL CREATININE-BSD FRML MDRD: 105 ML/MIN/1.73
GLOBULIN UR ELPH-MCNC: 3.3 GM/DL
GLUCOSE BLD-MCNC: 101 MG/DL (ref 65–99)
HCT VFR BLD AUTO: 38 % (ref 37.5–51)
HGB BLD-MCNC: 13.2 G/DL (ref 13–17.7)
IMM GRANULOCYTES # BLD AUTO: 0.02 10*3/MM3 (ref 0–0.05)
IMM GRANULOCYTES NFR BLD AUTO: 0.9 % (ref 0–0.5)
LYMPHOCYTES # BLD AUTO: 0.59 10*3/MM3 (ref 0.7–3.1)
LYMPHOCYTES NFR BLD AUTO: 26.2 % (ref 19.6–45.3)
MCH RBC QN AUTO: 30.9 PG (ref 26.6–33)
MCHC RBC AUTO-ENTMCNC: 34.7 G/DL (ref 31.5–35.7)
MCV RBC AUTO: 89 FL (ref 79–97)
MONOCYTES # BLD AUTO: 0.35 10*3/MM3 (ref 0.1–0.9)
MONOCYTES NFR BLD AUTO: 15.6 % (ref 5–12)
NEUTROPHILS # BLD AUTO: 1.21 10*3/MM3 (ref 1.7–7)
NEUTROPHILS NFR BLD AUTO: 53.8 % (ref 42.7–76)
NRBC BLD AUTO-RTO: 0 /100 WBC (ref 0–0.2)
PLATELET # BLD AUTO: 109 10*3/MM3 (ref 140–450)
PMV BLD AUTO: 8.9 FL (ref 6–12)
POTASSIUM BLD-SCNC: 4.2 MMOL/L (ref 3.5–5.2)
PROT SERPL-MCNC: 6.8 G/DL (ref 6–8.5)
RBC # BLD AUTO: 4.27 10*6/MM3 (ref 4.14–5.8)
SODIUM BLD-SCNC: 139 MMOL/L (ref 136–145)
WBC NRBC COR # BLD: 2.25 10*3/MM3 (ref 3.4–10.8)

## 2019-07-20 PROCEDURE — 25010000002 CEFAZOLIN PER 500 MG: Performed by: NURSE PRACTITIONER

## 2019-07-20 PROCEDURE — 0JPT0WZ REMOVAL OF TOTALLY IMPLANTABLE VASCULAR ACCESS DEVICE FROM TRUNK SUBCUTANEOUS TISSUE AND FASCIA, OPEN APPROACH: ICD-10-PCS | Performed by: SURGERY

## 2019-07-20 PROCEDURE — 88300 SURGICAL PATH GROSS: CPT | Performed by: PATHOLOGY

## 2019-07-20 PROCEDURE — 25010000002 PROPOFOL 10 MG/ML EMULSION: Performed by: NURSE ANESTHETIST, CERTIFIED REGISTERED

## 2019-07-20 PROCEDURE — 87147 CULTURE TYPE IMMUNOLOGIC: CPT | Performed by: SURGERY

## 2019-07-20 PROCEDURE — 87147 CULTURE TYPE IMMUNOLOGIC: CPT | Performed by: INTERNAL MEDICINE

## 2019-07-20 PROCEDURE — 88300 SURGICAL PATH GROSS: CPT | Performed by: SURGERY

## 2019-07-20 PROCEDURE — 87205 SMEAR GRAM STAIN: CPT | Performed by: SURGERY

## 2019-07-20 PROCEDURE — 85025 COMPLETE CBC W/AUTO DIFF WBC: CPT | Performed by: NURSE PRACTITIONER

## 2019-07-20 PROCEDURE — 87176 TISSUE HOMOGENIZATION CULTR: CPT | Performed by: SURGERY

## 2019-07-20 PROCEDURE — 36590 REMOVAL TUNNELED CV CATH: CPT | Performed by: SURGERY

## 2019-07-20 PROCEDURE — 80053 COMPREHEN METABOLIC PANEL: CPT | Performed by: NURSE PRACTITIONER

## 2019-07-20 PROCEDURE — 87186 SC STD MICRODIL/AGAR DIL: CPT | Performed by: SURGERY

## 2019-07-20 PROCEDURE — 99024 POSTOP FOLLOW-UP VISIT: CPT | Performed by: SURGERY

## 2019-07-20 PROCEDURE — 87070 CULTURE OTHR SPECIMN AEROBIC: CPT | Performed by: INTERNAL MEDICINE

## 2019-07-20 PROCEDURE — 25010000002 MIDAZOLAM PER 1 MG: Performed by: NURSE ANESTHETIST, CERTIFIED REGISTERED

## 2019-07-20 PROCEDURE — 87070 CULTURE OTHR SPECIMN AEROBIC: CPT | Performed by: SURGERY

## 2019-07-20 RX ORDER — SODIUM CHLORIDE, SODIUM GLUCONATE, SODIUM ACETATE, POTASSIUM CHLORIDE, AND MAGNESIUM CHLORIDE 526; 502; 368; 37; 30 MG/100ML; MG/100ML; MG/100ML; MG/100ML; MG/100ML
INJECTION, SOLUTION INTRAVENOUS CONTINUOUS PRN
Status: DISCONTINUED | OUTPATIENT
Start: 2019-07-20 | End: 2019-07-20 | Stop reason: SURG

## 2019-07-20 RX ORDER — MIDAZOLAM HYDROCHLORIDE 1 MG/ML
INJECTION INTRAMUSCULAR; INTRAVENOUS AS NEEDED
Status: DISCONTINUED | OUTPATIENT
Start: 2019-07-20 | End: 2019-07-20 | Stop reason: SURG

## 2019-07-20 RX ORDER — LIDOCAINE HYDROCHLORIDE 20 MG/ML
INJECTION, SOLUTION INFILTRATION; PERINEURAL AS NEEDED
Status: DISCONTINUED | OUTPATIENT
Start: 2019-07-20 | End: 2019-07-20 | Stop reason: SURG

## 2019-07-20 RX ORDER — PROPOFOL 10 MG/ML
VIAL (ML) INTRAVENOUS AS NEEDED
Status: DISCONTINUED | OUTPATIENT
Start: 2019-07-20 | End: 2019-07-20 | Stop reason: SURG

## 2019-07-20 RX ADMIN — OXYBUTYNIN CHLORIDE 5 MG: 5 TABLET ORAL at 08:53

## 2019-07-20 RX ADMIN — SODIUM CHLORIDE, PRESERVATIVE FREE 3 ML: 5 INJECTION INTRAVENOUS at 21:07

## 2019-07-20 RX ADMIN — PROPOFOL 30 MG: 10 INJECTION, EMULSION INTRAVENOUS at 07:20

## 2019-07-20 RX ADMIN — OXYBUTYNIN CHLORIDE 5 MG: 5 TABLET ORAL at 15:18

## 2019-07-20 RX ADMIN — PROPOFOL 50 MG: 10 INJECTION, EMULSION INTRAVENOUS at 07:13

## 2019-07-20 RX ADMIN — OXYBUTYNIN CHLORIDE 5 MG: 5 TABLET ORAL at 20:25

## 2019-07-20 RX ADMIN — SODIUM CHLORIDE, SODIUM GLUCONATE, SODIUM ACETATE, POTASSIUM CHLORIDE, AND MAGNESIUM CHLORIDE: 526; 502; 368; 37; 30 INJECTION, SOLUTION INTRAVENOUS at 07:09

## 2019-07-20 RX ADMIN — LIDOCAINE HYDROCHLORIDE 50 MG: 20 INJECTION, SOLUTION INFILTRATION; PERINEURAL at 07:13

## 2019-07-20 RX ADMIN — MIDAZOLAM HYDROCHLORIDE 1 MG: 2 INJECTION, SOLUTION INTRAMUSCULAR; INTRAVENOUS at 07:13

## 2019-07-20 RX ADMIN — MIDAZOLAM HYDROCHLORIDE 1 MG: 2 INJECTION, SOLUTION INTRAMUSCULAR; INTRAVENOUS at 07:09

## 2019-07-20 RX ADMIN — FAMOTIDINE 40 MG: 40 TABLET ORAL at 08:53

## 2019-07-20 RX ADMIN — CEFAZOLIN SODIUM 2 G: 10 INJECTION, POWDER, FOR SOLUTION INTRAVENOUS at 02:29

## 2019-07-20 RX ADMIN — PROPOFOL 30 MG: 10 INJECTION, EMULSION INTRAVENOUS at 07:24

## 2019-07-20 RX ADMIN — CEFAZOLIN SODIUM 2 G: 10 INJECTION, POWDER, FOR SOLUTION INTRAVENOUS at 11:13

## 2019-07-20 RX ADMIN — PROPOFOL 30 MG: 10 INJECTION, EMULSION INTRAVENOUS at 07:28

## 2019-07-20 RX ADMIN — CEFAZOLIN SODIUM 2 G: 10 INJECTION, POWDER, FOR SOLUTION INTRAVENOUS at 16:53

## 2019-07-20 RX ADMIN — SODIUM CHLORIDE, PRESERVATIVE FREE 3 ML: 5 INJECTION INTRAVENOUS at 08:53

## 2019-07-20 RX ADMIN — NEBIVOLOL HYDROCHLORIDE 10 MG: 5 TABLET ORAL at 20:25

## 2019-07-20 NOTE — ANESTHESIA POSTPROCEDURE EVALUATION
Patient: Juan Antonio Shaw    Procedure Summary     Date:  07/20/19 Room / Location:  [facility] OR  / [facility] OR    Anesthesia Start:  0710 Anesthesia Stop:  0738    Procedure:  REMOVAL VENOUS ACCESS DEVICE (Right ) Diagnosis:       Cellulitis of chest wall      (Cellulitis of chest wall [L03.313])    Surgeon:  Benson Wasserman MD Provider:  Geraldo Grimm MD    Anesthesia Type:  MAC ASA Status:  3          Anesthesia Type: MAC  Last vitals  BP   125/58 (07/20/19 0503)   Temp   97.7 °F (36.5 °C) (07/20/19 0503)   Pulse   66 (07/20/19 0503)   Resp   18 (07/20/19 0503)     SpO2   91 % (07/20/19 0503)     Post Anesthesia Care and Evaluation    Patient location during evaluation: PACU  Patient participation: complete - patient participated  Level of consciousness: awake and awake and alert  Pain score: 0  Pain management: satisfactory to patient  Airway patency: patent  Anesthetic complications: No anesthetic complications    Cardiovascular status: acceptable and stable  Respiratory status: acceptable, room air, unassisted and spontaneous ventilation  Hydration status: acceptable  Post Neuraxial Block status: Motor and sensory function returned to baseline

## 2019-07-20 NOTE — ANESTHESIA PREPROCEDURE EVALUATION
Anesthesia Evaluation     Patient summary reviewed and Nursing notes reviewed   no history of anesthetic complications:  NPO Solid Status: > 8 hours  NPO Liquid Status: > 8 hours           Airway   Mallampati: II  TM distance: >3 FB  Neck ROM: full  possible difficult intubation  Dental    (+) poor dentation    Pulmonary - negative pulmonary ROS and normal exam    breath sounds clear to auscultation  (-) not a smoker    ROS comment: FINDINGS: Cardiac silhouette is normal in size. Pulmonary  vascularity is unremarkable.      No focal infiltrate or consolidation.  No pleural effusion.  No  pneumothorax. There is benign calcified granulomata in the  mediastinum and both hilar regions.  Chronic elevation left diaphragm. This suggests diaphragmatic  eventration or diaphragmatic paralysis.           IMPRESSION:  CONCLUSION: Chronic elevation left diaphragm either diaphragmatic  eventration or diaphragmatic paralysis. Benign calcified  mediastinal and hilar lymph nodes. Lungs otherwise clear.     Electronically signed by:  Pelon Carr MD  6/17/2019 3:23 PM CDT  Cardiovascular - normal exam    ECG reviewed  Rhythm: regular  Rate: normal    (+) hypertension,   (-) murmur    ROS comment: Normal sinus rhythm  Normal ECG  No previous ECGs available    Referred By:             Confirmed By:     Specimen Collected: 06/26/19 08:10          Neuro/Psych- negative ROS  GI/Hepatic/Renal/Endo    (+) obesity,       Musculoskeletal (-) negative ROS    Abdominal   (+) obese,    Substance History - negative use     OB/GYN negative ob/gyn ROS         Other      history of cancer (Prostate/rectal.)                      Anesthesia Plan    ASA 3     MAC     intravenous induction   Anesthetic plan, all risks, benefits, and alternatives have been provided, discussed and informed consent has been obtained with: patient and spouse/significant other.

## 2019-07-20 NOTE — PROGRESS NOTES
[facility] Medicine Services  INPATIENT PROGRESS NOTE    Length of Stay: 6  Date of Admission: 7/14/2019  Primary Care Physician: Sukumar Aleman MD    Subjective   Chief Complaint: No complaints    HPI:     7/20/2019: Patient underwent DAVIDA yesterday with no signs of vegetation.  Patient underwent port removal this a.m. by Dr. Wasserman with no complications.    7/19/2019: Patient underwent radiation this morning and is scheduled for port removal today by Dr. Wasserman.  Dr. Santiago evaluated the patient for suggestive tricuspid valve echodensity and the patient was recommended for DAVIDA.    This is a 64-year-old  male with past medical history of rectal and anal cancer (currently undergoing chemotherapy and radiation therapy) that reported to be HCM on 7/14/2019 with complaints of fever, chills, nausea and discomfort in the anterior right side of the chest above his Port-A-Cath.  Patient states that he received his port approximately 2 weeks ago.  Erythema was noted around the Port-A-Cath extending towards his neck.  The patient is being followed by Dr. Wasserman.  Patient does not have another active chemotherapy regimen due for 3 weeks.  The patient underwent radiation on 7/17/2019.  Antibiotics were de-escalated given that the staph species is not MRSA and most likely MSSA.  Patient was also noted to have a E. coli urinary tract infection.  Echocardiogram was ordered to rule out vegetation.  Possible mass noted tricuspid valve.  Dr. Santiago has been consulted for possible DAVIDA.    Review of Systems   Constitutional: Negative for activity change and fatigue.   HENT: Negative for ear pain and sore throat.    Eyes: Negative for pain and discharge.   Respiratory: Negative for cough and shortness of breath.    Cardiovascular: Negative for chest pain and palpitations.   Gastrointestinal: Negative for abdominal pain and nausea.   Endocrine: Negative for cold intolerance and heat  intolerance.   Genitourinary: Negative for difficulty urinating and dysuria.   Musculoskeletal: Negative for arthralgias and gait problem.   Skin: Negative for color change and rash.        Erythema noted to right side of chest over port site.   Neurological: Negative for dizziness and weakness.   Psychiatric/Behavioral: Negative for agitation and confusion.        Objective    Temp:  [96.5 °F (35.8 °C)-98.3 °F (36.8 °C)] 96.5 °F (35.8 °C)  Heart Rate:  [65-93] 65  Resp:  [16-20] 16  BP: (108-134)/(58-85) 111/81    Physical Exam   Constitutional: He is oriented to person, place, and time. He appears well-developed and well-nourished.   HENT:   Head: Normocephalic and atraumatic.   Eyes: EOM are normal. Pupils are equal, round, and reactive to light.   Neck: Normal range of motion. Neck supple.   Cardiovascular: Normal rate and regular rhythm.   Pulmonary/Chest: Effort normal and breath sounds normal.   Abdominal: Soft. Bowel sounds are normal.   Musculoskeletal: Normal range of motion.   Neurological: He is alert and oriented to person, place, and time.   Skin: Skin is warm and dry.   Erythema noted over right chest wall above port site.    Psychiatric: He has a normal mood and affect. His behavior is normal.     Results Review:  I have reviewed the labs, radiology results, and diagnostic studies.    Laboratory Data:   Results from last 7 days   Lab Units 07/20/19  0533 07/19/19  0555 07/18/19  0542   SODIUM mmol/L 139 139 138   POTASSIUM mmol/L 4.2 4.0 3.9   CHLORIDE mmol/L 101 101 100   CO2 mmol/L 27.0 28.0 28.0   BUN mg/dL 17 15 15   CREATININE mg/dL 0.75* 0.65* 0.74*   GLUCOSE mg/dL 101* 107* 114*   CALCIUM mg/dL 8.7 8.9 8.8   BILIRUBIN mg/dL 0.3 0.5 0.6   ALK PHOS U/L 103 110 109   ALT (SGPT) U/L 116* 165* 186*   AST (SGOT) U/L 52* 71* 85*   ANION GAP mmol/L 11.0 10.0 10.0     Estimated Creatinine Clearance: 117.2 mL/min (A) (by C-G formula based on SCr of 0.75 mg/dL (L)).          Results from last 7 days    Lab Units 07/20/19  0533 07/19/19  0555 07/18/19  0542 07/17/19  0532 07/16/19  0545   WBC 10*3/mm3 2.25* 2.79* 2.87* 2.43* 2.95*   HEMOGLOBIN g/dL 13.2 13.4 13.0 12.3* 12.5*   HEMATOCRIT % 38.0 38.8 37.8 37.8 37.5   PLATELETS 10*3/mm3 109* 116* 125* 128* 130*           Culture Data:   No results found for: BLOODCX  No results found for: URINECX  No results found for: RESPCX  No results found for: WOUNDCX  No results found for: STOOLCX  No components found for: BODYFLD    Radiology Data:   Imaging Results (last 24 hours)     ** No results found for the last 24 hours. **          I have reviewed the patient's current medications.     Assessment/Plan     Active Hospital Problems    Diagnosis POA   • Cellulitis of chest wall [L03.313] Yes   • Prostate cancer (CMS/HCC) [C61] Yes       Plan:    1.  Cellulitis, right chest wall:  Per pharmacy recommendation, Ancef to cover cellulitis and uti.  Discontinue Zosyn.  General surgery following.  Repeat blood cultures ordered per surgery.    2.  Urinary tract infection, E. coli: Ancef.  3.  Prostate/anal cancer:  Next chemotherapy scheduled in 3 weeks.  Patient is continuing radiation.    4.  Leukopenia secondary to recent chemotherapy:  Will continue to monitor the need for neutropenic precautions.        Discharge Planning: I expect patient to be discharged to home in 1-2 days.      This document has been electronically signed by SUHAIL Maldonado on July 20, 2019 10:45 AM

## 2019-07-20 NOTE — PROGRESS NOTES
Patient transesophageal echocardiogram was negative.  Patient had not had any febrile episode.  Patient blood pressure is stable.  Patient had not complained of symptoms of chest pain.    Patient is stable from the cardiac standpoint.    Would sign off at the present time.

## 2019-07-20 NOTE — PLAN OF CARE
Problem: Patient Care Overview  Goal: Plan of Care Review  Outcome: Ongoing (interventions implemented as appropriate)   07/20/19 5736   Coping/Psychosocial   Plan of Care Reviewed With patient   Plan of Care Review   Progress no change   OTHER   Outcome Summary Pt has had no complaints this shift, v/s stable, will continue to monitor      Goal: Individualization and Mutuality  Outcome: Ongoing (interventions implemented as appropriate)    Goal: Discharge Needs Assessment  Outcome: Ongoing (interventions implemented as appropriate)    Goal: Interprofessional Rounds/Family Conf  Outcome: Ongoing (interventions implemented as appropriate)      Problem: Infection, Risk/Actual (Adult)  Goal: Infection Prevention/Resolution  Outcome: Ongoing (interventions implemented as appropriate)      Problem: Oncology Care (Adult)  Goal: Signs and Symptoms of Listed Potential Problems Will be Absent, Minimized or Managed (Oncology Care)  Outcome: Ongoing (interventions implemented as appropriate)      Problem: Chemotherapy Effects (Adult)  Goal: Signs and Symptoms of Listed Potential Problems Will be Absent, Minimized or Managed (Chemotherapy Effects)  Outcome: Ongoing (interventions implemented as appropriate)      Problem: Skin and Soft Tissue Infection (Adult)  Goal: Signs and Symptoms of Listed Potential Problems Will be Absent, Minimized or Managed (Skin and Soft Tissue Infection)  Outcome: Ongoing (interventions implemented as appropriate)

## 2019-07-20 NOTE — PROGRESS NOTES
GENERAL SURGERY PROGRESS NOTE     LOS: 6 days     Chief Complaint:     Fever and chills    Interval History:     Mr. 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 At that time it was noted he had erythema overlying the Port-A-Cath to his right chest wall. Since admission the erythema around his Port-A-Cath has not resolved despite antibiotic therapy; he is scheduled for port removal this morning. He reports he last ate and drank at 1800 yesterday. He reports normal urination and bowel movements. He denies fever, chills, nausea, vomiting, diarrhea, constipation, abdominal pain, chest pain, and SOA.    Medication Review:     ceFAZolin 2 g Intravenous Q8H   docusate sodium 100 mg Oral BID   enoxaparin 40 mg Subcutaneous Q24H   famotidine 40 mg Oral Daily   nebivolol 10 mg Oral Nightly   oxybutynin 5 mg Oral TID   sodium chloride 3 mL Intravenous Q12H        Objective     Vital Signs:  Temp:  [97.2 °F (36.2 °C)-98.3 °F (36.8 °C)] 97.7 °F (36.5 °C)  Heart Rate:  [63-93] 66  Resp:  [16-20] 18  BP: (108-134)/(58-85) 125/58  No intake or output data in the 24 hours ending 07/20/19 0602    Physical Exam   Constitutional: He is oriented to person, place, and time. He appears well-developed and well-nourished.   IV to left hand, no signs of infection.   Cardiovascular: Normal rate, regular rhythm and normal heart sounds.   Pulmonary/Chest: Effort normal and breath sounds normal.   Abdominal: Soft. Normal appearance and bowel sounds are normal. There is no tenderness.   Neurological: He is alert and oriented to person, place, and time.   Skin: Skin is warm and dry. There is erythema (Erythema overlying Port-A-Cath to right chest wall.).   Vitals reviewed.      Results Review:    Results from last 7 days   Lab Units 07/19/19  0555 07/18/19  0542 07/17/19  0531   SODIUM mmol/L 139 138 139   POTASSIUM mmol/L 4.0 3.9 3.7   CHLORIDE  mmol/L 101 100 102   CO2 mmol/L 28.0 28.0 27.0   BUN mg/dL 15 15 12   CREATININE mg/dL 0.65* 0.74* 0.78   GLUCOSE mg/dL 107* 114* 120*   CALCIUM mg/dL 8.9 8.8 8.3*     Results from last 7 days   Lab Units 07/19/19  0555 07/18/19  0542 07/17/19  0532   WBC 10*3/mm3 2.79* 2.87* 2.43*   HEMOGLOBIN g/dL 13.4 13.0 12.3*   HEMATOCRIT % 38.8 37.8 37.8   PLATELETS 10*3/mm3 116* 125* 128*       Assessment:    Prostate cancer (CMS/HCC)    Cellulitis of chest wall    Acute UTI (urinary tract infection)      Plan:    Port-A-Cath removal today.           This document has been electronically signed by Benson Wasserman MD on July 20, 2019 6:02 AM

## 2019-07-20 NOTE — PLAN OF CARE
Problem: Patient Care Overview  Goal: Plan of Care Review  Outcome: Ongoing (interventions implemented as appropriate)   07/20/19 0108   Coping/Psychosocial   Plan of Care Reviewed With patient   Plan of Care Review   Progress no change   OTHER   Outcome Summary pt vs stable, pt to have surgery to remove port in a.m. no new events will continue to montior.     Goal: Individualization and Mutuality  Outcome: Ongoing (interventions implemented as appropriate)    Goal: Discharge Needs Assessment  Outcome: Ongoing (interventions implemented as appropriate)    Goal: Interprofessional Rounds/Family Conf  Outcome: Ongoing (interventions implemented as appropriate)      Problem: Infection, Risk/Actual (Adult)  Goal: Infection Prevention/Resolution  Outcome: Ongoing (interventions implemented as appropriate)      Problem: Oncology Care (Adult)  Goal: Signs and Symptoms of Listed Potential Problems Will be Absent, Minimized or Managed (Oncology Care)  Outcome: Ongoing (interventions implemented as appropriate)      Problem: Chemotherapy Effects (Adult)  Goal: Signs and Symptoms of Listed Potential Problems Will be Absent, Minimized or Managed (Chemotherapy Effects)  Outcome: Ongoing (interventions implemented as appropriate)      Problem: Skin and Soft Tissue Infection (Adult)  Goal: Signs and Symptoms of Listed Potential Problems Will be Absent, Minimized or Managed (Skin and Soft Tissue Infection)  Outcome: Ongoing (interventions implemented as appropriate)

## 2019-07-20 NOTE — OP NOTE
REMOVAL VENOUS ACCESS DEVICE   Juan Antonio Shaw  7/20/2019    Pre-op Diagnosis:   Cellulitis of chest wall [L03.313]    Post-op Diagnosis:     Cellulitis of chest wall [L03.313]    Procedure:  REMOVAL VENOUS ACCESS DEVICE    Surgeon(s):  Benson Wasserman MD    Anesthesia: Monitor Anesthesia Care    Staff:   Circulator: Quin Rose RN  Scrub Person: Sandra Damon  Assistant: Shawnee Walker CSA    Estimated Blood Loss: none    Specimens:                ID Type Source Tests Collected by Time   1 : tissue right chest Tissue Chest, Right TISSUE / BONE CULTURE Benson Wasserman MD 7/20/2019 0731   2 : tip of old mediport for c&s, gram stain Tissue Chest, Right TISSUE / BONE CULTURE Benson Wasserman MD 7/20/2019 0732   A : old mediport Tissue Chest, Right TISSUE PATHOLOGY EXAM Benson Wasserman MD 7/20/2019 0732         Drains:  None    Findings: Cellulitis around Mediport with a hematoma around the port    Complications: None    Indications: 64-year-old man with a Mediport in place.  He had received chemotherapy for anal cancer.  Following that, he developed cellulitis around his port and he is here for port removal following failed antibiotic treatment.    Description of procedure: The patient is brought to the operating room and placed supine on the operating table. The area is prepped with Betadine and locally anesthesized. An incision is made with a 15 blade knife and the capsule is entered.  A small hematoma and a small amount of cloudy fluid is aspirated.  A small piece of tissue was sent for culture.  Sutures in the port in place are removed and the port and tubing are removed.  Piece of the tubing is sent for Gram stain culture and sensitivity.  Head is raised to decrease bleeding.  Bleeding sites within the wound was cauterized with electrocautery.  The wound is closed loosely with interrupted 4-0 Vicryl sutures.    The procedure was terminated.  He tolerated well.  Sponge and needle counts are correct.  Returned to  his room in satisfactory condition.    Tolerated well.              This document has been electronically signed by Benson Wasserman MD on July 20, 2019 7:33 AM      Date: 7/20/2019  Time: 7:33 AM

## 2019-07-21 ENCOUNTER — APPOINTMENT (OUTPATIENT)
Dept: INTERVENTIONAL RADIOLOGY/VASCULAR | Facility: HOSPITAL | Age: 64
End: 2019-07-21

## 2019-07-21 ENCOUNTER — APPOINTMENT (OUTPATIENT)
Dept: ULTRASOUND IMAGING | Facility: HOSPITAL | Age: 64
End: 2019-07-21

## 2019-07-21 ENCOUNTER — APPOINTMENT (OUTPATIENT)
Dept: GENERAL RADIOLOGY | Facility: HOSPITAL | Age: 64
End: 2019-07-21

## 2019-07-21 LAB
ALBUMIN SERPL-MCNC: 3.5 G/DL (ref 3.5–5.2)
ALBUMIN/GLOB SERPL: 1.1 G/DL
ALP SERPL-CCNC: 93 U/L (ref 39–117)
ALT SERPL W P-5'-P-CCNC: 82 U/L (ref 1–41)
ANION GAP SERPL CALCULATED.3IONS-SCNC: 10 MMOL/L (ref 5–15)
AST SERPL-CCNC: 40 U/L (ref 1–40)
BASOPHILS # BLD AUTO: 0 10*3/MM3 (ref 0–0.2)
BASOPHILS NFR BLD AUTO: 0 % (ref 0–1.5)
BILIRUB SERPL-MCNC: 0.2 MG/DL (ref 0.2–1.2)
BUN BLD-MCNC: 14 MG/DL (ref 8–23)
BUN/CREAT SERPL: 20 (ref 7–25)
CALCIUM SPEC-SCNC: 8.7 MG/DL (ref 8.6–10.5)
CHLORIDE SERPL-SCNC: 102 MMOL/L (ref 98–107)
CO2 SERPL-SCNC: 28 MMOL/L (ref 22–29)
CREAT BLD-MCNC: 0.7 MG/DL (ref 0.76–1.27)
DEPRECATED RDW RBC AUTO: 39.5 FL (ref 37–54)
EOSINOPHIL # BLD AUTO: 0.09 10*3/MM3 (ref 0–0.4)
EOSINOPHIL NFR BLD AUTO: 4.4 % (ref 0.3–6.2)
ERYTHROCYTE [DISTWIDTH] IN BLOOD BY AUTOMATED COUNT: 12.2 % (ref 12.3–15.4)
GFR SERPL CREATININE-BSD FRML MDRD: 114 ML/MIN/1.73
GLOBULIN UR ELPH-MCNC: 3.1 GM/DL
GLUCOSE BLD-MCNC: 102 MG/DL (ref 65–99)
HCT VFR BLD AUTO: 36.9 % (ref 37.5–51)
HGB BLD-MCNC: 12.4 G/DL (ref 13–17.7)
IMM GRANULOCYTES # BLD AUTO: 0.01 10*3/MM3 (ref 0–0.05)
IMM GRANULOCYTES NFR BLD AUTO: 0.5 % (ref 0–0.5)
LYMPHOCYTES # BLD AUTO: 0.79 10*3/MM3 (ref 0.7–3.1)
LYMPHOCYTES NFR BLD AUTO: 38.5 % (ref 19.6–45.3)
MCH RBC QN AUTO: 29.9 PG (ref 26.6–33)
MCHC RBC AUTO-ENTMCNC: 33.6 G/DL (ref 31.5–35.7)
MCV RBC AUTO: 88.9 FL (ref 79–97)
MONOCYTES # BLD AUTO: 0.36 10*3/MM3 (ref 0.1–0.9)
MONOCYTES NFR BLD AUTO: 17.6 % (ref 5–12)
NEUTROPHILS # BLD AUTO: 0.8 10*3/MM3 (ref 1.7–7)
NEUTROPHILS NFR BLD AUTO: 39 % (ref 42.7–76)
NRBC BLD AUTO-RTO: 0 /100 WBC (ref 0–0.2)
PLATELET # BLD AUTO: 103 10*3/MM3 (ref 140–450)
PMV BLD AUTO: 9.2 FL (ref 6–12)
POTASSIUM BLD-SCNC: 4 MMOL/L (ref 3.5–5.2)
PROT SERPL-MCNC: 6.6 G/DL (ref 6–8.5)
RBC # BLD AUTO: 4.15 10*6/MM3 (ref 4.14–5.8)
SODIUM BLD-SCNC: 140 MMOL/L (ref 136–145)
WBC NRBC COR # BLD: 2.05 10*3/MM3 (ref 3.4–10.8)

## 2019-07-21 PROCEDURE — 85025 COMPLETE CBC W/AUTO DIFF WBC: CPT | Performed by: NURSE PRACTITIONER

## 2019-07-21 PROCEDURE — 99024 POSTOP FOLLOW-UP VISIT: CPT | Performed by: SURGERY

## 2019-07-21 PROCEDURE — C1751 CATH, INF, PER/CENT/MIDLINE: HCPCS

## 2019-07-21 PROCEDURE — 25010000002 CEFAZOLIN PER 500 MG: Performed by: NURSE PRACTITIONER

## 2019-07-21 PROCEDURE — B548ZZA ULTRASONOGRAPHY OF SUPERIOR VENA CAVA, GUIDANCE: ICD-10-PCS | Performed by: INTERNAL MEDICINE

## 2019-07-21 PROCEDURE — 80053 COMPREHEN METABOLIC PANEL: CPT | Performed by: NURSE PRACTITIONER

## 2019-07-21 PROCEDURE — 02HV33Z INSERTION OF INFUSION DEVICE INTO SUPERIOR VENA CAVA, PERCUTANEOUS APPROACH: ICD-10-PCS | Performed by: INTERNAL MEDICINE

## 2019-07-21 RX ADMIN — CEFAZOLIN SODIUM 2 G: 10 INJECTION, POWDER, FOR SOLUTION INTRAVENOUS at 01:41

## 2019-07-21 RX ADMIN — OXYBUTYNIN CHLORIDE 5 MG: 5 TABLET ORAL at 17:17

## 2019-07-21 RX ADMIN — CEFAZOLIN SODIUM 2 G: 10 INJECTION, POWDER, FOR SOLUTION INTRAVENOUS at 12:20

## 2019-07-21 RX ADMIN — FAMOTIDINE 40 MG: 40 TABLET ORAL at 09:33

## 2019-07-21 RX ADMIN — SODIUM CHLORIDE, PRESERVATIVE FREE 3 ML: 5 INJECTION INTRAVENOUS at 09:34

## 2019-07-21 RX ADMIN — OXYBUTYNIN CHLORIDE 5 MG: 5 TABLET ORAL at 20:13

## 2019-07-21 RX ADMIN — CEFAZOLIN SODIUM 2 G: 10 INJECTION, POWDER, FOR SOLUTION INTRAVENOUS at 17:17

## 2019-07-21 RX ADMIN — SODIUM CHLORIDE, PRESERVATIVE FREE 3 ML: 5 INJECTION INTRAVENOUS at 20:13

## 2019-07-21 RX ADMIN — NEBIVOLOL HYDROCHLORIDE 10 MG: 5 TABLET ORAL at 20:12

## 2019-07-21 RX ADMIN — OXYBUTYNIN CHLORIDE 5 MG: 5 TABLET ORAL at 09:33

## 2019-07-21 RX ADMIN — DOCUSATE SODIUM 100 MG: 100 CAPSULE, LIQUID FILLED ORAL at 20:12

## 2019-07-21 NOTE — PLAN OF CARE
Problem: Patient Care Overview  Goal: Plan of Care Review  Outcome: Ongoing (interventions implemented as appropriate)   07/20/19 0219   Coping/Psychosocial   Plan of Care Reviewed With patient   Plan of Care Review   Progress no change   OTHER   Outcome Summary pt vs stable, port removed today, possible discharge tomm no new events will continue to monitor.     Goal: Individualization and Mutuality  Outcome: Ongoing (interventions implemented as appropriate)    Goal: Discharge Needs Assessment  Outcome: Ongoing (interventions implemented as appropriate)    Goal: Interprofessional Rounds/Family Conf  Outcome: Ongoing (interventions implemented as appropriate)      Problem: Infection, Risk/Actual (Adult)  Goal: Infection Prevention/Resolution  Outcome: Ongoing (interventions implemented as appropriate)      Problem: Oncology Care (Adult)  Goal: Signs and Symptoms of Listed Potential Problems Will be Absent, Minimized or Managed (Oncology Care)  Outcome: Ongoing (interventions implemented as appropriate)      Problem: Chemotherapy Effects (Adult)  Goal: Signs and Symptoms of Listed Potential Problems Will be Absent, Minimized or Managed (Chemotherapy Effects)  Outcome: Ongoing (interventions implemented as appropriate)      Problem: Skin and Soft Tissue Infection (Adult)  Goal: Signs and Symptoms of Listed Potential Problems Will be Absent, Minimized or Managed (Skin and Soft Tissue Infection)  Outcome: Ongoing (interventions implemented as appropriate)

## 2019-07-21 NOTE — PROGRESS NOTES
"   LOS: 7 days   Patient Care Team:  Sukumar Aleman MD as PCP - General (Family Medicine)  Brady Lopez MD as Consulting Physician (Radiation Oncology)  Terry Ly MD as Consulting Physician (Hematology and Oncology)  Jarek Gordillo DO as Consulting Physician (Gastroenterology)    Chief Complaint: POD 1 port removal    Subjective     History of Present Illness    Subjective:  Symptoms:  Improved.    Pain:  He reports no pain.        History taken from: patient chart RN    Objective     Vital Signs  Temp:  [96.5 °F (35.8 °C)-97.8 °F (36.6 °C)] 96.9 °F (36.1 °C)  Heart Rate:  [65-85] 72  Resp:  [16-18] 18  BP: ()/(52-81) 96/52    Objective:  General Appearance:  Comfortable.    Vital signs: (most recent): Blood pressure 96/52, pulse 72, temperature 96.9 °F (36.1 °C), temperature source Temporal, resp. rate 18, height 172.7 cm (68\"), weight 98.6 kg (217 lb 6.4 oz), SpO2 95 %.  Vital signs are normal.  No fever.    Skin:  Warm and dry.  (Incision to right chest wall clean, dry, and intact.)            Results Review:     I reviewed the patient's new clinical results.    Medication Review: Done.    Assessment/Plan       Prostate cancer (CMS/HCC)    Cellulitis of chest wall      Assessment:    Condition: In stable condition.  Improving.       Plan:   (Discharge at any time from my point of view. ).       Benson Wasserman MD  07/21/19  7:58 AM    Time: 10 mins      "

## 2019-07-21 NOTE — PROGRESS NOTES
TWO PATIENT IDENTIFIERS WERE USED. CONSENT WAS SIGNED PER PATIENT EDUCATION MATERIAL WAS GIVEN TO PATIENT AND / OR FAMILY. THE PATIENT WAS DRAPED WITH FULL BODY DRAPE AND PATIENT'S RIGHT ARM WAS PREPPED WITH CHLORAPREP.  ULTRASOUND WAS USED TO LOCALIZE THERIGHT BASILIC  VEIN. SUBCUTANEOUS TISSUE AT THE CATHETER SITE WAS INFILTRATED WITH 2% LIDOCAINE. UNDER ULTRASOUND GUIDANCE, THE VEIN WAS ACCESSED WITH A 21GAUGE  NEEDLE. AN 0.018 WIRE WAS THEN THREADED THROUGH THE NEEDLE INTO THE CENTRAL VENOUS SYSTEM. THE 21GAUGE  NEEDLE WAS REMOVED AND A 5 Cuban PEEL AWAY SHEATH WAS PLACED OVER THE WIRE. THE PICC LINE CATHETER WAS CUT AT 36 CM. THE PICC LINE CATHETER WAS THEN PLACED OVER THE WIRE INTO THE VEIN, THE SHEATH WAS PEELED AWAY,WIRE WAS REMOVED. CATHETER WAS FLUSHED WITH NORMAL SALINE AND TIPS APPLIED. BIOPATCH PLACED. CATHETER SECURED WITH STATLOCK AND TEGADERM. PATIENT TOLERATED PROCEDURE WELL. THIS WAS DONE IN THE   ANGIOSUITE      IMPRESSION: SUCCESSFUL PLACEMENT OF SINGLE LUMEN SOLO PICC        Aggie Poe  7/21/2019  11:11 AM

## 2019-07-21 NOTE — PROGRESS NOTES
[facility]  INPATIENT PROGRESS NOTE    Length of Stay: 7  Date of Admission: 7/14/2019  Primary Care Physician: Sukumar Aleman MD    Subjective   Chief Complaint: No complaints    HPI:     7/21/2019: The patient will need 14 days of IV antibiotics after the first day of negative blood culture which was 7/14/2019.  As of today he will need 7 additional days.  Patient was sent down this morning for a PICC line in anticipation of IV antibiotics and chemotherapy.  Case management has been notified to try to obtain approval for methicillin ball so the patient will only require one continuous dose daily.  Patient states he needs to get back to work and has band camp this week and is anxious for discharge.  Home health has been set up for PICC care and infusion therapy.  The patient can be discharged once this is set up.    7/20/2019: Patient underwent DAVIDA yesterday with no signs of vegetation.  Patient underwent port removal this a.m. by Dr. Wasserman with no complications.    7/19/2019: Patient underwent radiation this morning and is scheduled for port removal today by Dr. Wasserman.  Dr. Santiago evaluated the patient for suggestive tricuspid valve echodensity and the patient was recommended for DAVIDA.    This is a 64-year-old  male with past medical history of rectal and anal cancer (currently undergoing chemotherapy and radiation therapy) that reported to be HCM on 7/14/2019 with complaints of fever, chills, nausea and discomfort in the anterior right side of the chest above his Port-A-Cath.  Patient states that he received his port approximately 2 weeks ago.  Erythema was noted around the Port-A-Cath extending towards his neck.  The patient is being followed by Dr. Wasserman.  Patient does not have another active chemotherapy regimen due for 3 weeks.  The patient underwent radiation on 7/17/2019.  Antibiotics were de-escalated given that the staph species is not  MRSA and most likely MSSA.  Patient was also noted to have a E. coli urinary tract infection.  Echocardiogram was ordered to rule out vegetation.  Possible mass noted tricuspid valve.  Dr. Santiago has been consulted for possible DAVIDA.    Review of Systems   Constitutional: Negative for activity change and fatigue.   HENT: Negative for ear pain and sore throat.    Eyes: Negative for pain and discharge.   Respiratory: Negative for cough and shortness of breath.    Cardiovascular: Negative for chest pain and palpitations.   Gastrointestinal: Negative for abdominal pain and nausea.   Endocrine: Negative for cold intolerance and heat intolerance.   Genitourinary: Negative for difficulty urinating and dysuria.   Musculoskeletal: Negative for arthralgias and gait problem.   Skin: Negative for color change and rash.        Erythema noted to right side of chest over port site.   Neurological: Negative for dizziness and weakness.   Psychiatric/Behavioral: Negative for agitation and confusion.        Objective    Temp:  [96.9 °F (36.1 °C)-97.8 °F (36.6 °C)] 97.4 °F (36.3 °C)  Heart Rate:  [65-85] 79  Resp:  [16-18] 16  BP: ()/(52-74) 102/70    Physical Exam   Constitutional: He is oriented to person, place, and time. He appears well-developed and well-nourished.   HENT:   Head: Normocephalic and atraumatic.   Eyes: EOM are normal. Pupils are equal, round, and reactive to light.   Neck: Normal range of motion. Neck supple.   Cardiovascular: Normal rate and regular rhythm.   Pulmonary/Chest: Effort normal and breath sounds normal.   Abdominal: Soft. Bowel sounds are normal.   Musculoskeletal: Normal range of motion.   Neurological: He is alert and oriented to person, place, and time.   Skin: Skin is warm and dry.   Erythema noted over right chest wall above port site.    Psychiatric: He has a normal mood and affect. His behavior is normal.     Results Review:  I have reviewed the labs, radiology results, and diagnostic  studies.    Laboratory Data:   Results from last 7 days   Lab Units 07/21/19  0547 07/20/19  0533 07/19/19  0555   SODIUM mmol/L 140 139 139   POTASSIUM mmol/L 4.0 4.2 4.0   CHLORIDE mmol/L 102 101 101   CO2 mmol/L 28.0 27.0 28.0   BUN mg/dL 14 17 15   CREATININE mg/dL 0.70* 0.75* 0.65*   GLUCOSE mg/dL 102* 101* 107*   CALCIUM mg/dL 8.7 8.7 8.9   BILIRUBIN mg/dL 0.2 0.3 0.5   ALK PHOS U/L 93 103 110   ALT (SGPT) U/L 82* 116* 165*   AST (SGOT) U/L 40 52* 71*   ANION GAP mmol/L 10.0 11.0 10.0     Estimated Creatinine Clearance: 123 mL/min (A) (by C-G formula based on SCr of 0.7 mg/dL (L)).          Results from last 7 days   Lab Units 07/21/19  0547 07/20/19  0533 07/19/19  0555 07/18/19  0542 07/17/19  0532   WBC 10*3/mm3 2.05* 2.25* 2.79* 2.87* 2.43*   HEMOGLOBIN g/dL 12.4* 13.2 13.4 13.0 12.3*   HEMATOCRIT % 36.9* 38.0 38.8 37.8 37.8   PLATELETS 10*3/mm3 103* 109* 116* 125* 128*           Culture Data:   No results found for: BLOODCX  No results found for: URINECX  No results found for: RESPCX  No results found for: WOUNDCX  No results found for: STOOLCX  No components found for: BODYFLD    Radiology Data:   Imaging Results (last 24 hours)     Procedure Component Value Units Date/Time    XR Chest Post CVA Port [[id number]] Collected:  07/21/19 1115     Updated:  07/21/19 1242    Narrative:       PROCEDURE: XR CHEST POST CVA PORT    VIEWS:Single    INDICATION: PICC line placement    COMPARISON: CXR: 7/14/2019    FINDINGS:       - lines/tubes: Right-sided PICC line terminates in superior  vena cava. Interval removal of right-sided Mediport.    - cardiac: Size within normal limits.    - mediastinum: Contour within normal limits.     - lungs: No evidence of a focal air space process, pulmonary  interstitial edema, nodule(s)/mass. Minimal probable left base  atelectasis    - pleura: No evidence of  fluid.      - osseous: Unremarkable for age.        Impression:       Right-sided PICC line terminates in superior vena  cava.       Electronically signed by:  Parul Prado MD  7/21/2019 11:37 AM  CDT Workstation: [id number]    IR PICC W Imaging Guidance [[id number]] Collected:  07/21/19 1058     Updated:  07/21/19 1242    Narrative:       PROCEDURE: XR CHEST POST CVA PORT    VIEWS:Single    INDICATION: PICC line placement    COMPARISON: CXR: 7/14/2019    FINDINGS:       - lines/tubes: Right-sided PICC line terminates in superior  vena cava. Interval removal of right-sided Mediport.    - cardiac: Size within normal limits.    - mediastinum: Contour within normal limits.     - lungs: No evidence of a focal air space process, pulmonary  interstitial edema, nodule(s)/mass. Minimal probable left base  atelectasis    - pleura: No evidence of  fluid.      - osseous: Unremarkable for age.        Impression:       Right-sided PICC line terminates in superior vena cava.       Electronically signed by:  Parul Prado MD  7/21/2019 11:37 AM  CDT Workstation: [id number]    [place] Guidance PICC NC [[id number]] Collected:  07/21/19 1106     Updated:  07/21/19 1242    Narrative:       PROCEDURE: XR CHEST POST CVA PORT    VIEWS:Single    INDICATION: PICC line placement    COMPARISON: CXR: 7/14/2019    FINDINGS:       - lines/tubes: Right-sided PICC line terminates in superior  vena cava. Interval removal of right-sided Mediport.    - cardiac: Size within normal limits.    - mediastinum: Contour within normal limits.     - lungs: No evidence of a focal air space process, pulmonary  interstitial edema, nodule(s)/mass. Minimal probable left base  atelectasis    - pleura: No evidence of  fluid.      - osseous: Unremarkable for age.        Impression:       Right-sided PICC line terminates in superior vena cava.       Electronically signed by:  Parul Prado MD  7/21/2019 11:37 AM  CDT Workstation: [id number]          I have reviewed the patient's current medications.     Assessment/Plan     Active Hospital Problems    Diagnosis POA   • Cellulitis of chest wall  [L03.313] Yes   • Prostate cancer (CMS/[facility]) [C61] Yes       Plan:    1.  Cellulitis, right chest wall:  Per pharmacy recommendation, Ancef to cover cellulitis and uti.  Discontinue Zosyn.  General surgery following.  Repeat blood cultures ordered per surgery.    2.  Urinary tract infection, E. Coli: This has been adequately covered.  3.  Prostate/anal cancer:  Next chemotherapy scheduled in 2 weeks.  Patient is continuing radiation.    4.  Leukopenia secondary to recent chemotherapy:  Will continue to monitor the need for neutropenic precautions.  5.  Bacteremia, Staphylococcus: The patient will need 7 additional days of IV antibiotics for a complete course of 14 days status post negative blood culture, which was 7/14/2019.  Case management is setting this up for the patient to do at home.        Discharge Planning: I expect patient to be discharged to home in 1-2 days.      This document has been electronically signed by SUHAIL Maldonado on July 21, 2019 1:22 PM

## 2019-07-22 ENCOUNTER — APPOINTMENT (OUTPATIENT)
Dept: ONCOLOGY | Facility: HOSPITAL | Age: 64
End: 2019-07-22
Payer: COMMERCIAL

## 2019-07-22 VITALS
DIASTOLIC BLOOD PRESSURE: 76 MMHG | HEIGHT: 68 IN | TEMPERATURE: 98.1 F | WEIGHT: 220.6 LBS | BODY MASS INDEX: 33.43 KG/M2 | OXYGEN SATURATION: 93 % | RESPIRATION RATE: 20 BRPM | HEART RATE: 74 BPM | SYSTOLIC BLOOD PRESSURE: 105 MMHG

## 2019-07-22 LAB
ALBUMIN SERPL-MCNC: 3.5 G/DL (ref 3.5–5.2)
ALBUMIN/GLOB SERPL: 1.1 G/DL
ALP SERPL-CCNC: 88 U/L (ref 39–117)
ALT SERPL W P-5'-P-CCNC: 55 U/L (ref 1–41)
ANION GAP SERPL CALCULATED.3IONS-SCNC: 9 MMOL/L (ref 5–15)
AST SERPL-CCNC: 29 U/L (ref 1–40)
BACTERIA SPEC AEROBE CULT: ABNORMAL
BASOPHILS # BLD AUTO: 0.01 10*3/MM3 (ref 0–0.2)
BASOPHILS NFR BLD AUTO: 0.5 % (ref 0–1.5)
BILIRUB SERPL-MCNC: 0.3 MG/DL (ref 0.2–1.2)
BUN BLD-MCNC: 13 MG/DL (ref 8–23)
BUN/CREAT SERPL: 18.1 (ref 7–25)
CALCIUM SPEC-SCNC: 8.7 MG/DL (ref 8.6–10.5)
CATHETER CULTURE: ABNORMAL
CHLORIDE SERPL-SCNC: 102 MMOL/L (ref 98–107)
CO2 SERPL-SCNC: 29 MMOL/L (ref 22–29)
CREAT BLD-MCNC: 0.72 MG/DL (ref 0.76–1.27)
DEPRECATED RDW RBC AUTO: 39.1 FL (ref 37–54)
EOSINOPHIL # BLD AUTO: 0.12 10*3/MM3 (ref 0–0.4)
EOSINOPHIL NFR BLD AUTO: 5.9 % (ref 0.3–6.2)
ERYTHROCYTE [DISTWIDTH] IN BLOOD BY AUTOMATED COUNT: 12.2 % (ref 12.3–15.4)
GFR SERPL CREATININE-BSD FRML MDRD: 110 ML/MIN/1.73
GLOBULIN UR ELPH-MCNC: 3.1 GM/DL
GLUCOSE BLD-MCNC: 99 MG/DL (ref 65–99)
GRAM STN SPEC: ABNORMAL
HCT VFR BLD AUTO: 36.1 % (ref 37.5–51)
HGB BLD-MCNC: 12.2 G/DL (ref 13–17.7)
IMM GRANULOCYTES # BLD AUTO: 0.03 10*3/MM3 (ref 0–0.05)
IMM GRANULOCYTES NFR BLD AUTO: 1.5 % (ref 0–0.5)
LAB AP CASE REPORT: NORMAL
LYMPHOCYTES # BLD AUTO: 0.86 10*3/MM3 (ref 0.7–3.1)
LYMPHOCYTES NFR BLD AUTO: 42 % (ref 19.6–45.3)
MCH RBC QN AUTO: 29.8 PG (ref 26.6–33)
MCHC RBC AUTO-ENTMCNC: 33.8 G/DL (ref 31.5–35.7)
MCV RBC AUTO: 88 FL (ref 79–97)
MONOCYTES # BLD AUTO: 0.53 10*3/MM3 (ref 0.1–0.9)
MONOCYTES NFR BLD AUTO: 25.9 % (ref 5–12)
NEUTROPHILS # BLD AUTO: 0.5 10*3/MM3 (ref 1.7–7)
NEUTROPHILS NFR BLD AUTO: 24.2 % (ref 42.7–76)
NRBC BLD AUTO-RTO: 0 /100 WBC (ref 0–0.2)
PATH REPORT.FINAL DX SPEC: NORMAL
PATH REPORT.GROSS SPEC: NORMAL
PLATELET # BLD AUTO: 107 10*3/MM3 (ref 140–450)
PMV BLD AUTO: 8.6 FL (ref 6–12)
POTASSIUM BLD-SCNC: 4.2 MMOL/L (ref 3.5–5.2)
PROT SERPL-MCNC: 6.6 G/DL (ref 6–8.5)
RBC # BLD AUTO: 4.1 10*6/MM3 (ref 4.14–5.8)
RBC MORPH BLD: NORMAL
SMALL PLATELETS BLD QL SMEAR: NORMAL
SODIUM BLD-SCNC: 140 MMOL/L (ref 136–145)
WBC MORPH BLD: NORMAL
WBC NRBC COR # BLD: 2.05 10*3/MM3 (ref 3.4–10.8)

## 2019-07-22 PROCEDURE — 99024 POSTOP FOLLOW-UP VISIT: CPT | Performed by: SURGERY

## 2019-07-22 PROCEDURE — 85007 BL SMEAR W/DIFF WBC COUNT: CPT | Performed by: NURSE PRACTITIONER

## 2019-07-22 PROCEDURE — 25010000002 CEFAZOLIN PER 500 MG: Performed by: NURSE PRACTITIONER

## 2019-07-22 PROCEDURE — 77014 CHG CT GUIDANCE RADIATION THERAPY FLDS PLACEMENT: CPT | Performed by: RADIOLOGY

## 2019-07-22 PROCEDURE — 80053 COMPREHEN METABOLIC PANEL: CPT | Performed by: NURSE PRACTITIONER

## 2019-07-22 PROCEDURE — 85025 COMPLETE CBC W/AUTO DIFF WBC: CPT | Performed by: NURSE PRACTITIONER

## 2019-07-22 PROCEDURE — 77336 RADIATION PHYSICS CONSULT: CPT | Performed by: RADIOLOGY

## 2019-07-22 PROCEDURE — 25010000002 CEFTRIAXONE PER 250 MG: Performed by: NURSE PRACTITIONER

## 2019-07-22 PROCEDURE — 77386: CPT | Performed by: RADIOLOGY

## 2019-07-22 RX ADMIN — SODIUM CHLORIDE, PRESERVATIVE FREE 3 ML: 5 INJECTION INTRAVENOUS at 07:37

## 2019-07-22 RX ADMIN — CEFAZOLIN SODIUM 2 G: 10 INJECTION, POWDER, FOR SOLUTION INTRAVENOUS at 11:17

## 2019-07-22 RX ADMIN — FAMOTIDINE 40 MG: 40 TABLET ORAL at 07:35

## 2019-07-22 RX ADMIN — NAFCILLIN SODIUM 2 G: 2 INJECTION, POWDER, LYOPHILIZED, FOR SOLUTION INTRAMUSCULAR; INTRAVENOUS at 17:46

## 2019-07-22 RX ADMIN — OXYBUTYNIN CHLORIDE 5 MG: 5 TABLET ORAL at 07:35

## 2019-07-22 RX ADMIN — OXYBUTYNIN CHLORIDE 5 MG: 5 TABLET ORAL at 17:46

## 2019-07-22 RX ADMIN — CEFAZOLIN SODIUM 2 G: 10 INJECTION, POWDER, FOR SOLUTION INTRAVENOUS at 02:04

## 2019-07-22 RX ADMIN — CEFTRIAXONE SODIUM 2 G: 2 INJECTION, POWDER, FOR SOLUTION INTRAMUSCULAR; INTRAVENOUS at 20:50

## 2019-07-22 RX ADMIN — DOCUSATE SODIUM 100 MG: 100 CAPSULE, LIQUID FILLED ORAL at 07:35

## 2019-07-22 NOTE — PAYOR COMM NOTE
"Juan Antonio Barbosa (64 y.o. Male)     Date of Birth Social Security Number Address Home Phone MRN    [id number]  [id number] [facility] [address] [phone] [id number]    Latter day Marital Status          Cheondoism        Admission Date Admission Type Admitting Provider Attending Provider Department, Room/Bed    7/14/19 Emergency Gilson Lucio MD Olalekan, David B, MD [address], 405/1    Discharge Date Discharge Disposition Discharge Destination                       Attending Provider:  Gilson Lucio MD    Allergies:  No Known Allergies    Isolation:  None   Infection:  None   Code Status:  CPR    Ht:  172.7 cm (68\")   Wt:  100 kg (220 lb 9.6 oz)    Admission Cmt:  None   Principal Problem:  None                Active Insurance as of 7/14/2019     Primary Coverage     Payor Plan Insurance Group Employer/Plan Group    [facility] BLUE CROSS [employer] [employer] EMPLOYEE [id number]     Payor Plan Address Payor Plan Phone Number Payor Plan Fax Number Effective Dates    [id number] [phone]  1/1/2015 - None Entered    [place] [address]       Subscriber Name Subscriber Birth Date Member ID       JACKIE BARBOSA 1/29/1959 [id number]                 Emergency Contacts      (Rel.) Home Phone Work Phone Mobile Phone    Colinhilario (Spouse) [phone] -- [phone]        Leena Rangel RN [facility]  [phone] phone  [phone] fax  Continued Stay Review  Auth# [id number]  Thank you!         Physician Progress Notes (last 72 hours) (Notes from 7/19/2019 11:36 AM through 7/22/2019 11:36 AM)      Coco Elizabeth APRN at 7/21/2019  1:22 PM              [facility]  INPATIENT PROGRESS NOTE    Length of Stay: 7  Date of Admission: 7/14/2019  Primary Care Physician: Sukumar Aleman MD    Subjective   Chief Complaint: No complaints    HPI:     7/21/2019: The patient " will need 14 days of IV antibiotics after the first day of negative blood culture which was 7/14/2019.  As of today he will need 7 additional days.  Patient was sent down this morning for a PICC line in anticipation of IV antibiotics and chemotherapy.  Case management has been notified to try to obtain approval for methicillin ball so the patient will only require one continuous dose daily.  Patient states he needs to get back to work and has band camp this week and is anxious for discharge.  Home health has been set up for PICC care and infusion therapy.  The patient can be discharged once this is set up.    7/20/2019: Patient underwent DAVIDA yesterday with no signs of vegetation.  Patient underwent port removal this a.m. by Dr. Wasserman with no complications.    7/19/2019: Patient underwent radiation this morning and is scheduled for port removal today by Dr. Wasserman.  Dr. Santiago evaluated the patient for suggestive tricuspid valve echodensity and the patient was recommended for DAVIDA.    This is a 64-year-old  male with past medical history of rectal and anal cancer (currently undergoing chemotherapy and radiation therapy) that reported to be HCM on 7/14/2019 with complaints of fever, chills, nausea and discomfort in the anterior right side of the chest above his Port-A-Cath.  Patient states that he received his port approximately 2 weeks ago.  Erythema was noted around the Port-A-Cath extending towards his neck.  The patient is being followed by Dr. Wasesrman.  Patient does not have another active chemotherapy regimen due for 3 weeks.  The patient underwent radiation on 7/17/2019.  Antibiotics were de-escalated given that the staph species is not MRSA and most likely MSSA.  Patient was also noted to have a E. coli urinary tract infection.  Echocardiogram was ordered to rule out vegetation.  Possible mass noted tricuspid valve.  Dr. Santiago has been consulted for possible DAVIDA.    Review of Systems   Constitutional:  Negative for activity change and fatigue.   HENT: Negative for ear pain and sore throat.    Eyes: Negative for pain and discharge.   Respiratory: Negative for cough and shortness of breath.    Cardiovascular: Negative for chest pain and palpitations.   Gastrointestinal: Negative for abdominal pain and nausea.   Endocrine: Negative for cold intolerance and heat intolerance.   Genitourinary: Negative for difficulty urinating and dysuria.   Musculoskeletal: Negative for arthralgias and gait problem.   Skin: Negative for color change and rash.        Erythema noted to right side of chest over port site.   Neurological: Negative for dizziness and weakness.   Psychiatric/Behavioral: Negative for agitation and confusion.        Objective    Temp:  [96.9 °F (36.1 °C)-97.8 °F (36.6 °C)] 97.4 °F (36.3 °C)  Heart Rate:  [65-85] 79  Resp:  [16-18] 16  BP: ()/(52-74) 102/70    Physical Exam   Constitutional: He is oriented to person, place, and time. He appears well-developed and well-nourished.   HENT:   Head: Normocephalic and atraumatic.   Eyes: EOM are normal. Pupils are equal, round, and reactive to light.   Neck: Normal range of motion. Neck supple.   Cardiovascular: Normal rate and regular rhythm.   Pulmonary/Chest: Effort normal and breath sounds normal.   Abdominal: Soft. Bowel sounds are normal.   Musculoskeletal: Normal range of motion.   Neurological: He is alert and oriented to person, place, and time.   Skin: Skin is warm and dry.   Erythema noted over right chest wall above port site.    Psychiatric: He has a normal mood and affect. His behavior is normal.     Results Review:  I have reviewed the labs, radiology results, and diagnostic studies.    Laboratory Data:   Results from last 7 days   Lab Units 07/21/19  0547 07/20/19  0533 07/19/19  0555   SODIUM mmol/L 140 139 139   POTASSIUM mmol/L 4.0 4.2 4.0   CHLORIDE mmol/L 102 101 101   CO2 mmol/L 28.0 27.0 28.0   BUN mg/dL 14 17 15   CREATININE mg/dL 0.70*  0.75* 0.65*   GLUCOSE mg/dL 102* 101* 107*   CALCIUM mg/dL 8.7 8.7 8.9   BILIRUBIN mg/dL 0.2 0.3 0.5   ALK PHOS U/L 93 103 110   ALT (SGPT) U/L 82* 116* 165*   AST (SGOT) U/L 40 52* 71*   ANION GAP mmol/L 10.0 11.0 10.0     Estimated Creatinine Clearance: 123 mL/min (A) (by C-G formula based on SCr of 0.7 mg/dL (L)).          Results from last 7 days   Lab Units 07/21/19  0547 07/20/19  0533 07/19/19  0555 07/18/19  0542 07/17/19  0532   WBC 10*3/mm3 2.05* 2.25* 2.79* 2.87* 2.43*   HEMOGLOBIN g/dL 12.4* 13.2 13.4 13.0 12.3*   HEMATOCRIT % 36.9* 38.0 38.8 37.8 37.8   PLATELETS 10*3/mm3 103* 109* 116* 125* 128*           Culture Data:   No results found for: BLOODCX  No results found for: URINECX  No results found for: RESPCX  No results found for: WOUNDCX  No results found for: STOOLCX  No components found for: BODYFLD    Radiology Data:   Imaging Results (last 24 hours)     Procedure Component Value Units Date/Time    XR Chest Post CVA Port [[id number]] Collected:  07/21/19 1115     Updated:  07/21/19 1242    Narrative:       PROCEDURE: XR CHEST POST CVA PORT    VIEWS:Single    INDICATION: PICC line placement    COMPARISON: CXR: 7/14/2019    FINDINGS:       - lines/tubes: Right-sided PICC line terminates in superior  vena cava. Interval removal of right-sided Mediport.    - cardiac: Size within normal limits.    - mediastinum: Contour within normal limits.     - lungs: No evidence of a focal air space process, pulmonary  interstitial edema, nodule(s)/mass. Minimal probable left base  atelectasis    - pleura: No evidence of  fluid.      - osseous: Unremarkable for age.        Impression:       Right-sided PICC line terminates in superior vena cava.       Electronically signed by:  Parul Prado MD  7/21/2019 11:37 AM  CDT Workstation: [id number]    IR PICC W Imaging Guidance [[id number]] Collected:  07/21/19 1058     Updated:  07/21/19 1242    Narrative:       PROCEDURE: XR CHEST POST CVA  PORT    VIEWS:Single    INDICATION: PICC line placement    COMPARISON: CXR: 7/14/2019    FINDINGS:       - lines/tubes: Right-sided PICC line terminates in superior  vena cava. Interval removal of right-sided Mediport.    - cardiac: Size within normal limits.    - mediastinum: Contour within normal limits.     - lungs: No evidence of a focal air space process, pulmonary  interstitial edema, nodule(s)/mass. Minimal probable left base  atelectasis    - pleura: No evidence of  fluid.      - osseous: Unremarkable for age.        Impression:       Right-sided PICC line terminates in superior vena cava.       Electronically signed by:  Parul Prado MD  7/21/2019 11:37 AM  CDT Workstation: [id number]    US Guidance PICC NC [[id number]] Collected:  07/21/19 1106     Updated:  07/21/19 1242    Narrative:       PROCEDURE: XR CHEST POST CVA PORT    VIEWS:Single    INDICATION: PICC line placement    COMPARISON: CXR: 7/14/2019    FINDINGS:       - lines/tubes: Right-sided PICC line terminates in superior  vena cava. Interval removal of right-sided Mediport.    - cardiac: Size within normal limits.    - mediastinum: Contour within normal limits.     - lungs: No evidence of a focal air space process, pulmonary  interstitial edema, nodule(s)/mass. Minimal probable left base  atelectasis    - pleura: No evidence of  fluid.      - osseous: Unremarkable for age.        Impression:       Right-sided PICC line terminates in superior vena cava.       Electronically signed by:  Parul Prado MD  7/21/2019 11:37 AM  CDT Workstation: [id number]          I have reviewed the patient's current medications.     Assessment/Plan     Active Hospital Problems    Diagnosis POA   • Cellulitis of chest wall [L03.313] Yes   • Prostate cancer (CMS/HCC) [C61] Yes       Plan:    1.  Cellulitis, right chest wall:  Per pharmacy recommendation, Ancef to cover cellulitis and uti.  Discontinue Zosyn.  General surgery following.  Repeat blood cultures ordered per  "surgery.    2.  Urinary tract infection, E. Coli: This has been adequately covered.  3.  Prostate/anal cancer:  Next chemotherapy scheduled in 2 weeks.  Patient is continuing radiation.    4.  Leukopenia secondary to recent chemotherapy:  Will continue to monitor the need for neutropenic precautions.  5.  Bacteremia, Staphylococcus: The patient will need 7 additional days of IV antibiotics for a complete course of 14 days status post negative blood culture, which was 7/14/2019.  Case management is setting this up for the patient to do at home.        Discharge Planning: I expect patient to be discharged to home in 1-2 days.      This document has been electronically signed by SUHAIL Maldonado on July 21, 2019 1:22 PM          Electronically signed by Coco Elizabeth APRN at 7/21/2019  1:28 PM     Benson Wasserman MD at 7/21/2019  7:58 AM           LOS: 7 days   Patient Care Team:  Sukumar Aleman MD as PCP - General (Family Medicine)  Brady Lopez MD as Consulting Physician (Radiation Oncology)  Terry Ly MD as Consulting Physician (Hematology and Oncology)  Jarek Gordillo DO as Consulting Physician (Gastroenterology)    Chief Complaint: POD 1 port removal    Subjective     History of Present Illness    Subjective:  Symptoms:  Improved.    Pain:  He reports no pain.        History taken from: patient chart RN    Objective     Vital Signs  Temp:  [96.5 °F (35.8 °C)-97.8 °F (36.6 °C)] 96.9 °F (36.1 °C)  Heart Rate:  [65-85] 72  Resp:  [16-18] 18  BP: ()/(52-81) 96/52    Objective:  General Appearance:  Comfortable.    Vital signs: (most recent): Blood pressure 96/52, pulse 72, temperature 96.9 °F (36.1 °C), temperature source Temporal, resp. rate 18, height 172.7 cm (68\"), weight 98.6 kg (217 lb 6.4 oz), SpO2 95 %.  Vital signs are normal.  No fever.    Skin:  Warm and dry.  (Incision to right chest wall clean, dry, and intact.)            Results Review:     I reviewed the patient's " new clinical results.    Medication Review: Done.    Assessment/Plan       Prostate cancer (CMS/HCC)    Cellulitis of chest wall      Assessment:    Condition: In stable condition.  Improving.       Plan:   (Discharge at any time from my point of view. ).       Benson Wasserman MD  07/21/19  7:58 AM    Time: 10 mins        Electronically signed by Benson Wasserman MD at 7/21/2019  8:01 AM     Coco Elizabeth APRN at 7/20/2019 10:45 AM     Attestation signed by Lindsey Scott MD at 7/20/2019  5:56 PM    I have reviewed the documentation above and agree.                        [facility] Medicine Services  INPATIENT PROGRESS NOTE    Length of Stay: 6  Date of Admission: 7/14/2019  Primary Care Physician: Sukumar Aleman MD    Subjective   Chief Complaint: No complaints    HPI:     7/20/2019: Patient underwent DAVIDA yesterday with no signs of vegetation.  Patient underwent port removal this a.m. by Dr. Wasserman with no complications.    7/19/2019: Patient underwent radiation this morning and is scheduled for port removal today by Dr. Wasserman.  Dr. Santiago evaluated the patient for suggestive tricuspid valve echodensity and the patient was recommended for DAVIDA.    This is a 64-year-old  male with past medical history of rectal and anal cancer (currently undergoing chemotherapy and radiation therapy) that reported to be HCM on 7/14/2019 with complaints of fever, chills, nausea and discomfort in the anterior right side of the chest above his Port-A-Cath.  Patient states that he received his port approximately 2 weeks ago.  Erythema was noted around the Port-A-Cath extending towards his neck.  The patient is being followed by Dr. Wasserman.  Patient does not have another active chemotherapy regimen due for 3 weeks.  The patient underwent radiation on 7/17/2019.  Antibiotics were de-escalated given that the staph species is not MRSA and most likely MSSA.  Patient was also noted to have a E. coli  urinary tract infection.  Echocardiogram was ordered to rule out vegetation.  Possible mass noted tricuspid valve.  Dr. Santiago has been consulted for possible DAVIDA.    Review of Systems   Constitutional: Negative for activity change and fatigue.   HENT: Negative for ear pain and sore throat.    Eyes: Negative for pain and discharge.   Respiratory: Negative for cough and shortness of breath.    Cardiovascular: Negative for chest pain and palpitations.   Gastrointestinal: Negative for abdominal pain and nausea.   Endocrine: Negative for cold intolerance and heat intolerance.   Genitourinary: Negative for difficulty urinating and dysuria.   Musculoskeletal: Negative for arthralgias and gait problem.   Skin: Negative for color change and rash.        Erythema noted to right side of chest over port site.   Neurological: Negative for dizziness and weakness.   Psychiatric/Behavioral: Negative for agitation and confusion.        Objective    Temp:  [96.5 °F (35.8 °C)-98.3 °F (36.8 °C)] 96.5 °F (35.8 °C)  Heart Rate:  [65-93] 65  Resp:  [16-20] 16  BP: (108-134)/(58-85) 111/81    Physical Exam   Constitutional: He is oriented to person, place, and time. He appears well-developed and well-nourished.   HENT:   Head: Normocephalic and atraumatic.   Eyes: EOM are normal. Pupils are equal, round, and reactive to light.   Neck: Normal range of motion. Neck supple.   Cardiovascular: Normal rate and regular rhythm.   Pulmonary/Chest: Effort normal and breath sounds normal.   Abdominal: Soft. Bowel sounds are normal.   Musculoskeletal: Normal range of motion.   Neurological: He is alert and oriented to person, place, and time.   Skin: Skin is warm and dry.   Erythema noted over right chest wall above port site.    Psychiatric: He has a normal mood and affect. His behavior is normal.     Results Review:  I have reviewed the labs, radiology results, and diagnostic studies.    Laboratory Data:   Results from last 7 days   Lab Units  07/20/19  0533 07/19/19  0555 07/18/19  0542   SODIUM mmol/L 139 139 138   POTASSIUM mmol/L 4.2 4.0 3.9   CHLORIDE mmol/L 101 101 100   CO2 mmol/L 27.0 28.0 28.0   BUN mg/dL 17 15 15   CREATININE mg/dL 0.75* 0.65* 0.74*   GLUCOSE mg/dL 101* 107* 114*   CALCIUM mg/dL 8.7 8.9 8.8   BILIRUBIN mg/dL 0.3 0.5 0.6   ALK PHOS U/L 103 110 109   ALT (SGPT) U/L 116* 165* 186*   AST (SGOT) U/L 52* 71* 85*   ANION GAP mmol/L 11.0 10.0 10.0     Estimated Creatinine Clearance: 117.2 mL/min (A) (by C-G formula based on SCr of 0.75 mg/dL (L)).          Results from last 7 days   Lab Units 07/20/19  0533 07/19/19  0555 07/18/19  0542 07/17/19  0532 07/16/19  0545   WBC 10*3/mm3 2.25* 2.79* 2.87* 2.43* 2.95*   HEMOGLOBIN g/dL 13.2 13.4 13.0 12.3* 12.5*   HEMATOCRIT % 38.0 38.8 37.8 37.8 37.5   PLATELETS 10*3/mm3 109* 116* 125* 128* 130*           Culture Data:   No results found for: BLOODCX  No results found for: URINECX  No results found for: RESPCX  No results found for: WOUNDCX  No results found for: STOOLCX  No components found for: BODYFLD    Radiology Data:   Imaging Results (last 24 hours)     ** No results found for the last 24 hours. **          I have reviewed the patient's current medications.     Assessment/Plan     Active Hospital Problems    Diagnosis POA   • Cellulitis of chest wall [L03.313] Yes   • Prostate cancer (CMS/HCC) [C61] Yes       Plan:    1.  Cellulitis, right chest wall:  Per pharmacy recommendation, Ancef to cover cellulitis and uti.  Discontinue Zosyn.  General surgery following.  Repeat blood cultures ordered per surgery.    2.  Urinary tract infection, E. coli: Ancef.  3.  Prostate/anal cancer:  Next chemotherapy scheduled in 3 weeks.  Patient is continuing radiation.    4.  Leukopenia secondary to recent chemotherapy:  Will continue to monitor the need for neutropenic precautions.        Discharge Planning: I expect patient to be discharged to home in 1-2 days.      This document has been  electronically signed by SUHAIL Maldonado on July 20, 2019 10:45 AM          Electronically signed by Lindsey Scott MD at 7/20/2019  5:56 PM     Benson Wasserman MD at 7/20/2019  6:01 AM          GENERAL SURGERY PROGRESS NOTE     LOS: 6 days     Chief Complaint:     Fever and chills    Interval History:     Mr. 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 At that time it was noted he had erythema overlying the Port-A-Cath to his right chest wall. Since admission the erythema around his Port-A-Cath has not resolved despite antibiotic therapy; he is scheduled for port removal this morning. He reports he last ate and drank at 1800 yesterday. He reports normal urination and bowel movements. He denies fever, chills, nausea, vomiting, diarrhea, constipation, abdominal pain, chest pain, and SOA.    Medication Review:     ceFAZolin 2 g Intravenous Q8H   docusate sodium 100 mg Oral BID   enoxaparin 40 mg Subcutaneous Q24H   famotidine 40 mg Oral Daily   nebivolol 10 mg Oral Nightly   oxybutynin 5 mg Oral TID   sodium chloride 3 mL Intravenous Q12H        Objective     Vital Signs:  Temp:  [97.2 °F (36.2 °C)-98.3 °F (36.8 °C)] 97.7 °F (36.5 °C)  Heart Rate:  [63-93] 66  Resp:  [16-20] 18  BP: (108-134)/(58-85) 125/58  No intake or output data in the 24 hours ending 07/20/19 0602    Physical Exam   Constitutional: He is oriented to person, place, and time. He appears well-developed and well-nourished.   IV to left hand, no signs of infection.   Cardiovascular: Normal rate, regular rhythm and normal heart sounds.   Pulmonary/Chest: Effort normal and breath sounds normal.   Abdominal: Soft. Normal appearance and bowel sounds are normal. There is no tenderness.   Neurological: He is alert and oriented to person, place, and time.   Skin: Skin is warm and dry. There is erythema (Erythema overlying Port-A-Cath to right chest  wall.).   Vitals reviewed.      Results Review:    Results from last 7 days   Lab Units 07/19/19  0555 07/18/19  0542 07/17/19  0531   SODIUM mmol/L 139 138 139   POTASSIUM mmol/L 4.0 3.9 3.7   CHLORIDE mmol/L 101 100 102   CO2 mmol/L 28.0 28.0 27.0   BUN mg/dL 15 15 12   CREATININE mg/dL 0.65* 0.74* 0.78   GLUCOSE mg/dL 107* 114* 120*   CALCIUM mg/dL 8.9 8.8 8.3*     Results from last 7 days   Lab Units 07/19/19  0555 07/18/19  0542 07/17/19  0532   WBC 10*3/mm3 2.79* 2.87* 2.43*   HEMOGLOBIN g/dL 13.4 13.0 12.3*   HEMATOCRIT % 38.8 37.8 37.8   PLATELETS 10*3/mm3 116* 125* 128*       Assessment:    Prostate cancer (CMS/HCC)    Cellulitis of chest wall    Acute UTI (urinary tract infection)      Plan:    Port-A-Cath removal today.           This document has been electronically signed by Benson Wasserman MD on July 20, 2019 6:02 AM        Electronically signed by Benson Wasserman MD at 7/20/2019  8:18 AM     Alfie Santiago MD at 7/19/2019  9:23 PM        Patient transesophageal echocardiogram was negative.  Patient had not had any febrile episode.  Patient blood pressure is stable.  Patient had not complained of symptoms of chest pain.    Patient is stable from the cardiac standpoint.    Would sign off at the present time.    Electronically signed by Alfie Santiago MD at 7/19/2019  9:24 PM     Alfie Santiago MD at 7/19/2019  2:29 PM        Patient was brought to the cardiac catheterization lab.  Patient underwent a transesophageal echocardiogram without any difficulty.  Patient mitral valve was structurally intact without any evidence of any vegetation.  The aortic valve was trileaflet with normal integrity without any evidence of any vegetation.  The tricuspid valve was structurally intact without any evidence of any vegetation.  Patient had preserved left ventricular systolic function.    Final impression: No evidence of any vegetation noted on any valve.    Patient was transferred to the floor in stable  condition.    Electronically signed by Alfie Santiago MD at 7/19/2019  2:31 PM     Coco Elizabeth APRN at 7/19/2019 12:05 PM              [facility] Medicine Services  INPATIENT PROGRESS NOTE    Length of Stay: 5  Date of Admission: 7/14/2019  Primary Care Physician: Sukumar Aleman MD    Subjective   Chief Complaint: No complaints    HPI:       7/19/2019: Patient underwent radiation this morning and is scheduled for port removal today by Dr. Wasserman.  Dr. Santiago evaluated the patient for suggestive tricuspid valve echodensity and the patient was recommended for DAVIDA.    This is a 64-year-old  male with past medical history of rectal and anal cancer (currently undergoing chemotherapy and radiation therapy) that reported to be HCM on 7/14/2019 with complaints of fever, chills, nausea and discomfort in the anterior right side of the chest above his Port-A-Cath.  Patient states that he received his port approximately 2 weeks ago.  Erythema was noted around the Port-A-Cath extending towards his neck.  The patient is being followed by Dr. Wasserman.  Patient does not have another active chemotherapy regimen due for 3 weeks.  The patient underwent radiation on 7/17/2019.  Antibiotics were de-escalated given that the staph species is not MRSA and most likely MSSA.  Patient was also noted to have a E. coli urinary tract infection.  Echocardiogram was ordered to rule out vegetation.  Possible mass noted tricuspid valve.  Dr. Santiago has been consulted for possible DAVIDA.    Review of Systems   Constitutional: Negative for activity change and fatigue.   HENT: Negative for ear pain and sore throat.    Eyes: Negative for pain and discharge.   Respiratory: Negative for cough and shortness of breath.    Cardiovascular: Negative for chest pain and palpitations.   Gastrointestinal: Negative for abdominal pain and nausea.   Endocrine: Negative for cold intolerance and heat intolerance.    Genitourinary: Negative for difficulty urinating and dysuria.   Musculoskeletal: Negative for arthralgias and gait problem.   Skin: Negative for color change and rash.        Erythema noted to right side of chest over port site.   Neurological: Negative for dizziness and weakness.   Psychiatric/Behavioral: Negative for agitation and confusion.        Objective    Temp:  [96.9 °F (36.1 °C)-98.3 °F (36.8 °C)] 98.3 °F (36.8 °C)  Heart Rate:  [63-82] 68  Resp:  [16-20] 18  BP: (115-128)/(66-79) 115/70    Physical Exam   Constitutional: He is oriented to person, place, and time. He appears well-developed and well-nourished.   HENT:   Head: Normocephalic and atraumatic.   Eyes: EOM are normal. Pupils are equal, round, and reactive to light.   Neck: Normal range of motion. Neck supple.   Cardiovascular: Normal rate and regular rhythm.   Pulmonary/Chest: Effort normal and breath sounds normal.   Abdominal: Soft. Bowel sounds are normal.   Musculoskeletal: Normal range of motion.   Neurological: He is alert and oriented to person, place, and time.   Skin: Skin is warm and dry.   Erythema noted over right chest wall above port site.    Psychiatric: He has a normal mood and affect. His behavior is normal.     Results Review:  I have reviewed the labs, radiology results, and diagnostic studies.    Laboratory Data:   Results from last 7 days   Lab Units 07/19/19  0555 07/18/19  0542 07/17/19  0531   SODIUM mmol/L 139 138 139   POTASSIUM mmol/L 4.0 3.9 3.7   CHLORIDE mmol/L 101 100 102   CO2 mmol/L 28.0 28.0 27.0   BUN mg/dL 15 15 12   CREATININE mg/dL 0.65* 0.74* 0.78   GLUCOSE mg/dL 107* 114* 120*   CALCIUM mg/dL 8.9 8.8 8.3*   BILIRUBIN mg/dL 0.5 0.6 0.5   ALK PHOS U/L 110 109 96   ALT (SGPT) U/L 165* 186* 196*   AST (SGOT) U/L 71* 85* 112*   ANION GAP mmol/L 10.0 10.0 10.0     Estimated Creatinine Clearance: 135.2 mL/min (A) (by C-G formula based on SCr of 0.65 mg/dL (L)).          Results from last 7 days   Lab Units  07/19/19  0555 07/18/19  0542 07/17/19  0532 07/16/19  0545 07/15/19  0548   WBC 10*3/mm3 2.79* 2.87* 2.43* 2.95* 2.99*   HEMOGLOBIN g/dL 13.4 13.0 12.3* 12.5* 12.8*   HEMATOCRIT % 38.8 37.8 37.8 37.5 38.3   PLATELETS 10*3/mm3 116* 125* 128* 130* 152           Culture Data:   No results found for: BLOODCX  No results found for: URINECX  No results found for: RESPCX  No results found for: WOUNDCX  No results found for: STOOLCX  No components found for: BODYFLD    Radiology Data:   Imaging Results (last 24 hours)     ** No results found for the last 24 hours. **          I have reviewed the patient's current medications.     Assessment/Plan     Active Hospital Problems    Diagnosis POA   • Acute UTI (urinary tract infection) [N39.0] Unknown   • Cellulitis of chest wall [L03.313] Yes   • Prostate cancer (CMS/HCC) [C61] Yes       Plan:    1.  Cellulitis, right chest wall:  Per pharmacy recommendation, Ancef to cover cellulitis and uti.  Discontinue Zosyn.  General surgery following.  Repeat blood cultures ordered per surgery.    2.  Urinary tract infection, E. coli: Ancef.  3.  Prostate/anal cancer:  Next chemotherapy scheduled in 3 weeks.  Patient is continuing radiation.  Last radiation 7/19/2019.    4.  Leukopenia secondary to recent chemotherapy:  Will continue to monitor the need for neutropenic precautions.        Discharge Planning: I expect patient to be discharged to home in 1-2 days.      This document has been electronically signed by SUHAIL Maldonado on July 19, 2019 12:05 PM          Electronically signed by Coco Elizabeth APRN at 7/19/2019 12:08 PM

## 2019-07-22 NOTE — DISCHARGE SUMMARY
[facility] Medicine Services  DISCHARGE SUMMARY       Date of Admission: 7/14/2019  Date of Discharge:  7/22/2019  Primary Care Physician: Sukumar Aleman MD    Presenting Problem/History of Present Illness:  Cellulitis of chest wall [L03.313]  Fever, unspecified fever cause [R50.9]       Final Discharge Diagnoses:  Active Hospital Problems    Diagnosis   • Cellulitis of chest wall   • Prostate cancer (CMS/[facility])       Consults:   Consults     Date and Time Order Name Status Description    7/18/2019 1419 Inpatient Cardiology Consult Completed     7/15/2019 1053 Inpatient Radiation Oncology Consult      7/14/2019 2202 Inpatient General Surgery Consult            Procedures Performed: Procedure(s):  REMOVAL VENOUS ACCESS DEVICE                Pertinent Test Results:   Lab Results (last 24 hours)     Procedure Component Value Units Date/Time    Catheter Culture - Cath Tip, Neck [[id number]]  (Abnormal) Collected:  07/20/19 0802    Specimen:  Cath Tip from Neck Updated:  07/22/19 0948     CATHETER CULTURE >15 CFU/mL - Indicative of infection. Staphylococcus aureus    Narrative:       Refer to previous tissue culture collected on 7/21 for BRANDIE's    Tissue / Bone Culture - Tissue, Chest, Right [[id number]]  (Abnormal)  (Susceptibility) Collected:  07/20/19 0731    Specimen:  Tissue from Chest, Right Updated:  07/22/19 0944     Tissue Culture Rare Staphylococcus aureus     Gram Stain Rare (1+) WBCs seen      Rare (1+) Gram positive cocci in pairs      Rare (1+) Gram positive bacilli resembling diphtheroids    Susceptibility      Staphylococcus aureus     BRANDIE     Clindamycin Susceptible     Erythromycin Susceptible     Inducible Clindamycin Resistance Negative     Oxacillin Susceptible     Rifampin Susceptible     Tetracycline Susceptible     Trimethoprim + Sulfamethoxazole Susceptible     Vancomycin Susceptible                Susceptibility Comments     Staphylococcus aureus    This  isolate does not demonstrate inducible clindamycin resistance in vitro.               CBC & Differential [[id number]] Collected:  07/22/19 0624    Specimen:  Blood Updated:  07/22/19 0831    Narrative:       The following orders were created for panel order CBC & Differential.  Procedure                               Abnormality         Status                     ---------                               -----------         ------                     Scan Slide[[id number]]                                       Final result               CBC Auto Differential[[id number]]        Abnormal            Final result                 Please view results for these tests on the individual orders.    Scan Slide [[id number]] Collected:  07/22/19 0624    Specimen:  Blood Updated:  07/22/19 0831     RBC Morphology Normal     WBC Morphology Normal     Platelet Estimate Decreased    Comprehensive Metabolic Panel [[id number]]  (Abnormal) Collected:  07/22/19 0624    Specimen:  Blood Updated:  07/22/19 0714     Glucose 99 mg/dL      BUN 13 mg/dL      Creatinine 0.72 mg/dL      Sodium 140 mmol/L      Potassium 4.2 mmol/L      Chloride 102 mmol/L      CO2 29.0 mmol/L      Calcium 8.7 mg/dL      Total Protein 6.6 g/dL      Albumin 3.50 g/dL      ALT (SGPT) 55 U/L      AST (SGOT) 29 U/L      Alkaline Phosphatase 88 U/L      Total Bilirubin 0.3 mg/dL      eGFR Non African Amer 110 mL/min/1.73      Globulin 3.1 gm/dL      A/G Ratio 1.1 g/dL      BUN/Creatinine Ratio 18.1     Anion Gap 9.0 mmol/L     Narrative:       GFR Normal >60  Chronic Kidney Disease <60  Kidney Failure <15    CBC Auto Differential [[id number]]  (Abnormal) Collected:  07/22/19 0624    Specimen:  Blood Updated:  07/22/19 0655     WBC 2.05 10*3/mm3      RBC 4.10 10*6/mm3      Hemoglobin 12.2 g/dL      Hematocrit 36.1 %      MCV 88.0 fL      MCH 29.8 pg      MCHC 33.8 g/dL      RDW 12.2 %      RDW-SD 39.1 fl      MPV 8.6 fL      Platelets 107 10*3/mm3      Neutrophil % 24.2 %       Lymphocyte % 42.0 %      Monocyte % 25.9 %      Eosinophil % 5.9 %      Basophil % 0.5 %      Immature Grans % 1.5 %      Neutrophils, Absolute 0.50 10*3/mm3      Lymphocytes, Absolute 0.86 10*3/mm3      Monocytes, Absolute 0.53 10*3/mm3      Eosinophils, Absolute 0.12 10*3/mm3      Basophils, Absolute 0.01 10*3/mm3      Immature Grans, Absolute 0.03 10*3/mm3      nRBC 0.0 /100 WBC     Tissue Pathology Exam [[id number]] Collected:  07/20/19 0732    Specimen:  Tissue from Chest, Right Updated:  07/22/19 0620        Imaging Results (last 24 hours)     Procedure Component Value Units Date/Time    IR PICC W Imaging Guidance [[id number]] Collected:  07/21/19 1058     Updated:  07/22/19 1012    Narrative:       PROCEDURE: XR CHEST POST CVA PORT    VIEWS:Single    INDICATION: PICC line placement    COMPARISON: CXR: 7/14/2019    FINDINGS:       - lines/tubes: Right-sided PICC line terminates in superior  vena cava. Interval removal of right-sided Mediport.    - cardiac: Size within normal limits.    - mediastinum: Contour within normal limits.     - lungs: No evidence of a focal air space process, pulmonary  interstitial edema, nodule(s)/mass. Minimal probable left base  atelectasis    - pleura: No evidence of  fluid.      - osseous: Unremarkable for age.        Impression:       Right-sided PICC line terminates in superior vena cava.       Electronically signed by:  Parul Prado MD  7/21/2019 11:37 AM  CDT Workstation: [id number]    XR Chest Post CVA Port [[id number]] Collected:  07/21/19 1115     Updated:  07/22/19 1012    Narrative:       PROCEDURE: XR CHEST POST CVA PORT    VIEWS:Single    INDICATION: PICC line placement    COMPARISON: CXR: 7/14/2019    FINDINGS:       - lines/tubes: Right-sided PICC line terminates in superior  vena cava. Interval removal of right-sided Mediport.    - cardiac: Size within normal limits.    - mediastinum: Contour within normal limits.     - lungs: No evidence of a focal air space process,  "pulmonary  interstitial edema, nodule(s)/mass. Minimal probable left base  atelectasis    - pleura: No evidence of  fluid.      - osseous: Unremarkable for age.        Impression:       Right-sided PICC line terminates in superior vena cava.       Electronically signed by:  Parul Prado MD  7/21/2019 11:37 AM  CDT Workstation: [id number]          Chief Complaint on Day of Discharge: No complaints    Hospital Course:  This is a 64-year-old  male with past medical history of rectal and anal cancer (currently undergoing chemotherapy and radiation therapy) that reported to [facility] on 7/14/2019 with complaints of fever, chills, nausea and discomfort in the anterior right side of the chest above his Port-A-Cath.  Patient states that he received his port approximately 2 weeks ago.  Erythema was noted around the Port-A-Cath extending towards his neck.  Patient was found to have a Staphylococcus bacteremia and E. coli urinary tract infection.  The patient was started on IV antibiotics and de-escalated according to cultures. The patient was evaluated by Dr. Wasserman and the patient's port was removed on 7/20/2019.  At discharge the patient needs 6 additional days of IV antibiotics to cover the Staphylococcus bacteremia.  Home health and option care were set up and the patient will receive his antibiotics at home tomorrow for the additional 6 days.  Home health will assist with PICC line care infusion therapy.  The patient received a PICC line prior to discharge for anticipation of IV antibiotics and upcoming chemotherapy.      Condition on Discharge: Stable    Physical Exam on Discharge:  /74 (BP Location: Left arm, Patient Position: Lying)   Pulse 64   Temp 96.9 °F (36.1 °C) (Oral)   Resp 18   Ht 172.7 cm (68\")   Wt 100 kg (220 lb 9.6 oz)   SpO2 93%   BMI 33.54 kg/m²   Physical Exam   Constitutional: He is oriented to person, place, and time. He appears well-developed and well-nourished.   HENT:   Head: " Normocephalic and atraumatic.   Eyes: EOM are normal. Pupils are equal, round, and reactive to light.   Neck: Normal range of motion. Neck supple.   Cardiovascular: Normal rate and regular rhythm.   Pulmonary/Chest: Effort normal and breath sounds normal.   Abdominal: Soft. Bowel sounds are normal.   Musculoskeletal: Normal range of motion.   Neurological: He is alert and oriented to person, place, and time.   Skin: Skin is warm and dry.   Psychiatric: He has a normal mood and affect. His behavior is normal.     [place] [facility] [place] Medications:     Discharge Medications      New Medications      Instructions Start Date   nafcillin 6 g in sodium chloride 0.9 % 250 mL IVPB   6 g, Intravenous, Every 12 Hours         Continue These Medications      Instructions Start Date   nebivolol 10 MG tablet  Commonly known as:  BYSTOLIC   10 mg, Oral, Nightly      ondansetron 4 MG tablet  Commonly known as:  ZOFRAN   4 mg, Oral, 4 Times Daily PRN      oxybutynin 5 MG tablet  Commonly known as:  DITROPAN   1 tablet, Oral, 3 Times Daily             Discharge Diet:   Diet Instructions     Diet: Regular      Discharge Diet:  Regular          Activity at Discharge:   Activity Instructions     Activity as Tolerated            Discharge Care Plan/Instructions: As above    Follow-up Appointment:  Additional Instructions for the Follow-ups that You Need to Schedule     Ambulatory Referral to Home Health   As directed      Face to Face Visit Date:  7/21/2019    Follow-up Provider for Plan of Care?:  I treated the patient in an acute care facility and will not continue treatment after discharge.    Follow-up Provider:  JOSE GUTIERREZ [[id number]]    Reason/Clinical Findings:  Needs 7 days of IV anbx/PICC line    Describe mobility limitations that make leaving home difficult:  Needs 7 days of IV anbx/PICC line    Nursing/Therapeutic Services Requested:  Skilled Nursing (Needs PICC line care)    Skilled  nursing orders:  Medication education (IV anbx x 7 days) PICC line care/instruction Infusion therapy    Frequency:  1 Week 1            Contact information for follow-up providers     [facility] REFERRAL Follow up.    Specialty:  Home Health Services  Contact information:  [address]  [facility] [address]           Sukumar Aleman MD Follow up in 1 week(s).    Specialty:  Family Medicine  Contact information:  [address] [address]  [phone]             Benson Wasserman MD Follow up in 2 week(s).    Specialty:  General Surgery  Why:  s/p port removal.  Contact information:  [address] [place] [address] [address]  [facility] [address] [phone]                   Contact information for after-discharge care     [facility]     [facility] Follow up.    Service:  Home Health Services  Contact information:  [address] [facility]   [place] [address] [phone]                             Test Results Pending at Discharge:    Order Current Status    Tissue Pathology Exam In process            This document has been electronically signed by SUHAIL Maldonado on July 22, 2019 4:06 PM        Time: Greater than 30 minutes.

## 2019-07-22 NOTE — PROGRESS NOTES
GENERAL SURGERY PROGRESS NOTE     LOS: 8 days     Chief Complaint:         Interval History:     Mr. Shaw, 65 YO man, POD 2 of port removal is doing well. He denies pain this morning. He is able to urinate, pass flatus, and have BM with no problems.     Medication Review:     ceFAZolin 2 g Intravenous Q8H   docusate sodium 100 mg Oral BID   enoxaparin 40 mg Subcutaneous Q24H   famotidine 40 mg Oral Daily   nebivolol 10 mg Oral Nightly   oxybutynin 5 mg Oral TID   sodium chloride 3 mL Intravenous Q12H        Objective     Vital Signs:  Temp:  [97.4 °F (36.3 °C)-98.8 °F (37.1 °C)] 97.8 °F (36.6 °C)  Heart Rate:  [72-86] 72  Resp:  [16-18] 18  BP: (102-139)/(57-79) 106/59    Intake/Output Summary (Last 24 hours) at 7/22/2019 0520  Last data filed at 7/21/2019 0700  Gross per 24 hour   Intake 360 ml   Output --   Net 360 ml       Physical Exam   Constitutional: He is oriented to person, place, and time. He appears well-developed and well-nourished.   Cardiovascular: Normal rate, regular rhythm, normal heart sounds and intact distal pulses.   Pulmonary/Chest: Effort normal and breath sounds normal.   Incision was C/D/I with no weeping.  The original incision for the port insertion did have slight weeping. Area is still warm to touch and erythematous.        Abdominal: Soft. Bowel sounds are normal. He exhibits no distension. There is no tenderness.   Neurological: He is alert and oriented to person, place, and time.   Skin: Skin is warm and dry.   Psychiatric: He has a normal mood and affect.   Nursing note and vitals reviewed.      Results Review:    Results from last 7 days   Lab Units 07/21/19  0547 07/20/19  0533 07/19/19  0555   SODIUM mmol/L 140 139 139   POTASSIUM mmol/L 4.0 4.2 4.0   CHLORIDE mmol/L 102 101 101   CO2 mmol/L 28.0 27.0 28.0   BUN mg/dL 14 17 15   CREATININE mg/dL 0.70* 0.75* 0.65*   GLUCOSE mg/dL 102* 101* 107*   CALCIUM mg/dL 8.7 8.7 8.9     Results from last 7 days   Lab Units 07/21/19  05  07/20/19  0533 07/19/19  0555   WBC 10*3/mm3 2.05* 2.25* 2.79*   HEMOGLOBIN g/dL 12.4* 13.2 13.4   HEMATOCRIT % 36.9* 38.0 38.8   PLATELETS 10*3/mm3 103* 109* 116*       Assessment:    Prostate cancer (CMS/HCC)    Cellulitis of chest wall      Improving.     Plan:    Discharge.           This document has been electronically signed by Savanna Garcia, Medical Student on July 22, 2019 5:20 AM

## 2019-07-22 NOTE — PROGRESS NOTES
[facility] Medicine Services  INPATIENT PROGRESS NOTE    Length of Stay: 8  Date of Admission: 7/14/2019  Primary Care Physician: Sukumar Aleman MD    Subjective   Chief Complaint: No complaints    HPI:     7/22/2019: Case management is working with the patient's insurance company and [employer] to try to obtain a continuous nafcillin so the patient can be discharged with home IV antibiotics.  Patient has no complaints today.  No redness noted around previous port site.    7/21/2019: The patient will need 14 days of IV antibiotics after the first day of negative blood culture which was 7/14/2019.  As of today he will need 7 additional days.  Patient was sent down this morning for a PICC line in anticipation of IV antibiotics and chemotherapy.  Case management has been notified to try to obtain approval for methicillin ball so the patient will only require one continuous dose daily.  Patient states he needs to get back to work and has band camp this week and is anxious for discharge.  Home health has been set up for PICC care and infusion therapy.  The patient can be discharged once this is set up.    7/20/2019: Patient underwent DAVIDA yesterday with no signs of vegetation.  Patient underwent port removal this a.m. by Dr. Wasserman with no complications.    7/19/2019: Patient underwent radiation this morning and is scheduled for port removal today by Dr. Wasserman.  Dr. Santiago evaluated the patient for suggestive tricuspid valve echodensity and the patient was recommended for DAVIDA.    This is a 64-year-old  male with past medical history of rectal and anal cancer (currently undergoing chemotherapy and radiation therapy) that reported to be HCM on 7/14/2019 with complaints of fever, chills, nausea and discomfort in the anterior right side of the chest above his Port-A-Cath.  Patient states that he received his port approximately 2 weeks ago.  Erythema was noted around the  Port-A-Cath extending towards his neck.  The patient is being followed by Dr. Wasserman.  Patient does not have another active chemotherapy regimen due for 3 weeks.  The patient underwent radiation on 7/17/2019.  Antibiotics were de-escalated given that the staph species is not MRSA and most likely MSSA.  Patient was also noted to have a E. coli urinary tract infection.  Echocardiogram was ordered to rule out vegetation.  Possible mass noted tricuspid valve.  Dr. Santiago has been consulted for possible DAVIDA.    Review of Systems   Constitutional: Negative for activity change and fatigue.   HENT: Negative for ear pain and sore throat.    Eyes: Negative for pain and discharge.   Respiratory: Negative for cough and shortness of breath.    Cardiovascular: Negative for chest pain and palpitations.   Gastrointestinal: Negative for abdominal pain and nausea.   Endocrine: Negative for cold intolerance and heat intolerance.   Genitourinary: Negative for difficulty urinating and dysuria.   Musculoskeletal: Negative for arthralgias and gait problem.   Skin: Negative for color change and rash.        Erythema noted to right side of chest over port site.   Neurological: Negative for dizziness and weakness.   Psychiatric/Behavioral: Negative for agitation and confusion.        Objective    Temp:  [97.4 °F (36.3 °C)-98.8 °F (37.1 °C)] 97.8 °F (36.6 °C)  Heart Rate:  [71-86] 71  Resp:  [18] 18  BP: (106-139)/(57-79) 121/67    Physical Exam   Constitutional: He is oriented to person, place, and time. He appears well-developed and well-nourished.   HENT:   Head: Normocephalic and atraumatic.   Eyes: EOM are normal. Pupils are equal, round, and reactive to light.   Neck: Normal range of motion. Neck supple.   Cardiovascular: Normal rate and regular rhythm.   Pulmonary/Chest: Effort normal and breath sounds normal.   Abdominal: Soft. Bowel sounds are normal.   Musculoskeletal: Normal range of motion.   Neurological: He is alert and oriented  to person, place, and time.   Skin: Skin is warm and dry.   Erythema noted over right chest wall above port site.    Psychiatric: He has a normal mood and affect. His behavior is normal.     Results Review:  I have reviewed the labs, radiology results, and diagnostic studies.    Laboratory Data:   Results from last 7 days   Lab Units 07/22/19  0624 07/21/19  0547 07/20/19  0533   SODIUM mmol/L 140 140 139   POTASSIUM mmol/L 4.2 4.0 4.2   CHLORIDE mmol/L 102 102 101   CO2 mmol/L 29.0 28.0 27.0   BUN mg/dL 13 14 17   CREATININE mg/dL 0.72* 0.70* 0.75*   GLUCOSE mg/dL 99 102* 101*   CALCIUM mg/dL 8.7 8.7 8.7   BILIRUBIN mg/dL 0.3 0.2 0.3   ALK PHOS U/L 88 93 103   ALT (SGPT) U/L 55* 82* 116*   AST (SGOT) U/L 29 40 52*   ANION GAP mmol/L 9.0 10.0 11.0     Estimated Creatinine Clearance: 120.3 mL/min (A) (by C-G formula based on SCr of 0.72 mg/dL (L)).          Results from last 7 days   Lab Units 07/22/19  0624 07/21/19  0547 07/20/19  0533 07/19/19  0555 07/18/19  0542   WBC 10*3/mm3 2.05* 2.05* 2.25* 2.79* 2.87*   HEMOGLOBIN g/dL 12.2* 12.4* 13.2 13.4 13.0   HEMATOCRIT % 36.1* 36.9* 38.0 38.8 37.8   PLATELETS 10*3/mm3 107* 103* 109* 116* 125*           Culture Data:   No results found for: BLOODCX  No results found for: URINECX  No results found for: RESPCX  No results found for: WOUNDCX  No results found for: STOOLCX  No components found for: BODYFLD    Radiology Data:   Imaging Results (last 24 hours)     Procedure Component Value Units Date/Time    IR PICC W Imaging Guidance [[id number]] Collected:  07/21/19 1058     Updated:  07/22/19 1012    Narrative:       PROCEDURE: XR CHEST POST CVA PORT    VIEWS:Single    INDICATION: PICC line placement    COMPARISON: CXR: 7/14/2019    FINDINGS:       - lines/tubes: Right-sided PICC line terminates in superior  vena cava. Interval removal of right-sided Mediport.    - cardiac: Size within normal limits.    - mediastinum: Contour within normal limits.     - lungs: No evidence  of a focal air space process, pulmonary  interstitial edema, nodule(s)/mass. Minimal probable left base  atelectasis    - pleura: No evidence of  fluid.      - osseous: Unremarkable for age.        Impression:       Right-sided PICC line terminates in superior vena cava.       Electronically signed by:  Parul Prado MD  7/21/2019 11:37 AM  CDT Workstation: [id number]    XR Chest Post CVA Port [[id number]] Collected:  07/21/19 1115     Updated:  07/22/19 1012    Narrative:       PROCEDURE: XR CHEST POST CVA PORT    VIEWS:Single    INDICATION: PICC line placement    COMPARISON: CXR: 7/14/2019    FINDINGS:       - lines/tubes: Right-sided PICC line terminates in superior  vena cava. Interval removal of right-sided Mediport.    - cardiac: Size within normal limits.    - mediastinum: Contour within normal limits.     - lungs: No evidence of a focal air space process, pulmonary  interstitial edema, nodule(s)/mass. Minimal probable left base  atelectasis    - pleura: No evidence of  fluid.      - osseous: Unremarkable for age.        Impression:       Right-sided PICC line terminates in superior vena cava.       Electronically signed by:  Parul Prado MD  7/21/2019 11:37 AM  CDT Workstation: [id number]    US Guidance PICC NC [[id number]] Collected:  07/21/19 1106     Updated:  07/21/19 1242    Narrative:       PROCEDURE: XR CHEST POST CVA PORT    VIEWS:Single    INDICATION: PICC line placement    COMPARISON: CXR: 7/14/2019    FINDINGS:       - lines/tubes: Right-sided PICC line terminates in superior  vena cava. Interval removal of right-sided Mediport.    - cardiac: Size within normal limits.    - mediastinum: Contour within normal limits.     - lungs: No evidence of a focal air space process, pulmonary  interstitial edema, nodule(s)/mass. Minimal probable left base  atelectasis    - pleura: No evidence of  fluid.      - osseous: Unremarkable for age.        Impression:       Right-sided PICC line terminates in superior vena  cava.       Electronically signed by:  Parul Prado MD  7/21/2019 11:37 AM  CDT Workstation: [id number]          I have reviewed the patient's current medications.     Assessment/Plan     Active Hospital Problems    Diagnosis POA   • Cellulitis of chest wall [L03.313] Yes   • Prostate cancer (CMS/HCC) [C61] Yes       Plan:    1.  Cellulitis, right chest wall:  Per pharmacy recommendation, Ancef to cover cellulitis and uti.  Discontinue Zosyn.  General surgery following.  Repeat blood cultures ordered per surgery.    2.  Urinary tract infection, E. Coli: This has been adequately covered.  3.  Prostate/anal cancer:  Next chemotherapy scheduled in 2 weeks.  Patient is continuing radiation.    4.  Leukopenia secondary to recent chemotherapy:  Will continue to monitor the need for neutropenic precautions.  5.  Bacteremia, Staphylococcus: The patient will need 6 additional days of IV antibiotics for a complete course of 14 days status post negative blood culture, which was 7/14/2019.  Case management is setting this up for the patient to do at home.        Discharge Planning: I expect patient to be discharged to home in 1-2 days.      This document has been electronically signed by SUHAIL Maldonado on July 22, 2019 12:35 PM

## 2019-07-22 NOTE — PLAN OF CARE
Problem: Patient Care Overview  Goal: Plan of Care Review  Outcome: Ongoing (interventions implemented as appropriate)   07/22/19 1110   Coping/Psychosocial   Plan of Care Reviewed With patient   Plan of Care Review   Progress improving   OTHER   Outcome Summary Patient awaiting precert. Denies any pain or any complaints.

## 2019-07-22 NOTE — PLAN OF CARE
Problem: Patient Care Overview  Goal: Plan of Care Review  Outcome: Ongoing (interventions implemented as appropriate)   07/22/19 0220   Coping/Psychosocial   Plan of Care Reviewed With patient   Plan of Care Review   Progress no change   OTHER   Outcome Summary pt vs stable, awaiting on pre-cert for medication, anticipated discharge tomm.     Goal: Individualization and Mutuality  Outcome: Ongoing (interventions implemented as appropriate)    Goal: Discharge Needs Assessment  Outcome: Ongoing (interventions implemented as appropriate)    Goal: Interprofessional Rounds/Family Conf  Outcome: Ongoing (interventions implemented as appropriate)      Problem: Infection, Risk/Actual (Adult)  Goal: Infection Prevention/Resolution  Outcome: Ongoing (interventions implemented as appropriate)      Problem: Oncology Care (Adult)  Goal: Signs and Symptoms of Listed Potential Problems Will be Absent, Minimized or Managed (Oncology Care)  Outcome: Ongoing (interventions implemented as appropriate)      Problem: Chemotherapy Effects (Adult)  Goal: Signs and Symptoms of Listed Potential Problems Will be Absent, Minimized or Managed (Chemotherapy Effects)  Outcome: Ongoing (interventions implemented as appropriate)      Problem: Skin and Soft Tissue Infection (Adult)  Goal: Signs and Symptoms of Listed Potential Problems Will be Absent, Minimized or Managed (Skin and Soft Tissue Infection)  Outcome: Ongoing (interventions implemented as appropriate)

## 2019-07-22 NOTE — DISCHARGE PLACEMENT REQUEST
"Leena Rangel RN [facility]  [phone] phone  [phone] fax  Referral for Home IV Infusions  Has PICC line in place    Juan Antonio Shaw (64 y.o. Male)     Date of Birth Social Security Number Address Home Phone MRN    1955  [address] [address] [phone] [id number]    Orthodox Marital Status          Gnosticism        Admission Date Admission Type Admitting Provider Attending Provider Department, Room/Bed    7/14/19 Emergency Gilson Lucio MD Ol[place]kan, David B, MD [address], 405/1    Discharge Date Discharge Disposition Discharge Destination                       Attending Provider:  Gilson Lucio MD    Allergies:  No Known Allergies    Isolation:  None   Infection:  None   Code Status:  CPR    Ht:  172.7 cm (68\")   Wt:  100 kg (220 lb 9.6 oz)    Admission Cmt:  None   Principal Problem:  None                Active Insurance as of 7/14/2019     Primary Coverage     Payor Plan Insurance Group Employer/Plan Group    [employer] EMPL[employer]EE [id number]     Payor Plan Address Payor Plan Phone Number Payor Plan Fax Number Effective Dates    [id number] [phone]  1/1/2015 - None Entered    [place] [address]       Subscriber Name Subscriber Birth Date Member ID       JACKIE SHAW 1/29/1959 [id number]                 Emergency Contacts      (Rel.) Home Phone Work Phone Mobile Phone    Colinhilario (Spouse) [phone] -- [phone]        nafcillin 6 g in sodium chloride 0.9 % 250 mL IVPB [[id number]] (Order [id number])   Order   Date: 7/21/2019 Department: [address] Ordering/Authorizing: Levill, Coco G, APRN   Medication   nafcillin 6 g in sodium chloride 0.9 % 250 mL IVPB [[id number]]   Order History   Outpatient   Date/Time Action Taken User Additional Information   07/21/19 1037 Sign Levill, Coco G, APRN    nafcillin 6 g in sodium chloride " 0.9 % 250 mL IVPB [[id number]]   Order Details   Dose: 6 g Route: Intravenous Frequency: Every 12 Hours @ 20.8 mL/hr over 12 Hours   Dispense Quantity: -- Refills: -- Fills remaining: --           Sig: Infuse 6 g into a venous catheter Every 12 (Twelve) Hours.          Written Date: 07/21/19 Expiration Date: 07/20/20     Start Date: 07/21/19 End Date: --            Ordering Provider:  Coco Elizabeth APRN Phone:  [phone] Fax:  [phone]    Address:  [address] [place] [address] NPI:  [id number]            Authorizing Provider:  Coco Elizabeth APRN Phone:  [phone] Fax:  [phone]    Address:  [address] [place] [address] NPI:  [id number]            Ordering User:  Coco Elizabeth APRN                   Components     Component Ordered Dose Dispense Quantity   nafcillin 2 g reconstituted solution 6 g 6 g   sodium chloride 0.9 % solution 250 mL 250 mL         Pharmacy:  [address] - [phone]  - [phone] FX Phone:  [phone] Fax:  [phone]    Address:  [address] [place] [address]      Pharmacy Comments:  --          Fill quantity remaining:  -- Fill quantity used:  --

## 2019-07-23 ENCOUNTER — HOSPITAL ENCOUNTER (OUTPATIENT)
Dept: RADIATION ONCOLOGY | Facility: HOSPITAL | Age: 64
Discharge: HOME | End: 2019-07-23

## 2019-07-23 ENCOUNTER — READMISSION MANAGEMENT (OUTPATIENT)
Dept: CALL CENTER | Facility: HOSPITAL | Age: 64
End: 2019-07-23

## 2019-07-23 PROCEDURE — 77427 RADIATION TX MANAGEMENT X5: CPT | Performed by: RADIOLOGY

## 2019-07-23 PROCEDURE — 77386: CPT | Performed by: RADIOLOGY

## 2019-07-23 PROCEDURE — 77014 CHG CT GUIDANCE RADIATION THERAPY FLDS PLACEMENT: CPT | Performed by: RADIOLOGY

## 2019-07-23 NOTE — OUTREACH NOTE
Prep Survey      Responses   Facility patient discharged from?  [facility]   Is patient eligible?  Yes   Discharge diagnosis  Cellulitis of chest wall,  Prostate cancer,  REMOVAL VENOUS ACCESS DEVICE   Does the patient have one of the following disease processes/diagnoses(primary or secondary)?  General Surgery   Does the patient have Home health ordered?  Yes   What is the Home health agency?   [facility]   Is there a DME ordered?  Yes   What DME was ordered?  [employer] - infusions   Prep survey completed?  Yes          Eda Prather RN

## 2019-07-23 NOTE — PAYOR COMM NOTE
"Erika Briseno  P: [phone]  F: [phone]    Ref#[id number]    **Patient d/c on 07/22, please fax back case determination**        Juan Antonio Barbosa (64 y.o. Male)     Date of Birth Social Security Number Address Home Phone MRN    1955  [address] [facility] [address] [phone] [id number]    [place] Marital Status          Mormon        Admission Date Admission Type Admitting Provider Attending Provider Department, Room/Bed    7/14/19 Emergency Gilson Lucio MD  [address], 405/1    Discharge Date Discharge Disposition Discharge Destination        7/22/2019 Home-Health Care Svc              Attending Provider:  (none)   Allergies:  No Known Allergies    Isolation:  None   Infection:  None   Code Status:  Prior    Ht:  172.7 cm (68\")   Wt:  100 kg (220 lb 9.6 oz)    Admission Cmt:  None   Principal Problem:  None                Active Insurance as of 7/14/2019     Primary Coverage     Payor Plan Insurance Group Employer/Plan Group    [facility] BLUE CROSS [facility] [facility] [employer] EMPLOYEE [id number]     Payor Plan Address Payor Plan Phone Number Payor Plan Fax Number Effective Dates    [address] [phone]  1/1/2015 - None Entered    [place] [address]       Subscriber Name Subscriber Birth Date Member ID       JACKIE BARBOSA 1/29/1960 [id number]                 Emergency Contacts      (Rel.) Home Phone Work Phone Mobile Phone    hilario Barbosa (Spouse) [phone] -- [phone]               Discharge Summary      Coco Elizabeth APRN at 7/22/2019  4:06 PM     Attestation signed by Jewel Sharpe MD at 7/22/2019  4:30 PM    I personally evaluated and examined the patient in conjunction with SUHAIL Marie and agree with the assessment, treatment plan, and disposition of the patient as recorded.  Cardiovascular: Normal S1 and S2.  Lungs: Good air entry bilaterally.                      [facility] Team " [facility] Medicine Services  DISCHARGE SUMMARY       Date of Admission: 7/14/2019  Date of Discharge:  7/22/2019  Primary Care Physician: Sukumar Aleman MD    Presenting Problem/History of Present Illness:  Cellulitis of chest wall [L03.313]  Fever, unspecified fever cause [R50.9]       Final Discharge Diagnoses:  Active Hospital Problems    Diagnosis   • Cellulitis of chest wall   • Prostate cancer (CMS/[facility])       Consults:   Consults     Date and Time Order Name Status Description    7/18/2019 1419 Inpatient Cardiology Consult Completed     7/15/2019 1053 Inpatient Radiation Oncology Consult      7/14/2019 2202 Inpatient General Surgery Consult            Procedures Performed: Procedure(s):  REMOVAL VENOUS ACCESS DEVICE                Pertinent Test Results:   Lab Results (last 24 hours)     Procedure Component Value Units Date/Time    Catheter Culture - Cath Tip, Neck [[id number]]  (Abnormal) Collected:  07/20/19 0802    Specimen:  Cath Tip from Neck Updated:  07/22/19 0948     CATHETER CULTURE >15 CFU/mL - Indicative of infection. Staphylococcus aureus    Narrative:       Refer to previous tissue culture collected on 7/21 for BRANDIE's    Tissue / Bone Culture - Tissue, Chest, Right [[id number]]  (Abnormal)  (Susceptibility) Collected:  07/20/19 0731    Specimen:  Tissue from Chest, Right Updated:  07/22/19 0944     Tissue Culture Rare Staphylococcus aureus     Gram Stain Rare (1+) WBCs seen      Rare (1+) Gram positive cocci in pairs      Rare (1+) Gram positive bacilli resembling diphtheroids    Susceptibility      Staphylococcus aureus     BRANDIE     Clindamycin Susceptible     Erythromycin Susceptible     Inducible Clindamycin Resistance Negative     Oxacillin Susceptible     Rifampin Susceptible     Tetracycline Susceptible     Trimethoprim + Sulfamethoxazole Susceptible     Vancomycin Susceptible                Susceptibility Comments     Staphylococcus aureus    This isolate does not demonstrate  inducible clindamycin resistance in vitro.               CBC & Differential [[id number]] Collected:  07/22/19 0624    Specimen:  Blood Updated:  07/22/19 0831    Narrative:       The following orders were created for panel order CBC & Differential.  Procedure                               Abnormality         Status                     ---------                               -----------         ------                     Scan Slide[[id number]]                                       Final result               CBC Auto Differential[[id number]]        Abnormal            Final result                 Please view results for these tests on the individual orders.    Scan Slide [[id number]] Collected:  07/22/19 0624    Specimen:  Blood Updated:  07/22/19 0831     RBC Morphology Normal     WBC Morphology Normal     Platelet Estimate Decreased    Comprehensive Metabolic Panel [[id number]]  (Abnormal) Collected:  07/22/19 0624    Specimen:  Blood Updated:  07/22/19 0714     Glucose 99 mg/dL      BUN 13 mg/dL      Creatinine 0.72 mg/dL      Sodium 140 mmol/L      Potassium 4.2 mmol/L      Chloride 102 mmol/L      CO2 29.0 mmol/L      Calcium 8.7 mg/dL      Total Protein 6.6 g/dL      Albumin 3.50 g/dL      ALT (SGPT) 55 U/L      AST (SGOT) 29 U/L      Alkaline Phosphatase 88 U/L      Total Bilirubin 0.3 mg/dL      eGFR Non African Amer 110 mL/min/1.73      Globulin 3.1 gm/dL      A/G Ratio 1.1 g/dL      BUN/Creatinine Ratio 18.1     Anion Gap 9.0 mmol/L     Narrative:       GFR Normal >60  Chronic Kidney Disease <60  Kidney Failure <15    CBC Auto Differential [[id number]]  (Abnormal) Collected:  07/22/19 0624    Specimen:  Blood Updated:  07/22/19 0655     WBC 2.05 10*3/mm3      RBC 4.10 10*6/mm3      Hemoglobin 12.2 g/dL      Hematocrit 36.1 %      MCV 88.0 fL      MCH 29.8 pg      MCHC 33.8 g/dL      RDW 12.2 %      RDW-SD 39.1 fl      MPV 8.6 fL      Platelets 107 10*3/mm3      Neutrophil % 24.2 %      Lymphocyte % 42.0 %       Monocyte % 25.9 %      Eosinophil % 5.9 %      Basophil % 0.5 %      Immature Grans % 1.5 %      Neutrophils, Absolute 0.50 10*3/mm3      Lymphocytes, Absolute 0.86 10*3/mm3      Monocytes, Absolute 0.53 10*3/mm3      Eosinophils, Absolute 0.12 10*3/mm3      Basophils, Absolute 0.01 10*3/mm3      Immature Grans, Absolute 0.03 10*3/mm3      nRBC 0.0 /100 WBC     Tissue Pathology Exam [[id number]] Collected:  07/20/19 0732    Specimen:  Tissue from Chest, Right Updated:  07/22/19 0620        Imaging Results (last 24 hours)     Procedure Component Value Units Date/Time    IR PICC W Imaging Guidance [[id number]] Collected:  07/21/19 1058     Updated:  07/22/19 1012    Narrative:       PROCEDURE: XR CHEST POST CVA PORT    VIEWS:Single    INDICATION: PICC line placement    COMPARISON: CXR: 7/14/2019    FINDINGS:       - lines/tubes: Right-sided PICC line terminates in superior  vena cava. Interval removal of right-sided Mediport.    - cardiac: Size within normal limits.    - mediastinum: Contour within normal limits.     - lungs: No evidence of a focal air space process, pulmonary  interstitial edema, nodule(s)/mass. Minimal probable left base  atelectasis    - pleura: No evidence of  fluid.      - osseous: Unremarkable for age.        Impression:       Right-sided PICC line terminates in superior vena cava.       Electronically signed by:  Parul Prado MD  7/21/2019 11:37 AM  CDT Workstation: [id number]    XR Chest Post CVA Port [[id number]] Collected:  07/21/19 1115     Updated:  07/22/19 1012    Narrative:       PROCEDURE: XR CHEST POST CVA PORT    VIEWS:Single    INDICATION: PICC line placement    COMPARISON: CXR: 7/14/2019    FINDINGS:       - lines/tubes: Right-sided PICC line terminates in superior  vena cava. Interval removal of right-sided Mediport.    - cardiac: Size within normal limits.    - mediastinum: Contour within normal limits.     - lungs: No evidence of a focal air space process, pulmonary  interstitial  "edema, nodule(s)/mass. Minimal probable left base  atelectasis    - pleura: No evidence of  fluid.      - osseous: Unremarkable for age.        Impression:       Right-sided PICC line terminates in superior vena cava.       Electronically signed by:  Parul Prado MD  7/21/2019 11:37 AM  CDT Workstation: [id number]          Chief Complaint on Day of Discharge: No complaints    Hospital Course:  This is a 64-year-old  male with past medical history of rectal and anal cancer (currently undergoing chemotherapy and radiation therapy) that reported to [facility] on 7/14/2019 with complaints of fever, chills, nausea and discomfort in the anterior right side of the chest above his Port-A-Cath.  Patient states that he received his port approximately 2 weeks ago.  Erythema was noted around the Port-A-Cath extending towards his neck.   Patient was found to have a Staphylococcus bacteremia and E. coli urinary tract infection.  The patient was started on IV antibiotics and de-escalated according to cultures. The patient was evaluated by Dr. Wasserman and the patient's port was removed on 7/20/2019.  At discharge the patient needs 6 additional days of IV antibiotics to cover the Staphylococcus bacteremia.  Home health and [facility] were set up and the patient will receive his antibiotics at home tomorrow for the additional 6 days.  Home health will assist with PICC line care infusion therapy.  The patient received a PICC line prior to discharge for anticipation of IV antibiotics and upcoming chemotherapy.      Condition on Discharge: Stable    Physical Exam on Discharge:  /74 (BP Location: Left arm, Patient Position: Lying)   Pulse 64   Temp 96.9 °F (36.1 °C) (Oral)   Resp 18   Ht 172.7 cm (68\")   Wt 100 kg (220 lb 9.6 oz)   SpO2 93%   BMI 33.54 kg/m²    Physical Exam   Constitutional: He is oriented to person, place, and time. He appears well-developed and well-nourished.   HENT:   Head: Normocephalic and atraumatic. "   Eyes: EOM are normal. Pupils are equal, round, and reactive to light.   Neck: Normal range of motion. Neck supple.   Cardiovascular: Normal rate and regular rhythm.   Pulmonary/Chest: Effort normal and breath sounds normal.   Abdominal: Soft. Bowel sounds are normal.   Musculoskeletal: Normal range of motion.   Neurological: He is alert and oriented to person, place, and time.   Skin: Skin is warm and dry.   Psychiatric: He has a normal mood and affect. His behavior is normal.     Discharge Disposition:  Home-Health Care [facility] [place]    Discharge Medications:     Discharge Medications      New Medications      Instructions Start Date   nafcillin 6 g in sodium chloride 0.9 % 250 mL IVPB   6 g, Intravenous, Every 12 Hours         Continue These Medications      Instructions Start Date   nebivolol 10 MG tablet  Commonly known as:  BYSTOLIC   10 mg, Oral, Nightly      ondansetron 4 MG tablet  Commonly known as:  ZOFRAN   4 mg, Oral, 4 Times Daily PRN      oxybutynin 5 MG tablet  Commonly known as:  DITROPAN   1 tablet, Oral, 3 Times Daily             Discharge Diet:   Diet Instructions     Diet: Regular      Discharge Diet:  Regular          Activity at Discharge:   Activity Instructions     Activity as Tolerated            Discharge Care Plan/Instructions: As above    Follow-up Appointment:  Additional Instructions for the Follow-ups that You Need to Schedule     Ambulatory Referral to Home Health   As directed      Face to Face Visit Date:  7/21/2019    Follow-up Provider for Plan of Care?:  I treated the patient in an acute care facility and will not continue treatment after discharge.    Follow-up Provider:  JOSE GUTIERREZ [[id number]]    Reason/Clinical Findings:  Needs 7 days of IV anbx/PICC line    Describe mobility limitations that make leaving home difficult:  Needs 7 days of IV anbx/PICC line    Nursing/Therapeutic Services Requested:  Skilled Nursing (Needs PICC line care)    Skilled nursing orders:  Medication  education (IV anbx x 7 days) PICC line care/instruction Infusion therapy    Frequency:  1 Week 1            Contact information for follow-up providers     [facility] REFERRAL Follow up.    Specialty:  Home Health Services  Contact information:  [address]  [facility] [address]           Sukumar Aleman MD Follow up in 1 week(s).    Specialty:  Family Medicine  Contact information:  [address] [facility] [address] [phone]             Benson Wasserman MD Follow up in 2 week(s).    Specialty:  General Surgery  Why:  s/p port removal.  Contact information:  [address] [place] [address] [address]  [facility] [address] [phone]                   Contact information for after-discharge care     [facility]     [facility] Follow up.    Service:  Home Health Services  Contact information:  [address]  [place] [address] [phone]                             Test Results Pending at Discharge:    Order Current Status    Tissue Pathology Exam In process            This document has been electronically signed by SUHAIL Maldonado on July 22, 2019 4:06 PM        Time: Greater than 30 minutes.                Electronically signed by Jacinda Sharpe MD at 7/22/2019  4:30 PM       Discharge Order (From admission, onward)    Start     Ordered    07/22/19 1533  Discharge patient  Once     Expected Discharge Date:  07/22/19    [place] [facility]    Physician of Record for Attribution - Please select from Treatment Team:  JACINDA SHARPE [[id number]]    Review needed by CMO to determine Physician of Record:  No       Question Answer Comment   Physician of Record for Attribution - Please select from Treatment Team JACINDA SHARPE    Review needed by CMO to determine Physician of Record No        07/22/19 1535

## 2019-07-24 ENCOUNTER — HOSPITAL ENCOUNTER (OUTPATIENT)
Dept: RADIATION ONCOLOGY | Facility: HOSPITAL | Age: 64
Discharge: HOME | End: 2019-07-24

## 2019-07-24 ENCOUNTER — READMISSION MANAGEMENT (OUTPATIENT)
Dept: CALL CENTER | Facility: HOSPITAL | Age: 64
End: 2019-07-24

## 2019-07-24 ENCOUNTER — RADIATION ONCOLOGY WEEKLY ASSESSMENT (OUTPATIENT)
Dept: RADIATION ONCOLOGY | Facility: HOSPITAL | Age: 64
End: 2019-07-24

## 2019-07-24 VITALS
WEIGHT: 218.6 LBS | BODY MASS INDEX: 33.13 KG/M2 | SYSTOLIC BLOOD PRESSURE: 139 MMHG | DIASTOLIC BLOOD PRESSURE: 78 MMHG | RESPIRATION RATE: 16 BRPM | HEIGHT: 68 IN | HEART RATE: 67 BPM | TEMPERATURE: 96.6 F

## 2019-07-24 DIAGNOSIS — C21.0 ANAL CANCER (HCC): Primary | ICD-10-CM

## 2019-07-24 PROCEDURE — 77386: CPT | Performed by: RADIOLOGY

## 2019-07-24 PROCEDURE — 77014 CHG CT GUIDANCE RADIATION THERAPY FLDS PLACEMENT: CPT | Performed by: RADIOLOGY

## 2019-07-24 NOTE — OUTREACH NOTE
General Surgery Week 1 Survey      Responses   Facility patient discharged from?  [facility]   Does the patient have one of the following disease processes/diagnoses(primary or secondary)?  General Surgery   Is there a successful TCM telephone encounter documented?  No   Week 1 attempt successful?  Yes   Call start time  1425   Call end time  1429   Discharge diagnosis  Cellulitis of chest wall,  Prostate cancer,  REMOVAL VENOUS ACCESS DEVICE   Meds reviewed with patient/caregiver?  Yes   Is the patient having any side effects they believe may be caused by any medication additions or changes?  No   Does the patient have all medications related to this admission filled (includes all antibiotics, pain medications, etc.)  Yes   Is the patient taking all medications as directed (includes completed medication regime)?  Yes   Does the patient have a follow up appointment scheduled with their surgeon?  Yes   What is the Home health agency?   [facility]   Has home health visited the patient within 72 hours of discharge?  Yes   What DME was ordered?  [facility] - infusions   Psychosocial issues?  No   Did the patient receive a copy of their discharge instructions?  Yes   Nursing interventions  Reviewed instructions with patient   What is the patient's perception of their health status since discharge?  Improving   Nursing interventions  Nurse provided patient education   Is the patient /caregiver able to teach back basic post-op care?  Take showers only when approved by MD-sponge bathe until then, No tub bath, swimming, or hot tub until instructed by MD, Keep incision areas clean,dry and protected, Do not remove steri-strips, Lifting as instructed by MD in discharge instructions, Drive as instructed by MD in discharge instructions, Practice 'cough and deep breath', Continue use of incentive spirometry at least 1 week post discharge   Is the patient/caregiver able to teach back signs and symptoms of incisional infection?   Increased redness, swelling or pain at the incisonal site, Increased drainage or bleeding, Incisional warmth, Fever, Pus or odor from incision   Is the patient/caregiver able to teach back steps to recovery at home?  Set small, achievable goals for return to baseline health, Rest and rebuild strength, gradually increase activity, Eat a well-balance diet   Is the patient/caregiver able to teach back the hierarchy of who to call/visit for symptoms/problems? PCP, Specialist, Home health nurse, Urgent Care, ED, 911  Yes   Additional teach back comments  Pt says he is doing good, no questions or concerns at this time.   Week 1 call completed?  Yes          Page Quigley RN

## 2019-07-25 ENCOUNTER — HOSPITAL ENCOUNTER (OUTPATIENT)
Dept: RADIATION ONCOLOGY | Facility: HOSPITAL | Age: 64
Discharge: HOME | End: 2019-07-25

## 2019-07-25 PROCEDURE — 77338 DESIGN MLC DEVICE FOR IMRT: CPT | Performed by: RADIOLOGY

## 2019-07-25 PROCEDURE — 77300 RADIATION THERAPY DOSE PLAN: CPT | Performed by: RADIOLOGY

## 2019-07-25 PROCEDURE — 77386: CPT | Performed by: RADIOLOGY

## 2019-07-25 PROCEDURE — 77014 CHG CT GUIDANCE RADIATION THERAPY FLDS PLACEMENT: CPT | Performed by: RADIOLOGY

## 2019-07-26 ENCOUNTER — HOSPITAL ENCOUNTER (OUTPATIENT)
Dept: RADIATION ONCOLOGY | Facility: HOSPITAL | Age: 64
Discharge: HOME | End: 2019-07-26

## 2019-07-26 PROCEDURE — 77014 CHG CT GUIDANCE RADIATION THERAPY FLDS PLACEMENT: CPT | Performed by: RADIOLOGY

## 2019-07-26 PROCEDURE — 77386: CPT | Performed by: RADIOLOGY

## 2019-07-29 ENCOUNTER — HOSPITAL ENCOUNTER (OUTPATIENT)
Dept: RADIATION ONCOLOGY | Facility: HOSPITAL | Age: 64
Discharge: HOME | End: 2019-07-29

## 2019-07-29 PROCEDURE — 77386: CPT | Performed by: RADIOLOGY

## 2019-07-29 PROCEDURE — 77336 RADIATION PHYSICS CONSULT: CPT | Performed by: RADIOLOGY

## 2019-07-29 PROCEDURE — 77014 CHG CT GUIDANCE RADIATION THERAPY FLDS PLACEMENT: CPT | Performed by: RADIOLOGY

## 2019-07-30 ENCOUNTER — HOSPITAL ENCOUNTER (OUTPATIENT)
Dept: RADIATION ONCOLOGY | Facility: HOSPITAL | Age: 64
Discharge: HOME | End: 2019-07-30

## 2019-07-30 PROCEDURE — 77427 RADIATION TX MANAGEMENT X5: CPT | Performed by: RADIOLOGY

## 2019-07-30 PROCEDURE — 77386: CPT | Performed by: RADIOLOGY

## 2019-07-30 PROCEDURE — 77014 CHG CT GUIDANCE RADIATION THERAPY FLDS PLACEMENT: CPT | Performed by: RADIOLOGY

## 2019-07-31 ENCOUNTER — READMISSION MANAGEMENT (OUTPATIENT)
Dept: CALL CENTER | Facility: HOSPITAL | Age: 64
End: 2019-07-31

## 2019-07-31 ENCOUNTER — HOSPITAL ENCOUNTER (OUTPATIENT)
Dept: RADIATION ONCOLOGY | Facility: HOSPITAL | Age: 64
Discharge: HOME | End: 2019-07-31

## 2019-07-31 ENCOUNTER — RADIATION ONCOLOGY WEEKLY ASSESSMENT (OUTPATIENT)
Dept: RADIATION ONCOLOGY | Facility: HOSPITAL | Age: 64
End: 2019-07-31

## 2019-07-31 VITALS
BODY MASS INDEX: 33.27 KG/M2 | HEART RATE: 68 BPM | RESPIRATION RATE: 18 BRPM | HEIGHT: 68 IN | WEIGHT: 219.5 LBS | SYSTOLIC BLOOD PRESSURE: 134 MMHG | TEMPERATURE: 97.3 F | DIASTOLIC BLOOD PRESSURE: 67 MMHG

## 2019-07-31 DIAGNOSIS — C21.0 ANAL CANCER (HCC): Primary | ICD-10-CM

## 2019-07-31 PROCEDURE — 77338 DESIGN MLC DEVICE FOR IMRT: CPT | Performed by: RADIOLOGY

## 2019-07-31 PROCEDURE — 77014 CHG CT GUIDANCE RADIATION THERAPY FLDS PLACEMENT: CPT | Performed by: RADIOLOGY

## 2019-07-31 PROCEDURE — FACE2FACE: Performed by: RADIOLOGY

## 2019-07-31 PROCEDURE — 77300 RADIATION THERAPY DOSE PLAN: CPT | Performed by: RADIOLOGY

## 2019-07-31 PROCEDURE — 77386: CPT | Performed by: RADIOLOGY

## 2019-07-31 NOTE — PROGRESS NOTES
On Treatment Visit       Patient: Juan Antonio Shaw   YOB: 1955   Medical Record Number: [id number]     Date of Visit  July 31, 2019   Primary Diagnosis: Cancer Staging  Anal cancer (CMS/[facility])  Staging form: Anus, AJCC 8th Edition  - Clinical: Stage IIA (cT2, cN0, cM0) - Signed by Brady Lopez MD on 6/18/2019    No primary diagnosis found.     was seen today for an on treatment visit.  He is receiving radiation therapy to the pelvis. He  has received 3060 cGy in 17 fractions out of a planned dose of  5400 cGy in 30 fractions. He is currently receiving concurrent  Chemotherapy weekly.      Today on exam the patient is tolerating radiation therapy well and has no new disease or treatment-related complaints. No diarrhea, perianal breakdown, or  urinary complaints.                                          Vitals:     Vitals:    07/31/19 0829   BP: 134/67   Pulse: 68   Resp: 18   Temp: 97.3 °F (36.3 °C)       Weight:   Wt Readings from Last 3 Encounters:   07/31/19 99.6 kg (219 lb 8 oz)   07/24/19 99.2 kg (218 lb 9.6 oz)   07/22/19 100 kg (220 lb 9.6 oz)      Pain:    Pain Score    07/31/19 0829   PainSc: 0-No pain         Plan: We plan to continue radiation therapy as prescribed. The 1st of 3 planned [place]s to start tomorrow.          Electronically signed by Bimal Moura MD 7/31/2019  8:42 AM

## 2019-07-31 NOTE — OUTREACH NOTE
General Surgery Week 2 Survey      Responses   Facility patient discharged from?  [facility]   Does the patient have one of the following disease processes/diagnoses(primary or secondary)?  General Surgery   Week 2 attempt successful?  Yes   Call start time  1358   Call end time  1401   Discharge diagnosis  Cellulitis of chest wall,  Prostate cancer,  REMOVAL VENOUS ACCESS DEVICE   Is patient permission given to speak with other caregiver?  No   Meds reviewed with patient/caregiver?  Yes   Does the patient have all medications related to this admission filled (includes all antibiotics, pain medications, etc.)  Yes   Is the patient taking all medications as directed (includes completed medication regime)?  Yes   Medication comments  Patient states that he has completed antibiotics.    Does the patient have a follow up appointment scheduled with their surgeon?  Yes   Has the patient kept scheduled appointments due by today?  Yes   What is the Home health agency?   No  services.    Psychosocial issues?  No   Did the patient receive a copy of their discharge instructions?  Yes   Nursing interventions  Reviewed instructions with patient   What is the patient's perception of their health status since discharge?  Improving   Is the patient/caregiver able to teach back signs and symptoms of incisional infection?  Increased redness, swelling or pain at the incisonal site, Increased drainage or bleeding, Incisional warmth, Pus or odor from incision, Fever   Is the patient/caregiver able to teach back steps to recovery at home?  Set small, achievable goals for return to baseline health, Rest and rebuild strength, gradually increase activity   Is the patient/caregiver able to teach back the hierarchy of who to call/visit for symptoms/problems? PCP, Specialist, Home health nurse, Urgent Care, ED, 911  Yes   Week 2 call completed?  Yes          Teodora Vogel RN

## 2019-08-01 ENCOUNTER — HOSPITAL ENCOUNTER (OUTPATIENT)
Dept: RADIATION ONCOLOGY | Facility: HOSPITAL | Age: 64
Discharge: HOME | End: 2019-08-01

## 2019-08-01 ENCOUNTER — HOSPITAL ENCOUNTER (OUTPATIENT)
Dept: RADIATION ONCOLOGY | Facility: HOSPITAL | Age: 64
Setting detail: RADIATION/ONCOLOGY SERIES
End: 2019-08-01

## 2019-08-01 PROCEDURE — 77386: CPT | Performed by: RADIOLOGY

## 2019-08-01 PROCEDURE — 77014 CHG CT GUIDANCE RADIATION THERAPY FLDS PLACEMENT: CPT | Performed by: RADIOLOGY

## 2019-08-02 ENCOUNTER — INFUSION (OUTPATIENT)
Dept: ONCOLOGY | Facility: HOSPITAL | Age: 64
End: 2019-08-02

## 2019-08-02 ENCOUNTER — HOSPITAL ENCOUNTER (OUTPATIENT)
Dept: RADIATION ONCOLOGY | Facility: HOSPITAL | Age: 64
Discharge: HOME | End: 2019-08-02

## 2019-08-02 DIAGNOSIS — Z45.2 ENCOUNTER FOR VENOUS ACCESS DEVICE CARE: Primary | ICD-10-CM

## 2019-08-02 PROCEDURE — 96523 IRRIG DRUG DELIVERY DEVICE: CPT | Performed by: INTERNAL MEDICINE

## 2019-08-02 PROCEDURE — G0463 HOSPITAL OUTPT CLINIC VISIT: HCPCS | Performed by: INTERNAL MEDICINE

## 2019-08-02 PROCEDURE — 77014 CHG CT GUIDANCE RADIATION THERAPY FLDS PLACEMENT: CPT | Performed by: RADIOLOGY

## 2019-08-02 PROCEDURE — 77386: CPT | Performed by: RADIOLOGY

## 2019-08-02 RX ORDER — SODIUM CHLORIDE 0.9 % (FLUSH) 0.9 %
10 SYRINGE (ML) INJECTION AS NEEDED
Status: CANCELLED | OUTPATIENT
Start: 2019-08-05

## 2019-08-02 RX ORDER — SODIUM CHLORIDE 0.9 % (FLUSH) 0.9 %
10 SYRINGE (ML) INJECTION AS NEEDED
Status: DISCONTINUED | OUTPATIENT
Start: 2019-08-02 | End: 2019-08-02 | Stop reason: HOSPADM

## 2019-08-02 RX ADMIN — SODIUM CHLORIDE, PRESERVATIVE FREE 10 ML: 5 INJECTION INTRAVENOUS at 08:29

## 2019-08-05 ENCOUNTER — HOSPITAL ENCOUNTER (OUTPATIENT)
Dept: RADIATION ONCOLOGY | Facility: HOSPITAL | Age: 64
Discharge: HOME | End: 2019-08-05

## 2019-08-05 ENCOUNTER — OFFICE VISIT (OUTPATIENT)
Dept: SURGERY | Facility: CLINIC | Age: 64
End: 2019-08-05

## 2019-08-05 ENCOUNTER — INFUSION (OUTPATIENT)
Dept: ONCOLOGY | Facility: HOSPITAL | Age: 64
End: 2019-08-05

## 2019-08-05 ENCOUNTER — OFFICE VISIT (OUTPATIENT)
Dept: ONCOLOGY | Facility: CLINIC | Age: 64
End: 2019-08-05

## 2019-08-05 VITALS
TEMPERATURE: 97.8 F | DIASTOLIC BLOOD PRESSURE: 60 MMHG | BODY MASS INDEX: 33.04 KG/M2 | WEIGHT: 218 LBS | SYSTOLIC BLOOD PRESSURE: 122 MMHG | HEIGHT: 68 IN | HEART RATE: 68 BPM

## 2019-08-05 VITALS
RESPIRATION RATE: 18 BRPM | WEIGHT: 218.4 LBS | TEMPERATURE: 97.8 F | HEART RATE: 68 BPM | SYSTOLIC BLOOD PRESSURE: 122 MMHG | OXYGEN SATURATION: 96 % | DIASTOLIC BLOOD PRESSURE: 60 MMHG | BODY MASS INDEX: 33.21 KG/M2

## 2019-08-05 DIAGNOSIS — C61 PROSTATE CANCER (HCC): ICD-10-CM

## 2019-08-05 DIAGNOSIS — L03.313 CELLULITIS OF CHEST WALL: Primary | ICD-10-CM

## 2019-08-05 DIAGNOSIS — C21.0 ANAL CANCER (HCC): Primary | ICD-10-CM

## 2019-08-05 DIAGNOSIS — Z45.2 ENCOUNTER FOR VENOUS ACCESS DEVICE CARE: Primary | ICD-10-CM

## 2019-08-05 DIAGNOSIS — M89.9 BONE LESION: ICD-10-CM

## 2019-08-05 DIAGNOSIS — C21.0 ANAL CANCER (HCC): ICD-10-CM

## 2019-08-05 DIAGNOSIS — Z45.2 ENCOUNTER FOR VENOUS ACCESS DEVICE CARE: ICD-10-CM

## 2019-08-05 LAB
ALBUMIN SERPL-MCNC: 3.6 G/DL (ref 3.5–5.2)
ALBUMIN/GLOB SERPL: 0.9 G/DL
ALP SERPL-CCNC: 76 U/L (ref 39–117)
ALT SERPL W P-5'-P-CCNC: 29 U/L (ref 1–41)
ANION GAP SERPL CALCULATED.3IONS-SCNC: 10 MMOL/L (ref 5–15)
AST SERPL-CCNC: 26 U/L (ref 1–40)
BASOPHILS # BLD AUTO: 0.02 10*3/MM3 (ref 0–0.2)
BASOPHILS NFR BLD AUTO: 0.5 % (ref 0–1.5)
BILIRUB SERPL-MCNC: 0.3 MG/DL (ref 0.2–1.2)
BUN BLD-MCNC: 24 MG/DL (ref 8–23)
BUN/CREAT SERPL: 26.1 (ref 7–25)
CALCIUM SPEC-SCNC: 9.3 MG/DL (ref 8.6–10.5)
CHLORIDE SERPL-SCNC: 101 MMOL/L (ref 98–107)
CO2 SERPL-SCNC: 26 MMOL/L (ref 22–29)
CREAT BLD-MCNC: 0.92 MG/DL (ref 0.76–1.27)
DEPRECATED RDW RBC AUTO: 44.3 FL (ref 37–54)
EOSINOPHIL # BLD AUTO: 0.09 10*3/MM3 (ref 0–0.4)
EOSINOPHIL NFR BLD AUTO: 2.2 % (ref 0.3–6.2)
ERYTHROCYTE [DISTWIDTH] IN BLOOD BY AUTOMATED COUNT: 14.6 % (ref 12.3–15.4)
GFR SERPL CREATININE-BSD FRML MDRD: 83 ML/MIN/1.73
GLOBULIN UR ELPH-MCNC: 3.8 GM/DL
GLUCOSE BLD-MCNC: 99 MG/DL (ref 65–99)
HCT VFR BLD AUTO: 38 % (ref 37.5–51)
HGB BLD-MCNC: 13.3 G/DL (ref 13–17.7)
IMM GRANULOCYTES # BLD AUTO: 0.05 10*3/MM3 (ref 0–0.05)
IMM GRANULOCYTES NFR BLD AUTO: 1.2 % (ref 0–0.5)
LYMPHOCYTES # BLD AUTO: 0.44 10*3/MM3 (ref 0.7–3.1)
LYMPHOCYTES NFR BLD AUTO: 10.7 % (ref 19.6–45.3)
MCH RBC QN AUTO: 30.9 PG (ref 26.6–33)
MCHC RBC AUTO-ENTMCNC: 35 G/DL (ref 31.5–35.7)
MCV RBC AUTO: 88.4 FL (ref 79–97)
MONOCYTES # BLD AUTO: 0.56 10*3/MM3 (ref 0.1–0.9)
MONOCYTES NFR BLD AUTO: 13.7 % (ref 5–12)
NEUTROPHILS # BLD AUTO: 2.94 10*3/MM3 (ref 1.7–7)
NEUTROPHILS NFR BLD AUTO: 71.7 % (ref 42.7–76)
NRBC BLD AUTO-RTO: 0 /100 WBC (ref 0–0.2)
PLATELET # BLD AUTO: 211 10*3/MM3 (ref 140–450)
PMV BLD AUTO: 7.9 FL (ref 6–12)
POTASSIUM BLD-SCNC: 4.1 MMOL/L (ref 3.5–5.2)
PROT SERPL-MCNC: 7.4 G/DL (ref 6–8.5)
RBC # BLD AUTO: 4.3 10*6/MM3 (ref 4.14–5.8)
SODIUM BLD-SCNC: 137 MMOL/L (ref 136–145)
WBC NRBC COR # BLD: 4.1 10*3/MM3 (ref 3.4–10.8)

## 2019-08-05 PROCEDURE — 96416 CHEMO PROLONG INFUSE W/PUMP: CPT | Performed by: INTERNAL MEDICINE

## 2019-08-05 PROCEDURE — 99214 OFFICE O/P EST MOD 30 MIN: CPT | Performed by: INTERNAL MEDICINE

## 2019-08-05 PROCEDURE — 77014 CHG CT GUIDANCE RADIATION THERAPY FLDS PLACEMENT: CPT | Performed by: RADIOLOGY

## 2019-08-05 PROCEDURE — 25010000002 DEXAMETHASONE SODIUM PHOSPHATE 100 MG/10ML SOLUTION: Performed by: INTERNAL MEDICINE

## 2019-08-05 PROCEDURE — 77386: CPT | Performed by: RADIOLOGY

## 2019-08-05 PROCEDURE — 96409 CHEMO IV PUSH SNGL DRUG: CPT | Performed by: INTERNAL MEDICINE

## 2019-08-05 PROCEDURE — 77336 RADIATION PHYSICS CONSULT: CPT | Performed by: RADIOLOGY

## 2019-08-05 PROCEDURE — 36592 COLLECT BLOOD FROM PICC: CPT | Performed by: INTERNAL MEDICINE

## 2019-08-05 PROCEDURE — 85025 COMPLETE CBC W/AUTO DIFF WBC: CPT

## 2019-08-05 PROCEDURE — 25010000002 MITOMYCIN PER 5 MG: Performed by: INTERNAL MEDICINE

## 2019-08-05 PROCEDURE — 25010000002 FLUOROURACIL PER 500 MG: Performed by: INTERNAL MEDICINE

## 2019-08-05 PROCEDURE — 80053 COMPREHEN METABOLIC PANEL: CPT

## 2019-08-05 PROCEDURE — 1123F ACP DISCUSS/DSCN MKR DOCD: CPT | Performed by: INTERNAL MEDICINE

## 2019-08-05 PROCEDURE — G9903 PT SCRN TBCO ID AS NON USER: HCPCS | Performed by: INTERNAL MEDICINE

## 2019-08-05 PROCEDURE — G8731 PAIN NEG NO PLAN: HCPCS | Performed by: INTERNAL MEDICINE

## 2019-08-05 PROCEDURE — 96375 TX/PRO/DX INJ NEW DRUG ADDON: CPT | Performed by: INTERNAL MEDICINE

## 2019-08-05 PROCEDURE — 99024 POSTOP FOLLOW-UP VISIT: CPT | Performed by: SURGERY

## 2019-08-05 RX ORDER — MITOMYCIN 20 MG/40ML
20 INJECTION, POWDER, LYOPHILIZED, FOR SOLUTION INTRAVENOUS ONCE
Status: CANCELLED | OUTPATIENT
Start: 2019-08-05

## 2019-08-05 RX ORDER — SODIUM CHLORIDE 0.9 % (FLUSH) 0.9 %
10 SYRINGE (ML) INJECTION AS NEEDED
Status: CANCELLED | OUTPATIENT
Start: 2019-08-05

## 2019-08-05 RX ORDER — SODIUM CHLORIDE 0.9 % (FLUSH) 0.9 %
10 SYRINGE (ML) INJECTION AS NEEDED
Status: DISCONTINUED | OUTPATIENT
Start: 2019-08-05 | End: 2019-08-05 | Stop reason: HOSPADM

## 2019-08-05 RX ORDER — SODIUM CHLORIDE 0.9 % (FLUSH) 0.9 %
10 SYRINGE (ML) INJECTION AS NEEDED
Status: CANCELLED | OUTPATIENT
Start: 2019-08-09

## 2019-08-05 RX ORDER — SODIUM CHLORIDE 9 MG/ML
250 INJECTION, SOLUTION INTRAVENOUS ONCE
Status: COMPLETED | OUTPATIENT
Start: 2019-08-05 | End: 2019-08-05

## 2019-08-05 RX ORDER — MITOMYCIN 20 MG/40ML
20 INJECTION, POWDER, LYOPHILIZED, FOR SOLUTION INTRAVENOUS ONCE
Status: COMPLETED | OUTPATIENT
Start: 2019-08-05 | End: 2019-08-05

## 2019-08-05 RX ORDER — SODIUM CHLORIDE 9 MG/ML
250 INJECTION, SOLUTION INTRAVENOUS ONCE
Status: CANCELLED | OUTPATIENT
Start: 2019-08-05

## 2019-08-05 RX ADMIN — DEXAMETHASONE SODIUM PHOSPHATE 12 MG: 10 INJECTION, SOLUTION INTRAMUSCULAR; INTRAVENOUS at 10:10

## 2019-08-05 RX ADMIN — MITOMYCIN 20 MG: 20 INJECTION, POWDER, LYOPHILIZED, FOR SOLUTION INTRAVENOUS at 10:42

## 2019-08-05 RX ADMIN — SODIUM CHLORIDE 250 ML: 9 INJECTION, SOLUTION INTRAVENOUS at 10:00

## 2019-08-05 RX ADMIN — FLUOROURACIL 8440 MG: 50 INJECTION, SOLUTION INTRAVENOUS at 11:29

## 2019-08-05 NOTE — PROGRESS NOTES
DATE OF VISIT: 8/5/2019      REASON FOR VISIT: Anal cancer, history of prostate cancer, sclerotic lesion of bone on CT scan of chest, port infection      HISTORY OF PRESENT ILLNESS:    64-year-old male with medical problem consisting of hypertension, prostatic adenocarcinoma status post robotic prostatectomy done in May 2018 was initially seen in consultation on June 20, 2019 for newly diagnosed squamous cell cancer of anus.   Patient was started on day 1 of 5-FU and mitomycin with radiation treatment on July 8, 2019.  Patient was admitted to [facility] on July 14, 2019 with cellulitis of chest wall and was found to have port infection.  Patient finished antibiotics.  Denies any fever or bleeding.  Patient is here to get day 28 of 5-FU and mitomycin today on August 5, 2019.  Denies any new lymph node enlargement.  Denies any new shortness of breath or chest pain.        PAST MEDICAL HISTORY:    Past Medical History:   Diagnosis Date   • Hypertension    • Prostate cancer (CMS/[facility]) 05/16/2018   • Rectal cancer (CMS/[facility]) 06/2019       SOCIAL HISTORY:    Social History     Tobacco Use   • Smoking status: Never Smoker   • Smokeless tobacco: Never Used   Substance Use Topics   • Alcohol use: Yes     Comment: rarely   • Drug use: No       Surgical History :  Past Surgical History:   Procedure Laterality Date   • COLONOSCOPY N/A 5/30/2019    Procedure: COLONOSCOPY;  Surgeon: Jarek Gordillo DO;  Location: [facility] ENDOSCOPY;  Service: Gastroenterology   • ENDOSCOPY N/A 5/30/2019    Procedure: ESOPHAGOGASTRODUODENOSCOPY;  Surgeon: Jarek Gordillo DO;  Location: [facility] ENDOSCOPY;  Service: Gastroenterology   • PROSTATE SURGERY     • VENOUS ACCESS DEVICE (PORT) INSERTION N/A 6/27/2019    Procedure: INSERTION VENOUS ACCESS DEVICE (MEDIPORT)           (C-ARM#1);  Surgeon: Benson Wasserman MD;  Location: [facility] OR;  Service: General   • VENOUS ACCESS DEVICE (PORT) REMOVAL Right 7/20/2019    Procedure: REMOVAL  VENOUS ACCESS DEVICE;  Surgeon: Benson Wasserman MD;  Location: [facility];  Service: General       ALLERGIES:    No Known Allergies      FAMILY HISTORY:  Family History   Problem Relation Age of Onset   • Uterine cancer Mother            REVIEW OF SYSTEMS:      CONSTITUTIONAL:   Denies any fever, chills or weight loss.      EYES: No visual disturbances. No discharge. No new lesions     ENMT: States he had mouth sores after first cycle of chemotherapy which is resolved..  No epistaxis or difficulty swallowing.     RESPIRATORY:  No new shortness of breath. No new cough or hemoptysis.     CARDIOVASCULAR:  No chest pain or palpitations.     GASTROINTESTINAL:  No abdominal pain nausea, vomiting or blood in the stool.     GENITOURINARY: No Dysuria or Hematuria.     MUSCULOSKELETAL:  No new back pain or arthralgia.     LYMPHATICS:  Denies any abnormal swollen glands anywhere in the body.     NEUROLOGICAL : No tingling or numbness. No headache or dizziness. No seizures or balance problems.     SKIN: Erythematous skin lesion present on right side of neck.     ENDOCRINE : No new heat or cold intolerance. No new polyuria . No polydipsia          PHYSICAL EXAMINATION:      VITAL SIGNS:  /60   Pulse 68   Temp 97.8 °F (36.6 °C) (Temporal)   Resp 18   Wt 99.1 kg (218 lb 6.4 oz)   SpO2 96%   BMI 33.21 kg/m²       08/05/19  0903   Weight: 99.1 kg (218 lb 6.4 oz)         ECOG performance status: 1     CONSTITUTIONAL:  Not in any distress.     EYES: Mild conjunctival Pallor. No Icterus. No Pterygium. Extraocular Movements intact.No ptosis.     ENMT:  Normocephalic, Atraumatic.No Facial Asymmetry noted.     NECK:  No adenopathy.Trachea midline. NO JVD.     RESPIRATORY:  Fair air entry bilateral. No rhonchi or wheezing.Fair respiratory effort.     CARDIOVASCULAR:  S1, S2. Regular rate and rhythm. No murmur or gallop appreciated.       ABDOMEN:  Soft, obese, nontender. Bowel sounds present in all four quadrants.  No  Hepatosplenomegaly appreciated.     MUSCULOSKELETAL:  No edema.No Calf Tenderness.Normal range of motion.  PICC line present in right upper extremity.     NEUROLOGIC:    No  Motor or sensory deficit appreciated. Cranial Nerves 2-12 grossly intact.     SKIN : No new skin lesion identified. Skin is warm and dry to touch.     LYMPHATICS: No new enlarged lymph nodes in neck or supraclavicular area.     PSYCHIATRY: Alert, awake and oriented ×3.Normal affect.  Normal judgment.  Makes good eye contact.          DIAGNOSTIC DATA:    Glucose   Date Value Ref Range Status   08/05/2019 99 65 - 99 mg/dL Final     Sodium   Date Value Ref Range Status   08/05/2019 137 136 - 145 mmol/L Final   07/11/2018 138 134 - 144 mmol/L Final   05/17/2018 135 (L) 136 - 145 mmol/L Final     Potassium   Date Value Ref Range Status   08/05/2019 4.1 3.5 - 5.2 mmol/L Final   07/11/2018 4 3.5 - 5.1 mmol/L Final   05/17/2018 4.3 3.3 - 5.0 mmol/L Final     CO2   Date Value Ref Range Status   08/05/2019 26.0 22.0 - 29.0 mmol/L Final     Total CO2   Date Value Ref Range Status   05/17/2018 26 20 - 31 mmol/L Final     Chloride   Date Value Ref Range Status   08/05/2019 101 98 - 107 mmol/L Final   07/11/2018 105 98 - 107 mmol/L Final   05/17/2018 104 99 - 111 mmol/L Final     Anion Gap   Date Value Ref Range Status   08/05/2019 10.0 5.0 - 15.0 mmol/L Final   05/17/2018 9 4 - 16 mmol/L Final     Creatinine   Date Value Ref Range Status   08/05/2019 0.92 0.76 - 1.27 mg/dL Final   07/11/2018 1 0.6 - 1.3 mg/dL Final   05/17/2018 1.0 0.6 - 1.3 mg/dL Final     BUN   Date Value Ref Range Status   08/05/2019 24 (H) 8 - 23 mg/dL Final   07/11/2018 34 (H) 7 - 18 mg/dL Final     BUN/Creatinine Ratio   Date Value Ref Range Status   08/05/2019 26.1 (H) 7.0 - 25.0 Final     Calcium   Date Value Ref Range Status   08/05/2019 9.3 8.6 - 10.5 mg/dL Final   07/11/2018 8.8 8.8 - 10.5 mg/dL Final     eGFR Non  Amer   Date Value Ref Range Status   08/05/2019 83 >60  mL/min/1.73 Final     Alkaline Phosphatase   Date Value Ref Range Status   08/05/2019 76 39 - 117 U/L Final   07/11/2018 69 46 - 116 U/L Final     Total Protein   Date Value Ref Range Status   08/05/2019 7.4 6.0 - 8.5 g/dL Final   07/11/2018 7.4 6.4 - 8.2 g/dL Final     ALT (SGPT)   Date Value Ref Range Status   08/05/2019 29 1 - 41 U/L Final   07/11/2018 19 (L) 30 - 65 U/L Final     AST (SGOT)   Date Value Ref Range Status   08/05/2019 26 1 - 40 U/L Final   07/11/2018 17 15 - 37 U/L Final     Total Bilirubin   Date Value Ref Range Status   08/05/2019 0.3 0.2 - 1.2 mg/dL Final   07/11/2018 0.3 0 - 1.0 mg/dL Final     Albumin   Date Value Ref Range Status   08/05/2019 3.60 3.50 - 5.20 g/dL Final   07/11/2018 3.5 3.4 - 5.0 g/dL Final     Globulin   Date Value Ref Range Status   08/05/2019 3.8 gm/dL Final     Lab Results   Component Value Date    WBC 4.10 08/05/2019    HGB 13.3 08/05/2019    HCT 38.0 08/05/2019    MCV 88.4 08/05/2019     08/05/2019     Lab Results   Component Value Date    NEUTROABS 2.94 08/05/2019    GEVPWQCN05 1,034 07/11/2018     No results found for: , LABCA2, AFPTM, HCGQUANT, , CHROMGRNA, [id number], CEA, REFLABREPO        HIV-1 & HIV-2 Antibodies          Specimen Collected: 06/20/19 14:41 Last Resulted: 06/20/19 15:33                         Radiology Data :  CT of chest without contrast done on June 27, 2019 was reviewed with Dr. Weller, discussed with the patient, it showed:  FINDINGS:     PULMONARY PARENCHYMA:      - air spaces: Negative    - interstitium: Grossly within normal limits for age    - misc: Tiny densely calcified granuloma in the right upper  lobe anteriorly measuring approximately 3 mm in greatest  dimension. Mild atelectasis in the left lung base. Left a  diaphragm is mildly elevated     MEDIASTINUM / CIRA:      - heart: Normal size, no pericardial fluid. Mild  atherosclerotic calcification of the coronary arteries.    - aorta/great vessels: Ascending thoracic  aorta is dilated up  to 4.0 cm in greatest diameter. The visualized portion of the  descending aorta is normal in caliber.    - misc: No mediastinal mass / significant adenopathy. There are  densely calcified lymph nodes in the right paratracheal and the  precarinal space. There are smaller calcified nodes in the right  hilum.     PLEURAL COMPARTMENT:      - misc: No pleural fluid or mass        MISC:      - inferior neck: Negative    - osseous/body wall: A few tiny sclerotic foci are seen in  multiple vertebral bodies, which are indeterminate, and these are  so small that they may be falsely negative on bone scan or PET  scan even if representing metastasis. These can be followed up on  future studies to evaluate for change.    - subdiaphramatic: Nonobstructing 1.1 cm calculus in the right  renal midpole. There may be a small cyst in the upper pole of the  right kidney. Small nonobstructing calculus measuring about 3 mm  is seen in the left kidney. Additional smaller calculi are  present in the lower pole. A tiny low-attenuation lesion in the  lower pole of the left kidney is too small to adequately  characterize. The gallbladder appears to be filled with calculi.  There is limited evaluation of the liver for metastases due to  noncontrast technique, but no specific lesions are identified.   Small hiatal hernia present.         CT of abdomen and pelvis with contrast done on June 17, 2019 showed:   - - - CT ABDOMEN - - -      THORAX (INFERIOR):   - LUNG BASES:  Clear   - PLEURA:  No fluid or mass   - HEART: Normal size, no pericardial fluid   - MISC:  N/A      ABDOMEN:   - LIVER:  Normal size/contour, no ductal dilatation. Scattered  punctate calcifications are present, consistent with healed  granulomatous disease   - GB: Filled with gallstones   - CBD:  Grossly negative   - SPLEEN:  Normal size and contour. Granulomatous scarring   - PANCREAS:  Normal in size, contour, no focal mass    - VISCERA:  Normal caliber,  no wall thickening   - MESENTERY: No mesenteric mass   - CAVITY:  No free abdominal fluid, no free intraperitoneal air   - BODY WALL:  With normal limits   - OSSEOUS:  Degenerative changes most prominently at L5-S1 where  there is almost complete loss of joint space and vacuum disc  phenomenon      RETROPERITONEUM:   - KIDNEYS:Normal size/contour, no collecting system dilation                    No evidence of an enhancing mass. Bilateral  nonobstructing calculi, and small possible cysts bilaterally.    - URETERS:  Normal course, caliber   - ADRENALS:  Normal size, contour   - MISC:  No sig. retroperitoneal adenopathy or mass   - VASCULAR:  Aorta / iliacs: wnl for age     - - - CT PELVIS - - -       - VISCERA:  Normal caliber small/large bowel, mild thickening of  the rectum, nonspecific   - MESENTERY:  No mass   - VASCULAR:  Within normal limits for age   - CAVITY:  No free fluid / air   - BLADDER:  Unremarkable   - OSSEOUS:  Within normal limits    - PROSTATE:  Grossly wnl  Small left inguinal hernia contains only fat.      IMPRESSION:  1. No definite evidence of metastasis. Mild thickening of the  rectum, may be related to treatment effect, though malignancy  cannot be excluded.   2. Cholelithiasis.   3. Nonobstructing nephrolithiasis.  4. Possible bilateral renal cysts, could be further evaluated and  characterized by ultrasound or multiphasic contrast enhanced CT  or MRI  5. Please see finding sections above for further details.            Chest x-ray PA/lateral done on June 17, 2019 showed:  IMPRESSION:  CONCLUSION: Chronic elevation left diaphragm either diaphragmatic  eventration or diaphragmatic paralysis. Benign calcified  mediastinal and hilar lymph nodes. Lungs otherwise clear.              Pathology :  Pathology report from May 30, 2019 showed:  Component     Final Diagnosis   1.  MUCOSA, ESOPHAGUS:   CHRONIC ESOPHAGITIS.   NEGATIVE FOR FUNGI (GMS STAIN), VIRAL CELLS AND DYSPLASIA.      2.  MUCOSA,  ANUS:   INVASIVE NONKERATINIZING SQUAMOUS CELL CARCINOMA, POORLY DIFFERENTIATED.   IMMUNOSTAIN STRONGLY POSITIVE FOR p16 (HPV MARKER).      3.  POLYP, TRANSVERSE COLON:   INFLAMMATORY POLYP.     4.  POLYP, ASCENDING COLON:   SESSILE SERRATED ADENOMA.      5.  POLYP, DESCENDING COLON:   NO SIGNIFICANT HISTOLOGIC ABNORMALITY.                Pathology report from 05/16/2018 at [facility] showed:  SPECIMEN:  A. PROSTATE      CLINICAL HISTORY:  NONE GIVEN  PRE-OP DIAGNOSIS:  ELEVATED PSA  POST-OP DIAGNOSIS:  PROCEDURE:  ROBOTIC ASSISTED PROSTATECTOMY    FINAL DIAGNOSIS:    RADICAL PROSTATECTOMY SPECIMEN:       Invasive carcinoma.       - Histologic type:  Adenocarcinoma, conventional.       - Histologic Milka score:  6 (3+3).       - Grade group:  1.       - Tumor quantitation:  Less than 1%. (Tumor present in 2 of 16 slides)       - Margins:  Clear.        - Bilateral involvement:  Present.       - Extra-prostatic extension:  Not identified.       - Seminal vesicle invasion:  Not identified.       - Perineural invasion:  Not identified.       - Angiolymphatic invasion:  Not identified.       - Other findings:  Benign glandular cyst near bladder margin.       - Pathologic staging:  pT2c.    GROSS:      Received is a 56 gram radical prostatectomy specimen with both seminal vesicles attached.  The prostate is 5.2 x 4.5 x 4.6 cm.  There is a 1 cm cystic structure present at the bladder margin on the right side.  Sections through the specimen demonstrate a somewhat nodular surface with increased firmness present primarily of the right posterior aspect.  Prostate from the right anterior margin is submitted as A1, right anterior lateral A2, right mid lateral A3, right posterior A4-A6, and right seminal vesicle A7.  A section of the left anterior prostate is submitted as A8, left anterior lateral A9, left mid lateral A10, left posterior A11-A13, and left seminal vesicle A14.  Sections of the cystic lesion are submitted  as A15 and A16.       (AEE)    MICROSCOPIC:    Multiple sections of prostate from right and left lobes including margins are evaluated.  Most of the prostate features hyperplastic changes with chronic inflammation.  Two areas feature prostatic adenocarcinoma.  These are on slides A1 and A12.  In both slides, the amount of tumor is relatively small with the margins being clear.  Perineural invasion is not noted.  There is no evidence of seminal vesicle invasion.  Sections from the bladder margin demonstrate a benign glandular type cyst.      Final Diagnosis performed by  Gilson Vaughn M.D.  Electronically signed 5/18/2018 9:01AM        ASSESSMENT AND PLAN:       1.  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  -Result of CT scan of abdomen and pelvis, pathology and staging were discussed with the patient today.  -It was discussed with as per NCCN guidelines, her standard recommendation for this kind of cancer is concurrent chemoradiation with 5-FU and mitomycin.  -Patient has  been evaluated by Dr. Lopez on June 18, 2019.    -Patient had a CT of chest without contrast done on June 27, 2019 that showed tiny sclerotic focus in vertebral body as well as healing fracture in rib.  Result of CT scan showing tiny sclerotic focus was discussed with patient.  Patient does admit that he has a trauma due to fall 3 months ago that can contribute to refracture.  Sclerotic focus there about 4 mm in size, PET/CT and bone scan will not detect those kind of tiny focus.  Plan is to repeat CT of chest in about 4 months that will be around November 2019 for follow-up.  -In view of no conclusive evidence of metastatic disease, patient was started on concurrent 5-FU mitomycin with radiation  on July 8, 2019.  - First week of treatment was complicated by port infection requiring port removal and antibiotics.  - We will go ahead with day 29 of 5-FU and mitomycin today on  August 5, 2019.  - We will remove PICC line after finishing 4 days of chemotherapy.  - We will ask patient to return to clinic in 4 weeks with repeat CBC and CMP on that day.  - Patient will need an anoscopy around October 2019 to evaluate response.  This recommendation were discussed with patient.        2.  Prostatic adenocarcinoma, PT2cN0, stage IIc  -Status post radical prostatectomy on May 16, 2018.  -Most recent PSA done in May 2019 was <0.1  -We will need periodic PSA check to rule out recurrence which was discussed with patient.  -Outside pathology report was reviewed.     3.  Sclerotic bone focus on vertebral body as well as healing fracture of ribs:  -Patient had a CT of chest without contrast on June 29, 2019 that showed tiny sclerotic focus on vertebral body as well as healing fracture on rib.  Patient does admit to trauma to rib around April 2019.  -Sclerotic focus on deep there are 2 tiny measuring about 4 mm to 5 mm in size.  Bone scan or PET/CT would not detect those kind of tiny focus.  -We will do another CT of chest without contrast around November 2019 to follow-up on sclerotic focus.  This recommendation were discussed with patient today.      4.  Hypertension     5.  Health maintenance: Patient does not smoke.  Had a colonoscopy done on May 30, 2019 by Dr. Gordillo.     6. Advance Care Planning: For now patient remains full code and is able to make his decisions.  Patient has health care surrogate mentioned on chart.     7.  Pain assessment:  -Patient denies any pain today.             Terry Ly MD  8/5/2019  5:18 PM        EMR Dragon/Transcription disclaimer:   Much of this encounter note is an electronic transcription/translation of spoken language to printed text. The electronic translation of spoken language may permit erroneous, or at times, nonsensical words or phrases to be inadvertently transcribed; Although I have reviewed the note for such errors, some may still exist.

## 2019-08-05 NOTE — PATIENT INSTRUCTIONS

## 2019-08-05 NOTE — PROGRESS NOTES
CHIEF COMPLAINT:   Chief Complaint   Patient presents with   • Follow-up     Recheck mediport removal       HPI: This patient presents for a post-operative visit after undergoing excision of infected Mediport.  Port became infected after first treatment of chemotherapy.  Currently he is doing well- no cellulitis, wound separation, or significant pain.  Contains abnormal data Catheter Culture - Cath Tip, Neck   Order: [id number]   Collected:  7/20/2019 08:02 Status:  Final result   Visible to patient:  Yes (MyChart)   Specimen Information: Neck; Cath Tip        CATHETER CULTURE >15 CFU/mL - Indicative of infection. Staphylococcus aureus Abnormal           Refer to previous tissue culture collected on 7/21 for BRANDIE's      Resulting Agency: [facility]               Physical Exam  All incisions are healed.  Sutures are removed and the wound is intact.    ASSESSMENT:    Juan Antonio was seen today for follow-up.    Diagnoses and all orders for this visit:    Cellulitis of chest wall        PLAN:    1. The patient will follow-up as needed  2. May shower.   3. May return to normal activity without restrictions.

## 2019-08-06 ENCOUNTER — HOSPITAL ENCOUNTER (OUTPATIENT)
Dept: RADIATION ONCOLOGY | Facility: HOSPITAL | Age: 64
Discharge: HOME | End: 2019-08-06

## 2019-08-06 ENCOUNTER — DOCUMENTATION (OUTPATIENT)
Dept: NUTRITION | Facility: HOSPITAL | Age: 64
End: 2019-08-06

## 2019-08-06 ENCOUNTER — APPOINTMENT (OUTPATIENT)
Dept: RADIATION ONCOLOGY | Facility: HOSPITAL | Age: 64
End: 2019-08-06

## 2019-08-06 PROCEDURE — 77386: CPT | Performed by: RADIOLOGY

## 2019-08-06 PROCEDURE — 77014 CHG CT GUIDANCE RADIATION THERAPY FLDS PLACEMENT: CPT | Performed by: RADIOLOGY

## 2019-08-06 PROCEDURE — 77427 RADIATION TX MANAGEMENT X5: CPT | Performed by: RADIOLOGY

## 2019-08-06 NOTE — PROGRESS NOTES
Adult Outpatient Nutrition  Assessment    Patient Name:  Juan Antonio hSaw  YOB: 1955  MRN: [id number]    Assessment Date:  Entry from 8/2/19 visit    Comments: Follow-up visit to check nutrition. States feeling and eating well. No nutrition related problems verbalized. Wt 219.7 lb (stable).                        Electronically signed by:  Stephie Art RD  08/06/19 2:21 PM

## 2019-08-07 ENCOUNTER — HOSPITAL ENCOUNTER (OUTPATIENT)
Dept: RADIATION ONCOLOGY | Facility: HOSPITAL | Age: 64
Discharge: HOME | End: 2019-08-07

## 2019-08-07 ENCOUNTER — RADIATION ONCOLOGY WEEKLY ASSESSMENT (OUTPATIENT)
Dept: RADIATION ONCOLOGY | Facility: HOSPITAL | Age: 64
End: 2019-08-07

## 2019-08-07 ENCOUNTER — READMISSION MANAGEMENT (OUTPATIENT)
Dept: CALL CENTER | Facility: HOSPITAL | Age: 64
End: 2019-08-07

## 2019-08-07 VITALS
TEMPERATURE: 97.8 F | BODY MASS INDEX: 33.66 KG/M2 | RESPIRATION RATE: 18 BRPM | SYSTOLIC BLOOD PRESSURE: 125 MMHG | DIASTOLIC BLOOD PRESSURE: 74 MMHG | WEIGHT: 222.1 LBS | HEART RATE: 76 BPM | HEIGHT: 68 IN

## 2019-08-07 DIAGNOSIS — C21.0 ANAL CANCER (HCC): Primary | ICD-10-CM

## 2019-08-07 PROCEDURE — 77338 DESIGN MLC DEVICE FOR IMRT: CPT | Performed by: RADIOLOGY

## 2019-08-07 PROCEDURE — 77300 RADIATION THERAPY DOSE PLAN: CPT | Performed by: RADIOLOGY

## 2019-08-07 PROCEDURE — 77014 CHG CT GUIDANCE RADIATION THERAPY FLDS PLACEMENT: CPT | Performed by: RADIOLOGY

## 2019-08-07 PROCEDURE — 77386: CPT | Performed by: RADIOLOGY

## 2019-08-07 NOTE — PROGRESS NOTES
On Treatment Visit       Patient: Juan Antonio Shaw   YOB: 1955   Medical Record Number: [id number]     Date of Visit  August 7, 2019   Primary Diagnosis: Stage IIA (cT2 cN0 M0) poorly differentiated nonkeratinizing squamous cell carcinoma the anus (p16+).  ICD 10 Code: C21.0      was seen today for an on treatment visit.  He is receiving radiation therapy to the pelvis/anus. He has received 3960 cGy in 22 fractions out of a planned dose of 5400 cGy in 30 fractions.  He is receiving concurrent 5-FU/mitomycin chemotherapy per Dr. Ly, and received cycle 1, day 29 on 8/5/2019.       Mr. Shaw  is doing well and has no significant disease or treatment-related complaints. He has no complaints of nausea/vomiting.   He is urinating without difficulty.  He complains of mild perirectal tenderness, but states that this is improving.   His previously infected Mediport site has healed, and the stitches were taken out earlier this week by Dr. Wasserman.                                          Vitals:     Vitals:    08/07/19 0829   BP: 125/74   Pulse: 76   Resp: 18   Temp: 97.8 °F (36.6 °C)       Weight:   Wt Readings from Last 3 Encounters:   08/07/19 101 kg (222 lb 1.6 oz)   08/05/19 98.9 kg (218 lb)   08/05/19 99.1 kg (218 lb 6.4 oz)      Pain:    Pain Score    08/07/19 0829   PainSc: 0-No pain         Plan: We plan to continue radiation therapy as prescribed.    Brady Lopez MD  Radiation Oncology    Electronically signed by Brady Lopez MD 8/7/2019  8:41 AM     cc: Dr. Jarek Aleman

## 2019-08-07 NOTE — OUTREACH NOTE
General Surgery Week 3 Survey      Responses   Facility patient discharged from?  [place]   Does the patient have one of the following disease processes/diagnoses(primary or secondary)?  General Surgery   Week 3 attempt successful?  No   Unsuccessful attempts  Attempt 1          Tiffany Hightower RN

## 2019-08-08 ENCOUNTER — HOSPITAL ENCOUNTER (OUTPATIENT)
Dept: RADIATION ONCOLOGY | Facility: HOSPITAL | Age: 64
Discharge: HOME | End: 2019-08-08

## 2019-08-08 ENCOUNTER — READMISSION MANAGEMENT (OUTPATIENT)
Dept: CALL CENTER | Facility: HOSPITAL | Age: 64
End: 2019-08-08

## 2019-08-08 PROCEDURE — 77386: CPT | Performed by: RADIOLOGY

## 2019-08-08 PROCEDURE — 77014 CHG CT GUIDANCE RADIATION THERAPY FLDS PLACEMENT: CPT | Performed by: RADIOLOGY

## 2019-08-08 NOTE — OUTREACH NOTE
General Surgery Week 3 Survey      Responses   Facility patient discharged from?  [place]   Does the patient have one of the following disease processes/diagnoses(primary or secondary)?  General Surgery   Week 3 attempt successful?  Yes   Call start time  1216   Call end time  1220   Meds reviewed with patient/caregiver?  Yes   Is the patient having any side effects they believe may be caused by any medication additions or changes?  No   Is the patient taking all medications as directed (includes completed medication regime)?  Yes   Has the patient kept scheduled appointments due by today?  Yes   Week 3 call completed?  Yes   Revoked  No further contact(revokes)-requires comment   Graduated/Revoked comments  Pt is feeling better and has one more chemo treatment to go. Pt's PICC line to be removed next week. Pt is back to work and doing ok.          Claudine Stern RN

## 2019-08-09 ENCOUNTER — HOSPITAL ENCOUNTER (OUTPATIENT)
Dept: RADIATION ONCOLOGY | Facility: HOSPITAL | Age: 64
Discharge: HOME | End: 2019-08-09

## 2019-08-09 ENCOUNTER — INFUSION (OUTPATIENT)
Dept: ONCOLOGY | Facility: HOSPITAL | Age: 64
End: 2019-08-09

## 2019-08-09 DIAGNOSIS — Z45.2 ENCOUNTER FOR VENOUS ACCESS DEVICE CARE: Primary | ICD-10-CM

## 2019-08-09 PROCEDURE — 77386: CPT | Performed by: RADIOLOGY

## 2019-08-09 PROCEDURE — 77014 CHG CT GUIDANCE RADIATION THERAPY FLDS PLACEMENT: CPT | Performed by: RADIOLOGY

## 2019-08-09 RX ORDER — SODIUM CHLORIDE 0.9 % (FLUSH) 0.9 %
10 SYRINGE (ML) INJECTION AS NEEDED
Status: DISCONTINUED | OUTPATIENT
Start: 2019-08-09 | End: 2019-08-09 | Stop reason: HOSPADM

## 2019-08-09 RX ORDER — SODIUM CHLORIDE 0.9 % (FLUSH) 0.9 %
10 SYRINGE (ML) INJECTION AS NEEDED
Status: CANCELLED | OUTPATIENT
Start: 2019-09-03

## 2019-08-09 RX ADMIN — SODIUM CHLORIDE, PRESERVATIVE FREE 10 ML: 5 INJECTION INTRAVENOUS at 11:49

## 2019-08-12 ENCOUNTER — EDUCATION (OUTPATIENT)
Dept: RADIATION ONCOLOGY | Facility: HOSPITAL | Age: 64
End: 2019-08-12

## 2019-08-12 ENCOUNTER — HOSPITAL ENCOUNTER (OUTPATIENT)
Dept: RADIATION ONCOLOGY | Facility: HOSPITAL | Age: 64
Discharge: HOME | End: 2019-08-12

## 2019-08-12 PROCEDURE — 77014 CHG CT GUIDANCE RADIATION THERAPY FLDS PLACEMENT: CPT | Performed by: RADIOLOGY

## 2019-08-12 PROCEDURE — 77386: CPT | Performed by: RADIOLOGY

## 2019-08-12 PROCEDURE — 77336 RADIATION PHYSICS CONSULT: CPT | Performed by: RADIOLOGY

## 2019-08-13 ENCOUNTER — HOSPITAL ENCOUNTER (OUTPATIENT)
Dept: RADIATION ONCOLOGY | Facility: HOSPITAL | Age: 64
Discharge: HOME | End: 2019-08-13

## 2019-08-13 PROCEDURE — 77386: CPT | Performed by: RADIOLOGY

## 2019-08-13 PROCEDURE — 77427 RADIATION TX MANAGEMENT X5: CPT | Performed by: RADIOLOGY

## 2019-08-13 PROCEDURE — 77014 CHG CT GUIDANCE RADIATION THERAPY FLDS PLACEMENT: CPT | Performed by: RADIOLOGY

## 2019-08-14 ENCOUNTER — HOSPITAL ENCOUNTER (OUTPATIENT)
Dept: RADIATION ONCOLOGY | Facility: HOSPITAL | Age: 64
Discharge: HOME | End: 2019-08-14

## 2019-08-14 ENCOUNTER — RADIATION ONCOLOGY WEEKLY ASSESSMENT (OUTPATIENT)
Dept: RADIATION ONCOLOGY | Facility: HOSPITAL | Age: 64
End: 2019-08-14

## 2019-08-14 VITALS
HEIGHT: 68 IN | BODY MASS INDEX: 33.16 KG/M2 | RESPIRATION RATE: 18 BRPM | HEART RATE: 82 BPM | WEIGHT: 218.8 LBS | SYSTOLIC BLOOD PRESSURE: 127 MMHG | DIASTOLIC BLOOD PRESSURE: 78 MMHG | TEMPERATURE: 98.1 F

## 2019-08-14 DIAGNOSIS — C21.0 ANAL CANCER (HCC): Primary | ICD-10-CM

## 2019-08-14 PROCEDURE — 77014 CHG CT GUIDANCE RADIATION THERAPY FLDS PLACEMENT: CPT | Performed by: RADIOLOGY

## 2019-08-14 PROCEDURE — 77386: CPT | Performed by: RADIOLOGY

## 2019-08-14 NOTE — PROGRESS NOTES
On Treatment Visit       Patient: Juan Antonio Shaw   YOB: 1955   Medical Record Number: [id number]     Date of Visit  August 14, 2019   Primary Diagnosis: Stage IIA (cT2 cN0 M0) poorly differentiated nonkeratinizing squamous cell carcinoma the anus (p16+).  ICD 10 Code: C21.0      was seen today for an on treatment visit.  He is receiving radiation therapy to the pelvis/anus. He has received 4860 cGy in 27 fractions out of a planned dose of 5400 cGy in 30 fractions.  He is receiving concurrent 5-FU/mitomycin chemotherapy per Dr. Ly, and received cycle 1, day 29 on 8/5/2019.       Mr. Shaw is doing well and has no new disease or treatment-related complaints. His perirectal tenderness has been significantly helped by Silvadene cream. His oral mucositis has been helped with miracle mouthwash (per Dr. Ly).                                          Vitals:     Vitals:    08/14/19 0848   BP: 127/78   Pulse: 82   Resp: 18   Temp: 98.1 °F (36.7 °C)       Weight:   Wt Readings from Last 3 Encounters:   08/14/19 99.2 kg (218 lb 12.8 oz)   08/07/19 101 kg (222 lb 1.6 oz)   08/05/19 98.9 kg (218 lb)      Pain:    Pain Score    08/14/19 0848   PainSc:   6   PainLoc: Rectum         Plan: We plan to continue radiation therapy as prescribed.  Mr. Shaw is scheduled to complete radiation therapy next week.  He has follow-up scheduled with Dr. Ly on 9/3/2019.  We will arrange follow-up with Dr. Gordillo approximately 3 weeks after completion of XRT for reevaluation.  We plan to see the patient back in our clinic for routine follow-up in 3 months.    Brady Lopez MD  Radiation Oncology    Electronically signed by Brady Lopez MD 8/14/2019  9:02 AM     cc: Dr. Jarek Aleman

## 2019-08-15 ENCOUNTER — HOSPITAL ENCOUNTER (OUTPATIENT)
Dept: RADIATION ONCOLOGY | Facility: HOSPITAL | Age: 64
Discharge: HOME | End: 2019-08-15

## 2019-08-15 PROCEDURE — 77014 CHG CT GUIDANCE RADIATION THERAPY FLDS PLACEMENT: CPT | Performed by: RADIOLOGY

## 2019-08-15 PROCEDURE — 77386: CPT | Performed by: RADIOLOGY

## 2019-08-16 ENCOUNTER — HOSPITAL ENCOUNTER (OUTPATIENT)
Dept: RADIATION ONCOLOGY | Facility: HOSPITAL | Age: 64
Discharge: HOME | End: 2019-08-16

## 2019-08-16 PROCEDURE — 77386: CPT | Performed by: RADIOLOGY

## 2019-08-16 PROCEDURE — 77014 CHG CT GUIDANCE RADIATION THERAPY FLDS PLACEMENT: CPT | Performed by: RADIOLOGY

## 2019-08-19 ENCOUNTER — DOCUMENTATION (OUTPATIENT)
Dept: NUTRITION | Facility: HOSPITAL | Age: 64
End: 2019-08-19

## 2019-08-19 ENCOUNTER — HOSPITAL ENCOUNTER (OUTPATIENT)
Dept: RADIATION ONCOLOGY | Facility: HOSPITAL | Age: 64
Discharge: HOME | End: 2019-08-19

## 2019-08-19 ENCOUNTER — OFFICE VISIT (OUTPATIENT)
Dept: RADIATION ONCOLOGY | Facility: HOSPITAL | Age: 64
End: 2019-08-19

## 2019-08-19 DIAGNOSIS — C21.0 ANAL CANCER (HCC): Primary | ICD-10-CM

## 2019-08-19 PROCEDURE — 77336 RADIATION PHYSICS CONSULT: CPT | Performed by: RADIOLOGY

## 2019-08-19 PROCEDURE — 77014 CHG CT GUIDANCE RADIATION THERAPY FLDS PLACEMENT: CPT | Performed by: RADIOLOGY

## 2019-08-19 PROCEDURE — 99212-NC PR NO CHARGE CBC OFFICE OUTPATIENT VISIT 10 MINUTES: Performed by: RADIOLOGY

## 2019-08-19 PROCEDURE — 77386: CPT | Performed by: RADIOLOGY

## 2019-08-19 NOTE — PROGRESS NOTES
Adult Outpatient Nutrition  Assessment    Patient Name:  Juan Antonio Shaw  YOB: 1955  MRN: [id number]    Assessment Date:  8/19/2019    Comments:  Shared in pt's celebration as completed radiation today. Mouth better. Continues to experience constipation--RN advised. Wt 218.9 lb--up/down (overall stable). Alb 3.6 8/5/19. Praised pt for placing emphasis on nutrition.                        Electronically signed by:  Stephie Art RD  08/19/19 4:05 PM

## 2019-08-19 NOTE — PATIENT INSTRUCTIONS
RADIATION DISCHARGE INSTRUCTIONS    Juan Antonio Shaw  1955  [id number]    August 19, 2019    · The effects from your radiation treatments will continue for several weeks, so expect them to get better slowly.  · Fatigue (extreme tiredness and weakness) may last for several weeks but will improve slowly.  · Continue to rest as necessary, drink plenty of fluids, and eat nutritious foods as you are able.    Skin Care: Skin reactions will slowly heal.  Follow the skin care instructions provided by your nurse.    Oral Care: Continue any oral care instructions your nurse provided for you if needed.  If you must have dental work or surgery in the treatment area, ask you dentist/physician to call Dr. Lopez prior to the procedure.    Diet: [x]  High Calorie, High Protein for 4 weeks      [x]  Continue eating 6 small meals per day for 4 weeks     []  Dietary Supplement 2 times per day     []  Continue soft/liquid diet     [x]  Low residue-after diarrhea and abdominal cramping have stopped (3-4 weeks), gradually add foods to your diet, one food at a time every 2-3 days.      [x]  No spicy or foods that are high in citric acid (ex: oranges, grapefruit, tomatoes, pineapple).    Medications: Continue your regular medications.  Take a multi-vitamin daily for 6 months.     []  Follow Dexamethasone (Decadron) prescriptions provided (if applicable).    Sandy Musa RN  August 19, 2019  8:07 AM                    External Beam Radiation Therapy, Care After  This sheet gives you information about how to care for yourself after your procedure. Your health care provider may also give you more specific instructions. If you have problems or questions, contact your health care provider.  What can I expect after the procedure?  After the procedure, it is common to have:  · Fatigue.  · Red, flaking, dry skin in the treated area.  · A sunburn-like rash on the skin in the treated area.  · Hair loss in the treated area.  · Itching in  the treated area.  Other side effects may occur, depending on which part of the body was exposed to radiation and how much radiation was used. These may include:  · Hair loss if the radiation therapy was directed to the head.  · Coughing or difficulty swallowing if the radiation therapy was directed to the head, neck, or chest  · Nausea, vomiting, or diarrhea if the radiation therapy was directed to the abdomen or pelvis.  · Bladder problems, frequent urination, or sexual dysfunction if the radiation therapy was directed to the bladder, kidney, or prostate.  · Memory loss and cognitive changes if the radiation therapy was directed to your brain.  Although some side effects may show up months to years later, most side effects are usually temporary and get better over time. It can take up to 3-4 weeks for you to regain your energy or for side effects to get better.  Follow these instructions at home:    Skin care  · Wash your skin with a mild soap as told by your health care provider. Do not scrub or rub your skin. Pat yourself dry.  · Use a mild shampoo and be gentle when washing your hair.  · Apply gentle lotion or cream to the treated area as told by your health care provider.  · Keep the treated area covered when you are outside. Do not expose treated skin to the sun.  · Avoid scratching the treated area.  General instructions  · Do not use a heating pad or a warm cloth to relieve pain in the treated area.  · Take over-the-counter and prescription medicines only as told by your health care provider.  · Follow your health care provider's advice on the type and amount of liquids to drink each day.  · Try to maintain your weight during treatment. Ask your health care team for tips.  · Keep all follow-up visits as told by your health care provider. This is important. The visits are usually scheduled 6 weeks to 6 months after radiation therapy. They are needed to determine if the radiation therapy worked as it was  intended to.  Contact a health care provider if:  · You have pain in the treated area.  · The redness worsens in the treated area.  · Open skin or blisters develop in the treated area.  · You have unexplained weight loss.  Get help right away if:  · You have a fever.  · You have nausea or vomiting that lasts a long time.  · You have diarrhea that lasts a long time.  Summary  · After this procedure, it is common to have fatigue, skin changes and other side effects depending on where the radiation therapy was given.  · Although some side effects may show up months to years later, most side effects are usually temporary and get better over time. It can take up to 3-4 weeks for you to regain your energy or for side effects to get better.  · Keep all follow-up visits as told by your health care provider. This is important. The visits are usually scheduled 6 weeks to 6 months after radiation therapy.  This information is not intended to replace advice given to you by your health care provider. Make sure you discuss any questions you have with your health care provider.  Document Released: 12/23/2014 Document Revised: 11/22/2017 Document Reviewed: 11/22/2017  [employer] Interactive Patient Education © 2019 [employer]

## 2019-08-22 ENCOUNTER — TELEPHONE (OUTPATIENT)
Dept: RADIATION ONCOLOGY | Facility: HOSPITAL | Age: 64
End: 2019-08-22

## 2019-08-22 NOTE — PROGRESS NOTES
Radiation Completion Note       Patient: Juan Antonio Shaw   YOB: 1955   Medical Record Number: [id number]   Date of Completion: 8/19/2019    Summary: Mr. Shaw completed radiation therapy today in our department. The following is a summary of his course of treatment.    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  ICD 10 Code: C21.0     Treatment course: Mr. Shaw received 5400 cGy in 30 fractions over 42 elapsed days, from 7/9/19 through 8/19/19, utilizing IMRT and VMAT (arc) external beam radiation therapy and 6 MV photons. He completed his radiation therapy as prescribed, with no significant treatment breaks. He  received concurrent 5-FU/mitomycin chemotherapy per Dr. Ly.    Response/Tolerance: Mr. Shaw tolerated his radiation therapy well and had no significant treatment-related complaints.  His weight was 216 pounds at the start of radiation therapy and 218 pounds at the completion of XRT.    Disposition: Mr. Shaw has follow-up scheduled with Dr. Ly on 9/3/2019.  He has an appointment with Dr. Wasserman on 9/11/2019. We will arrange follow-up with Dr. Gordillo approximately 3 weeks after completion of XRT for reevaluation.  We plan to see the patient back in our clinic for routine follow-up in 3 months.      Brady Lopez MD  Radiation Oncology    Electronically signed by Brady Lopez MD 8/19/2019 2:48 PM     cc: Dr. Jarek Aleman

## 2019-09-03 ENCOUNTER — LAB (OUTPATIENT)
Dept: ONCOLOGY | Facility: HOSPITAL | Age: 64
End: 2019-09-03

## 2019-09-03 ENCOUNTER — OFFICE VISIT (OUTPATIENT)
Dept: ONCOLOGY | Facility: CLINIC | Age: 64
End: 2019-09-03

## 2019-09-03 VITALS
RESPIRATION RATE: 18 BRPM | TEMPERATURE: 98.3 F | OXYGEN SATURATION: 96 % | HEART RATE: 70 BPM | HEIGHT: 68 IN | WEIGHT: 219 LBS | DIASTOLIC BLOOD PRESSURE: 69 MMHG | BODY MASS INDEX: 33.19 KG/M2 | SYSTOLIC BLOOD PRESSURE: 139 MMHG

## 2019-09-03 DIAGNOSIS — C21.0 ANAL CANCER (HCC): ICD-10-CM

## 2019-09-03 DIAGNOSIS — M89.9 BONE LESION: ICD-10-CM

## 2019-09-03 DIAGNOSIS — Z45.2 ENCOUNTER FOR VENOUS ACCESS DEVICE CARE: Primary | ICD-10-CM

## 2019-09-03 DIAGNOSIS — C61 PROSTATE CANCER (HCC): ICD-10-CM

## 2019-09-03 LAB
ALBUMIN SERPL-MCNC: 3.7 G/DL (ref 3.5–5.2)
ALBUMIN/GLOB SERPL: 0.9 G/DL
ALP SERPL-CCNC: 87 U/L (ref 39–117)
ALT SERPL W P-5'-P-CCNC: 30 U/L (ref 1–41)
ANION GAP SERPL CALCULATED.3IONS-SCNC: 11 MMOL/L (ref 5–15)
AST SERPL-CCNC: 29 U/L (ref 1–40)
BASOPHILS # BLD AUTO: 0.05 10*3/MM3 (ref 0–0.2)
BASOPHILS NFR BLD AUTO: 1 % (ref 0–1.5)
BILIRUB SERPL-MCNC: 0.3 MG/DL (ref 0.2–1.2)
BUN BLD-MCNC: 20 MG/DL (ref 8–23)
BUN/CREAT SERPL: 21.5 (ref 7–25)
CALCIUM SPEC-SCNC: 9.5 MG/DL (ref 8.6–10.5)
CHLORIDE SERPL-SCNC: 101 MMOL/L (ref 98–107)
CO2 SERPL-SCNC: 28 MMOL/L (ref 22–29)
CREAT BLD-MCNC: 0.93 MG/DL (ref 0.76–1.27)
DEPRECATED RDW RBC AUTO: 60.1 FL (ref 37–54)
EOSINOPHIL # BLD AUTO: 0.12 10*3/MM3 (ref 0–0.4)
EOSINOPHIL NFR BLD AUTO: 2.5 % (ref 0.3–6.2)
ERYTHROCYTE [DISTWIDTH] IN BLOOD BY AUTOMATED COUNT: 18 % (ref 12.3–15.4)
GFR SERPL CREATININE-BSD FRML MDRD: 82 ML/MIN/1.73
GLOBULIN UR ELPH-MCNC: 3.9 GM/DL
GLUCOSE BLD-MCNC: 94 MG/DL (ref 65–99)
HCT VFR BLD AUTO: 38.5 % (ref 37.5–51)
HGB BLD-MCNC: 13 G/DL (ref 13–17.7)
HOLD SPECIMEN: NORMAL
IMM GRANULOCYTES # BLD AUTO: 0.09 10*3/MM3 (ref 0–0.05)
IMM GRANULOCYTES NFR BLD AUTO: 1.8 % (ref 0–0.5)
LYMPHOCYTES # BLD AUTO: 1.61 10*3/MM3 (ref 0.7–3.1)
LYMPHOCYTES NFR BLD AUTO: 32.9 % (ref 19.6–45.3)
MCH RBC QN AUTO: 31.9 PG (ref 26.6–33)
MCHC RBC AUTO-ENTMCNC: 33.8 G/DL (ref 31.5–35.7)
MCV RBC AUTO: 94.4 FL (ref 79–97)
MONOCYTES # BLD AUTO: 0.61 10*3/MM3 (ref 0.1–0.9)
MONOCYTES NFR BLD AUTO: 12.5 % (ref 5–12)
NEUTROPHILS # BLD AUTO: 2.41 10*3/MM3 (ref 1.7–7)
NEUTROPHILS NFR BLD AUTO: 49.3 % (ref 42.7–76)
NRBC BLD AUTO-RTO: 0 /100 WBC (ref 0–0.2)
PLATELET # BLD AUTO: 317 10*3/MM3 (ref 140–450)
PMV BLD AUTO: 8.1 FL (ref 6–12)
POTASSIUM BLD-SCNC: 3.9 MMOL/L (ref 3.5–5.2)
PROT SERPL-MCNC: 7.6 G/DL (ref 6–8.5)
RBC # BLD AUTO: 4.08 10*6/MM3 (ref 4.14–5.8)
SODIUM BLD-SCNC: 140 MMOL/L (ref 136–145)
WBC NRBC COR # BLD: 4.89 10*3/MM3 (ref 3.4–10.8)

## 2019-09-03 PROCEDURE — 85025 COMPLETE CBC W/AUTO DIFF WBC: CPT | Performed by: NURSE PRACTITIONER

## 2019-09-03 PROCEDURE — 99213 OFFICE O/P EST LOW 20 MIN: CPT | Performed by: NURSE PRACTITIONER

## 2019-09-03 PROCEDURE — G0463 HOSPITAL OUTPT CLINIC VISIT: HCPCS | Performed by: NURSE PRACTITIONER

## 2019-09-03 PROCEDURE — 80053 COMPREHEN METABOLIC PANEL: CPT | Performed by: NURSE PRACTITIONER

## 2019-09-03 RX ORDER — SODIUM CHLORIDE 0.9 % (FLUSH) 0.9 %
10 SYRINGE (ML) INJECTION AS NEEDED
Status: CANCELLED | OUTPATIENT
Start: 2019-09-03

## 2019-09-03 RX ORDER — SODIUM CHLORIDE 0.9 % (FLUSH) 0.9 %
10 SYRINGE (ML) INJECTION AS NEEDED
Status: DISCONTINUED | OUTPATIENT
Start: 2019-09-03 | End: 2019-09-03

## 2019-09-04 NOTE — PROGRESS NOTES
DATE OF VISIT: 9/3/2019    REASON FOR VISIT:  Anal cancer, history of prostate cancer, sclerotic lesion of bone on CT scan of chest, port infection        HISTORY OF PRESENT ILLNESS:    64-year-old male with medical problem consisting of hypertension, prostatic adenocarcinoma status post robotic prostatectomy done in May 2018 was initially seen in consultation on June 20, 2019 for newly diagnosed squamous cell cancer of anus.   Patient was started on day 1 of 5-FU and mitomycin with radiation treatment on July 8, 2019.  Patient was admitted to [facility] on July 14, 2019 with cellulitis of chest wall and was found to have port infection.  Patient finished antibiotics.  Denies any fever or bleeding.  He completed concurrent chemotherapy on 8/5/2019. He is here today for follow-up.   He is scheduled to see Dr. Wasserman later this month. Denies any new lymph node enlargement.  Denies any new shortness of breath or chest pain. Denies any blood in stool or rectal pain.       PAST MEDICAL HISTORY:    Past Medical History:   Diagnosis Date   • Hypertension    • Prostate cancer (CMS/[facility]) 05/16/2018   • Rectal cancer (CMS/[facility]) 06/2019       SOCIAL HISTORY:    Social History     Tobacco Use   • Smoking status: Never Smoker   • Smokeless tobacco: Never Used   Substance Use Topics   • Alcohol use: Yes     Comment: rarely   • Drug use: No       Surgical History :  Past Surgical History:   Procedure Laterality Date   • COLONOSCOPY N/A 5/30/2019    Procedure: COLONOSCOPY;  Surgeon: Jarek Gordillo DO;  Location: [facility] ENDOSCOPY;  Service: Gastroenterology   • ENDOSCOPY N/A 5/30/2019    Procedure: ESOPHAGOGASTRODUODENOSCOPY;  Surgeon: Jarek Gordillo DO;  Location: [facility] ENDOSCOPY;  Service: Gastroenterology   • PROSTATE SURGERY     • VENOUS ACCESS DEVICE (PORT) INSERTION N/A 6/27/2019    Procedure: INSERTION VENOUS ACCESS DEVICE (MEDIPORT)           (C-ARM#1);  Surgeon: Benson Wasserman MD;  Location: [facility] OR;   "Service: General   • VENOUS ACCESS DEVICE (PORT) REMOVAL Right 7/20/2019    Procedure: REMOVAL VENOUS ACCESS DEVICE;  Surgeon: Benson Wasserman MD;  Location: [facility];  Service: General       ALLERGIES:    No Known Allergies    REVIEW OF SYSTEMS:      CONSTITUTIONAL:  No fever, chills, or night sweats.     HEENT:  No epistaxis, mouth sores, or difficulty swallowing.    RESPIRATORY:  No new shortness of breath or cough at present.    CARDIOVASCULAR:  No chest pain or palpitations.    GASTROINTESTINAL:  No abdominal pain, nausea, vomiting, or blood in the stool.    GENITOURINARY:  No dysuria or hematuria.    MUSCULOSKELETAL:  No any new back pain or arthralgias.     NEUROLOGICAL:  No tingling or numbness. No new headache or dizziness.     LYMPHATICS:  Denies any abnormal swollen and anywhere in the body.    SKIN:  Denies any new skin rash.    PHYSICAL EXAMINATION:      VITAL SIGNS:  /69   Pulse 70   Temp 98.3 °F (36.8 °C) (Temporal)   Resp 18   Ht 172.7 cm (67.99\")   Wt 99.3 kg (219 lb)   SpO2 96%   BMI 33.31 kg/m²     GENERAL:  Not in any distress.    HEENT:  Normocephalic, Atraumatic.Mild Conjunctival pallor. No icterus. Extraocular Movements Intact. No Facial Asymmetry noted.    NECK:  No adenopathy. No JVD.    RESPIRATORY:  Fair air entry bilateral. No rhonchi or wheezing.    CARDIOVASCULAR:  S1, S2. Regular rate and rhythm. No murmur or gallop appreciated.    ABDOMEN:  Soft, obese, nontender. Bowel sounds present in all four quadrants.  No organomegaly appreciated.    EXTREMITIES:  No edema.No Calf Tenderness.    NEUROLOGIC:  Alert, awake and oriented ×3.  No  Motor or sensory deficit appreciated. Cranial Nerves 2-12 grossly intact.    SKIN : No new skin lesion identified  DIAGNOSTIC DATA:    Glucose   Date Value Ref Range Status   09/03/2019 94 65 - 99 mg/dL Final     Sodium   Date Value Ref Range Status   09/03/2019 140 136 - 145 mmol/L Final   07/11/2018 138 134 - 144 mmol/L Final   05/17/2018 135 " (L) 136 - 145 mmol/L Final     Potassium   Date Value Ref Range Status   09/03/2019 3.9 3.5 - 5.2 mmol/L Final   07/11/2018 4 3.5 - 5.1 mmol/L Final   05/17/2018 4.3 3.3 - 5.0 mmol/L Final     CO2   Date Value Ref Range Status   09/03/2019 28.0 22.0 - 29.0 mmol/L Final     Total CO2   Date Value Ref Range Status   05/17/2018 26 20 - 31 mmol/L Final     Chloride   Date Value Ref Range Status   09/03/2019 101 98 - 107 mmol/L Final   07/11/2018 105 98 - 107 mmol/L Final   05/17/2018 104 99 - 111 mmol/L Final     Anion Gap   Date Value Ref Range Status   09/03/2019 11.0 5.0 - 15.0 mmol/L Final   05/17/2018 9 4 - 16 mmol/L Final     Creatinine   Date Value Ref Range Status   09/03/2019 0.93 0.76 - 1.27 mg/dL Final   07/11/2018 1 0.6 - 1.3 mg/dL Final   05/17/2018 1.0 0.6 - 1.3 mg/dL Final     BUN   Date Value Ref Range Status   09/03/2019 20 8 - 23 mg/dL Final   07/11/2018 34 (H) 7 - 18 mg/dL Final     BUN/Creatinine Ratio   Date Value Ref Range Status   09/03/2019 21.5 7.0 - 25.0 Final     Calcium   Date Value Ref Range Status   09/03/2019 9.5 8.6 - 10.5 mg/dL Final   07/11/2018 8.8 8.8 - 10.5 mg/dL Final     eGFR Non  Amer   Date Value Ref Range Status   09/03/2019 82 >60 mL/min/1.73 Final     Alkaline Phosphatase   Date Value Ref Range Status   09/03/2019 87 39 - 117 U/L Final   07/11/2018 69 46 - 116 U/L Final     Total Protein   Date Value Ref Range Status   09/03/2019 7.6 6.0 - 8.5 g/dL Final   07/11/2018 7.4 6.4 - 8.2 g/dL Final     ALT (SGPT)   Date Value Ref Range Status   09/03/2019 30 1 - 41 U/L Final   07/11/2018 19 (L) 30 - 65 U/L Final     AST (SGOT)   Date Value Ref Range Status   09/03/2019 29 1 - 40 U/L Final   07/11/2018 17 15 - 37 U/L Final     Total Bilirubin   Date Value Ref Range Status   09/03/2019 0.3 0.2 - 1.2 mg/dL Final   07/11/2018 0.3 0 - 1.0 mg/dL Final     Albumin   Date Value Ref Range Status   09/03/2019 3.70 3.50 - 5.20 g/dL Final   07/11/2018 3.5 3.4 - 5.0 g/dL Final     Globulin    Date Value Ref Range Status   09/03/2019 3.9 gm/dL Final     Lab Results   Component Value Date    WBC 4.89 09/03/2019    HGB 13.0 09/03/2019    HCT 38.5 09/03/2019    MCV 94.4 09/03/2019     09/03/2019     Lab Results   Component Value Date    NEUTROABS 2.41 09/03/2019    [id number] 1,034 07/11/2018     No results found for: , LABCA2, AFPTM, HCGQUANT, , CHROMGRNA, [id number], CEA, REFLABREPO]  CT of chest without contrast done on June 27, 2019 was reviewed with Dr. Weller, discussed with the patient, it showed:  FINDINGS:     PULMONARY PARENCHYMA:      - air spaces: Negative    - interstitium: Grossly within normal limits for age    - misc: Tiny densely calcified granuloma in the right upper  lobe anteriorly measuring approximately 3 mm in greatest  dimension. Mild atelectasis in the left lung base. Left a  diaphragm is mildly elevated     MEDIASTINUM / CIRA:      - heart: Normal size, no pericardial fluid. Mild  atherosclerotic calcification of the coronary arteries.    - aorta/great vessels: Ascending thoracic aorta is dilated up  to 4.0 cm in greatest diameter. The visualized portion of the  descending aorta is normal in caliber.    - misc: No mediastinal mass / significant adenopathy. There are  densely calcified lymph nodes in the right paratracheal and the  precarinal space. There are smaller calcified nodes in the right  hilum.     PLEURAL COMPARTMENT:      - misc: No pleural fluid or mass        MISC:      - inferior neck: Negative    - osseous/body wall: A few tiny sclerotic foci are seen in  multiple vertebral bodies, which are indeterminate, and these are  so small that they may be falsely negative on bone scan or PET  scan even if representing metastasis. These can be followed up on  future studies to evaluate for change.    - subdiaphramatic: Nonobstructing 1.1 cm calculus in the right  renal midpole. There may be a small cyst in the upper pole of the  right kidney. Small  nonobstructing calculus measuring about 3 mm  is seen in the left kidney. Additional smaller calculi are  present in the lower pole. A tiny low-attenuation lesion in the  lower pole of the left kidney is too small to adequately  characterize. The gallbladder appears to be filled with calculi.  There is limited evaluation of the liver for metastases due to  noncontrast technique, but no specific lesions are identified.   Small hiatal hernia present.           CT of abdomen and pelvis with contrast done on June 17, 2019 showed:   - - - CT ABDOMEN - - -      THORAX (INFERIOR):   - LUNG BASES:  Clear   - PLEURA:  No fluid or mass   - HEART: Normal size, no pericardial fluid   - MISC:  N/A      ABDOMEN:   - LIVER:  Normal size/contour, no ductal dilatation. Scattered  punctate calcifications are present, consistent with healed  granulomatous disease   - GB: Filled with gallstones   - CBD:  Grossly negative   - SPLEEN:  Normal size and contour. Granulomatous scarring   - PANCREAS:  Normal in size, contour, no focal mass    - VISCERA:  Normal caliber, no wall thickening   - MESENTERY: No mesenteric mass   - CAVITY:  No free abdominal fluid, no free intraperitoneal air   - BODY WALL:  With normal limits   - OSSEOUS:  Degenerative changes most prominently at L5-S1 where  there is almost complete loss of joint space and vacuum disc  phenomenon      RETROPERITONEUM:   - KIDNEYS:Normal size/contour, no collecting system dilation                    No evidence of an enhancing mass. Bilateral  nonobstructing calculi, and small possible cysts bilaterally.    - URETERS:  Normal course, caliber   - ADRENALS:  Normal size, contour   - MISC:  No sig. retroperitoneal adenopathy or mass   - VASCULAR:  Aorta / iliacs: wnl for age     - - - CT PELVIS - - -       - VISCERA:  Normal caliber small/large bowel, mild thickening of  the rectum, nonspecific   - MESENTERY:  No mass   - VASCULAR:  Within normal limits for age   - CAVITY:  No free  fluid / air   - BLADDER:  Unremarkable   - OSSEOUS:  Within normal limits    - PROSTATE:  Grossly wnl  Small left inguinal hernia contains only fat.      IMPRESSION:  1. No definite evidence of metastasis. Mild thickening of the  rectum, may be related to treatment effect, though malignancy  cannot be excluded.   2. Cholelithiasis.   3. Nonobstructing nephrolithiasis.  4. Possible bilateral renal cysts, could be further evaluated and  characterized by ultrasound or multiphasic contrast enhanced CT  or MRI  5. Please see finding sections above for further details.            Chest x-ray PA/lateral done on June 17, 2019 showed:  IMPRESSION:  CONCLUSION: Chronic elevation left diaphragm either diaphragmatic  eventration or diaphragmatic paralysis. Benign calcified  mediastinal and hilar lymph nodes. Lungs otherwise clear.              Pathology :  Pathology report from May 30, 2019 showed:  Component     Final Diagnosis   1.  MUCOSA, ESOPHAGUS:   CHRONIC ESOPHAGITIS.   NEGATIVE FOR FUNGI (GMS STAIN), VIRAL CELLS AND DYSPLASIA.      2.  MUCOSA, ANUS:   INVASIVE NONKERATINIZING SQUAMOUS CELL CARCINOMA, POORLY DIFFERENTIATED.   IMMUNOSTAIN STRONGLY POSITIVE FOR p16 (HPV MARKER).      3.  POLYP, TRANSVERSE COLON:   INFLAMMATORY POLYP.     4.  POLYP, ASCENDING COLON:   SESSILE SERRATED ADENOMA.      5.  POLYP, DESCENDING COLON:   NO SIGNIFICANT HISTOLOGIC ABNORMALITY.                Pathology report from 05/16/2018 at [facility] showed:  SPECIMEN:  A. PROSTATE      CLINICAL HISTORY:  NONE GIVEN  PRE-OP DIAGNOSIS:  ELEVATED PSA  POST-OP DIAGNOSIS:  PROCEDURE:  ROBOTIC ASSISTED PROSTATECTOMY    FINAL DIAGNOSIS:    RADICAL PROSTATECTOMY SPECIMEN:       Invasive carcinoma.       - Histologic type:  Adenocarcinoma, conventional.       - Histologic Milka score:  6 (3+3).       - Grade group:  1.       - Tumor quantitation:  Less than 1%. (Tumor present in 2 of 16 slides)       - Margins:  Clear.        - Bilateral  involvement:  Present.       - Extra-prostatic extension:  Not identified.       - Seminal vesicle invasion:  Not identified.       - Perineural invasion:  Not identified.       - Angiolymphatic invasion:  Not identified.       - Other findings:  Benign glandular cyst near bladder margin.       - Pathologic staging:  pT2c.    GROSS:      Received is a 56 gram radical prostatectomy specimen with both seminal vesicles attached.  The prostate is 5.2 x 4.5 x 4.6 cm.  There is a 1 cm cystic structure present at the bladder margin on the right side.  Sections through the specimen demonstrate a somewhat nodular surface with increased firmness present primarily of the right posterior aspect.  Prostate from the right anterior margin is submitted as A1, right anterior lateral A2, right mid lateral A3, right posterior A4-A6, and right seminal vesicle A7.  A section of the left anterior prostate is submitted as A8, left anterior lateral A9, left mid lateral A10, left posterior A11-A13, and left seminal vesicle A14.  Sections of the cystic lesion are submitted as A15 and A16.       (AEE)    MICROSCOPIC:    Multiple sections of prostate from right and left lobes including margins are evaluated.  Most of the prostate features hyperplastic changes with chronic inflammation.  Two areas feature prostatic adenocarcinoma.  These are on slides A1 and A12.  In both slides, the amount of tumor is relatively small with the margins being clear.  Perineural invasion is not noted.  There is no evidence of seminal vesicle invasion.  Sections from the bladder margin demonstrate a benign glandular type cyst.      Final Diagnosis performed by  Gilson Vaughn M.D.  Electronically signed 5/18/2018 9:01AM        ASSESSMENT AND PLAN:       1.  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  -Result of CT scan of abdomen and pelvis, pathology and staging were discussed with the patient  today.  -It was discussed with as per NCCN guidelines, her standard recommendation for this kind of cancer is concurrent chemoradiation with 5-FU and mitomycin.  -Patient has  been evaluated by Dr. Lopez on June 18, 2019.    -Patient had a CT of chest without contrast done on June 27, 2019 that showed tiny sclerotic focus in vertebral body as well as healing fracture in rib.  Result of CT scan showing tiny sclerotic focus was discussed with patient.  Patient does admit that he has a trauma due to fall 3 months ago that can contribute to refracture.  Sclerotic focus there about 4 mm in size, PET/CT and bone scan will not detect those kind of tiny focus.  Plan is to repeat CT of chest in about 4 months that will be around November 2019 for follow-up.  -In view of no conclusive evidence of metastatic disease, patient was started on concurrent 5-FU mitomycin with radiation  on July 8, 2019.  - First week of treatment was complicated by port infection requiring port removal and antibiotics.  - Patient completed concurrenty chemotherapy/radiation therapy with 5 FU and mitomycin on 8/5/2019.  -Doing well today, labs are reviewed and WNL.  _he will need follow-up with Dr. Gordillo for repeat scope. He is scheduled to see Dr. Wasserman later this month as well.  -Will have RTC next month with Dr. Ly.      2.  Prostatic adenocarcinoma, PT2cN0, stage IIc  -Status post radical prostatectomy on May 16, 2018.  -Most recent PSA done in May 2019 was <0.1  -We will need periodic PSA check to rule out recurrence which was discussed with patient.  -Outside pathology report was reviewed.     3.  Sclerotic bone focus on vertebral body as well as healing fracture of ribs:  -Patient had a CT of chest without contrast on June 29, 2019 that showed tiny sclerotic focus on vertebral body as well as healing fracture on rib.  Patient does admit to trauma to rib around April 2019.  -Sclerotic focus on deep there are 2 tiny measuring about 4 mm to 5  mm in size.  Bone scan or PET/CT would not detect those kind of tiny focus.  -We will do another CT of chest without contrast around November 2019 to follow-up on sclerotic focus.  This recommendation were discussed with patient today.        4.  Hypertension, BP is 139/69     5.  Health maintenance: Patient does not smoke.  Had a colonoscopy done on May 30, 2019 by Dr. Gordillo.         This document has been signed by SUHAIL Hook on September 4, 2019 12:12 PM

## 2019-10-04 ENCOUNTER — DOCUMENTATION (OUTPATIENT)
Dept: ONCOLOGY | Facility: CLINIC | Age: 64
End: 2019-10-04

## 2019-10-04 NOTE — PROGRESS NOTES
Late entry.  8-22-19  Oncology SW advised of pt distress score upon completion of radiation treatments.  Ongoing availability of SW reinforced throughout treatment.  Pt. Was seen for brief intervention this day. Pt. Voiced no needs and distress described within the scope of his situation and adjustments.

## 2019-10-08 DIAGNOSIS — C21.0 ANAL CANCER (HCC): Primary | ICD-10-CM

## 2019-10-10 ENCOUNTER — APPOINTMENT (OUTPATIENT)
Dept: ONCOLOGY | Facility: HOSPITAL | Age: 64
End: 2019-10-10

## 2019-10-10 ENCOUNTER — APPOINTMENT (OUTPATIENT)
Dept: ONCOLOGY | Facility: CLINIC | Age: 64
End: 2019-10-10

## 2019-10-22 ENCOUNTER — OFFICE VISIT (OUTPATIENT)
Dept: ONCOLOGY | Facility: CLINIC | Age: 64
End: 2019-10-22

## 2019-10-22 ENCOUNTER — LAB (OUTPATIENT)
Dept: ONCOLOGY | Facility: HOSPITAL | Age: 64
End: 2019-10-22

## 2019-10-22 VITALS
RESPIRATION RATE: 18 BRPM | TEMPERATURE: 98 F | DIASTOLIC BLOOD PRESSURE: 71 MMHG | SYSTOLIC BLOOD PRESSURE: 135 MMHG | HEART RATE: 66 BPM | HEIGHT: 68 IN | BODY MASS INDEX: 34.34 KG/M2 | WEIGHT: 226.6 LBS

## 2019-10-22 DIAGNOSIS — C61 PROSTATE CANCER (HCC): ICD-10-CM

## 2019-10-22 DIAGNOSIS — M89.9 BONE LESION: ICD-10-CM

## 2019-10-22 DIAGNOSIS — C21.0 ANAL CANCER (HCC): ICD-10-CM

## 2019-10-22 DIAGNOSIS — D64.9 ANEMIA, UNSPECIFIED TYPE: ICD-10-CM

## 2019-10-22 DIAGNOSIS — C21.0 ANAL CANCER (HCC): Primary | ICD-10-CM

## 2019-10-22 LAB
ALBUMIN SERPL-MCNC: 3.9 G/DL (ref 3.5–5.2)
ALBUMIN/GLOB SERPL: 1.2 G/DL
ALP SERPL-CCNC: 62 U/L (ref 39–117)
ALT SERPL W P-5'-P-CCNC: 13 U/L (ref 1–41)
ANION GAP SERPL CALCULATED.3IONS-SCNC: 11 MMOL/L (ref 5–15)
AST SERPL-CCNC: 18 U/L (ref 1–40)
BASOPHILS # BLD AUTO: 0.03 10*3/MM3 (ref 0–0.2)
BASOPHILS NFR BLD AUTO: 0.5 % (ref 0–1.5)
BILIRUB SERPL-MCNC: 0.4 MG/DL (ref 0.2–1.2)
BUN BLD-MCNC: 31 MG/DL (ref 8–23)
BUN/CREAT SERPL: 36.9 (ref 7–25)
CALCIUM SPEC-SCNC: 9.4 MG/DL (ref 8.6–10.5)
CHLORIDE SERPL-SCNC: 102 MMOL/L (ref 98–107)
CO2 SERPL-SCNC: 28 MMOL/L (ref 22–29)
CREAT BLD-MCNC: 0.84 MG/DL (ref 0.76–1.27)
DEPRECATED RDW RBC AUTO: 48.5 FL (ref 37–54)
EOSINOPHIL # BLD AUTO: 0.53 10*3/MM3 (ref 0–0.4)
EOSINOPHIL NFR BLD AUTO: 8.6 % (ref 0.3–6.2)
ERYTHROCYTE [DISTWIDTH] IN BLOOD BY AUTOMATED COUNT: 13.7 % (ref 12.3–15.4)
FERRITIN SERPL-MCNC: 27.03 NG/ML (ref 30–400)
FOLATE SERPL-MCNC: 12.3 NG/ML (ref 4.78–24.2)
GFR SERPL CREATININE-BSD FRML MDRD: 92 ML/MIN/1.73
GLOBULIN UR ELPH-MCNC: 3.3 GM/DL
GLUCOSE BLD-MCNC: 95 MG/DL (ref 65–99)
HCT VFR BLD AUTO: 39.2 % (ref 37.5–51)
HGB BLD-MCNC: 12.8 G/DL (ref 13–17.7)
IMM GRANULOCYTES # BLD AUTO: 0.03 10*3/MM3 (ref 0–0.05)
IMM GRANULOCYTES NFR BLD AUTO: 0.5 % (ref 0–0.5)
IRON 24H UR-MRATE: 95 MCG/DL (ref 59–158)
IRON SATN MFR SERPL: 28 % (ref 20–50)
LYMPHOCYTES # BLD AUTO: 1.36 10*3/MM3 (ref 0.7–3.1)
LYMPHOCYTES NFR BLD AUTO: 22.1 % (ref 19.6–45.3)
MCH RBC QN AUTO: 31.5 PG (ref 26.6–33)
MCHC RBC AUTO-ENTMCNC: 32.7 G/DL (ref 31.5–35.7)
MCV RBC AUTO: 96.6 FL (ref 79–97)
MONOCYTES # BLD AUTO: 0.62 10*3/MM3 (ref 0.1–0.9)
MONOCYTES NFR BLD AUTO: 10.1 % (ref 5–12)
NEUTROPHILS # BLD AUTO: 3.58 10*3/MM3 (ref 1.7–7)
NEUTROPHILS NFR BLD AUTO: 58.2 % (ref 42.7–76)
NRBC BLD AUTO-RTO: 0 /100 WBC (ref 0–0.2)
PLATELET # BLD AUTO: 232 10*3/MM3 (ref 140–450)
PMV BLD AUTO: 8.3 FL (ref 6–12)
POTASSIUM BLD-SCNC: 4.7 MMOL/L (ref 3.5–5.2)
PROT SERPL-MCNC: 7.2 G/DL (ref 6–8.5)
RBC # BLD AUTO: 4.06 10*6/MM3 (ref 4.14–5.8)
SODIUM BLD-SCNC: 141 MMOL/L (ref 136–145)
TIBC SERPL-MCNC: 343 MCG/DL (ref 298–536)
TRANSFERRIN SERPL-MCNC: 230 MG/DL (ref 200–360)
VIT B12 BLD-MCNC: 1075 PG/ML (ref 211–946)
WBC NRBC COR # BLD: 6.15 10*3/MM3 (ref 3.4–10.8)

## 2019-10-22 PROCEDURE — G9903 PT SCRN TBCO ID AS NON USER: HCPCS | Performed by: INTERNAL MEDICINE

## 2019-10-22 PROCEDURE — 83540 ASSAY OF IRON: CPT | Performed by: INTERNAL MEDICINE

## 2019-10-22 PROCEDURE — 82728 ASSAY OF FERRITIN: CPT | Performed by: INTERNAL MEDICINE

## 2019-10-22 PROCEDURE — 99214 OFFICE O/P EST MOD 30 MIN: CPT | Performed by: INTERNAL MEDICINE

## 2019-10-22 PROCEDURE — G0463 HOSPITAL OUTPT CLINIC VISIT: HCPCS | Performed by: INTERNAL MEDICINE

## 2019-10-22 PROCEDURE — 82607 VITAMIN B-12: CPT | Performed by: INTERNAL MEDICINE

## 2019-10-22 PROCEDURE — 84466 ASSAY OF TRANSFERRIN: CPT | Performed by: INTERNAL MEDICINE

## 2019-10-22 PROCEDURE — G8731 PAIN NEG NO PLAN: HCPCS | Performed by: INTERNAL MEDICINE

## 2019-10-22 PROCEDURE — 1123F ACP DISCUSS/DSCN MKR DOCD: CPT | Performed by: INTERNAL MEDICINE

## 2019-10-22 PROCEDURE — 85025 COMPLETE CBC W/AUTO DIFF WBC: CPT

## 2019-10-22 PROCEDURE — 82746 ASSAY OF FOLIC ACID SERUM: CPT | Performed by: INTERNAL MEDICINE

## 2019-10-22 PROCEDURE — 80053 COMPREHEN METABOLIC PANEL: CPT

## 2019-10-22 NOTE — PROGRESS NOTES
DATE OF VISIT: 10/22/2019      REASON FOR VISIT: Anal cancer, history of prostate cancer, sclerotic lesion of bone on CT scan of chest, anemia    HISTORY OF PRESENT ILLNESS:    64-year-old male with medical problem consisting of hypertension, prostatic adenocarcinoma status post robotic prostatectomy done in May 2018 was initially seen in consultation on June 20, 2019 for newly diagnosed squamous cell cancer of anus.   Patient was started on day 1 of 5-FU and mitomycin with radiation treatment on July 8, 2019.  Patient received day 28 of 5-FU and mitomycin on August 5, 2019.  Patient finished radiation on August 19, 2019.  Patient is here for follow-up appointment today.  States he feels good.  Denies any new lymph node enlargement.  Denies any new shortness of breath or chest pain.        PAST MEDICAL HISTORY:    Past Medical History:   Diagnosis Date   • Hypertension    • Prostate cancer (CMS/HCC) 05/16/2018   • Rectal cancer (CMS/HCC) 06/2019       SOCIAL HISTORY:    Social History     Tobacco Use   • Smoking status: Never Smoker   • Smokeless tobacco: Never Used   Substance Use Topics   • Alcohol use: Yes     Comment: rarely   • Drug use: No       Surgical History :  Past Surgical History:   Procedure Laterality Date   • COLONOSCOPY N/A 5/30/2019    Procedure: COLONOSCOPY;  Surgeon: Jarek Gordillo DO;  Location: [facility] ENDOSCOPY;  Service: Gastroenterology   • ENDOSCOPY N/A 5/30/2019    Procedure: ESOPHAGOGASTRODUODENOSCOPY;  Surgeon: Jarek Gordillo DO;  Location: [facility] ENDOSCOPY;  Service: Gastroenterology   • PROSTATE SURGERY     • VENOUS ACCESS DEVICE (PORT) INSERTION N/A 6/27/2019    Procedure: INSERTION VENOUS ACCESS DEVICE (MEDIPORT)           (C-ARM#1);  Surgeon: Benson Wasserman MD;  Location: [facility] OR;  Service: General   • VENOUS ACCESS DEVICE (PORT) REMOVAL Right 7/20/2019    Procedure: REMOVAL VENOUS ACCESS DEVICE;  Surgeon: Benson Wasserman MD;  Location: [facility] OR;  Service: General  "      ALLERGIES:    No Known Allergies      FAMILY HISTORY:  Family History   Problem Relation Age of Onset   • Uterine cancer Mother            REVIEW OF SYSTEMS:      CONSTITUTIONAL:   Denies any fever, chills or weight loss.      EYES: No visual disturbances. No discharge. No new lesions     ENMT:  No epistaxis, mouth sores or difficulty swallowing.     RESPIRATORY:  No new shortness of breath. No new cough or hemoptysis.     CARDIOVASCULAR:  No chest pain or palpitations.     GASTROINTESTINAL:  No abdominal pain nausea, vomiting or blood in the stool.     GENITOURINARY: No Dysuria or Hematuria.     MUSCULOSKELETAL:  No new back pain or arthralgia.     LYMPHATICS:  Denies any abnormal swollen glands anywhere in the body.     NEUROLOGICAL : No tingling or numbness. No headache or dizziness. No seizures or balance problems.     SKIN: Erythematous skin lesion present on right side of neck.     ENDOCRINE : No new heat or cold intolerance. No new polyuria . No polydipsia          PHYSICAL EXAMINATION:      VITAL SIGNS:  /71   Pulse 66   Temp 98 °F (36.7 °C) (Temporal)   Resp 18   Ht 172.7 cm (67.99\")   Wt 103 kg (226 lb 9.6 oz)   BMI 34.46 kg/m²       10/22/19  0814   Weight: 103 kg (226 lb 9.6 oz)         ECOG performance status: 1     CONSTITUTIONAL:  Not in any distress.     EYES: Mild conjunctival Pallor. No Icterus. No Pterygium. Extraocular Movements intact.No ptosis.     ENMT:  Normocephalic, Atraumatic.No Facial Asymmetry noted.     NECK:  No adenopathy.Trachea midline. NO JVD.     RESPIRATORY:  Fair air entry bilateral. No rhonchi or wheezing.Fair respiratory effort.     CARDIOVASCULAR:  S1, S2. Regular rate and rhythm. No murmur or gallop appreciated.       ABDOMEN:  Soft, obese, nontender. Bowel sounds present in all four quadrants.  No Hepatosplenomegaly appreciated.     MUSCULOSKELETAL:  No edema.No Calf Tenderness.Normal range of motion.     NEUROLOGIC:    No  Motor or sensory deficit " appreciated. Cranial Nerves 2-12 grossly intact.     SKIN : No new skin lesion identified. Skin is warm and dry to touch.     LYMPHATICS: No new enlarged lymph nodes in neck or supraclavicular area.     PSYCHIATRY: Alert, awake and oriented ×3.Normal affect.  Normal judgment.  Makes good eye contact.          DIAGNOSTIC DATA:    Glucose   Date Value Ref Range Status   09/03/2019 94 65 - 99 mg/dL Final     Sodium   Date Value Ref Range Status   09/03/2019 140 136 - 145 mmol/L Final   07/11/2018 138 134 - 144 mmol/L Final   05/17/2018 135 (L) 136 - 145 mmol/L Final     Potassium   Date Value Ref Range Status   09/03/2019 3.9 3.5 - 5.2 mmol/L Final   07/11/2018 4 3.5 - 5.1 mmol/L Final   05/17/2018 4.3 3.3 - 5.0 mmol/L Final     CO2   Date Value Ref Range Status   09/03/2019 28.0 22.0 - 29.0 mmol/L Final     Total CO2   Date Value Ref Range Status   05/17/2018 26 20 - 31 mmol/L Final     Chloride   Date Value Ref Range Status   09/03/2019 101 98 - 107 mmol/L Final   07/11/2018 105 98 - 107 mmol/L Final   05/17/2018 104 99 - 111 mmol/L Final     Anion Gap   Date Value Ref Range Status   09/03/2019 11.0 5.0 - 15.0 mmol/L Final   05/17/2018 9 4 - 16 mmol/L Final     Creatinine   Date Value Ref Range Status   09/03/2019 0.93 0.76 - 1.27 mg/dL Final   07/11/2018 1 0.6 - 1.3 mg/dL Final   05/17/2018 1.0 0.6 - 1.3 mg/dL Final     BUN   Date Value Ref Range Status   09/03/2019 20 8 - 23 mg/dL Final   07/11/2018 34 (H) 7 - 18 mg/dL Final     BUN/Creatinine Ratio   Date Value Ref Range Status   09/03/2019 21.5 7.0 - 25.0 Final     Calcium   Date Value Ref Range Status   09/03/2019 9.5 8.6 - 10.5 mg/dL Final   07/11/2018 8.8 8.8 - 10.5 mg/dL Final     eGFR Non  Amer   Date Value Ref Range Status   09/03/2019 82 >60 mL/min/1.73 Final     Alkaline Phosphatase   Date Value Ref Range Status   09/03/2019 87 39 - 117 U/L Final   07/11/2018 69 46 - 116 U/L Final     Total Protein   Date Value Ref Range Status   09/03/2019 7.6 6.0  - 8.5 g/dL Final   07/11/2018 7.4 6.4 - 8.2 g/dL Final     ALT (SGPT)   Date Value Ref Range Status   09/03/2019 30 1 - 41 U/L Final   07/11/2018 19 (L) 30 - 65 U/L Final     AST (SGOT)   Date Value Ref Range Status   09/03/2019 29 1 - 40 U/L Final   07/11/2018 17 15 - 37 U/L Final     Total Bilirubin   Date Value Ref Range Status   09/03/2019 0.3 0.2 - 1.2 mg/dL Final   07/11/2018 0.3 0 - 1.0 mg/dL Final     Albumin   Date Value Ref Range Status   09/03/2019 3.70 3.50 - 5.20 g/dL Final   07/11/2018 3.5 3.4 - 5.0 g/dL Final     Globulin   Date Value Ref Range Status   09/03/2019 3.9 gm/dL Final     Lab Results   Component Value Date    WBC 6.15 10/22/2019    HGB 12.8 (L) 10/22/2019    HCT 39.2 10/22/2019    MCV 96.6 10/22/2019     10/22/2019     Lab Results   Component Value Date    NEUTROABS 3.58 10/22/2019    NEWLDSUB36 1,034 07/11/2018     No results found for: , LABCA2, AFPTM, HCGQUANT, , CHROMGRNA, [id number], CEA, REFLABREPO        HIV-1 & HIV-2 Antibodies          Specimen Collected: 06/20/19 14:41 Last Resulted: 06/20/19 15:33                         Radiology Data :  CT of chest without contrast done on June 27, 2019 was reviewed with Dr. Weller, discussed with the patient, it showed:  FINDINGS:     PULMONARY PARENCHYMA:      - air spaces: Negative    - interstitium: Grossly within normal limits for age    - misc: Tiny densely calcified granuloma in the right upper  lobe anteriorly measuring approximately 3 mm in greatest  dimension. Mild atelectasis in the left lung base. Left a  diaphragm is mildly elevated     MEDIASTINUM / CIRA:      - heart: Normal size, no pericardial fluid. Mild  atherosclerotic calcification of the coronary arteries.    - aorta/great vessels: Ascending thoracic aorta is dilated up  to 4.0 cm in greatest diameter. The visualized portion of the  descending aorta is normal in caliber.    - misc: No mediastinal mass / significant adenopathy. There are  densely calcified  lymph nodes in the right paratracheal and the  precarinal space. There are smaller calcified nodes in the right  hilum.     PLEURAL COMPARTMENT:      - misc: No pleural fluid or mass        MISC:      - inferior neck: Negative    - osseous/body wall: A few tiny sclerotic foci are seen in  multiple vertebral bodies, which are indeterminate, and these are  so small that they may be falsely negative on bone scan or PET  scan even if representing metastasis. These can be followed up on  future studies to evaluate for change.    - subdiaphramatic: Nonobstructing 1.1 cm calculus in the right  renal midpole. There may be a small cyst in the upper pole of the  right kidney. Small nonobstructing calculus measuring about 3 mm  is seen in the left kidney. Additional smaller calculi are  present in the lower pole. A tiny low-attenuation lesion in the  lower pole of the left kidney is too small to adequately  characterize. The gallbladder appears to be filled with calculi.  There is limited evaluation of the liver for metastases due to  noncontrast technique, but no specific lesions are identified.   Small hiatal hernia present.         CT of abdomen and pelvis with contrast done on June 17, 2019 showed:   - - - CT ABDOMEN - - -      THORAX (INFERIOR):   - LUNG BASES:  Clear   - PLEURA:  No fluid or mass   - HEART: Normal size, no pericardial fluid   - MISC:  N/A      ABDOMEN:   - LIVER:  Normal size/contour, no ductal dilatation. Scattered  punctate calcifications are present, consistent with healed  granulomatous disease   - GB: Filled with gallstones   - CBD:  Grossly negative   - SPLEEN:  Normal size and contour. Granulomatous scarring   - PANCREAS:  Normal in size, contour, no focal mass    - VISCERA:  Normal caliber, no wall thickening   - MESENTERY: No mesenteric mass   - CAVITY:  No free abdominal fluid, no free intraperitoneal air   - BODY WALL:  With normal limits   - OSSEOUS:  Degenerative changes most prominently at  L5-S1 where  there is almost complete loss of joint space and vacuum disc  phenomenon      RETROPERITONEUM:   - KIDNEYS:Normal size/contour, no collecting system dilation                    No evidence of an enhancing mass. Bilateral  nonobstructing calculi, and small possible cysts bilaterally.    - URETERS:  Normal course, caliber   - ADRENALS:  Normal size, contour   - MISC:  No sig. retroperitoneal adenopathy or mass   - VASCULAR:  Aorta / iliacs: wnl for age     - - - CT PELVIS - - -       - VISCERA:  Normal caliber small/large bowel, mild thickening of  the rectum, nonspecific   - MESENTERY:  No mass   - VASCULAR:  Within normal limits for age   - CAVITY:  No free fluid / air   - BLADDER:  Unremarkable   - OSSEOUS:  Within normal limits    - PROSTATE:  Grossly wnl  Small left inguinal hernia contains only fat.      IMPRESSION:  1. No definite evidence of metastasis. Mild thickening of the  rectum, may be related to treatment effect, though malignancy  cannot be excluded.   2. Cholelithiasis.   3. Nonobstructing nephrolithiasis.  4. Possible bilateral renal cysts, could be further evaluated and  characterized by ultrasound or multiphasic contrast enhanced CT  or MRI  5. Please see finding sections above for further details.            Chest x-ray PA/lateral done on June 17, 2019 showed:  IMPRESSION:  CONCLUSION: Chronic elevation left diaphragm either diaphragmatic  eventration or diaphragmatic paralysis. Benign calcified  mediastinal and hilar lymph nodes. Lungs otherwise clear.              Pathology :  Pathology report from May 30, 2019 showed:  Component     Final Diagnosis   1.  MUCOSA, ESOPHAGUS:   CHRONIC ESOPHAGITIS.   NEGATIVE FOR FUNGI (GMS STAIN), VIRAL CELLS AND DYSPLASIA.      2.  MUCOSA, ANUS:   INVASIVE NONKERATINIZING SQUAMOUS CELL CARCINOMA, POORLY DIFFERENTIATED.   IMMUNOSTAIN STRONGLY POSITIVE FOR p16 (HPV MARKER).      3.  POLYP, TRANSVERSE COLON:   INFLAMMATORY POLYP.     4.  POLYP,  ASCENDING COLON:   SESSILE SERRATED ADENOMA.      5.  POLYP, DESCENDING COLON:   NO SIGNIFICANT HISTOLOGIC ABNORMALITY.                Pathology report from 05/16/2018 at [facility] showed:  SPECIMEN:  A. PROSTATE      CLINICAL HISTORY:  NONE GIVEN  PRE-OP DIAGNOSIS:  ELEVATED PSA  POST-OP DIAGNOSIS:  PROCEDURE:  ROBOTIC ASSISTED PROSTATECTOMY    FINAL DIAGNOSIS:    RADICAL PROSTATECTOMY SPECIMEN:       Invasive carcinoma.       - Histologic type:  Adenocarcinoma, conventional.       - Histologic Milka score:  6 (3+3).       - Grade group:  1.       - Tumor quantitation:  Less than 1%. (Tumor present in 2 of 16 slides)       - Margins:  Clear.        - Bilateral involvement:  Present.       - Extra-prostatic extension:  Not identified.       - Seminal vesicle invasion:  Not identified.       - Perineural invasion:  Not identified.       - Angiolymphatic invasion:  Not identified.       - Other findings:  Benign glandular cyst near bladder margin.       - Pathologic staging:  pT2c.    GROSS:      Received is a 56 gram radical prostatectomy specimen with both seminal vesicles attached.  The prostate is 5.2 x 4.5 x 4.6 cm.  There is a 1 cm cystic structure present at the bladder margin on the right side.  Sections through the specimen demonstrate a somewhat nodular surface with increased firmness present primarily of the right posterior aspect.  Prostate from the right anterior margin is submitted as A1, right anterior lateral A2, right mid lateral A3, right posterior A4-A6, and right seminal vesicle A7.  A section of the left anterior prostate is submitted as A8, left anterior lateral A9, left mid lateral A10, left posterior A11-A13, and left seminal vesicle A14.  Sections of the cystic lesion are submitted as A15 and A16.       (AEE)    MICROSCOPIC:    Multiple sections of prostate from right and left lobes including margins are evaluated.  Most of the prostate features hyperplastic changes with chronic  inflammation.  Two areas feature prostatic adenocarcinoma.  These are on slides A1 and A12.  In both slides, the amount of tumor is relatively small with the margins being clear.  Perineural invasion is not noted.  There is no evidence of seminal vesicle invasion.  Sections from the bladder margin demonstrate a benign glandular type cyst.      Final Diagnosis performed by  Gilson Vaughn M.D.  Electronically signed 5/18/2018 9:01AM        ASSESSMENT AND PLAN:       1.  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  -Result of CT scan of abdomen and pelvis, pathology and staging were discussed with the patient today.  -It was discussed with as per NCCN guidelines, her standard recommendation for this kind of cancer is concurrent chemoradiation with 5-FU and mitomycin.  -Patient has  been evaluated by Dr. Lopez on June 18, 2019.    -Patient had a CT of chest without contrast done on June 27, 2019 that showed tiny sclerotic focus in vertebral body as well as healing fracture in rib.  Result of CT scan showing tiny sclerotic focus was discussed with patient.  Patient does admit that he has a trauma due to fall 3 months ago that can contribute to refracture.  Sclerotic focus there about 4 mm in size, PET/CT and bone scan will not detect those kind of tiny focus.  Plan is to repeat CT of chest in about 4 months that will be around November 2019 for follow-up.  -In view of no conclusive evidence of metastatic disease, patient was started on concurrent 5-FU mitomycin with radiation  on July 8, 2019.  -Patient received a 29 of 5-FU and mitomycin on August 5, 2019.  Last day of radiation was on August 19, 2019.  - Patient is scheduled to undergo flexible sigmoidoscopy by Dr. Gordillo on November 6, 2019.  He was encouraged to keep that appointment.  - We will ask patient to return to clinic in 6 weeks to go over the results of sigmoidoscopy as well as CT of chest  that we are planning to order to follow-up on sclerotic bone lesion.          2.  Prostatic adenocarcinoma, PT2cN0, stage IIc  -Status post radical prostatectomy on May 16, 2018.  -Most recent PSA done in May 2019 was <0.1  -We will need periodic PSA check to rule out recurrence which was discussed with patient.  -Outside pathology report was reviewed.         3.  Sclerotic bone focus on vertebral body as well as healing fracture of ribs:  -Patient had a CT of chest without contrast on June 29, 2019 that showed tiny sclerotic focus on vertebral body as well as healing fracture on rib.  Patient does admit to trauma to rib around April 2019.  -Sclerotic focus on deep there are 2 tiny measuring about 4 mm to 5 mm in size.  Bone scan or PET/CT would not detect those kind of tiny focus.  -We will do another CT of chest without contrast to next clinic visit in 6 weeks, to follow-up on sclerotic focus.  This recommendation were discussed with patient today.      4.  Anemia:( problem is new to me)  -Patient is found to have anemia with a hemoglobin of 12.8.   -Anemia work-up done today on October 22, 2019 shows ferritin is low at 27.  Iron saturation is 28%.  Will restart patient on ferrous sulfate 1 tablet 3 times a week week along with stool softener.  Folate level is 12 we will also start him on folic acid supplement.  -We will repeat anemia work-up upon next clinic visit in 6 weeks.      5.  Hypertension     6.  Health maintenance: Patient does not smoke.  Had a colonoscopy done on May 30, 2019 by Dr. Gordillo.     7. Advance Care Planning: For now patient remains full code and is able to make his decisions.  Patient has health care surrogate mentioned on chart.     8.  Pain assessment:  -Patient denies any pain today.             Terry Ly MD  10/22/2019  8:36 AM        EMR Dragon/Transcription disclaimer:   Much of this encounter note is an electronic transcription/translation of spoken language to printed text. The  electronic translation of spoken language may permit erroneous, or at times, nonsensical words or phrases to be inadvertently transcribed; Although I have reviewed the note for such errors, some may still exist.

## 2019-10-22 NOTE — PATIENT INSTRUCTIONS
Patient Instructions for CT Scan    · Your CT scan is being done without any oral contrast.  Your CT scan may be done with IV contrast, if you have an allergy to iodine--please tell your nurse.    · Do not eat or drink 4 hours prior to scan.     · You may take your medications with sips of water, except DO NOT take your diabetic pill the morning of the test.    Arrive at the Outpatient Surgery entrance at [facility] 20 minutes prior to appointment time.    You will receive a phone call with an appointment for your CT scan.  Please call our office, if someone does not contact you with 3 days.    Megan Barry RN  October 22, 2019  8:34 AM

## 2019-10-24 ENCOUNTER — TELEPHONE (OUTPATIENT)
Dept: ONCOLOGY | Facility: HOSPITAL | Age: 64
End: 2019-10-24

## 2019-10-24 RX ORDER — FOLIC ACID 1 MG/1
1 TABLET ORAL DAILY
Qty: 90 TABLET | Refills: 1 | Status: SHIPPED | OUTPATIENT
Start: 2019-10-24 | End: 2020-06-02 | Stop reason: SDUPTHER

## 2019-10-24 RX ORDER — DOCUSATE SODIUM 100 MG/1
100 CAPSULE, LIQUID FILLED ORAL 2 TIMES DAILY PRN
Qty: 60 CAPSULE | Refills: 2 | Status: SHIPPED | OUTPATIENT
Start: 2019-10-24 | End: 2021-07-20

## 2019-10-24 RX ORDER — FERROUS SULFATE 325(65) MG
TABLET ORAL
Qty: 36 TABLET | Refills: 1 | Status: SHIPPED | OUTPATIENT
Start: 2019-10-24 | End: 2020-06-04

## 2019-10-24 NOTE — TELEPHONE ENCOUNTER
----- Message from Terry Ly MD sent at 10/24/2019  7:50 AM CDT -----  I have sent prescription for ferrous sulfate 1 tablet 3 times a week along with folic acid to his pharmacy.  Please let patient know.  Thank you

## 2019-11-05 ENCOUNTER — ANESTHESIA (OUTPATIENT)
Dept: GASTROENTEROLOGY | Facility: HOSPITAL | Age: 64
End: 2019-11-05

## 2019-11-05 ENCOUNTER — HOSPITAL ENCOUNTER (OUTPATIENT)
Facility: HOSPITAL | Age: 64
Setting detail: HOSPITAL OUTPATIENT SURGERY
Discharge: HOME | End: 2019-11-05
Attending: INTERNAL MEDICINE | Admitting: INTERNAL MEDICINE

## 2019-11-05 ENCOUNTER — ANESTHESIA EVENT (OUTPATIENT)
Dept: GASTROENTEROLOGY | Facility: HOSPITAL | Age: 64
End: 2019-11-05

## 2019-11-05 VITALS
TEMPERATURE: 97.5 F | SYSTOLIC BLOOD PRESSURE: 121 MMHG | DIASTOLIC BLOOD PRESSURE: 64 MMHG | WEIGHT: 217.38 LBS | RESPIRATION RATE: 16 BRPM | HEIGHT: 68 IN | HEART RATE: 72 BPM | OXYGEN SATURATION: 96 % | BODY MASS INDEX: 32.94 KG/M2

## 2019-11-05 DIAGNOSIS — C44.520 SQUAMOUS CELL CARCINOMA OF PERIANAL REGION: ICD-10-CM

## 2019-11-05 PROCEDURE — 25010000002 PROPOFOL 10 MG/ML EMULSION: Performed by: NURSE ANESTHETIST, CERTIFIED REGISTERED

## 2019-11-05 PROCEDURE — 88305 TISSUE EXAM BY PATHOLOGIST: CPT | Performed by: PATHOLOGY

## 2019-11-05 PROCEDURE — 88305 TISSUE EXAM BY PATHOLOGIST: CPT | Performed by: INTERNAL MEDICINE

## 2019-11-05 RX ORDER — PROPOFOL 10 MG/ML
VIAL (ML) INTRAVENOUS AS NEEDED
Status: DISCONTINUED | OUTPATIENT
Start: 2019-11-05 | End: 2019-11-05 | Stop reason: SURG

## 2019-11-05 RX ORDER — LIDOCAINE HYDROCHLORIDE 20 MG/ML
INJECTION, SOLUTION INTRAVENOUS AS NEEDED
Status: DISCONTINUED | OUTPATIENT
Start: 2019-11-05 | End: 2019-11-05 | Stop reason: SURG

## 2019-11-05 RX ORDER — PROMETHAZINE HYDROCHLORIDE 25 MG/1
25 SUPPOSITORY RECTAL ONCE AS NEEDED
Status: DISCONTINUED | OUTPATIENT
Start: 2019-11-05 | End: 2019-11-05 | Stop reason: HOSPADM

## 2019-11-05 RX ORDER — PROMETHAZINE HYDROCHLORIDE 25 MG/1
25 TABLET ORAL ONCE AS NEEDED
Status: DISCONTINUED | OUTPATIENT
Start: 2019-11-05 | End: 2019-11-05 | Stop reason: HOSPADM

## 2019-11-05 RX ORDER — ONDANSETRON 2 MG/ML
4 INJECTION INTRAMUSCULAR; INTRAVENOUS ONCE AS NEEDED
Status: DISCONTINUED | OUTPATIENT
Start: 2019-11-05 | End: 2019-11-05 | Stop reason: HOSPADM

## 2019-11-05 RX ORDER — PROMETHAZINE HYDROCHLORIDE 25 MG/ML
12.5 INJECTION, SOLUTION INTRAMUSCULAR; INTRAVENOUS ONCE AS NEEDED
Status: DISCONTINUED | OUTPATIENT
Start: 2019-11-05 | End: 2019-11-05 | Stop reason: HOSPADM

## 2019-11-05 RX ORDER — DEXTROSE AND SODIUM CHLORIDE 5; .45 G/100ML; G/100ML
20 INJECTION, SOLUTION INTRAVENOUS CONTINUOUS
Status: DISCONTINUED | OUTPATIENT
Start: 2019-11-05 | End: 2019-11-05 | Stop reason: HOSPADM

## 2019-11-05 RX ADMIN — PROPOFOL 50 MG: 10 INJECTION, EMULSION INTRAVENOUS at 09:14

## 2019-11-05 RX ADMIN — PROPOFOL 50 MG: 10 INJECTION, EMULSION INTRAVENOUS at 09:12

## 2019-11-05 RX ADMIN — PROPOFOL 50 MG: 10 INJECTION, EMULSION INTRAVENOUS at 09:17

## 2019-11-05 RX ADMIN — LIDOCAINE HYDROCHLORIDE 50 MG: 20 INJECTION, SOLUTION INTRAVENOUS at 09:12

## 2019-11-05 RX ADMIN — DEXTROSE AND SODIUM CHLORIDE 20 ML/HR: 5; 450 INJECTION, SOLUTION INTRAVENOUS at 08:28

## 2019-11-05 NOTE — H&P
Sam Espinosa DO,[facility]  Gastroenterology  Hepatology  Endoscopy  Board Certified in Internal Medicine and gastroenterology  [address], [address]  [place]. [address]  - [phone]   F - [phone]     GASTROENTEROLOGY HISTORY AND PHYSICAL  NOTE   SAM ESPINOSA DO.         SUBJECTIVE:   11/5/2019    Name: Juan Antonio Shaw  DOD: 1955        Chief Complaint:       Subjective : Anal carcinoma associated with infectious etiology, treated with radiation and chemotherapy    Patient is 64 y.o. male presents with desire for flexible sigmoidoscopy for reevaluation.      ROS/HISTORY/ CURRENT MEDICATIONS/OBJECTIVE/VS/PE:   Review of Systems:  All systems unremarkable unless specified below.  Constitutional   HENT  Eyes   Respiratory    Cardiovascular  Gastrointestinal   Endocrine  Genitourinary    Musculoskeletal   Skin  Allergic/Immunologic    Neurological    Hematological  Psychiatric/Behavioral    History:     Past Medical History:   Diagnosis Date   • Hypertension    • Prostate cancer (CMS/HCC) 05/16/2018   • Rectal cancer (CMS/HCC) 06/2019     Past Surgical History:   Procedure Laterality Date   • COLONOSCOPY N/A 5/30/2019    Procedure: COLONOSCOPY;  Surgeon: Sam Espinosa DO;  Location: [facility] ENDOSCOPY;  Service: Gastroenterology   • ENDOSCOPY N/A 5/30/2019    Procedure: ESOPHAGOGASTRODUODENOSCOPY;  Surgeon: Sam Espinosa DO;  Location: [facility] ENDOSCOPY;  Service: Gastroenterology   • PROSTATE SURGERY     • VENOUS ACCESS DEVICE (PORT) INSERTION N/A 6/27/2019    Procedure: INSERTION VENOUS ACCESS DEVICE (MEDIPORT)           (C-ARM#1);  Surgeon: Benson Wasserman MD;  Location: [facility] OR;  Service: General   • VENOUS ACCESS DEVICE (PORT) REMOVAL Right 7/20/2019    Procedure: REMOVAL VENOUS ACCESS DEVICE;  Surgeon: Benson Wasserman MD;  Location: [facility] OR;  Service: General     Family History   Problem Relation Age of Onset   • Uterine cancer Mother      Social History     Tobacco Use    • Smoking status: Never Smoker   • Smokeless tobacco: Never Used   Substance Use Topics   • Alcohol use: Yes     Comment: rarely   • Drug use: No     Prior to Admission medications    Medication Sig Start Date End Date Taking? Authorizing Provider   docusate sodium (COLACE) 100 MG capsule Take 1 capsule by mouth 2 (Two) Times a Day As Needed for Constipation. 10/24/19  Yes Terry Ly MD   ferrous sulfate 325 (65 FE) MG tablet Take 1 tablet by mouth on Monday, Wednesday and Friday. 10/24/19  Yes Terry Ly MD   folic acid (FOLVITE) 1 MG tablet Take 1 tablet by mouth Daily. 10/24/19  Yes Terry Ly MD   nebivolol (BYSTOLIC) 10 MG tablet Take 10 mg by mouth Every Night.   Yes ProviderAndreea MD   oxybutynin (DITROPAN) 5 MG tablet Take 1 tablet by mouth 3 (Three) Times a Day. 6/14/19  Yes ProviderAndreea MD     Allergies:  Patient has no known allergies.    I have reviewed the patients medical history, surgical history and family history in the available medical record system.     Current Medications:     Current Facility-Administered Medications   Medication Dose Route Frequency Provider Last Rate Last Dose   • dextrose 5 % and sodium chloride 0.45 % infusion  20 mL/hr Intravenous Continuous Jarek Gordillo DO 20 mL/hr at 11/05/19 0828 20 mL/hr at 11/05/19 0828     Facility-Administered Medications Ordered in Other Encounters   Medication Dose Route Frequency Provider Last Rate Last Dose   • sodium chloride 0.9 % flush 10 mL  10 mL Intravenous PRN Terry Ly MD   10 mL at 07/26/19 0844       Objective     Physical Exam:   Temp:  [98 °F (36.7 °C)] 98 °F (36.7 °C)  Heart Rate:  [70] 70  Resp:  [20] 20  BP: (145)/(79) 145/79    Physical Exam:  General Appearance:    Alert, cooperative, in no acute distress   Head:    Normocephalic, without obvious abnormality, atraumatic   Eyes:            Lids and lashes normal, conjunctivae and sclerae normal, no   icterus, no pallor, corneas clear,  PERRLA   Ears:    Ears appear intact with no abnormalities noted   Throat:   No oral lesions, no thrush, oral mucosa moist   Neck:   No adenopathy, supple, trachea midline, no thyromegaly, no     carotid bruit, no JVD   Back:     No kyphosis present, no scoliosis present, no skin lesions,       erythema or scars, no tenderness to percussion or                   palpation,   range of motion normal   Lungs:     Clear to auscultation,respirations regular, even and                   unlabored    Heart:    Regular rhythm and normal rate, normal S1 and S2, no            murmur, no gallop, no rub, no click   Breast Exam:    Deferred   Abdomen:     Normal bowel sounds, no masses, no organomegaly, soft        non-tender, non-distended, no guarding, no rebound                 tenderness   Genitalia:    Deferred   Extremities:   Moves all extremities well, no edema, no cyanosis, no              redness   Pulses:   Pulses palpable and equal bilaterally   Skin:   No bleeding, bruising or rash   Lymph nodes:   No palpable adenopathy   Neurologic:   Cranial nerves 2 - 12 grossly intact, sensation intact, DTR        present and equal bilaterally      Results Review:     Lab Results   Component Value Date    WBC 6.15 10/22/2019    WBC 4.89 09/03/2019    WBC 4.10 08/05/2019    HGB 12.8 (L) 10/22/2019    HGB 13.0 09/03/2019    HGB 13.3 08/05/2019    HCT 39.2 10/22/2019    HCT 38.5 09/03/2019    HCT 38.0 08/05/2019     10/22/2019     09/03/2019     08/05/2019             No results found for: LIPASE  Lab Results   Component Value Date    INR 1.02 05/09/2018          Radiology Review:  Imaging Results (Last 72 Hours)     ** No results found for the last 72 hours. **           I reviewed the patient's new clinical results.  I reviewed the patient's new imaging results and agree with the interpretation.     ASSESSMENT/PLAN:   ASSESSMENT:  1.  Anal carcinoma    PLAN:  1.  Flexible sigmoidoscopy    Risk and benefits  associated with the procedure are reviewed with the patient.  The patient wished to proceed     Jarek Gordillo DO  11/05/19  8:55 AM

## 2019-11-05 NOTE — ANESTHESIA POSTPROCEDURE EVALUATION
Patient: Juan Antonio Shaw    Procedure Summary     Date:  11/05/19 Room / Location:  [facility] ENDOSCOPY 2 / [facility] ENDOSCOPY    Anesthesia Start:  0859 Anesthesia Stop:  0923    Procedure:  SIGMOIDOSCOPY FLEXIBLE (N/A ) Diagnosis:       Squamous cell carcinoma of perianal region      (Squamous cell carcinoma of perianal region [C44.520])    Surgeon:  Jarek Gordillo DO Provider:  Ariel Mcrae CRNA    Anesthesia Type:  MAC ASA Status:  3          Anesthesia Type: MAC  Last vitals  BP   145/79 (11/05/19 0810)   Temp   98 °F (36.7 °C) (11/05/19 0810)   Pulse   70 (11/05/19 0810)   Resp   20 (11/05/19 0810)     SpO2   93 % (11/05/19 0810)     Post Anesthesia Care and Evaluation    Patient location during evaluation: bedside  Patient participation: complete - patient cannot participate  Level of consciousness: awake  Pain score: 0  Pain management: adequate  Airway patency: patent  Anesthetic complications: No anesthetic complications  PONV Status: none  Cardiovascular status: acceptable  Respiratory status: acceptable  Hydration status: acceptable

## 2019-11-05 NOTE — DISCHARGE INSTR - APPOINTMENTS
Dr. Jarek Gordillo, DO  [address], [address]  [place] [address]  [phone]    Appointment date and time:    February 14, 2020 @ 9:45 am

## 2019-11-06 LAB
LAB AP CASE REPORT: NORMAL
PATH REPORT.FINAL DX SPEC: NORMAL
PATH REPORT.GROSS SPEC: NORMAL

## 2019-11-14 ENCOUNTER — HOSPITAL ENCOUNTER (OUTPATIENT)
Dept: CT IMAGING | Facility: HOSPITAL | Age: 64
Discharge: HOME | End: 2019-11-14
Admitting: INTERNAL MEDICINE

## 2019-11-14 DIAGNOSIS — M89.9 BONE LESION: ICD-10-CM

## 2019-11-14 DIAGNOSIS — C61 PROSTATE CANCER (HCC): ICD-10-CM

## 2019-11-14 DIAGNOSIS — C21.0 ANAL CANCER (HCC): ICD-10-CM

## 2019-11-14 PROCEDURE — 71260 CT THORAX DX C+: CPT

## 2019-11-14 PROCEDURE — 25010000002 IOPAMIDOL 61 % SOLUTION: Performed by: INTERNAL MEDICINE

## 2019-11-14 RX ADMIN — IOPAMIDOL 90 ML: 612 INJECTION, SOLUTION INTRAVENOUS at 08:19

## 2019-11-19 ENCOUNTER — OFFICE VISIT (OUTPATIENT)
Dept: RADIATION ONCOLOGY | Facility: HOSPITAL | Age: 64
End: 2019-11-19

## 2019-11-19 ENCOUNTER — HOSPITAL ENCOUNTER (OUTPATIENT)
Dept: RADIATION ONCOLOGY | Facility: HOSPITAL | Age: 64
Setting detail: RADIATION/ONCOLOGY SERIES
End: 2019-11-19

## 2019-11-19 VITALS
HEART RATE: 76 BPM | DIASTOLIC BLOOD PRESSURE: 67 MMHG | WEIGHT: 221.2 LBS | SYSTOLIC BLOOD PRESSURE: 123 MMHG | RESPIRATION RATE: 16 BRPM | BODY MASS INDEX: 33.52 KG/M2 | HEIGHT: 68 IN | TEMPERATURE: 97.7 F

## 2019-11-19 DIAGNOSIS — C21.0 ANAL CANCER (HCC): Primary | ICD-10-CM

## 2019-11-19 PROCEDURE — 99213 OFFICE O/P EST LOW 20 MIN: CPT | Performed by: RADIOLOGY

## 2019-11-19 PROCEDURE — G0463 HOSPITAL OUTPT CLINIC VISIT: HCPCS | Performed by: RADIOLOGY

## 2019-11-19 NOTE — PROGRESS NOTES
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  ICD 10 Code: C21.0                                             History of Present Illness: Mr. Juan Antonio Shaw returns to our clinic today for a routine follow-up exam.  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  Mr. Shaw received 5400 cGy to the anus, along with concurrent 5-FU/mitomycin chemotherapy, and completed his radiation therapy on 8/19/19.  This is his first follow-up visit in our clinic since completion of therapy.    Today on exam, the patient is doing well and has no disease or treatment-related complaints.  He has no complaints of rectal pain or bleeding.  He has no complaints of diarrhea or constipation.  He has no  complaints.  He was last seen in follow-up by Dr. Gordillo on 11/5/2019, at which time he underwent a flexible sigmoidoscopy.  Exam revealed the perianal area to be within normal limits.  Endoscopy revealed for benign sessile polyps, which were removed.    (Old medical records were requested, reviewed, and summarized in the HPI)    Review of Systems   All other systems reviewed and are negative.     Past Medical History:   Diagnosis Date   • Hypertension    • Prostate cancer (CMS/HCC) 05/16/2018   • Rectal cancer (CMS/HCC) 06/2019        Past Surgical History:   Procedure Laterality Date   • COLONOSCOPY N/A 5/30/2019    Procedure: COLONOSCOPY;  Surgeon: Jarek Gordillo DO;  Location: [facility] ENDOSCOPY;  Service: Gastroenterology   • ENDOSCOPY N/A 5/30/2019    Procedure: ESOPHAGOGASTRODUODENOSCOPY;  Surgeon: Jarek Gordillo DO;  Location: [facility] ENDOSCOPY;  Service: Gastroenterology   • PROSTATE SURGERY      • SIGMOIDOSCOPY N/A 11/5/2019    Procedure: SIGMOIDOSCOPY FLEXIBLE;  Surgeon: Jarek Gordillo DO;  Location: [facility] ENDOSCOPY;  Service: Gastroenterology   • VENOUS ACCESS DEVICE (PORT) INSERTION N/A 6/27/2019    Procedure: INSERTION VENOUS ACCESS DEVICE (MEDIPORT)           (C-ARM#1);  Surgeon: Benson Wasserman MD;  Location: [facility] OR;  Service: General   • VENOUS ACCESS DEVICE (PORT) REMOVAL Right 7/20/2019    Procedure: REMOVAL VENOUS ACCESS DEVICE;  Surgeon: Benson Wasserman MD;  Location: [facility] OR;  Service: General      Family History   Problem Relation Age of Onset   • Uterine cancer Mother         Social History     Socioeconomic History   • Marital status:      Spouse name: Jeffrey   • Number of children: 2   • Years of education: 15   • Highest education level: Not on file   Occupational History   • Occupation: [employer]   Tobacco Use   • Smoking status: Never Smoker   • Smokeless tobacco: Never Used   Substance and Sexual Activity   • Alcohol use: Yes     Comment: rarely   • Drug use: No   • Sexual activity: Defer     Allergies: Patient has no known allergies.     Prior to Admission medications    Medication Sig Start Date End Date Taking? Authorizing Provider   ferrous sulfate 325 (65 FE) MG tablet Take 1 tablet by mouth on Monday, Wednesday and Friday. 10/24/19  Yes Terry Ly MD   folic acid (FOLVITE) 1 MG tablet Take 1 tablet by mouth Daily. 10/24/19  Yes Terry Ly MD   nebivolol (BYSTOLIC) 10 MG tablet Take 10 mg by mouth Every Night.   Yes ProviderAndreea MD   oxybutynin (DITROPAN) 5 MG tablet Take 1 tablet by mouth 3 (Three) Times a Day. 6/14/19  Yes Andreea Chery MD   docusate sodium (COLACE) 100 MG capsule Take 1 capsule by mouth 2 (Two) Times a Day As Needed for Constipation. 10/24/19   Terry Ly MD      Pain:(on a scale of 0-10)  Pain Score    11/19/19 1344   PainSc: 0-No pain        Quality of Life: 100 - Full Activity   (0) Fully active, able to carry on all  "predisease performance without restriction  Advanced Care Plan: N    Physical Examination:  Vitals:     Vitals:    11/19/19 1344   BP: 123/67   Pulse: 76   Resp: 16   Temp: 97.7 °F (36.5 °C)       Height: 172.7 cm (67.99\")  Weight: Weight: 100 kg (221 lb 3.2 oz) Body mass index is 33.64 kg/m².      Constitutional: The patient is a well-developed, well-nourished white male in no acute distress.  Alert and oriented ×3.  HEENT: Atraumatic. Normocephalic. No abnormalities noted.  CV: Regular rate and rhythm.  No murmurs, rubs, or gallops are appreciated.  Respiratory: Lungs clear to auscultation.  Breath sounds equal bilaterally.  GI: Abdomen soft, nontender, nondistended, with no hepatosplenomegaly or masses palpated.  Rectal: deferred.  Extremities: No clubbing, cyanosis, or edema.  Neurologic: Cranial nerves II through XII are grossly intact, with no focal neurological deficits noted on exam.  Psychiatric: Alert and oriented x3. Normal affect, with no anxiety or depression noted.    Radiographs :     CT chest with contrast. (11/14/19)  CLINICAL INDICATION: History of renal cancer, prostate cancer.  Sclerotic focus vertebral body follow-up.   COMPARISON: CT chest June 27, 2019.       CHEST CT FINDINGS: Benign calcified right paratracheal  mediastinal lymph nodes. Significant calcified plaque LAD.  Multilevel degenerative changes thoracic spine. Tiny sclerotic  focus T6 vertebral body. Slight depression superior endplate of  T10. These findings are both unchanged since prior exam.   Chronic elevation left diaphragm. Subtle chronic infiltrative  changes left lung base, chronic atelectasis or fibrosis. Lungs  are otherwise clear. No suspicious pulmonary nodules.     IMPRESSION:  CONCLUSION: Tiny sclerotic focus posterior aspect T6 vertebral  body. Subtle depression superior endplate of T10. These finds are  unchanged since prior examination. Sclerotic focus is most likely  a benign bone island.   Chronic elevation left " diaphragm and chronic infiltrative changes  left lung base, chronic atelectasis or fibrosis.   CT chest with contrast is otherwise unremarkable.      Pathology:     Final Diagnosis   (11/5/19)   RECTUM, MUCOSAL BIOPSY:  MUCOSAL XANTHOMA.     Labs:   Lab Results   Component Value Date    GLUCOSE 95 10/22/2019    BUN 31 (H) 10/22/2019    CREATININE 0.84 10/22/2019    EGFRIFNONA 92 10/22/2019    EGFRIFAFRI 92 05/17/2018    BCR 36.9 (H) 10/22/2019    K 4.7 10/22/2019    CO2 28.0 10/22/2019    CALCIUM 9.4 10/22/2019    ALBUMIN 3.90 10/22/2019    AST 18 10/22/2019    ALT 13 10/22/2019      WBC   Date Value Ref Range Status   10/22/2019 6.15 3.40 - 10.80 10*3/mm3 Final   07/11/2018 5.8 4.0 - 11.0 10*3/uL Final     Hemoglobin   Date Value Ref Range Status   10/22/2019 12.8 (L) 13.0 - 17.7 g/dL Final   07/11/2018 14 (L) 14.4 - 16.6 g/dL Final     Hematocrit   Date Value Ref Range Status   10/22/2019 39.2 37.5 - 51.0 % Final   07/11/2018 43.2 42.9 - 49.1 % Final   05/17/2018 38.0 (L) 42 - 54 % Final     Platelets   Date Value Ref Range Status   10/22/2019 232 140 - 450 10*3/mm3 Final   07/11/2018 296 150 - 450 10*3/uL Final   05/09/2018 281 150 - 450 10*9/L Final      PSA   Date Value Ref Range Status   05/13/2019 <0.1 0.0 - 4.0 ng/mL Final             02/11/2019 <0.1 0.0 - 4.0 ng/mL Final             11/08/2018 <0.1 0.0 - 4.0 ng/mL Final   07/11/2018 0 0.0 - 4.0 ng/mL Final   04/17/2018 6.7 (H) 0.0 - 4.0 ng/mL Final   01/08/2018 7.36 (H) 0.0 - 4.0 ng/mL Final             ASSESSMENT/PLAN: Mr. 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  He is doing well and has no evidence of persistent, recurrent, or metastatic disease at this time.  He was recently seen in follow-up by Dr. Ly on 10/22/2019, and has follow-up scheduled with him on 12/3/2019.  He has follow-up scheduled Dr. Gordillo in 3 months, with a  repeat flexible sigmoidoscopy in 6 months.  We plan to see the patient back in our clinic for routine follow-up in 6 months.    Sincerely,    Brady Lopez MD  Radiation Oncology    Electronically signed by Brady Lopez MD 11/19/2019  3:22 PM    cc: Dr. Jarek Aleman

## 2019-12-03 ENCOUNTER — OFFICE VISIT (OUTPATIENT)
Dept: ONCOLOGY | Facility: CLINIC | Age: 64
End: 2019-12-03

## 2019-12-03 ENCOUNTER — LAB (OUTPATIENT)
Dept: ONCOLOGY | Facility: HOSPITAL | Age: 64
End: 2019-12-03

## 2019-12-03 VITALS
TEMPERATURE: 97.4 F | HEART RATE: 68 BPM | DIASTOLIC BLOOD PRESSURE: 87 MMHG | BODY MASS INDEX: 34.59 KG/M2 | WEIGHT: 220.4 LBS | HEIGHT: 67 IN | RESPIRATION RATE: 18 BRPM | SYSTOLIC BLOOD PRESSURE: 127 MMHG

## 2019-12-03 DIAGNOSIS — M89.9 BONE LESION: ICD-10-CM

## 2019-12-03 DIAGNOSIS — D64.9 ANEMIA, UNSPECIFIED TYPE: ICD-10-CM

## 2019-12-03 DIAGNOSIS — C61 PROSTATE CANCER (HCC): ICD-10-CM

## 2019-12-03 DIAGNOSIS — C21.0 ANAL CANCER (HCC): ICD-10-CM

## 2019-12-03 DIAGNOSIS — C21.0 ANAL CANCER (HCC): Primary | ICD-10-CM

## 2019-12-03 LAB
ANION GAP SERPL CALCULATED.3IONS-SCNC: 9 MMOL/L (ref 5–15)
BASOPHILS # BLD AUTO: 0.04 10*3/MM3 (ref 0–0.2)
BASOPHILS NFR BLD AUTO: 0.9 % (ref 0–1.5)
BUN BLD-MCNC: 28 MG/DL (ref 8–23)
BUN/CREAT SERPL: 28.6 (ref 7–25)
CALCIUM SPEC-SCNC: 9.4 MG/DL (ref 8.6–10.5)
CHLORIDE SERPL-SCNC: 102 MMOL/L (ref 98–107)
CO2 SERPL-SCNC: 28 MMOL/L (ref 22–29)
CREAT BLD-MCNC: 0.98 MG/DL (ref 0.76–1.27)
DEPRECATED RDW RBC AUTO: 40.9 FL (ref 37–54)
EOSINOPHIL # BLD AUTO: 0.11 10*3/MM3 (ref 0–0.4)
EOSINOPHIL NFR BLD AUTO: 2.4 % (ref 0.3–6.2)
ERYTHROCYTE [DISTWIDTH] IN BLOOD BY AUTOMATED COUNT: 12.2 % (ref 12.3–15.4)
FERRITIN SERPL-MCNC: 27.96 NG/ML (ref 30–400)
FOLATE SERPL-MCNC: >20 NG/ML (ref 4.78–24.2)
GFR SERPL CREATININE-BSD FRML MDRD: 77 ML/MIN/1.73
GLUCOSE BLD-MCNC: 90 MG/DL (ref 65–99)
HCT VFR BLD AUTO: 42.5 % (ref 37.5–51)
HGB BLD-MCNC: 14.3 G/DL (ref 13–17.7)
IMM GRANULOCYTES # BLD AUTO: 0.01 10*3/MM3 (ref 0–0.05)
IMM GRANULOCYTES NFR BLD AUTO: 0.2 % (ref 0–0.5)
IRON 24H UR-MRATE: 95 MCG/DL (ref 59–158)
IRON SATN MFR SERPL: 27 % (ref 20–50)
LYMPHOCYTES # BLD AUTO: 1.17 10*3/MM3 (ref 0.7–3.1)
LYMPHOCYTES NFR BLD AUTO: 25.6 % (ref 19.6–45.3)
MCH RBC QN AUTO: 30.7 PG (ref 26.6–33)
MCHC RBC AUTO-ENTMCNC: 33.6 G/DL (ref 31.5–35.7)
MCV RBC AUTO: 91.2 FL (ref 79–97)
MONOCYTES # BLD AUTO: 0.54 10*3/MM3 (ref 0.1–0.9)
MONOCYTES NFR BLD AUTO: 11.8 % (ref 5–12)
NEUTROPHILS # BLD AUTO: 2.7 10*3/MM3 (ref 1.7–7)
NEUTROPHILS NFR BLD AUTO: 59.1 % (ref 42.7–76)
NRBC BLD AUTO-RTO: 0 /100 WBC (ref 0–0.2)
PLATELET # BLD AUTO: 224 10*3/MM3 (ref 140–450)
PMV BLD AUTO: 8.8 FL (ref 6–12)
POTASSIUM BLD-SCNC: 4.3 MMOL/L (ref 3.5–5.2)
RBC # BLD AUTO: 4.66 10*6/MM3 (ref 4.14–5.8)
SODIUM BLD-SCNC: 139 MMOL/L (ref 136–145)
TIBC SERPL-MCNC: 355 MCG/DL (ref 298–536)
TRANSFERRIN SERPL-MCNC: 238 MG/DL (ref 200–360)
VIT B12 BLD-MCNC: 1893 PG/ML (ref 211–946)
WBC NRBC COR # BLD: 4.57 10*3/MM3 (ref 3.4–10.8)

## 2019-12-03 PROCEDURE — 84466 ASSAY OF TRANSFERRIN: CPT

## 2019-12-03 PROCEDURE — G9903 PT SCRN TBCO ID AS NON USER: HCPCS | Performed by: INTERNAL MEDICINE

## 2019-12-03 PROCEDURE — G8731 PAIN NEG NO PLAN: HCPCS | Performed by: INTERNAL MEDICINE

## 2019-12-03 PROCEDURE — 80048 BASIC METABOLIC PNL TOTAL CA: CPT

## 2019-12-03 PROCEDURE — 82746 ASSAY OF FOLIC ACID SERUM: CPT

## 2019-12-03 PROCEDURE — G0463 HOSPITAL OUTPT CLINIC VISIT: HCPCS | Performed by: INTERNAL MEDICINE

## 2019-12-03 PROCEDURE — 82728 ASSAY OF FERRITIN: CPT

## 2019-12-03 PROCEDURE — 83540 ASSAY OF IRON: CPT

## 2019-12-03 PROCEDURE — 85025 COMPLETE CBC W/AUTO DIFF WBC: CPT

## 2019-12-03 PROCEDURE — 1123F ACP DISCUSS/DSCN MKR DOCD: CPT | Performed by: INTERNAL MEDICINE

## 2019-12-03 PROCEDURE — 99214 OFFICE O/P EST MOD 30 MIN: CPT | Performed by: INTERNAL MEDICINE

## 2019-12-03 PROCEDURE — 82607 VITAMIN B-12: CPT

## 2019-12-03 NOTE — PROGRESS NOTES
DATE OF VISIT: 12/3/2019      REASON FOR VISIT: Anal cancer, history of prostate cancer, sclerotic lesion of bone on CT scan of chest, anemia      HISTORY OF PRESENT ILLNESS:    64-year-old male with medical problem consisting of hypertension, prostatic adenocarcinoma status post robotic prostatectomy done in May 2018 was initially seen in consultation on June 20, 2019 for newly diagnosed squamous cell cancer of anus.   Patient was started on day 1 of 5-FU and mitomycin with radiation treatment on July 8, 2019.  Patient received day 28 of 5-FU and mitomycin on August 5, 2019.  Patient finished radiation on August 19, 2019.  Patient is here for follow-up appointment today.  Patient had flexible sigmoidoscopy with biopsy on November 5, 2019 by Dr. Gordillo.  Patient also had a CT of chest with contrast done on November 14, 2019 to follow-up on sclerotic bone lesion.  States he feels good.  Denies any new lymph node enlargement.  Denies any new shortness of breath or chest pain.        PAST MEDICAL HISTORY:    Past Medical History:   Diagnosis Date   • Hypertension    • Prostate cancer (CMS/[facility]) 05/16/2018   • Rectal cancer (CMS/[facility]) 06/2019       SOCIAL HISTORY:    Social History     Tobacco Use   • Smoking status: Never Smoker   • Smokeless tobacco: Never Used   Substance Use Topics   • Alcohol use: Yes     Comment: rarely   • Drug use: No       Surgical History :  Past Surgical History:   Procedure Laterality Date   • COLONOSCOPY N/A 5/30/2019    Procedure: COLONOSCOPY;  Surgeon: Jarek Gordillo DO;  Location: [facility] ENDOSCOPY;  Service: Gastroenterology   • ENDOSCOPY N/A 5/30/2019    Procedure: ESOPHAGOGASTRODUODENOSCOPY;  Surgeon: Jarek Gordillo DO;  Location: [facility] ENDOSCOPY;  Service: Gastroenterology   • PROSTATE SURGERY     • SIGMOIDOSCOPY N/A 11/5/2019    Procedure: SIGMOIDOSCOPY FLEXIBLE;  Surgeon: Jarek Gordillo DO;  Location: [facility] ENDOSCOPY;  Service: Gastroenterology   • VENOUS ACCESS DEVICE  "(PORT) INSERTION N/A 6/27/2019    Procedure: INSERTION VENOUS ACCESS DEVICE (MEDIPORT)           (C-ARM#1);  Surgeon: Benson Wasserman MD;  Location: [facility];  Service: General   • VENOUS ACCESS DEVICE (PORT) REMOVAL Right 7/20/2019    Procedure: REMOVAL VENOUS ACCESS DEVICE;  Surgeon: Benson Wasserman MD;  Location: [facility];  Service: General       ALLERGIES:    No Known Allergies      FAMILY HISTORY:  Family History   Problem Relation Age of Onset   • Uterine cancer Mother            REVIEW OF SYSTEMS:      CONSTITUTIONAL:   Denies any fever, chills or weight loss.      EYES: No visual disturbances. No discharge. No new lesions     ENMT:  No epistaxis, mouth sores or difficulty swallowing.     RESPIRATORY:  No new shortness of breath. No new cough or hemoptysis.     CARDIOVASCULAR:  No chest pain or palpitations.     GASTROINTESTINAL:  No abdominal pain nausea, vomiting or blood in the stool.     GENITOURINARY: No Dysuria or Hematuria.     MUSCULOSKELETAL:  No new back pain or arthralgia.     LYMPHATICS:  Denies any abnormal swollen glands anywhere in the body.     NEUROLOGICAL : No tingling or numbness. No headache or dizziness. No seizures or balance problems.     SKIN: Erythematous skin lesion present on right side of neck.     ENDOCRINE : No new heat or cold intolerance. No new polyuria . No polydipsia          PHYSICAL EXAMINATION:      VITAL SIGNS:  /87   Pulse 68   Temp 97.4 °F (36.3 °C) (Temporal)   Resp 18   Ht 170.2 cm (67.01\")   Wt 100 kg (220 lb 6.4 oz)   BMI 34.51 kg/m²       12/03/19  0827   Weight: 100 kg (220 lb 6.4 oz)         ECOG performance status: 1     CONSTITUTIONAL:  Not in any distress.     EYES: Mild conjunctival Pallor. No Icterus. No Pterygium. Extraocular Movements intact.No ptosis.     ENMT:  Normocephalic, Atraumatic.No Facial Asymmetry noted.     NECK:  No adenopathy.Trachea midline. NO JVD.     RESPIRATORY:  Fair air entry bilateral. No rhonchi or wheezing.Fair " respiratory effort.     CARDIOVASCULAR:  S1, S2. Regular rate and rhythm. No murmur or gallop appreciated.       ABDOMEN:  Soft, obese, nontender. Bowel sounds present in all four quadrants.  No Hepatosplenomegaly appreciated.     MUSCULOSKELETAL:  No edema.No Calf Tenderness.Normal range of motion.     NEUROLOGIC:    No  Motor or sensory deficit appreciated. Cranial Nerves 2-12 grossly intact.     SKIN : No new skin lesion identified. Skin is warm and dry to touch.     LYMPHATICS: No new enlarged lymph nodes in neck or supraclavicular area.     PSYCHIATRY: Alert, awake and oriented ×3.Normal affect.  Normal judgment.  Makes good eye contact.          DIAGNOSTIC DATA:    Glucose   Date Value Ref Range Status   12/03/2019 90 65 - 99 mg/dL Final     Sodium   Date Value Ref Range Status   12/03/2019 139 136 - 145 mmol/L Final   07/11/2018 138 134 - 144 mmol/L Final   05/17/2018 135 (L) 136 - 145 mmol/L Final     Potassium   Date Value Ref Range Status   12/03/2019 4.3 3.5 - 5.2 mmol/L Final   07/11/2018 4 3.5 - 5.1 mmol/L Final   05/17/2018 4.3 3.3 - 5.0 mmol/L Final     CO2   Date Value Ref Range Status   12/03/2019 28.0 22.0 - 29.0 mmol/L Final     Total CO2   Date Value Ref Range Status   05/17/2018 26 20 - 31 mmol/L Final     Chloride   Date Value Ref Range Status   12/03/2019 102 98 - 107 mmol/L Final   07/11/2018 105 98 - 107 mmol/L Final   05/17/2018 104 99 - 111 mmol/L Final     Anion Gap   Date Value Ref Range Status   12/03/2019 9.0 5.0 - 15.0 mmol/L Final   05/17/2018 9 4 - 16 mmol/L Final     Creatinine   Date Value Ref Range Status   12/03/2019 0.98 0.76 - 1.27 mg/dL Final   07/11/2018 1 0.6 - 1.3 mg/dL Final   05/17/2018 1.0 0.6 - 1.3 mg/dL Final     BUN   Date Value Ref Range Status   12/03/2019 28 (H) 8 - 23 mg/dL Final   07/11/2018 34 (H) 7 - 18 mg/dL Final     BUN/Creatinine Ratio   Date Value Ref Range Status   12/03/2019 28.6 (H) 7.0 - 25.0 Final     Calcium   Date Value Ref Range Status    12/03/2019 9.4 8.6 - 10.5 mg/dL Final   07/11/2018 8.8 8.8 - 10.5 mg/dL Final     eGFR Non  Amer   Date Value Ref Range Status   12/03/2019 77 >60 mL/min/1.73 Final     Alkaline Phosphatase   Date Value Ref Range Status   10/22/2019 62 39 - 117 U/L Final   07/11/2018 69 46 - 116 U/L Final     Total Protein   Date Value Ref Range Status   10/22/2019 7.2 6.0 - 8.5 g/dL Final   07/11/2018 7.4 6.4 - 8.2 g/dL Final     ALT (SGPT)   Date Value Ref Range Status   10/22/2019 13 1 - 41 U/L Final   07/11/2018 19 (L) 30 - 65 U/L Final     AST (SGOT)   Date Value Ref Range Status   10/22/2019 18 1 - 40 U/L Final   07/11/2018 17 15 - 37 U/L Final     Total Bilirubin   Date Value Ref Range Status   10/22/2019 0.4 0.2 - 1.2 mg/dL Final   07/11/2018 0.3 0 - 1.0 mg/dL Final     Albumin   Date Value Ref Range Status   10/22/2019 3.90 3.50 - 5.20 g/dL Final   07/11/2018 3.5 3.4 - 5.0 g/dL Final     Globulin   Date Value Ref Range Status   10/22/2019 3.3 gm/dL Final     Lab Results   Component Value Date    WBC 4.57 12/03/2019    HGB 14.3 12/03/2019    HCT 42.5 12/03/2019    MCV 91.2 12/03/2019     12/03/2019     Lab Results   Component Value Date    NEUTROABS 2.70 12/03/2019    IRON 95 12/03/2019    TIBC 355 12/03/2019    LABIRON 27 12/03/2019    FERRITIN 27.96 (L) 12/03/2019    HVPHHFHI30 1,075 (H) 10/22/2019    FOLATE 12.30 10/22/2019     No results found for: , LABCA2, AFPTM, HCGQUANT, , CHROMGRNA, [id number], CEA, REFLABREPO        HIV-1 & HIV-2 Antibodies          Specimen Collected: 06/20/19 14:41 Last Resulted: 06/20/19 15:33                         Radiology Data :  CT of chest without contrast done on June 27, 2019 was reviewed with Dr. Weller, discussed with the patient, it showed:  FINDINGS:     PULMONARY PARENCHYMA:      - air spaces: Negative    - interstitium: Grossly within normal limits for age    - misc: Tiny densely calcified granuloma in the right upper  lobe anteriorly measuring  approximately 3 mm in greatest  dimension. Mild atelectasis in the left lung base. Left a  diaphragm is mildly elevated     MEDIASTINUM / CIRA:      - heart: Normal size, no pericardial fluid. Mild  atherosclerotic calcification of the coronary arteries.    - aorta/great vessels: Ascending thoracic aorta is dilated up  to 4.0 cm in greatest diameter. The visualized portion of the  descending aorta is normal in caliber.    - misc: No mediastinal mass / significant adenopathy. There are  densely calcified lymph nodes in the right paratracheal and the  precarinal space. There are smaller calcified nodes in the right  hilum.     PLEURAL COMPARTMENT:      - misc: No pleural fluid or mass        MISC:      - inferior neck: Negative    - osseous/body wall: A few tiny sclerotic foci are seen in  multiple vertebral bodies, which are indeterminate, and these are  so small that they may be falsely negative on bone scan or PET  scan even if representing metastasis. These can be followed up on  future studies to evaluate for change.    - subdiaphramatic: Nonobstructing 1.1 cm calculus in the right  renal midpole. There may be a small cyst in the upper pole of the  right kidney. Small nonobstructing calculus measuring about 3 mm  is seen in the left kidney. Additional smaller calculi are  present in the lower pole. A tiny low-attenuation lesion in the  lower pole of the left kidney is too small to adequately  characterize. The gallbladder appears to be filled with calculi.  There is limited evaluation of the liver for metastases due to  noncontrast technique, but no specific lesions are identified.   Small hiatal hernia present.         CT of abdomen and pelvis with contrast done on June 17, 2019 showed:   - - - CT ABDOMEN - - -      THORAX (INFERIOR):   - LUNG BASES:  Clear   - PLEURA:  No fluid or mass   - HEART: Normal size, no pericardial fluid   - MISC:  N/A      ABDOMEN:   - LIVER:  Normal size/contour, no ductal  dilatation. Scattered  punctate calcifications are present, consistent with healed  granulomatous disease   - GB: Filled with gallstones   - CBD:  Grossly negative   - SPLEEN:  Normal size and contour. Granulomatous scarring   - PANCREAS:  Normal in size, contour, no focal mass    - VISCERA:  Normal caliber, no wall thickening   - MESENTERY: No mesenteric mass   - CAVITY:  No free abdominal fluid, no free intraperitoneal air   - BODY WALL:  With normal limits   - OSSEOUS:  Degenerative changes most prominently at L5-S1 where  there is almost complete loss of joint space and vacuum disc  phenomenon      RETROPERITONEUM:   - KIDNEYS:Normal size/contour, no collecting system dilation                    No evidence of an enhancing mass. Bilateral  nonobstructing calculi, and small possible cysts bilaterally.    - URETERS:  Normal course, caliber   - ADRENALS:  Normal size, contour   - MISC:  No sig. retroperitoneal adenopathy or mass   - VASCULAR:  Aorta / iliacs: wnl for age     - - - CT PELVIS - - -       - VISCERA:  Normal caliber small/large bowel, mild thickening of  the rectum, nonspecific   - MESENTERY:  No mass   - VASCULAR:  Within normal limits for age   - CAVITY:  No free fluid / air   - BLADDER:  Unremarkable   - OSSEOUS:  Within normal limits    - PROSTATE:  Grossly wnl  Small left inguinal hernia contains only fat.      IMPRESSION:  1. No definite evidence of metastasis. Mild thickening of the  rectum, may be related to treatment effect, though malignancy  cannot be excluded.   2. Cholelithiasis.   3. Nonobstructing nephrolithiasis.  4. Possible bilateral renal cysts, could be further evaluated and  characterized by ultrasound or multiphasic contrast enhanced CT  or MRI  5. Please see finding sections above for further details.            Chest x-ray PA/lateral done on June 17, 2019 showed:  IMPRESSION:  CONCLUSION: Chronic elevation left diaphragm either diaphragmatic  eventration or diaphragmatic  paralysis. Benign calcified  mediastinal and hilar lymph nodes. Lungs otherwise clear.              Pathology :  Pathology report from May 30, 2019 showed:  Component     Final Diagnosis   1.  MUCOSA, ESOPHAGUS:   CHRONIC ESOPHAGITIS.   NEGATIVE FOR FUNGI (GMS STAIN), VIRAL CELLS AND DYSPLASIA.      2.  MUCOSA, ANUS:   INVASIVE NONKERATINIZING SQUAMOUS CELL CARCINOMA, POORLY DIFFERENTIATED.   IMMUNOSTAIN STRONGLY POSITIVE FOR p16 (HPV MARKER).      3.  POLYP, TRANSVERSE COLON:   INFLAMMATORY POLYP.     4.  POLYP, ASCENDING COLON:   SESSILE SERRATED ADENOMA.      5.  POLYP, DESCENDING COLON:   NO SIGNIFICANT HISTOLOGIC ABNORMALITY.                Pathology report from 05/16/2018 at [facility] showed:  SPECIMEN:  A. PROSTATE      CLINICAL HISTORY:  NONE GIVEN  PRE-OP DIAGNOSIS:  ELEVATED PSA  POST-OP DIAGNOSIS:  PROCEDURE:  ROBOTIC ASSISTED PROSTATECTOMY    FINAL DIAGNOSIS:    RADICAL PROSTATECTOMY SPECIMEN:       Invasive carcinoma.       - Histologic type:  Adenocarcinoma, conventional.       - Histologic Jeddo score:  6 (3+3).       - Grade group:  1.       - Tumor quantitation:  Less than 1%. (Tumor present in 2 of 16 slides)       - Margins:  Clear.        - Bilateral involvement:  Present.       - Extra-prostatic extension:  Not identified.       - Seminal vesicle invasion:  Not identified.       - Perineural invasion:  Not identified.       - Angiolymphatic invasion:  Not identified.       - Other findings:  Benign glandular cyst near bladder margin.       - Pathologic staging:  pT2c.    GROSS:      Received is a 56 gram radical prostatectomy specimen with both seminal vesicles attached.  The prostate is 5.2 x 4.5 x 4.6 cm.  There is a 1 cm cystic structure present at the bladder margin on the right side.  Sections through the specimen demonstrate a somewhat nodular surface with increased firmness present primarily of the right posterior aspect.  Prostate from the right anterior margin is submitted as  A1, right anterior lateral A2, right mid lateral A3, right posterior A4-A6, and right seminal vesicle A7.  A section of the left anterior prostate is submitted as A8, left anterior lateral A9, left mid lateral A10, left posterior A11-A13, and left seminal vesicle A14.  Sections of the cystic lesion are submitted as A15 and A16.       (AEE)    MICROSCOPIC:    Multiple sections of prostate from right and left lobes including margins are evaluated.  Most of the prostate features hyperplastic changes with chronic inflammation.  Two areas feature prostatic adenocarcinoma.  These are on slides A1 and A12.  In both slides, the amount of tumor is relatively small with the margins being clear.  Perineural invasion is not noted.  There is no evidence of seminal vesicle invasion.  Sections from the bladder margin demonstrate a benign glandular type cyst.      Final Diagnosis performed by  Gilson Vaughn M.D.  Electronically signed 5/18/2018 9:01AM        ASSESSMENT AND PLAN:       1.  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  -Result of CT scan of abdomen and pelvis, pathology and staging were discussed with the patient today.  -It was discussed with as per NCCN guidelines, her standard recommendation for this kind of cancer is concurrent chemoradiation with 5-FU and mitomycin.  -Patient has  been evaluated by Dr. Lopez on June 18, 2019.    -Patient had a CT of chest without contrast done on June 27, 2019 that showed tiny sclerotic focus in vertebral body as well as healing fracture in rib.  Result of CT scan showing tiny sclerotic focus was discussed with patient.  Patient does admit that he has a trauma due to fall 3 months ago that can contribute to refracture.  Sclerotic focus there about 4 mm in size, PET/CT and bone scan will not detect those kind of tiny focus.  Plan is to repeat CT of chest in about 4 months that will be around November 2019 for  follow-up.  -In view of no conclusive evidence of metastatic disease, patient was started on concurrent 5-FU mitomycin with radiation  on July 8, 2019.  -Patient received a 29 of 5-FU and mitomycin on August 5, 2019.  Last day of radiation was on August 19, 2019.  - Patient had flexible sigmoidoscopy done by Dr. Gordillo on November 5, 2019 which did not show any evidence of residual tumor.    -We will ask patient to return to clinic in 3 months with a repeat CBC, CMP and anemia work-up to be done on that day.          2.  Prostatic adenocarcinoma, PT2cN0, stage IIc  -Status post radical prostatectomy on May 16, 2018.  -Most recent PSA done in May 2019 was <0.1  -We will need periodic PSA check to rule out recurrence which was discussed with patient.  -Outside pathology report was reviewed.  -We will repeat PSA upon next clinic visit in 3 months.         3.  Sclerotic bone focus on vertebral body as well as healing fracture of ribs:  -Patient had a CT of chest without contrast on June 29, 2019 that showed tiny sclerotic focus on vertebral body as well as healing fracture on rib.  Patient does admit to trauma to rib around April 2019.  -Sclerotic focus on deep there are 2 tiny measuring about 4 mm to 5 mm in size.  Bone scan or PET/CT would not detect those kind of tiny focus.  -CT of chest with contrast done on November 14, 2019 shows stable sclerotic lesion most likely bony island.  Will repeat CT of chest around November 2020 to follow-up on bone lesion.      4.  Anemia:  -Was found to be anemic with hemoglobin of 12.8 in October 2019.  -Currently patient is taking ferrous sulfate 1 tablet p.o. daily.  Ferritin is still borderline low at 27, will recommend continuing with ferrous sulfate 1 tablet p.o. daily.  -Hemoglobin is improved to 14.1 today.  -We will repeat anemia work-up upon next clinic visit in 3 months.      5.  Hypertension     6.  Health maintenance: Patient does not smoke.  Had a colonoscopy done on  May 30, 2019 by Dr. Gordillo.     7. Advance Care Planning: For now patient remains full code and is able to make his decisions.  Patient has health care surrogate mentioned on chart.     8.  Pain assessment:  -Patient denies any pain today.             Terry Ly MD  12/3/2019  5:36 PM        EMR Dragon/Transcription disclaimer:   Much of this encounter note is an electronic transcription/translation of spoken language to printed text. The electronic translation of spoken language may permit erroneous, or at times, nonsensical words or phrases to be inadvertently transcribed; Although I have reviewed the note for such errors, some may still exist.

## 2019-12-04 ENCOUNTER — TELEPHONE (OUTPATIENT)
Dept: ONCOLOGY | Facility: HOSPITAL | Age: 64
End: 2019-12-04

## 2019-12-04 NOTE — TELEPHONE ENCOUNTER
----- Message from Terry Ly MD sent at 12/4/2019  7:47 AM CST -----  Please let patient know, he needs to continue taking ferrous sulfate 1 tablet p.o. daily.  He can stop taking folic acid supplement.  Thank you

## 2020-03-03 ENCOUNTER — OFFICE VISIT (OUTPATIENT)
Dept: ONCOLOGY | Facility: CLINIC | Age: 65
End: 2020-03-03

## 2020-03-03 ENCOUNTER — CLINICAL SUPPORT (OUTPATIENT)
Dept: ONCOLOGY | Facility: CLINIC | Age: 65
End: 2020-03-03

## 2020-03-03 ENCOUNTER — LAB (OUTPATIENT)
Dept: ONCOLOGY | Facility: HOSPITAL | Age: 65
End: 2020-03-03

## 2020-03-03 VITALS
HEIGHT: 67 IN | HEART RATE: 56 BPM | SYSTOLIC BLOOD PRESSURE: 131 MMHG | DIASTOLIC BLOOD PRESSURE: 71 MMHG | TEMPERATURE: 98.4 F | WEIGHT: 221.7 LBS | RESPIRATION RATE: 18 BRPM | BODY MASS INDEX: 34.8 KG/M2

## 2020-03-03 DIAGNOSIS — C21.0 ANAL CANCER (HCC): ICD-10-CM

## 2020-03-03 DIAGNOSIS — M89.9 BONE LESION: ICD-10-CM

## 2020-03-03 DIAGNOSIS — D64.9 ANEMIA, UNSPECIFIED TYPE: ICD-10-CM

## 2020-03-03 DIAGNOSIS — C21.0 ANAL CANCER (HCC): Primary | ICD-10-CM

## 2020-03-03 DIAGNOSIS — C61 PROSTATE CANCER (HCC): ICD-10-CM

## 2020-03-03 LAB
ALBUMIN SERPL-MCNC: 4.1 G/DL (ref 3.5–5.2)
ALBUMIN/GLOB SERPL: 1.2 G/DL
ALP SERPL-CCNC: 64 U/L (ref 39–117)
ALT SERPL W P-5'-P-CCNC: 13 U/L (ref 1–41)
ANION GAP SERPL CALCULATED.3IONS-SCNC: 11 MMOL/L (ref 5–15)
AST SERPL-CCNC: 15 U/L (ref 1–40)
BASOPHILS # BLD AUTO: 0.02 10*3/MM3 (ref 0–0.2)
BASOPHILS NFR BLD AUTO: 0.4 % (ref 0–1.5)
BILIRUB SERPL-MCNC: 0.5 MG/DL (ref 0.2–1.2)
BUN BLD-MCNC: 22 MG/DL (ref 8–23)
BUN/CREAT SERPL: 20.8 (ref 7–25)
CALCIUM SPEC-SCNC: 9.6 MG/DL (ref 8.6–10.5)
CHLORIDE SERPL-SCNC: 101 MMOL/L (ref 98–107)
CO2 SERPL-SCNC: 25 MMOL/L (ref 22–29)
CREAT BLD-MCNC: 1.06 MG/DL (ref 0.76–1.27)
DEPRECATED RDW RBC AUTO: 44.5 FL (ref 37–54)
EOSINOPHIL # BLD AUTO: 0.09 10*3/MM3 (ref 0–0.4)
EOSINOPHIL NFR BLD AUTO: 1.7 % (ref 0.3–6.2)
ERYTHROCYTE [DISTWIDTH] IN BLOOD BY AUTOMATED COUNT: 13.7 % (ref 12.3–15.4)
FERRITIN SERPL-MCNC: 85.95 NG/ML (ref 30–400)
FOLATE SERPL-MCNC: 14.8 NG/ML (ref 4.78–24.2)
GFR SERPL CREATININE-BSD FRML MDRD: 70 ML/MIN/1.73
GLOBULIN UR ELPH-MCNC: 3.3 GM/DL
GLUCOSE BLD-MCNC: 99 MG/DL (ref 65–99)
HCT VFR BLD AUTO: 41.3 % (ref 37.5–51)
HGB BLD-MCNC: 14.3 G/DL (ref 13–17.7)
HOLD SPECIMEN: NORMAL
IMM GRANULOCYTES # BLD AUTO: 0.02 10*3/MM3 (ref 0–0.05)
IMM GRANULOCYTES NFR BLD AUTO: 0.4 % (ref 0–0.5)
IRON 24H UR-MRATE: 131 MCG/DL (ref 59–158)
IRON SATN MFR SERPL: 41 % (ref 20–50)
LYMPHOCYTES # BLD AUTO: 1.63 10*3/MM3 (ref 0.7–3.1)
LYMPHOCYTES NFR BLD AUTO: 30.5 % (ref 19.6–45.3)
MCH RBC QN AUTO: 30.9 PG (ref 26.6–33)
MCHC RBC AUTO-ENTMCNC: 34.6 G/DL (ref 31.5–35.7)
MCV RBC AUTO: 89.2 FL (ref 79–97)
MONOCYTES # BLD AUTO: 0.58 10*3/MM3 (ref 0.1–0.9)
MONOCYTES NFR BLD AUTO: 10.8 % (ref 5–12)
NEUTROPHILS # BLD AUTO: 3.01 10*3/MM3 (ref 1.7–7)
NEUTROPHILS NFR BLD AUTO: 56.2 % (ref 42.7–76)
NRBC BLD AUTO-RTO: 0 /100 WBC (ref 0–0.2)
PLATELET # BLD AUTO: 245 10*3/MM3 (ref 140–450)
PMV BLD AUTO: 8.5 FL (ref 6–12)
POTASSIUM BLD-SCNC: 4.5 MMOL/L (ref 3.5–5.2)
PROT SERPL-MCNC: 7.4 G/DL (ref 6–8.5)
PSA SERPL-MCNC: <0.014 NG/ML (ref 0–4)
RBC # BLD AUTO: 4.63 10*6/MM3 (ref 4.14–5.8)
SODIUM BLD-SCNC: 137 MMOL/L (ref 136–145)
TIBC SERPL-MCNC: 320 MCG/DL (ref 298–536)
TRANSFERRIN SERPL-MCNC: 215 MG/DL (ref 200–360)
VIT B12 BLD-MCNC: 1073 PG/ML (ref 211–946)
WBC NRBC COR # BLD: 5.35 10*3/MM3 (ref 3.4–10.8)

## 2020-03-03 PROCEDURE — 84153 ASSAY OF PSA TOTAL: CPT | Performed by: INTERNAL MEDICINE

## 2020-03-03 PROCEDURE — 84466 ASSAY OF TRANSFERRIN: CPT | Performed by: INTERNAL MEDICINE

## 2020-03-03 PROCEDURE — G8731 PAIN NEG NO PLAN: HCPCS | Performed by: INTERNAL MEDICINE

## 2020-03-03 PROCEDURE — 82607 VITAMIN B-12: CPT | Performed by: INTERNAL MEDICINE

## 2020-03-03 PROCEDURE — 83540 ASSAY OF IRON: CPT | Performed by: INTERNAL MEDICINE

## 2020-03-03 PROCEDURE — 1123F ACP DISCUSS/DSCN MKR DOCD: CPT | Performed by: INTERNAL MEDICINE

## 2020-03-03 PROCEDURE — 85025 COMPLETE CBC W/AUTO DIFF WBC: CPT | Performed by: INTERNAL MEDICINE

## 2020-03-03 PROCEDURE — 82746 ASSAY OF FOLIC ACID SERUM: CPT | Performed by: INTERNAL MEDICINE

## 2020-03-03 PROCEDURE — 82728 ASSAY OF FERRITIN: CPT | Performed by: INTERNAL MEDICINE

## 2020-03-03 PROCEDURE — 99214 OFFICE O/P EST MOD 30 MIN: CPT | Performed by: INTERNAL MEDICINE

## 2020-03-03 PROCEDURE — G0463 HOSPITAL OUTPT CLINIC VISIT: HCPCS | Performed by: INTERNAL MEDICINE

## 2020-03-03 PROCEDURE — 99212 OFFICE O/P EST SF 10 MIN: CPT | Performed by: NURSE PRACTITIONER

## 2020-03-03 PROCEDURE — 80053 COMPREHEN METABOLIC PANEL: CPT | Performed by: INTERNAL MEDICINE

## 2020-03-03 PROCEDURE — G9903 PT SCRN TBCO ID AS NON USER: HCPCS | Performed by: INTERNAL MEDICINE

## 2020-03-03 NOTE — PROGRESS NOTES
3/3/2020    DX: Stage IIA (cT2 cN0 M0) poorly differentiated nonkeratinizing squamous cell carcinoma the anus (p16+).    Patient completed concurrent chemotherapy and radiation therapy for above mentioned diagnosis. He is seeing medical oncology today for follow-up.   Survivorship care plan was discussed and presented to the patient today. Copy forwarded to his PCP as well.  Discussed his stage and treatment regimen as well as follow-up care that will be provided per NCCN guidelines based on his staging.  Discussed additional cancer screenings; he has history of prostate cancer and follows routinely with urologist in [place]; discussed skin cancer prevention as patient states he is outside a lot in summer.  He is scheduled for next sigmoidoscopy with Dr. Gordillo in May.

## 2020-03-03 NOTE — PROGRESS NOTES
DATE OF VISIT: 3/3/2020      REASON FOR VISIT: Anal cancer, history of prostate cancer, sclerotic lesion of bone on CT scan of chest, anemia      HISTORY OF PRESENT ILLNESS:    65-year-old male with medical problem consisting of hypertension, prostatic adenocarcinoma status post robotic prostatectomy done in May 2018 was initially seen in consultation on June 20, 2019 for newly diagnosed squamous cell cancer of anus.   Patient was started on day 1 of 5-FU and mitomycin with radiation treatment on July 8, 2019.  Patient received day 28 of 5-FU and mitomycin on August 5, 2019.  Patient finished radiation on August 19, 2019.  Patient is here for follow-up appointment today.  Patient had flexible sigmoidoscopy with biopsy on November 5, 2019 by Dr. Gordillo was negative for any  residual malignancy.  Patient is here for 3-month follow-up appointment today.  States he feels good.  Denies any new lymph node enlargement.  Denies any new shortness of breath or chest pain.        PAST MEDICAL HISTORY:    Past Medical History:   Diagnosis Date   • Hypertension    • Prostate cancer (CMS/HCC) 05/16/2018   • Rectal cancer (CMS/HCC) 06/2019       SOCIAL HISTORY:    Social History     Tobacco Use   • Smoking status: Never Smoker   • Smokeless tobacco: Never Used   Substance Use Topics   • Alcohol use: Yes     Comment: rarely   • Drug use: No       Surgical History :  Past Surgical History:   Procedure Laterality Date   • COLONOSCOPY N/A 5/30/2019    Procedure: COLONOSCOPY;  Surgeon: Jarek Gordillo DO;  Location: [facility] ENDOSCOPY;  Service: Gastroenterology   • ENDOSCOPY N/A 5/30/2019    Procedure: ESOPHAGOGASTRODUODENOSCOPY;  Surgeon: Jarek Gordillo DO;  Location: [facility] ENDOSCOPY;  Service: Gastroenterology   • PROSTATE SURGERY     • SIGMOIDOSCOPY N/A 11/5/2019    Procedure: SIGMOIDOSCOPY FLEXIBLE;  Surgeon: Jarek Gordillo DO;  Location: [facility] ENDOSCOPY;  Service: Gastroenterology   • VENOUS ACCESS DEVICE (PORT)  "INSERTION N/A 6/27/2019    Procedure: INSERTION VENOUS ACCESS DEVICE (MEDIPORT)           (C-ARM#1);  Surgeon: Benson Wasserman MD;  Location: [facility];  Service: General   • VENOUS ACCESS DEVICE (PORT) REMOVAL Right 7/20/2019    Procedure: REMOVAL VENOUS ACCESS DEVICE;  Surgeon: Benson Wasserman MD;  Location: [facility];  Service: General       ALLERGIES:    No Known Allergies      FAMILY HISTORY:  Family History   Problem Relation Age of Onset   • Uterine cancer Mother            REVIEW OF SYSTEMS:      CONSTITUTIONAL:   Denies any fever, chills or weight loss.      EYES: No visual disturbances. No discharge. No new lesions     ENMT:  No epistaxis, mouth sores or difficulty swallowing.     RESPIRATORY:  No new shortness of breath. No new cough or hemoptysis.     CARDIOVASCULAR:  No chest pain or palpitations.     GASTROINTESTINAL:  No abdominal pain nausea, vomiting or blood in the stool.     GENITOURINARY: No Dysuria or Hematuria.     MUSCULOSKELETAL:  No new back pain or arthralgia.     LYMPHATICS:  Denies any abnormal swollen glands anywhere in the body.     NEUROLOGICAL : No tingling or numbness. No headache or dizziness. No seizures or balance problems.     SKIN: Erythematous skin lesion present on right side of neck.     ENDOCRINE : No new hot or cold intolerance. No new polyuria . No polydipsia          PHYSICAL EXAMINATION:      VITAL SIGNS:  /71   Pulse 56   Temp 98.4 °F (36.9 °C) (Temporal)   Resp 18   Ht 170.2 cm (67.01\")   Wt 101 kg (221 lb 11.2 oz)   BMI 34.71 kg/m²       03/03/20  0947   Weight: 101 kg (221 lb 11.2 oz)         ECOG performance status: 1     CONSTITUTIONAL:  Not in any distress.     EYES: Mild conjunctival Pallor. No Icterus. No Pterygium. Extraocular Movements intact.No ptosis.     ENMT:  Normocephalic, Atraumatic.No Facial Asymmetry noted.     NECK:  No adenopathy.Trachea midline. NO JVD.     RESPIRATORY:  Fair air entry bilateral. No rhonchi or wheezing.Fair respiratory " effort.     CARDIOVASCULAR:  S1, S2. Regular rate and rhythm. No murmur or gallop appreciated.       ABDOMEN:  Soft, obese, nontender. Bowel sounds present in all four quadrants.  No Hepatosplenomegaly appreciated.     MUSCULOSKELETAL:  No edema.No Calf Tenderness.Normal range of motion.     NEUROLOGIC:    No  Motor or sensory deficit appreciated. Cranial Nerves 2-12 grossly intact.     SKIN : No new skin lesion identified. Skin is warm and dry to touch.     LYMPHATICS: No new enlarged lymph nodes in neck or supraclavicular area.     PSYCHIATRY: Alert, awake and oriented ×3.Normal affect.  Normal judgment.  Makes good eye contact.          DIAGNOSTIC DATA:    Glucose   Date Value Ref Range Status   03/03/2020 99 65 - 99 mg/dL Final     Sodium   Date Value Ref Range Status   03/03/2020 137 136 - 145 mmol/L Final   07/11/2018 138 134 - 144 mmol/L Final   05/17/2018 135 (L) 136 - 145 mmol/L Final     Potassium   Date Value Ref Range Status   03/03/2020 4.5 3.5 - 5.2 mmol/L Final   07/11/2018 4 3.5 - 5.1 mmol/L Final   05/17/2018 4.3 3.3 - 5.0 mmol/L Final     CO2   Date Value Ref Range Status   03/03/2020 25.0 22.0 - 29.0 mmol/L Final     Total CO2   Date Value Ref Range Status   05/17/2018 26 20 - 31 mmol/L Final     Chloride   Date Value Ref Range Status   03/03/2020 101 98 - 107 mmol/L Final   07/11/2018 105 98 - 107 mmol/L Final   05/17/2018 104 99 - 111 mmol/L Final     Anion Gap   Date Value Ref Range Status   03/03/2020 11.0 5.0 - 15.0 mmol/L Final   05/17/2018 9 4 - 16 mmol/L Final     Creatinine   Date Value Ref Range Status   03/03/2020 1.06 0.76 - 1.27 mg/dL Final   07/11/2018 1 0.6 - 1.3 mg/dL Final   05/17/2018 1.0 0.6 - 1.3 mg/dL Final     BUN   Date Value Ref Range Status   03/03/2020 22 8 - 23 mg/dL Final   07/11/2018 34 (H) 7 - 18 mg/dL Final     BUN/Creatinine Ratio   Date Value Ref Range Status   03/03/2020 20.8 7.0 - 25.0 Final     Calcium   Date Value Ref Range Status   03/03/2020 9.6 8.6 - 10.5  mg/dL Final   07/11/2018 8.8 8.8 - 10.5 mg/dL Final     eGFR Non  Amer   Date Value Ref Range Status   03/03/2020 70 >60 mL/min/1.73 Final     Alkaline Phosphatase   Date Value Ref Range Status   03/03/2020 64 39 - 117 U/L Final   07/11/2018 69 46 - 116 U/L Final     Total Protein   Date Value Ref Range Status   03/03/2020 7.4 6.0 - 8.5 g/dL Final   07/11/2018 7.4 6.4 - 8.2 g/dL Final     ALT (SGPT)   Date Value Ref Range Status   03/03/2020 13 1 - 41 U/L Final   07/11/2018 19 (L) 30 - 65 U/L Final     AST (SGOT)   Date Value Ref Range Status   03/03/2020 15 1 - 40 U/L Final   07/11/2018 17 15 - 37 U/L Final     Total Bilirubin   Date Value Ref Range Status   03/03/2020 0.5 0.2 - 1.2 mg/dL Final   07/11/2018 0.3 0 - 1.0 mg/dL Final     Albumin   Date Value Ref Range Status   03/03/2020 4.10 3.50 - 5.20 g/dL Final   07/11/2018 3.5 3.4 - 5.0 g/dL Final     Globulin   Date Value Ref Range Status   03/03/2020 3.3 gm/dL Final     Lab Results   Component Value Date    WBC 5.35 03/03/2020    HGB 14.3 03/03/2020    HCT 41.3 03/03/2020    MCV 89.2 03/03/2020     03/03/2020     Lab Results   Component Value Date    NEUTROABS 3.01 03/03/2020    IRON 131 03/03/2020    TIBC 320 03/03/2020    LABIRON 41 03/03/2020    FERRITIN 85.95 03/03/2020    KAPBAKKZ28 1,893 (H) 12/03/2019    FOLATE >20.00 12/03/2019     No results found for: , LABCA2, AFPTM, HCGQUANT, , CHROMGRNA, [id number], CEA, REFLABREPO        HIV-1 & HIV-2 Antibodies          Specimen Collected: 06/20/19 14:41 Last Resulted: 06/20/19 15:33                         Radiology Data :  CT of chest without contrast done on June 27, 2019 was reviewed with Dr. Weller, discussed with the patient, it showed:  FINDINGS:     PULMONARY PARENCHYMA:      - air spaces: Negative    - interstitium: Grossly within normal limits for age    - misc: Tiny densely calcified granuloma in the right upper  lobe anteriorly measuring approximately 3 mm in greatest  dimension.  Mild atelectasis in the left lung base. Left a  diaphragm is mildly elevated     MEDIASTINUM / CIRA:      - heart: Normal size, no pericardial fluid. Mild  atherosclerotic calcification of the coronary arteries.    - aorta/great vessels: Ascending thoracic aorta is dilated up  to 4.0 cm in greatest diameter. The visualized portion of the  descending aorta is normal in caliber.    - misc: No mediastinal mass / significant adenopathy. There are  densely calcified lymph nodes in the right paratracheal and the  precarinal space. There are smaller calcified nodes in the right  hilum.     PLEURAL COMPARTMENT:      - misc: No pleural fluid or mass        MISC:      - inferior neck: Negative    - osseous/body wall: A few tiny sclerotic foci are seen in  multiple vertebral bodies, which are indeterminate, and these are  so small that they may be falsely negative on bone scan or PET  scan even if representing metastasis. These can be followed up on  future studies to evaluate for change.    - subdiaphramatic: Nonobstructing 1.1 cm calculus in the right  renal midpole. There may be a small cyst in the upper pole of the  right kidney. Small nonobstructing calculus measuring about 3 mm  is seen in the left kidney. Additional smaller calculi are  present in the lower pole. A tiny low-attenuation lesion in the  lower pole of the left kidney is too small to adequately  characterize. The gallbladder appears to be filled with calculi.  There is limited evaluation of the liver for metastases due to  noncontrast technique, but no specific lesions are identified.   Small hiatal hernia present.         CT of abdomen and pelvis with contrast done on June 17, 2019 showed:   - - - CT ABDOMEN - - -      THORAX (INFERIOR):   - LUNG BASES:  Clear   - PLEURA:  No fluid or mass   - HEART: Normal size, no pericardial fluid   - MISC:  N/A      ABDOMEN:   - LIVER:  Normal size/contour, no ductal dilatation. Scattered  punctate calcifications are  present, consistent with healed  granulomatous disease   - GB: Filled with gallstones   - CBD:  Grossly negative   - SPLEEN:  Normal size and contour. Granulomatous scarring   - PANCREAS:  Normal in size, contour, no focal mass    - VISCERA:  Normal caliber, no wall thickening   - MESENTERY: No mesenteric mass   - CAVITY:  No free abdominal fluid, no free intraperitoneal air   - BODY WALL:  With normal limits   - OSSEOUS:  Degenerative changes most prominently at L5-S1 where  there is almost complete loss of joint space and vacuum disc  phenomenon      RETROPERITONEUM:   - KIDNEYS:Normal size/contour, no collecting system dilation                    No evidence of an enhancing mass. Bilateral  nonobstructing calculi, and small possible cysts bilaterally.    - URETERS:  Normal course, caliber   - ADRENALS:  Normal size, contour   - MISC:  No sig. retroperitoneal adenopathy or mass   - VASCULAR:  Aorta / iliacs: wnl for age     - - - CT PELVIS - - -       - VISCERA:  Normal caliber small/large bowel, mild thickening of  the rectum, nonspecific   - MESENTERY:  No mass   - VASCULAR:  Within normal limits for age   - CAVITY:  No free fluid / air   - BLADDER:  Unremarkable   - OSSEOUS:  Within normal limits    - PROSTATE:  Grossly wnl  Small left inguinal hernia contains only fat.      IMPRESSION:  1. No definite evidence of metastasis. Mild thickening of the  rectum, may be related to treatment effect, though malignancy  cannot be excluded.   2. Cholelithiasis.   3. Nonobstructing nephrolithiasis.  4. Possible bilateral renal cysts, could be further evaluated and  characterized by ultrasound or multiphasic contrast enhanced CT  or MRI  5. Please see finding sections above for further details.            Chest x-ray PA/lateral done on June 17, 2019 showed:  IMPRESSION:  CONCLUSION: Chronic elevation left diaphragm either diaphragmatic  eventration or diaphragmatic paralysis. Benign calcified  mediastinal and hilar lymph  nodes. Lungs otherwise clear.              Pathology :  Pathology report from May 30, 2019 showed:  Component     Final Diagnosis   1.  MUCOSA, ESOPHAGUS:   CHRONIC ESOPHAGITIS.   NEGATIVE FOR FUNGI (GMS STAIN), VIRAL CELLS AND DYSPLASIA.      2.  MUCOSA, ANUS:   INVASIVE NONKERATINIZING SQUAMOUS CELL CARCINOMA, POORLY DIFFERENTIATED.   IMMUNOSTAIN STRONGLY POSITIVE FOR p16 (HPV MARKER).      3.  POLYP, TRANSVERSE COLON:   INFLAMMATORY POLYP.     4.  POLYP, ASCENDING COLON:   SESSILE SERRATED ADENOMA.      5.  POLYP, DESCENDING COLON:   NO SIGNIFICANT HISTOLOGIC ABNORMALITY.                Pathology report from 05/16/2018 at [facility] showed:  SPECIMEN:  A. PROSTATE      CLINICAL HISTORY:  NONE GIVEN  PRE-OP DIAGNOSIS:  ELEVATED PSA  POST-OP DIAGNOSIS:  PROCEDURE:  ROBOTIC ASSISTED PROSTATECTOMY    FINAL DIAGNOSIS:    RADICAL PROSTATECTOMY SPECIMEN:       Invasive carcinoma.       - Histologic type:  Adenocarcinoma, conventional.       - Histologic Aurora score:  6 (3+3).       - Grade group:  1.       - Tumor quantitation:  Less than 1%. (Tumor present in 2 of 16 slides)       - Margins:  Clear.        - Bilateral involvement:  Present.       - Extra-prostatic extension:  Not identified.       - Seminal vesicle invasion:  Not identified.       - Perineural invasion:  Not identified.       - Angiolymphatic invasion:  Not identified.       - Other findings:  Benign glandular cyst near bladder margin.       - Pathologic staging:  pT2c.    GROSS:      Received is a 56 gram radical prostatectomy specimen with both seminal vesicles attached.  The prostate is 5.2 x 4.5 x 4.6 cm.  There is a 1 cm cystic structure present at the bladder margin on the right side.  Sections through the specimen demonstrate a somewhat nodular surface with increased firmness present primarily of the right posterior aspect.  Prostate from the right anterior margin is submitted as A1, right anterior lateral A2, right mid lateral A3, right  posterior A4-A6, and right seminal vesicle A7.  A section of the left anterior prostate is submitted as A8, left anterior lateral A9, left mid lateral A10, left posterior A11-A13, and left seminal vesicle A14.  Sections of the cystic lesion are submitted as A15 and A16.       (AEE)    MICROSCOPIC:    Multiple sections of prostate from right and left lobes including margins are evaluated.  Most of the prostate features hyperplastic changes with chronic inflammation.  Two areas feature prostatic adenocarcinoma.  These are on slides A1 and A12.  In both slides, the amount of tumor is relatively small with the margins being clear.  Perineural invasion is not noted.  There is no evidence of seminal vesicle invasion.  Sections from the bladder margin demonstrate a benign glandular type cyst.      Final Diagnosis performed by  Gilson Vaughn M.D.  Electronically signed 5/18/2018 9:01AM        ASSESSMENT AND PLAN:       1.  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  -Result of CT scan of abdomen and pelvis, pathology and staging were discussed with the patient today.  -It was discussed with as per NCCN guidelines, her standard recommendation for this kind of cancer is concurrent chemoradiation with 5-FU and mitomycin.  -Patient has  been evaluated by Dr. Lopez on June 18, 2019.    -Patient had a CT of chest without contrast done on June 27, 2019 that showed tiny sclerotic focus in vertebral body as well as healing fracture in rib.  Result of CT scan showing tiny sclerotic focus was discussed with patient.  Patient does admit that he has a trauma due to fall 3 months ago that can contribute to refracture.  Sclerotic focus there about 4 mm in size, PET/CT and bone scan will not detect those kind of tiny focus.  Plan is to repeat CT of chest in about 4 months that will be around November 2019 for follow-up.  -In view of no conclusive evidence of metastatic  disease, patient was started on concurrent 5-FU mitomycin with radiation  on July 8, 2019.  -Patient received a 29 of 5-FU and mitomycin on August 5, 2019.  Last day of radiation was on August 19, 2019.  - Patient had flexible sigmoidoscopy done by Dr. Gordillo on November 5, 2019 which did not show any evidence of residual tumor.    -We will ask patient to return to clinic in 3 months with a repeat CBC, CMP and anemia work-up to be done on that day.          2.  Prostatic adenocarcinoma, PT2cN0, stage IIc  -Status post radical prostatectomy on May 16, 2018.  -Most recent PSA done in May 2019 was <0.1  -We will need periodic PSA check to rule out recurrence which was discussed with patient.  -Outside pathology report was reviewed.  -Next PSA check around July 2020.         3.  Sclerotic bone focus on vertebral body as well as healing fracture of ribs:  -Patient had a CT of chest without contrast on June 29, 2019 that showed tiny sclerotic focus on vertebral body as well as healing fracture on rib.  Patient does admit to trauma to rib around April 2019.  -Sclerotic focus on deep there are 2 tiny measuring about 4 mm to 5 mm in size.  Bone scan or PET/CT would not detect those kind of tiny focus.  -CT of chest with contrast done on November 14, 2019 shows stable sclerotic lesion most likely bony island.  Will repeat CT of chest around November 2020 to follow-up on bone lesion.      4.  Anemia:  -Was found to be anemic with hemoglobin of 12.8 in October 2019.  -Hemoglobin is improved to 14.3 today.  Iron studies are adequate, will discontinue ferrous sulfate from today on March 3, 2020.  - Recommend continuing with B12 tablet every day.      5.  Hypertension     6.  Health maintenance: Patient does not smoke.  Had a colonoscopy done on May 30, 2019 by Dr. Gordillo.     7. Advance Care Planning: For now patient remains full code and is able to make his decisions.  Patient has health care surrogate mentioned on chart.     8.  Juan Antonio Shaw reports a pain score of 0.  Given his pain assessment as noted, treatment options were discussed and the following options were decided upon as a follow-up plan to address the patient's pain: No intervention required.    9. PHQ-9 Total Score: 0   -No intervention required             Terry Ly MD  3/3/2020  5:02 PM        EMR Dragon/Transcription disclaimer:   Much of this encounter note is an electronic transcription/translation of spoken language to printed text. The electronic translation of spoken language may permit erroneous, or at times, nonsensical words or phrases to be inadvertently transcribed; Although I have reviewed the note for such errors, some may still exist.

## 2020-03-04 ENCOUNTER — TELEPHONE (OUTPATIENT)
Dept: ONCOLOGY | Facility: HOSPITAL | Age: 65
End: 2020-03-04

## 2020-03-04 NOTE — TELEPHONE ENCOUNTER
----- Message from Terry Ly MD sent at 3/4/2020  7:39 AM CST -----  Please let patient know, he needs to continue taking B12 tablet .  Thank you

## 2020-05-17 PROCEDURE — U0003 INFECTIOUS AGENT DETECTION BY NUCLEIC ACID (DNA OR RNA); SEVERE ACUTE RESPIRATORY SYNDROME CORONAVIRUS 2 (SARS-COV-2) (CORONAVIRUS DISEASE [COVID-19]), AMPLIFIED PROBE TECHNIQUE, MAKING USE OF HIGH THROUGHPUT TECHNOLOGIES AS DESCRIBED BY CMS-2020-01-R: HCPCS | Performed by: INTERNAL MEDICINE

## 2020-05-18 ENCOUNTER — OFFICE VISIT (OUTPATIENT)
Dept: SURGERY | Facility: CLINIC | Age: 65
End: 2020-05-18

## 2020-05-18 VITALS
WEIGHT: 220.2 LBS | DIASTOLIC BLOOD PRESSURE: 80 MMHG | SYSTOLIC BLOOD PRESSURE: 120 MMHG | BODY MASS INDEX: 34.56 KG/M2 | TEMPERATURE: 97.6 F | HEIGHT: 67 IN | HEART RATE: 65 BPM

## 2020-05-18 DIAGNOSIS — K80.20 GALLSTONES: Primary | ICD-10-CM

## 2020-05-18 LAB — SARS-COV-2 RNA RESP QL NAA+PROBE: NOT DETECTED

## 2020-05-18 PROCEDURE — 99213 OFFICE O/P EST LOW 20 MIN: CPT | Performed by: SURGERY

## 2020-05-18 RX ORDER — BUPIVACAINE HCL/0.9 % NACL/PF 0.1 %
2 PLASTIC BAG, INJECTION (ML) EPIDURAL ONCE
Status: CANCELLED | OUTPATIENT
Start: 2020-06-05 | End: 2020-05-18

## 2020-05-18 RX ORDER — SODIUM CHLORIDE, SODIUM LACTATE, POTASSIUM CHLORIDE, CALCIUM CHLORIDE 600; 310; 30; 20 MG/100ML; MG/100ML; MG/100ML; MG/100ML
100 INJECTION, SOLUTION INTRAVENOUS CONTINUOUS
Status: CANCELLED | OUTPATIENT
Start: 2020-06-05

## 2020-05-18 NOTE — PROGRESS NOTES
Chief Complaint   Patient presents with   • Abdominal Pain     Right upper quadrant abdominal pain and gallstones        HPI  65 year old man 2 week history of abdominal pain. Nausea but no vomiting. No other changes in bowel habits.No fever or jaundice. Saw Dr. Gordillo and US ordered:     1. Gallstones .  2. Stone in the lower right kidney    [id number]   Result Narrative   Right upper quadrant pain .    Sonogram complete abdomen: The liver has a homogeneous appearance and no focal abnormality . There are gallstones . The common duct measures 3 mm . The pancreatic head is not well seen but no abnormality in the area . The IVC is unremarkable . The aorta   has a diameter of 1.7 cm . The spleen is unremarkable . The kidneys do not have any masses or hydronephrosis . There is a stone in the lower right kidney.   Other Result Information   Froy, Rad Results In - 04/14/2020  9:10 AM CDT  Right upper quadrant pain .    Sonogram complete abdomen: The liver has a homogeneous appearance and no focal abnormality . There are gallstones . The common duct measures 3 mm . The pancreatic head is not well seen but no abnormality in the area . The IVC is unremarkable . The aorta   has a diameter of 1.7 cm . The spleen is unremarkable . The kidneys do not have any masses or hydronephrosis . There is a stone in the lower right kidney.       I have personally reviewed the imaging and concur with the radiologist's findings.    Past Medical History:   Diagnosis Date   • Hypertension    • Prostate cancer (CMS/HCC) 05/16/2018   • Rectal cancer (CMS/HCC) 06/2019       Past Surgical History:   Procedure Laterality Date   • COLONOSCOPY N/A 5/30/2019    Procedure: COLONOSCOPY;  Surgeon: Jarek Gordillo DO;  Location: [facility] ENDOSCOPY;  Service: Gastroenterology   • ENDOSCOPY N/A 5/30/2019    Procedure: ESOPHAGOGASTRODUODENOSCOPY;  Surgeon: Jarek Gordillo DO;  Location: [facility] ENDOSCOPY;  Service: Gastroenterology   • PROSTATE SURGERY      • SIGMOIDOSCOPY N/A 11/5/2019    Procedure: SIGMOIDOSCOPY FLEXIBLE;  Surgeon: Jarek Gordillo DO;  Location: [facility] ENDOSCOPY;  Service: Gastroenterology   • VENOUS ACCESS DEVICE (PORT) INSERTION N/A 6/27/2019    Procedure: INSERTION VENOUS ACCESS DEVICE (MEDIPORT)           (C-ARM#1);  Surgeon: Benson Wasserman MD;  Location: [facility] OR;  Service: General   • VENOUS ACCESS DEVICE (PORT) REMOVAL Right 7/20/2019    Procedure: REMOVAL VENOUS ACCESS DEVICE;  Surgeon: Benson Wasserman MD;  Location: [facility] OR;  Service: General         Current Outpatient Medications:   •  docusate sodium (COLACE) 100 MG capsule, Take 1 capsule by mouth 2 (Two) Times a Day As Needed for Constipation., Disp: 60 capsule, Rfl: 2  •  ferrous sulfate 325 (65 FE) MG tablet, Take 1 tablet by mouth on Monday, Wednesday and Friday., Disp: 36 tablet, Rfl: 1  •  folic acid (FOLVITE) 1 MG tablet, Take 1 tablet by mouth Daily., Disp: 90 tablet, Rfl: 1  •  nebivolol (BYSTOLIC) 10 MG tablet, Take 10 mg by mouth Every Night., Disp: , Rfl:   •  oxybutynin (DITROPAN) 5 MG tablet, Take 1 tablet by mouth 3 (Three) Times a Day., Disp: , Rfl:   No current facility-administered medications for this visit.     Facility-Administered Medications Ordered in Other Visits:   •  sodium chloride 0.9 % flush 10 mL, 10 mL, Intravenous, PRN, Terry Ly MD, 10 mL at 07/26/19 0844    No Known Allergies    Family History   Problem Relation Age of Onset   • Uterine cancer Mother        Social History     Socioeconomic History   • Marital status:      Spouse name: Jeffrey   • Number of children: 2   • Years of education: 15   • Highest education level: Not on file   Occupational History   • Occupation: [employer]   Tobacco Use   • Smoking status: Never Smoker   • Smokeless tobacco: Never Used   Substance and Sexual Activity   • Alcohol use: Yes     Comment: rarely   • Drug use: No   • Sexual activity: Defer       Review of Systems   Constitutional: Negative for activity  change, appetite change, chills and fever.   HENT: Negative for hearing loss, nosebleeds and trouble swallowing.    Cardiovascular: Negative for chest pain, palpitations and leg swelling.   Gastrointestinal: Positive for abdominal pain and nausea. Negative for abdominal distention, anal bleeding, blood in stool, constipation, diarrhea, rectal pain and vomiting.   Endocrine: Negative for cold intolerance, heat intolerance, polydipsia and polyuria.   Genitourinary: Negative for decreased urine volume, difficulty urinating, dysuria, enuresis, frequency, hematuria and urgency.   Musculoskeletal: Negative for arthralgias, back pain, gait problem, myalgias and neck pain.   Skin: Negative for pallor, rash and wound.   Allergic/Immunologic: Negative for immunocompromised state.   Neurological: Negative for dizziness, seizures, weakness, light-headedness, numbness and headaches.   Psychiatric/Behavioral: Negative for agitation and behavioral problems. The patient is not nervous/anxious.        Physical Exam   Constitutional: He is oriented to person, place, and time. He appears well-developed and well-nourished.   HENT:   Head: Normocephalic and atraumatic.   Nose: Nose normal.   Eyes: Conjunctivae and EOM are normal. Right eye exhibits no discharge. Left eye exhibits no discharge.   Neck: Trachea normal, normal range of motion and phonation normal. Neck supple. No JVD present. No tracheal deviation and no edema present. No thyromegaly present.   Cardiovascular: Normal rate, regular rhythm and normal heart sounds. Exam reveals no gallop and no friction rub.   No murmur heard.  Pulmonary/Chest: Effort normal and breath sounds normal. No accessory muscle usage. No respiratory distress. He has no decreased breath sounds. He has no wheezes. He has no rales. He exhibits no tenderness.   Abdominal: Soft. He exhibits no distension, no fluid wave, no ascites, no pulsatile midline mass and no mass. There is no tenderness. There is no  rebound and no guarding. No hernia.   Musculoskeletal: Normal range of motion. He exhibits no edema, tenderness or deformity.   Lymphadenopathy:     He has no cervical adenopathy.        Left: No supraclavicular adenopathy present.   Neurological: He is alert and oriented to person, place, and time. He has normal strength. No cranial nerve deficit.   Skin: Skin is warm and dry. No rash noted. He is not diaphoretic. No erythema. No pallor.   Psychiatric: He has a normal mood and affect. His speech is normal and behavior is normal. Judgment and thought content normal. Cognition and memory are normal.   Vitals reviewed.        ASSESSMENT    Juan Antonio was seen today for abdominal pain.    Diagnoses and all orders for this visit:    Gallstones  -     Comprehensive Metabolic Panel; Future  -     ECG 12 Lead; Future  -     Case Request; Standing    Other orders  -     Follow anesthesia standing orders.  -     Provide NPO Instructions to Patient; Future  -     Chlorhexidine Skin Prep; Future        PLAN    1.Laparoscopic possible open cholecystectomy with cholangiogram is planned.    The following were discussed with the patient/family:    What are the indications that have led your doctor to the opinion that an operation is necessary?    * Symptoms and studies indicate that there is gallbladder disease causing pain the abdomen.    What, if any, alternative treatments are available for your condition?    * Watching and waiting with no surgery  * Removing the gallbladder through one large incision made in the abdomen.    What will be the likely result if you don't have the operation?    * Pain, infection, inflammation, and/or stones may continue or get worse    What are the basic procedures involved in the operation?    * The surgery may be done laparoscopically. Laparoscopic surgery is done using a scope and hollow tube(s) called ports. These are inserted through small cuts in the abdomen. A scope is a thin, lighted instrument  with a camera attached. Tools are passed through the ports. Carbon dioxide gas is pumped into the abdomen to create workspace.   If the surgery cannot be performed with a scope, it may be done through a larger cut. This occurs 2% of the time.  The gall bladder will be removed after it is  from the liver, bile duct, and surrounding arteries. An x-ray of the bile ducts is usually performed with contrast dye injected into the ducts.  If stones are found, another procedure may be required to remove them.  A drain may rarely be inserted to keep fluid from building up in the treatment area.     What are the risks?    * Exposure to radiation. Pregnant women and women of childbearing age should talk with their doctor about this.  * Pain, numbness, swelling, weakness or scarring where tissue is cut.  * The gas used in laparoscopic procedures to inflate the abdomen can become trapped in tissues. Gas in the bloodstream can dangerously affect blood flow and  heart function.  * The procedure may not cure or relieve your condition or symptoms. They may come back and even worsen.  * You may need additional tests or treatment.  * Bleeding. You may need blood transfusions or other treatments. This may be discovered during the procedure, or later.  * Embolism. An embolism is an object that moves through your body in your bloodstream. It can be an air bubble, a blood clot, a piece of fat or other material. It can block a blood vessel. This can lead to stroke, pulmonary embolism (blockage of the main artery of the lung), or injury to organs or extremities.  * Reactions to dye used for imaging. These may include hives, swelling of the face and/or throat, difficulty breathing, and kidney failure.  * Retained stones in bile ducts.  * Wound infection, poor healing or reopening. Blood or clear fluid can also collect at the wound site(s).  * Damage to the bile ducts or nearby structures. This may be discovered during the procedure,  or later.  * Incisional hernia. Weak scar tissue may allow tissue to bulge through the cut.  * The instrument(s) placed in your abdomen can cause injuries to nearby structures.  * Death.    How is the operation expected to improve your health or quality of life?    * Operation is expected to decrease pain and nausea/vomiting associated with gallstones.  * This procedure may relieve or prevent infection, inflammation, and/or pain from stones or blockage of bile ducts.    Is hospitalization necessary and, if so, how long can you expect to be hospitalized?    * Most often, the operation is performed on an outpatient basis. Occasionally hospitalization is necessary for pain or nausea control    What can you expect during your recovery period?    * The gas used in laparoscopic procedures to inflate the abdomen can become trapped    in tissues. Shoulder pain may occur for a few days after surgery.   * Nausea and pain control are obtained with oral medication.  * If you are on metformin, it will need to be held for 48 hours after surgery    When can you expect to resume normal activities?    * Normal activity can be resumed in 10-14 days if the procedure is performed laparoscopically. Open operations require no lifting or straining for 6 weeks.     Are there likely to be residual effects from the operation?    * Diarrhea often occurs, mostly temporary. Occasionally there is still minor food intolerance.    All questions were answered. The patient agrees to operation.                    This document has been electronically signed by Benson Wasserman MD on May 18, 2020 10:04

## 2020-05-18 NOTE — PATIENT INSTRUCTIONS

## 2020-05-20 ENCOUNTER — ANESTHESIA EVENT (OUTPATIENT)
Dept: GASTROENTEROLOGY | Facility: HOSPITAL | Age: 65
End: 2020-05-20

## 2020-05-20 ENCOUNTER — ANESTHESIA (OUTPATIENT)
Dept: GASTROENTEROLOGY | Facility: HOSPITAL | Age: 65
End: 2020-05-20

## 2020-05-20 ENCOUNTER — HOSPITAL ENCOUNTER (OUTPATIENT)
Facility: HOSPITAL | Age: 65
Setting detail: HOSPITAL OUTPATIENT SURGERY
Discharge: HOME | End: 2020-05-20
Attending: INTERNAL MEDICINE | Admitting: INTERNAL MEDICINE

## 2020-05-20 VITALS
HEIGHT: 68 IN | TEMPERATURE: 96.9 F | SYSTOLIC BLOOD PRESSURE: 130 MMHG | BODY MASS INDEX: 32.64 KG/M2 | HEART RATE: 69 BPM | RESPIRATION RATE: 18 BRPM | OXYGEN SATURATION: 95 % | WEIGHT: 215.4 LBS | DIASTOLIC BLOOD PRESSURE: 70 MMHG

## 2020-05-20 DIAGNOSIS — C44.520 SQUAMOUS CELL CARCINOMA OF PERIANAL REGION: ICD-10-CM

## 2020-05-20 PROCEDURE — 25010000002 PROPOFOL 10 MG/ML EMULSION: Performed by: NURSE ANESTHETIST, CERTIFIED REGISTERED

## 2020-05-20 RX ORDER — LIDOCAINE HYDROCHLORIDE 20 MG/ML
INJECTION, SOLUTION EPIDURAL; INFILTRATION; INTRACAUDAL; PERINEURAL AS NEEDED
Status: DISCONTINUED | OUTPATIENT
Start: 2020-05-20 | End: 2020-05-20 | Stop reason: SURG

## 2020-05-20 RX ORDER — ONDANSETRON 2 MG/ML
4 INJECTION INTRAMUSCULAR; INTRAVENOUS ONCE AS NEEDED
Status: DISCONTINUED | OUTPATIENT
Start: 2020-05-20 | End: 2020-05-20 | Stop reason: HOSPADM

## 2020-05-20 RX ORDER — PROPOFOL 10 MG/ML
VIAL (ML) INTRAVENOUS AS NEEDED
Status: DISCONTINUED | OUTPATIENT
Start: 2020-05-20 | End: 2020-05-20 | Stop reason: SURG

## 2020-05-20 RX ORDER — DEXTROSE AND SODIUM CHLORIDE 5; .45 G/100ML; G/100ML
30 INJECTION, SOLUTION INTRAVENOUS CONTINUOUS PRN
Status: DISCONTINUED | OUTPATIENT
Start: 2020-05-20 | End: 2020-05-20 | Stop reason: HOSPADM

## 2020-05-20 RX ADMIN — LIDOCAINE HYDROCHLORIDE 100 MG: 20 INJECTION, SOLUTION EPIDURAL; INFILTRATION; INTRACAUDAL; PERINEURAL at 15:16

## 2020-05-20 RX ADMIN — PROPOFOL 100 MG: 10 INJECTION, EMULSION INTRAVENOUS at 15:16

## 2020-05-20 RX ADMIN — DEXTROSE AND SODIUM CHLORIDE 30 ML/HR: 5; 450 INJECTION, SOLUTION INTRAVENOUS at 13:40

## 2020-05-20 RX ADMIN — PROPOFOL 20 MG: 10 INJECTION, EMULSION INTRAVENOUS at 15:21

## 2020-05-20 NOTE — H&P
Sam Espinosa DO,[facility]  Gastroenterology  Hepatology  Endoscopy  Board Certified in Internal Medicine and gastroenterology  [address], [address]  [place]. [address]  - [phone]   F - [phone]     GASTROENTEROLOGY HISTORY AND PHYSICAL  NOTE   SAM ESPINOSA DO.         SUBJECTIVE:   5/20/2020    Name: Juan Antonio Shaw  DOD: 1955      Chief Complaint:       Subjective : Personal history of anal cancer, status post chemotherapy and radiation.  Assess for effectiveness of treatment    Patient is 65 y.o. male presents with desire for elective flexible sigmoidoscopy with biopsy as required.      ROS/HISTORY/ CURRENT MEDICATIONS/OBJECTIVE/VS/PE:   Review of Systems:  All systems unremarkable unless specified below.  Constitutional   HENT  Eyes   Respiratory    Cardiovascular  Gastrointestinal   Endocrine  Genitourinary    Musculoskeletal   Skin  Allergic/Immunologic    Neurological    Hematological  Psychiatric/Behavioral    History:     Past Medical History:   Diagnosis Date   • Hypertension    • Prostate cancer (CMS/HCC) 05/16/2018   • Rectal cancer (CMS/HCC) 06/2019     Past Surgical History:   Procedure Laterality Date   • COLONOSCOPY N/A 5/30/2019    Procedure: COLONOSCOPY;  Surgeon: Sam Espinosa DO;  [facility];  Service: Gastroenterology   • ENDOSCOPY N/A 5/30/2019    Procedure: ESOPHAGOGASTRODUODENOSCOPY;  Surgeon: Sam Espinosa DO;  Location[facility] ENDOSCOPY;  Service: Gastroenterology   • PROSTATE SURGERY     • SIGMOIDOSCOPY N/A 11/5/2019    Procedure: SIGMOIDOSCOPY FLEXIBLE;  Surgeon: Sam Espinosa DO;  Location[facility] ENDOSCOPY;  Service: Gastroenterology   • VENOUS ACCESS DEVICE (PORT) INSERTION N/A 6/27/2019    Procedure: INSERTION VENOUS ACCESS DEVICE (MEDIPORT)           (C-ARM#1);  Surgeon: Benson Wasserman MD;  Location: [facility] OR;  Service: General   • VENOUS ACCESS DEVICE (PORT) REMOVAL Right 7/20/2019    Procedure: REMOVAL VENOUS  ACCESS DEVICE;  Surgeon: Benson Wasserman MD;  Location: [facility];  Service: General     Family History   Problem Relation Age of Onset   • Uterine cancer Mother      Social History     Tobacco Use   • Smoking status: Never Smoker   • Smokeless tobacco: Never Used   Substance Use Topics   • Alcohol use: Yes     Comment: rarely   • Drug use: No     Prior to Admission medications    Medication Sig Start Date End Date Taking? Authorizing Provider   docusate sodium (COLACE) 100 MG capsule Take 1 capsule by mouth 2 (Two) Times a Day As Needed for Constipation. 10/24/19   Terry Ly MD   ferrous sulfate 325 (65 FE) MG tablet Take 1 tablet by mouth on Monday, Wednesday and Friday. 10/24/19   eTrry Ly MD   folic acid (FOLVITE) 1 MG tablet Take 1 tablet by mouth Daily. 10/24/19   Terry Ly MD   nebivolol (BYSTOLIC) 10 MG tablet Take 10 mg by mouth Every Night.    ProviderAndreea MD   oxybutynin (DITROPAN) 5 MG tablet Take 1 tablet by mouth 3 (Three) Times a Day. 6/14/19   ProviderAndreea MD     Allergies:  Patient has no known allergies.    I have reviewed the patients medical history, surgical history and family history in the available medical record system.     Current Medications:     No current facility-administered medications for this encounter.      Current Outpatient Medications   Medication Sig Dispense Refill   • docusate sodium (COLACE) 100 MG capsule Take 1 capsule by mouth 2 (Two) Times a Day As Needed for Constipation. 60 capsule 2   • ferrous sulfate 325 (65 FE) MG tablet Take 1 tablet by mouth on Monday, Wednesday and Friday. 36 tablet 1   • folic acid (FOLVITE) 1 MG tablet Take 1 tablet by mouth Daily. 90 tablet 1   • nebivolol (BYSTOLIC) 10 MG tablet Take 10 mg by mouth Every Night.     • oxybutynin (DITROPAN) 5 MG tablet Take 1 tablet by mouth 3 (Three) Times a Day.       Facility-Administered Medications Ordered in Other Encounters   Medication Dose Route Frequency Provider Last  "Rate Last Dose   • sodium chloride 0.9 % flush 10 mL  10 mL Intravenous PRN Terry Ly MD   10 mL at 07/26/19 0844       Objective     Physical Exam:      /70   Pulse 69   Temp 96.9 °F (36.1 °C)   Resp 18   Ht 172.7 cm (68\")   Wt 97.7 kg (215 lb 6.4 oz)   SpO2 95%   BMI 32.75 kg/m²   Physical Exam:  General Appearance:    Alert, cooperative, in no acute distress   Head:    Normocephalic, without obvious abnormality, atraumatic   Eyes:            Lids and lashes normal, conjunctivae and sclerae normal, no   icterus, no pallor, corneas clear, PERRLA   Ears:    Ears appear intact with no abnormalities noted   Throat:   No oral lesions, no thrush, oral mucosa moist   Neck:   No adenopathy, supple, trachea midline, no thyromegaly, no     carotid bruit, no JVD   Back:     No kyphosis present, no scoliosis present, no skin lesions,       erythema or scars, no tenderness to percussion or                   palpation,   range of motion normal   Lungs:     Clear to auscultation,respirations regular, even and                   unlabored    Heart:    Regular rhythm and normal rate, normal S1 and S2, no            murmur, no gallop, no rub, no click   Breast Exam:    Deferred   Abdomen:     Normal bowel sounds, no masses, no organomegaly, soft        non-tender, non-distended, no guarding, no rebound                 tenderness   Genitalia:    Deferred   Extremities:   Moves all extremities well, no edema, no cyanosis, no              redness   Pulses:   Pulses palpable and equal bilaterally   Skin:   No bleeding, bruising or rash   Lymph nodes:   No palpable adenopathy   Neurologic:   Cranial nerves 2 - 12 grossly intact, sensation intact, DTR        present and equal bilaterally      Results Review:     Lab Results   Component Value Date    WBC 5.35 03/03/2020    WBC 4.57 12/03/2019    WBC 6.15 10/22/2019    HGB 14.3 03/03/2020    HGB 14.3 12/03/2019    HGB 12.8 (L) 10/22/2019    HCT 41.3 03/03/2020    HCT 42.5 " 12/03/2019    HCT 39.2 10/22/2019     03/03/2020     12/03/2019     10/22/2019             No results found for: LIPASE  Lab Results   Component Value Date    INR 1.02 05/09/2018     No results found for: CULTURE    Radiology Review:  Imaging Results (Last 72 Hours)     ** No results found for the last 72 hours. **           I reviewed the patient's new clinical results.  I reviewed the patient's new imaging results and agree with the interpretation.     ASSESSMENT/PLAN:   ASSESSMENT:  1.  Personal history of anal cancer, treated with chemotherapy and radiation therapy    Plan  1.  Flexible sigmoidoscopy with biopsy    Risk and benefits associated with the procedure are reviewed with the patient.  Patient wished to proceed     Jarek Gordillo DO  05/20/20  12:51

## 2020-05-20 NOTE — ANESTHESIA PREPROCEDURE EVALUATION
Anesthesia Evaluation     Patient summary reviewed and Nursing notes reviewed   NPO Solid Status: > 8 hours  NPO Liquid Status: > 8 hours           Airway   Mallampati: II  TM distance: >3 FB  Neck ROM: full  No difficulty expected  Dental      Pulmonary - negative pulmonary ROS and normal exam   Cardiovascular - normal exam  Exercise tolerance: good (4-7 METS)    (+) hypertension well controlled less than 2 medications,       Neuro/Psych- negative ROS  GI/Hepatic/Renal/Endo    (+) obesity,       Musculoskeletal (-) negative ROS    Abdominal  - normal exam   Substance History - negative use     OB/GYN          Other      history of cancer (rectAl and prostate) remission                  Anesthesia Plan    ASA 2     MAC     intravenous induction     Anesthetic plan, all risks, benefits, and alternatives have been provided, discussed and informed consent has been obtained with: patient.

## 2020-05-22 LAB
LAB AP CASE REPORT: NORMAL
PATH REPORT.FINAL DX SPEC: NORMAL

## 2020-06-01 ENCOUNTER — LAB (OUTPATIENT)
Dept: ONCOLOGY | Facility: HOSPITAL | Age: 65
End: 2020-06-01

## 2020-06-01 DIAGNOSIS — C61 PROSTATE CANCER (HCC): ICD-10-CM

## 2020-06-01 DIAGNOSIS — C21.0 ANAL CANCER (HCC): ICD-10-CM

## 2020-06-01 DIAGNOSIS — M89.9 BONE LESION: ICD-10-CM

## 2020-06-01 LAB
ALBUMIN SERPL-MCNC: 4.2 G/DL (ref 3.5–5.2)
ALBUMIN/GLOB SERPL: 1.3 G/DL
ALP SERPL-CCNC: 75 U/L (ref 39–117)
ALT SERPL W P-5'-P-CCNC: 18 U/L (ref 1–41)
ANION GAP SERPL CALCULATED.3IONS-SCNC: 8 MMOL/L (ref 5–15)
AST SERPL-CCNC: 22 U/L (ref 1–40)
BASOPHILS # BLD AUTO: 0.02 10*3/MM3 (ref 0–0.2)
BASOPHILS NFR BLD AUTO: 0.4 % (ref 0–1.5)
BILIRUB SERPL-MCNC: 0.4 MG/DL (ref 0.2–1.2)
BUN BLD-MCNC: 22 MG/DL (ref 8–23)
BUN/CREAT SERPL: 27.2 (ref 7–25)
CALCIUM SPEC-SCNC: 9.3 MG/DL (ref 8.6–10.5)
CHLORIDE SERPL-SCNC: 102 MMOL/L (ref 98–107)
CO2 SERPL-SCNC: 27 MMOL/L (ref 22–29)
CREAT BLD-MCNC: 0.81 MG/DL (ref 0.76–1.27)
DEPRECATED RDW RBC AUTO: 40.9 FL (ref 37–54)
EOSINOPHIL # BLD AUTO: 0.08 10*3/MM3 (ref 0–0.4)
EOSINOPHIL NFR BLD AUTO: 1.6 % (ref 0.3–6.2)
ERYTHROCYTE [DISTWIDTH] IN BLOOD BY AUTOMATED COUNT: 12.4 % (ref 12.3–15.4)
FERRITIN SERPL-MCNC: 95.87 NG/ML (ref 30–400)
FOLATE SERPL-MCNC: 13.2 NG/ML (ref 4.78–24.2)
GFR SERPL CREATININE-BSD FRML MDRD: 96 ML/MIN/1.73
GLOBULIN UR ELPH-MCNC: 3.3 GM/DL
GLUCOSE BLD-MCNC: 69 MG/DL (ref 65–99)
HCT VFR BLD AUTO: 42.2 % (ref 37.5–51)
HGB BLD-MCNC: 14.9 G/DL (ref 13–17.7)
IMM GRANULOCYTES # BLD AUTO: 0.02 10*3/MM3 (ref 0–0.05)
IMM GRANULOCYTES NFR BLD AUTO: 0.4 % (ref 0–0.5)
IRON 24H UR-MRATE: 98 MCG/DL (ref 59–158)
IRON SATN MFR SERPL: 27 % (ref 20–50)
LYMPHOCYTES # BLD AUTO: 1.24 10*3/MM3 (ref 0.7–3.1)
LYMPHOCYTES NFR BLD AUTO: 24.9 % (ref 19.6–45.3)
MCH RBC QN AUTO: 32.3 PG (ref 26.6–33)
MCHC RBC AUTO-ENTMCNC: 35.3 G/DL (ref 31.5–35.7)
MCV RBC AUTO: 91.5 FL (ref 79–97)
MONOCYTES # BLD AUTO: 0.51 10*3/MM3 (ref 0.1–0.9)
MONOCYTES NFR BLD AUTO: 10.3 % (ref 5–12)
NEUTROPHILS # BLD AUTO: 3.1 10*3/MM3 (ref 1.7–7)
NEUTROPHILS NFR BLD AUTO: 62.4 % (ref 42.7–76)
NRBC BLD AUTO-RTO: 0 /100 WBC (ref 0–0.2)
PLATELET # BLD AUTO: 253 10*3/MM3 (ref 140–450)
PMV BLD AUTO: 8.7 FL (ref 6–12)
POTASSIUM BLD-SCNC: 3.9 MMOL/L (ref 3.5–5.2)
PROT SERPL-MCNC: 7.5 G/DL (ref 6–8.5)
RBC # BLD AUTO: 4.61 10*6/MM3 (ref 4.14–5.8)
SODIUM BLD-SCNC: 137 MMOL/L (ref 136–145)
TIBC SERPL-MCNC: 362 MCG/DL (ref 298–536)
TRANSFERRIN SERPL-MCNC: 243 MG/DL (ref 200–360)
VIT B12 BLD-MCNC: 757 PG/ML (ref 211–946)
WBC NRBC COR # BLD: 4.97 10*3/MM3 (ref 3.4–10.8)

## 2020-06-01 PROCEDURE — 82728 ASSAY OF FERRITIN: CPT | Performed by: INTERNAL MEDICINE

## 2020-06-01 PROCEDURE — 83540 ASSAY OF IRON: CPT | Performed by: INTERNAL MEDICINE

## 2020-06-01 PROCEDURE — 85025 COMPLETE CBC W/AUTO DIFF WBC: CPT | Performed by: INTERNAL MEDICINE

## 2020-06-01 PROCEDURE — 82746 ASSAY OF FOLIC ACID SERUM: CPT | Performed by: INTERNAL MEDICINE

## 2020-06-01 PROCEDURE — 36415 COLL VENOUS BLD VENIPUNCTURE: CPT | Performed by: INTERNAL MEDICINE

## 2020-06-01 PROCEDURE — 82607 VITAMIN B-12: CPT | Performed by: INTERNAL MEDICINE

## 2020-06-01 PROCEDURE — 80053 COMPREHEN METABOLIC PANEL: CPT | Performed by: INTERNAL MEDICINE

## 2020-06-01 PROCEDURE — 84466 ASSAY OF TRANSFERRIN: CPT | Performed by: INTERNAL MEDICINE

## 2020-06-02 ENCOUNTER — TELEMEDICINE (OUTPATIENT)
Dept: ONCOLOGY | Facility: CLINIC | Age: 65
End: 2020-06-02

## 2020-06-02 DIAGNOSIS — C21.0 ANAL CANCER (HCC): Primary | ICD-10-CM

## 2020-06-02 DIAGNOSIS — C61 PROSTATE CANCER (HCC): ICD-10-CM

## 2020-06-02 DIAGNOSIS — M89.9 BONE LESION: ICD-10-CM

## 2020-06-02 DIAGNOSIS — D64.9 ANEMIA, UNSPECIFIED TYPE: ICD-10-CM

## 2020-06-02 PROCEDURE — U0003 INFECTIOUS AGENT DETECTION BY NUCLEIC ACID (DNA OR RNA); SEVERE ACUTE RESPIRATORY SYNDROME CORONAVIRUS 2 (SARS-COV-2) (CORONAVIRUS DISEASE [COVID-19]), AMPLIFIED PROBE TECHNIQUE, MAKING USE OF HIGH THROUGHPUT TECHNOLOGIES AS DESCRIBED BY CMS-2020-01-R: HCPCS | Performed by: SURGERY

## 2020-06-02 PROCEDURE — 99214 OFFICE O/P EST MOD 30 MIN: CPT | Performed by: INTERNAL MEDICINE

## 2020-06-02 RX ORDER — FOLIC ACID 1 MG/1
TABLET ORAL
Qty: 36 TABLET | Refills: 1 | Status: SHIPPED | OUTPATIENT
Start: 2020-06-02 | End: 2020-09-01 | Stop reason: SDUPTHER

## 2020-06-02 NOTE — PROGRESS NOTES
DATE OF VISIT: 6/2/2020    You have chosen to receive care through a telehealth visit.  Do you consent to use a video/audio connection for your medical care today? Yes       REASON FOR VISIT:  Anal cancer, history of prostate cancer, sclerotic lesion of bone on CT scan of chest, anemia      HISTORY OF PRESENT ILLNESS:    65-year-old male with medical problem consisting of hypertension, prostatic adenocarcinoma status post robotic prostatectomy done in May 2018 was initially seen in consultation on June 20, 2019 for newly diagnosed squamous cell cancer of anus.   Patient was started on day 1 of 5-FU and mitomycin with radiation treatment on July 8, 2019.  Patient received day 28 of 5-FU and mitomycin on August 5, 2019.  Patient finished radiation on August 19, 2019.  Patient is here for follow-up appointment today to discuss recently done blood work.  States he has been diagnosed with gallbladder stones recently and is having intermittent pain.  He scheduled to get cholecystectomy done later this week.  Denies any new lymph node enlargement.  Denies any bleeding.  States overall he feels good.    PAST MEDICAL HISTORY:    Past Medical History:   Diagnosis Date   • Hypertension    • Prostate cancer (CMS/HCC) 05/16/2018   • Rectal cancer (CMS/[facility]) 06/2019       SOCIAL HISTORY:    Social History     Tobacco Use   • Smoking status: Never Smoker   • Smokeless tobacco: Never Used   Substance Use Topics   • Alcohol use: Yes     Comment: rarely   • Drug use: No       Surgical History :  Past Surgical History:   Procedure Laterality Date   • COLONOSCOPY N/A 5/30/2019    Procedure: COLONOSCOPY;  Surgeon: Jarek Gordillo DO;  Location: [facility] ENDOSCOPY;  Service: Gastroenterology   • ENDOSCOPY N/A 5/30/2019    Procedure: ESOPHAGOGASTRODUODENOSCOPY;  Surgeon: Jarek Gordillo DO;  Location: [facility] ENDOSCOPY;  Service: Gastroenterology   • PROSTATE SURGERY     • SIGMOIDOSCOPY N/A 11/5/2019    Procedure: SIGMOIDOSCOPY  FLEXIBLE;  Surgeon: Jarek Gordillo DO;  Location: [facility] ENDOSCOPY;  Service: Gastroenterology   • SIGMOIDOSCOPY N/A 5/20/2020    Procedure: SIGMOIDOSCOPY FLEXIBLE;  Surgeon: Jarek Gordillo DO;  Location: [facility] ENDOSCOPY;  Service: Gastroenterology;  Laterality: N/A;   • VENOUS ACCESS DEVICE (PORT) INSERTION N/A 6/27/2019    Procedure: INSERTION VENOUS ACCESS DEVICE (MEDIPORT)           (C-ARM#1);  Surgeon: Benson Wasserman MD;  Location: [facility] OR;  Service: General   • VENOUS ACCESS DEVICE (PORT) REMOVAL Right 7/20/2019    Procedure: REMOVAL VENOUS ACCESS DEVICE;  Surgeon: Benson Wasserman MD;  Location: [facility] OR;  Service: General       ALLERGIES:    No Known Allergies      FAMILY HISTORY:  Family History   Problem Relation Age of Onset   • Uterine cancer Mother            REVIEW OF SYSTEMS:      CONSTITUTIONAL:   Denies any fever, chills or weight loss.      EYES: No visual disturbances. No discharge. No new lesions     ENMT:  No epistaxis, mouth sores or difficulty swallowing.     RESPIRATORY:  No new shortness of breath. No new cough or hemoptysis.     CARDIOVASCULAR:  No chest pain or palpitations.     GASTROINTESTINAL: Complains of intermittent pain in right upper quadrant secondary to gallstones.  No  nausea, vomiting or blood in the stool.     GENITOURINARY: No Dysuria or Hematuria.     MUSCULOSKELETAL:  No new back pain or arthralgia.     LYMPHATICS:  Denies any abnormal swollen glands anywhere in the body.     NEUROLOGICAL : No tingling or numbness. No headache or dizziness. No seizures or balance problems.     SKIN: Erythematous skin lesion present on right side of neck.     ENDOCRINE : No new hot or cold intolerance. No new polyuria . No polydipsia        PHYSICAL EXAMINATION:      VITAL SIGNS:  Afebrile(per patient), RR-16      ECOG performance status: 1    CONSTITUTIONAL:  Not in any distress.    EYES:  Extraocular Movements intact.No ptosis.    ENMT:  Normocephalic, Atraumatic.No Facial  Asymmetry noted.    NECK:  No  visible adenopathy.    RESPIRATORY:  Fair respiratory effort.    MUSCULOSKELETAL:  No edema.Normal range of motion.    NEUROLOGIC:    No  Motor  deficit appreciated.  Moving all 4 extremities appropriately.    SKIN : No new skin lesion lesions.    LYMPHATICS: No visible enlarged lymph nodes in neck or supraclavicular area.    PSYCHIATRY: Alert, awake and oriented ×3.        DIAGNOSTIC DATA:    Glucose   Date Value Ref Range Status   06/01/2020 69 65 - 99 mg/dL Final     Sodium   Date Value Ref Range Status   06/01/2020 137 136 - 145 mmol/L Final   07/11/2018 138 134 - 144 mmol/L Final   05/17/2018 135 (L) 136 - 145 mmol/L Final     Potassium   Date Value Ref Range Status   06/01/2020 3.9 3.5 - 5.2 mmol/L Final   07/11/2018 4 3.5 - 5.1 mmol/L Final   05/17/2018 4.3 3.3 - 5.0 mmol/L Final     CO2   Date Value Ref Range Status   06/01/2020 27.0 22.0 - 29.0 mmol/L Final     Total CO2   Date Value Ref Range Status   05/17/2018 26 20 - 31 mmol/L Final     Chloride   Date Value Ref Range Status   06/01/2020 102 98 - 107 mmol/L Final   07/11/2018 105 98 - 107 mmol/L Final   05/17/2018 104 99 - 111 mmol/L Final     Anion Gap   Date Value Ref Range Status   06/01/2020 8.0 5.0 - 15.0 mmol/L Final   05/17/2018 9 4 - 16 mmol/L Final     Creatinine   Date Value Ref Range Status   06/01/2020 0.81 0.76 - 1.27 mg/dL Final   07/11/2018 1 0.6 - 1.3 mg/dL Final   05/17/2018 1.0 0.6 - 1.3 mg/dL Final     BUN   Date Value Ref Range Status   06/01/2020 22 8 - 23 mg/dL Final   07/11/2018 34 (H) 7 - 18 mg/dL Final     BUN/Creatinine Ratio   Date Value Ref Range Status   06/01/2020 27.2 (H) 7.0 - 25.0 Final     Calcium   Date Value Ref Range Status   06/01/2020 9.3 8.6 - 10.5 mg/dL Final   07/11/2018 8.8 8.8 - 10.5 mg/dL Final     eGFR Non  Amer   Date Value Ref Range Status   06/01/2020 96 >60 mL/min/1.73 Final     Alkaline Phosphatase   Date Value Ref Range Status   06/01/2020 75 39 - 117 U/L Final    07/11/2018 69 46 - 116 U/L Final     Total Protein   Date Value Ref Range Status   06/01/2020 7.5 6.0 - 8.5 g/dL Final   07/11/2018 7.4 6.4 - 8.2 g/dL Final     ALT (SGPT)   Date Value Ref Range Status   06/01/2020 18 1 - 41 U/L Final   07/11/2018 19 (L) 30 - 65 U/L Final     AST (SGOT)   Date Value Ref Range Status   06/01/2020 22 1 - 40 U/L Final   07/11/2018 17 15 - 37 U/L Final     Total Bilirubin   Date Value Ref Range Status   06/01/2020 0.4 0.2 - 1.2 mg/dL Final   07/11/2018 0.3 0 - 1.0 mg/dL Final     Albumin   Date Value Ref Range Status   06/01/2020 4.20 3.50 - 5.20 g/dL Final   07/11/2018 3.5 3.4 - 5.0 g/dL Final     Globulin   Date Value Ref Range Status   06/01/2020 3.3 gm/dL Final     Lab Results   Component Value Date    WBC 4.97 06/01/2020    HGB 14.9 06/01/2020    HCT 42.2 06/01/2020    MCV 91.5 06/01/2020     06/01/2020     Lab Results   Component Value Date    NEUTROABS 3.10 06/01/2020    IRON 98 06/01/2020    TIBC 362 06/01/2020    LABIRON 27 06/01/2020    FERRITIN 95.87 06/01/2020    UUEFFFMH74 757 06/01/2020    FOLATE 13.20 06/01/2020     No results found for: , LABCA2, AFPTM, HCGQUANT, , CHROMGRNA, [id number], CEA, REFLABREPO            ASSESSMENT AND PLAN:      1.  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  -Result of CT scan of abdomen and pelvis, pathology and staging were discussed with the patient today.  -It was discussed with as per NCCN guidelines, her standard recommendation for this kind of cancer is concurrent chemoradiation with 5-FU and mitomycin.  -Patient has  been evaluated by Dr. Lopez on June 18, 2019.    -Patient had a CT of chest without contrast done on June 27, 2019 that showed tiny sclerotic focus in vertebral body as well as healing fracture in rib.  Result of CT scan showing tiny sclerotic focus was discussed with patient.  Patient does admit that he has a trauma due to fall 3 months ago  that can contribute to refracture.  Sclerotic focus there about 4 mm in size, PET/CT and bone scan will not detect those kind of tiny focus.  Plan is to repeat CT of chest in about 4 months that will be around November 2019 for follow-up.  -In view of no conclusive evidence of metastatic disease, patient was started on concurrent 5-FU mitomycin with radiation  on July 8, 2019.  -Patient received a 29 of 5-FU and mitomycin on August 5, 2019.  Last day of radiation was on August 19, 2019.  - Patient had flexible sigmoidoscopy done by Dr. Gordillo on November 5, 2019 which did not show any evidence of residual tumor.    -We will ask patient to return to clinic in 3 months with a repeat CBC, CMP,psa and anemia work-up to be done prior to that day.              2.  Prostatic adenocarcinoma, PT2cN0, stage IIc  -Status post radical prostatectomy on May 16, 2018.  -Most recent PSA done in May 2019 was <0.1  -We will need periodic PSA check to rule out recurrence which was discussed with patient.  -Outside pathology report was reviewed.  -Next PSA will be done prior to next clinic visit in 3 months      3.  Sclerotic bone focus on vertebral body as well as healing fracture of ribs:  -Patient had a CT of chest without contrast on June 29, 2019 that showed tiny sclerotic focus on vertebral body as well as healing fracture on rib.  Patient does admit to trauma to rib around April 2019.  -Sclerotic focus on deep there are 2 tiny measuring about 4 mm to 5 mm in size.  Bone scan or PET/CT would not detect those kind of tiny focus.  -CT of chest with contrast done on November 14, 2019 shows stable sclerotic lesion most likely bony island.  Will repeat CT of chest around November 2020 to follow-up on bone lesion.        4.  Anemia:  -Hemoglobin is 14.9.  Iron studies are adequate.  - Recommend continuing with B12 tablet every day.  -We will start patient on folic acid 1 tablet 3 times a week on Monday, Wednesday and Friday.        5.   Hypertension     6.  Health maintenance: Patient does not smoke.  Had a colonoscopy done on May 30, 2019 by Dr. Gordillo.     7. Advance Care Planning: For now patient remains full code and is able to make his decisions.  Patient has health care surrogate mentioned on chart.         PHQ-9 Total Score:       Juan Antonio Shaw reports a pain score of 0.  Given his pain assessment as noted, treatment options were discussed and the following options were decided upon as a follow-up plan to address the patient's pain: No intervention required.               This visit was completed as a video visit due to ongoing pandemic with coronavirus.    I did not complete this video visit with the patient using [employer].  Video visit was completed through third-party video application.      Terry Ly MD  6/2/2020  10:58          Part of this note may be an electronic transcription/translation of spoken language to printed text using the Dragon Dictation System.

## 2020-06-03 LAB
COVID LABCORP PRIORITY: NORMAL
SARS-COV-2 RNA RESP QL NAA+PROBE: NOT DETECTED

## 2020-06-04 PROCEDURE — 93005 ELECTROCARDIOGRAM TRACING: CPT | Performed by: SURGERY

## 2020-06-05 ENCOUNTER — ANESTHESIA (OUTPATIENT)
Dept: PERIOP | Facility: HOSPITAL | Age: 65
End: 2020-06-05

## 2020-06-05 ENCOUNTER — ANESTHESIA EVENT (OUTPATIENT)
Dept: PERIOP | Facility: HOSPITAL | Age: 65
End: 2020-06-05

## 2020-06-05 ENCOUNTER — HOSPITAL ENCOUNTER (OUTPATIENT)
Facility: HOSPITAL | Age: 65
Setting detail: HOSPITAL OUTPATIENT SURGERY
Discharge: HOME | End: 2020-06-05
Attending: SURGERY | Admitting: SURGERY

## 2020-06-05 ENCOUNTER — APPOINTMENT (OUTPATIENT)
Dept: GENERAL RADIOLOGY | Facility: HOSPITAL | Age: 65
End: 2020-06-05

## 2020-06-05 VITALS
WEIGHT: 216.05 LBS | DIASTOLIC BLOOD PRESSURE: 55 MMHG | HEART RATE: 69 BPM | TEMPERATURE: 98.1 F | RESPIRATION RATE: 20 BRPM | OXYGEN SATURATION: 95 % | BODY MASS INDEX: 32.74 KG/M2 | HEIGHT: 68 IN | SYSTOLIC BLOOD PRESSURE: 117 MMHG

## 2020-06-05 DIAGNOSIS — M89.9 BONE LESION: ICD-10-CM

## 2020-06-05 DIAGNOSIS — C61 PROSTATE CANCER (HCC): ICD-10-CM

## 2020-06-05 DIAGNOSIS — C21.0 ANAL CANCER (HCC): ICD-10-CM

## 2020-06-05 DIAGNOSIS — D64.9 ANEMIA, UNSPECIFIED TYPE: ICD-10-CM

## 2020-06-05 DIAGNOSIS — K80.20 GALLSTONES: Primary | ICD-10-CM

## 2020-06-05 LAB
ALBUMIN SERPL-MCNC: 4.1 G/DL (ref 3.5–5.2)
ALBUMIN/GLOB SERPL: 1.3 G/DL
ALP SERPL-CCNC: 73 U/L (ref 39–117)
ALT SERPL W P-5'-P-CCNC: 13 U/L (ref 1–41)
ANION GAP SERPL CALCULATED.3IONS-SCNC: 13 MMOL/L (ref 5–15)
AST SERPL-CCNC: 17 U/L (ref 1–40)
BILIRUB SERPL-MCNC: 0.6 MG/DL (ref 0.2–1.2)
BUN BLD-MCNC: 19 MG/DL (ref 8–23)
BUN/CREAT SERPL: 25.7 (ref 7–25)
CALCIUM SPEC-SCNC: 9.3 MG/DL (ref 8.6–10.5)
CHLORIDE SERPL-SCNC: 100 MMOL/L (ref 98–107)
CO2 SERPL-SCNC: 26 MMOL/L (ref 22–29)
CREAT BLD-MCNC: 0.74 MG/DL (ref 0.76–1.27)
GFR SERPL CREATININE-BSD FRML MDRD: 106 ML/MIN/1.73
GLOBULIN UR ELPH-MCNC: 3.1 GM/DL
GLUCOSE BLD-MCNC: 98 MG/DL (ref 65–99)
POTASSIUM BLD-SCNC: 4 MMOL/L (ref 3.5–5.2)
PROT SERPL-MCNC: 7.2 G/DL (ref 6–8.5)
SODIUM BLD-SCNC: 139 MMOL/L (ref 136–145)

## 2020-06-05 PROCEDURE — 25010000002 FENTANYL CITRATE (PF) 100 MCG/2ML SOLUTION: Performed by: NURSE ANESTHETIST, CERTIFIED REGISTERED

## 2020-06-05 PROCEDURE — C1758 CATHETER, URETERAL: HCPCS | Performed by: SURGERY

## 2020-06-05 PROCEDURE — 25010000002 SUCCINYLCHOLINE PER 20 MG: Performed by: NURSE ANESTHETIST, CERTIFIED REGISTERED

## 2020-06-05 PROCEDURE — 76000 FLUOROSCOPY <1 HR PHYS/QHP: CPT

## 2020-06-05 PROCEDURE — 25010000002 PROPOFOL 10 MG/ML EMULSION: Performed by: NURSE ANESTHETIST, CERTIFIED REGISTERED

## 2020-06-05 PROCEDURE — 93010 ELECTROCARDIOGRAM REPORT: CPT | Performed by: INTERNAL MEDICINE

## 2020-06-05 PROCEDURE — 74300 X-RAY BILE DUCTS/PANCREAS: CPT | Performed by: SURGERY

## 2020-06-05 PROCEDURE — 25010000002 MIDAZOLAM PER 1 MG: Performed by: NURSE ANESTHETIST, CERTIFIED REGISTERED

## 2020-06-05 PROCEDURE — 93005 ELECTROCARDIOGRAM TRACING: CPT | Performed by: SURGERY

## 2020-06-05 PROCEDURE — 80053 COMPREHEN METABOLIC PANEL: CPT | Performed by: INTERNAL MEDICINE

## 2020-06-05 PROCEDURE — C1889 IMPLANT/INSERT DEVICE, NOC: HCPCS | Performed by: SURGERY

## 2020-06-05 PROCEDURE — 47563 LAPARO CHOLECYSTECTOMY/GRAPH: CPT | Performed by: SURGERY

## 2020-06-05 PROCEDURE — 0: Performed by: SURGERY

## 2020-06-05 PROCEDURE — 25010000002 DEXAMETHASONE PER 1 MG: Performed by: NURSE ANESTHETIST, CERTIFIED REGISTERED

## 2020-06-05 PROCEDURE — 25010000002 IOPAMIDOL 61 % SOLUTION: Performed by: SURGERY

## 2020-06-05 DEVICE — HEMOST ABS SURGICEL SNOW 1X2IN: Type: IMPLANTABLE DEVICE | Site: ABDOMEN | Status: FUNCTIONAL

## 2020-06-05 DEVICE — LIGAMAX 5 MM ENDOSCOPIC MULTIPLE CLIP APPLIER
Type: IMPLANTABLE DEVICE | Site: ABDOMEN | Status: FUNCTIONAL
Brand: LIGAMAX

## 2020-06-05 RX ORDER — BUPIVACAINE HCL/0.9 % NACL/PF 0.1 %
2 PLASTIC BAG, INJECTION (ML) EPIDURAL ONCE
Status: COMPLETED | OUTPATIENT
Start: 2020-06-05 | End: 2020-06-05

## 2020-06-05 RX ORDER — ROCURONIUM BROMIDE 10 MG/ML
INJECTION, SOLUTION INTRAVENOUS AS NEEDED
Status: DISCONTINUED | OUTPATIENT
Start: 2020-06-05 | End: 2020-06-05 | Stop reason: SURG

## 2020-06-05 RX ORDER — SODIUM CHLORIDE, SODIUM GLUCONATE, SODIUM ACETATE, POTASSIUM CHLORIDE, AND MAGNESIUM CHLORIDE 526; 502; 368; 37; 30 MG/100ML; MG/100ML; MG/100ML; MG/100ML; MG/100ML
INJECTION, SOLUTION INTRAVENOUS CONTINUOUS PRN
Status: DISCONTINUED | OUTPATIENT
Start: 2020-06-05 | End: 2020-06-05 | Stop reason: SURG

## 2020-06-05 RX ORDER — SUCCINYLCHOLINE CHLORIDE 20 MG/ML
INJECTION INTRAMUSCULAR; INTRAVENOUS AS NEEDED
Status: DISCONTINUED | OUTPATIENT
Start: 2020-06-05 | End: 2020-06-05 | Stop reason: SURG

## 2020-06-05 RX ORDER — LIDOCAINE HYDROCHLORIDE 20 MG/ML
INJECTION, SOLUTION EPIDURAL; INFILTRATION; INTRACAUDAL; PERINEURAL AS NEEDED
Status: DISCONTINUED | OUTPATIENT
Start: 2020-06-05 | End: 2020-06-05 | Stop reason: SURG

## 2020-06-05 RX ORDER — FENTANYL CITRATE 50 UG/ML
INJECTION, SOLUTION INTRAMUSCULAR; INTRAVENOUS AS NEEDED
Status: DISCONTINUED | OUTPATIENT
Start: 2020-06-05 | End: 2020-06-05 | Stop reason: SURG

## 2020-06-05 RX ORDER — MIDAZOLAM HYDROCHLORIDE 1 MG/ML
INJECTION INTRAMUSCULAR; INTRAVENOUS AS NEEDED
Status: DISCONTINUED | OUTPATIENT
Start: 2020-06-05 | End: 2020-06-05 | Stop reason: SURG

## 2020-06-05 RX ORDER — DEXAMETHASONE SODIUM PHOSPHATE 4 MG/ML
INJECTION, SOLUTION INTRA-ARTICULAR; INTRALESIONAL; INTRAMUSCULAR; INTRAVENOUS; SOFT TISSUE AS NEEDED
Status: DISCONTINUED | OUTPATIENT
Start: 2020-06-05 | End: 2020-06-05 | Stop reason: SURG

## 2020-06-05 RX ORDER — SODIUM CHLORIDE, SODIUM LACTATE, POTASSIUM CHLORIDE, CALCIUM CHLORIDE 600; 310; 30; 20 MG/100ML; MG/100ML; MG/100ML; MG/100ML
100 INJECTION, SOLUTION INTRAVENOUS CONTINUOUS
Status: DISCONTINUED | OUTPATIENT
Start: 2020-06-05 | End: 2020-06-05 | Stop reason: HOSPADM

## 2020-06-05 RX ORDER — PROPOFOL 10 MG/ML
VIAL (ML) INTRAVENOUS AS NEEDED
Status: DISCONTINUED | OUTPATIENT
Start: 2020-06-05 | End: 2020-06-05 | Stop reason: SURG

## 2020-06-05 RX ORDER — HYDROCODONE BITARTRATE AND ACETAMINOPHEN 7.5; 325 MG/1; MG/1
1 TABLET ORAL EVERY 6 HOURS PRN
Qty: 18 TABLET | Refills: 0 | Status: SHIPPED | OUTPATIENT
Start: 2020-06-05 | End: 2021-07-20

## 2020-06-05 RX ORDER — BUPIVACAINE HYDROCHLORIDE AND EPINEPHRINE 5; 5 MG/ML; UG/ML
INJECTION, SOLUTION EPIDURAL; INTRACAUDAL; PERINEURAL AS NEEDED
Status: DISCONTINUED | OUTPATIENT
Start: 2020-06-05 | End: 2020-06-05 | Stop reason: HOSPADM

## 2020-06-05 RX ADMIN — Medication 2 G: at 15:15

## 2020-06-05 RX ADMIN — SUCCINYLCHOLINE CHLORIDE 100 MG: 20 INJECTION, SOLUTION INTRAMUSCULAR; INTRAVENOUS at 15:05

## 2020-06-05 RX ADMIN — SODIUM CHLORIDE, SODIUM GLUCONATE, SODIUM ACETATE, POTASSIUM CHLORIDE, AND MAGNESIUM CHLORIDE: 526; 502; 368; 37; 30 INJECTION, SOLUTION INTRAVENOUS at 16:03

## 2020-06-05 RX ADMIN — LIDOCAINE HYDROCHLORIDE 50 MG: 20 INJECTION, SOLUTION EPIDURAL; INFILTRATION; INTRACAUDAL; PERINEURAL at 15:05

## 2020-06-05 RX ADMIN — ROCURONIUM BROMIDE 5 MG: 10 INJECTION INTRAVENOUS at 15:05

## 2020-06-05 RX ADMIN — DEXAMETHASONE SODIUM PHOSPHATE 4 MG: 4 INJECTION, SOLUTION INTRAMUSCULAR; INTRAVENOUS at 15:15

## 2020-06-05 RX ADMIN — SODIUM CHLORIDE, POTASSIUM CHLORIDE, SODIUM LACTATE AND CALCIUM CHLORIDE 100 ML/HR: 600; 310; 30; 20 INJECTION, SOLUTION INTRAVENOUS at 13:02

## 2020-06-05 RX ADMIN — ROCURONIUM BROMIDE 10 MG: 10 INJECTION INTRAVENOUS at 15:44

## 2020-06-05 RX ADMIN — ROCURONIUM BROMIDE 35 MG: 10 INJECTION INTRAVENOUS at 15:15

## 2020-06-05 RX ADMIN — FENTANYL CITRATE 100 MCG: 50 INJECTION, SOLUTION INTRAMUSCULAR; INTRAVENOUS at 15:05

## 2020-06-05 RX ADMIN — FENTANYL CITRATE 100 MCG: 50 INJECTION, SOLUTION INTRAMUSCULAR; INTRAVENOUS at 17:36

## 2020-06-05 RX ADMIN — PROPOFOL 150 MG: 10 INJECTION, EMULSION INTRAVENOUS at 15:05

## 2020-06-05 RX ADMIN — MIDAZOLAM HYDROCHLORIDE 2 MG: 2 INJECTION, SOLUTION INTRAMUSCULAR; INTRAVENOUS at 14:57

## 2020-06-05 NOTE — DISCHARGE INSTRUCTIONS
Dr. Benson Wasserman  [address] [place] [address]  [phone] (office)  [phone] (hospital)  [phone] (cell)  Discharge Instructions for Gall Bladder Surgery      1. Go home, rest and take it easy today; however, you should get up and move about several times today to reduce the risk of developing a clot in your legs.    2. You may experience some dizziness or memory loss from the anesthesia.  This may last for the next 24 hours.  Someone should plan on staying with you for the first 24 hours for your safety.  3. Do not make any important legal decisions or sign any legal papers for the next 24 hours.    4. Eat and drink lightly today.  Start off with liquids, jello, soup, crackers or other bland foods at first. You may advance your diet tomorrow as tolerated as long as you do not experience any nausea or vomiting.   5. You may remove your outer dressings in 3 days.  The white tapes called steri-strips should stay in place.  They will fall off on their own in 1-2 weeks.  Do not worry if they come off sooner.    6. You may notice some bleeding/drainage on your outer dressings. A little bloody drainage is normal. If the bleeding/drainage is such that the bandage cannot absorb it, remove the dressing, apply clean gauze and apply firm pressure for a full 15 minutes.  If the bleeding continues, please call me.  7. You may shower tomorrow.  No tub baths for at least 1 week until your incisions are completely healed.       8. You have received a prescription for a narcotic pain medicine, as you will have some pain following surgery.   You will not be totally pain free, but your pain medicine should make the pain tolerable.  Please take your pain medicine as prescribed and always take your pills with food to prevent nausea. If you are having severe pain that cannot be controlled by the pain medicine, please contact me.    9. If needed, you may receive a prescription for an anti-nausea medicine.  Please take  this as prescribed for any nausea or vomiting.  Nausea could be a result of the anesthesia or a result of the narcotic pain medicine.  If you experience severe nausea and vomiting that cannot be controlled by the nausea medicine, please call me.    10. It is not unusual to experience pain/discomfort in your shoulders or under your ribs after surgery.  It is from the gas used during the laparoscopic procedure and usually lasts 1-3 days.  The prescription pain medicine is used to treat the surgical pain and does not typically alleviate this “gassy” pain.   11. No driving for 24 hours and for as long as you are taking your prescription pain medicine.    12.  If an appointment is not given to you before discharge, you will need to call the office       at [phone] to schedule a follow-up appointment in 5-7 days.   13. Remember to contact me for any of the following:    • Fever > 101 degrees  • Severe pain that cannot be controlled by taking your pain pills  • Severe nausea or vomiting that cannot be controlled by taking your nausea pills  • Significant bleeding of your incisions  • Drainage that has a bad smell or is yellow or green in appearance  • Any other questions or concerns

## 2020-06-05 NOTE — ANESTHESIA PROCEDURE NOTES
Airway  Urgency: elective    Date/Time: 6/5/2020 3:06 PM  Airway not difficult    General Information and Staff    Patient location during procedure: OR  CRNA: Ariel Mcrae CRNA    Indications and Patient Condition  Indications for airway management: airway protection    Preoxygenated: yes  Mask difficulty assessment: 1 - vent by mask    Final Airway Details  Final airway type: endotracheal airway      Successful airway: ETT  Cuffed: yes   Successful intubation technique: direct laryngoscopy  Facilitating devices/methods: intubating stylet  Endotracheal tube insertion site: oral  Blade: Merna  Blade size: 4  ETT size (mm): 7.5  Cormack-Lehane Classification: grade IIa - partial view of glottis  Placement verified by: chest auscultation and capnometry   Measured from: lips  Number of attempts at approach: 1  Assessment: lips, teeth, and gum same as pre-op    Additional Comments  Teeth checked after intubation.  No damage noted.

## 2020-06-05 NOTE — INTERVAL H&P NOTE
H&P reviewed. The patient was examined and there are no changes to the H&P.      Temp:  [97.5 °F (36.4 °C)] 97.5 °F (36.4 °C)  Heart Rate:  [69] 69  Resp:  [20] 20  BP: (118)/(74) 118/74

## 2020-06-05 NOTE — ANESTHESIA POSTPROCEDURE EVALUATION
Patient: Juan Antonio Shaw    Procedure Summary     Date:  06/05/20 Room / Location:  [facility] OR 03 / [facility] OR    Anesthesia Start:  1457 Anesthesia Stop:  1756    Procedure:  LAPAROSCOPIC POSSIBLE OPEN CHOLECYSTECTOMY WITH INTRAOPERATIVE CHOLANGIOGRAM           (C-ARM#1) (N/A Abdomen) Diagnosis:       Gallstones      (Gallstones [K80.20])    Surgeon:  Benson Wasserman MD Provider:  Jordan Luo MD    Anesthesia Type:  general ASA Status:  2          Anesthesia Type: general    Vitals  Vitals Value Taken Time   /58 6/5/2020  5:45 PM   Temp 97.3 °F (36.3 °C) 6/5/2020  5:45 PM   Pulse 78 6/5/2020  5:45 PM   Resp 20 6/5/2020  5:45 PM   SpO2 96 % 6/5/2020  5:45 PM           Post Anesthesia Care and Evaluation    Patient location during evaluation: bedside  Patient participation: complete - patient cannot participate  Level of consciousness: awake  Pain score: 0  Pain management: adequate  Airway patency: patent  Anesthetic complications: No anesthetic complications  PONV Status: none  Cardiovascular status: acceptable  Respiratory status: acceptable  Hydration status: acceptable

## 2020-06-05 NOTE — OP NOTE
CHOLECYSTECTOMY LAPAROSCOPIC INTRAOPERATIVE CHOLANGIOGRAM  Procedure Note    Juan Antonio Shaw  6/5/2020    Pre-op Diagnosis:   Gallstones [K80.20]    Post-op Diagnosis:     Gallstones [K80.20]    Procedure:  LAPAROSCOPIC POSSIBLE OPEN CHOLECYSTECTOMY WITH INTRAOPERATIVE CHOLANGIOGRAM           (C-ARM#1)    Surgeon(s):  Benson Wasserman MD    Anesthesia: General    Staff:   Circulator: Lorrie Sher RN; Doris Caballero RN  Radiology Technologist: Lorena Perez  Scrub Person: Sandra Damon; Rowena Roach  Assistant: Shawnee Walker CSA    Estimated Blood Loss: minimal    Specimens:                ID Type Source Tests Collected by Time   A (Not marked as sent) : Gallbladder and Contents Tissue Gallbladder TISSUE PATHOLOGY EXAM Benson Wasserman MD 6/5/2020 1700         Drains: None    Findings: Innumerable gallstones; normal operative cholangiogram    Complications: None    INDICATION:  This is a 65-year-old with epigastric and right upper quadrant abdominal pain. Positive ultrasound for stones. Taken to the operating room for laparoscopic cholecystectomy.    DESCRIPTION:  The patient was brought to the operating room and placed supine on the operating table. After adequate general endotracheal anesthesia, the abdominal area was prepped and draped in a sterile manner. A briefing and timeout were performed and all parties were in agreement.     A transverse incision was made slightly to the right of the midline in the epigastrium subcostally. A 5 mm XCEL trocar was uneventfully passed into the abdomen under direct vision with no visceral injury. The abdomen was insufflated to a total of 15 mmHg, 4.8 L of CO2. A 5 mm laparoscope revealed an excellent view of the upper and lower abdominal areas. A longitudinal skin incision was made at the umbilicus and a 5 mm  XCEL trocar was placed under direct vision with no visceral injury. The camera was then moved to the umbilical port site and an excellent view of the upper  abdomen was obtained. An incision was made at the tip of the 12th rib on the right side and carried into the subcutaneous tissue. A 5 mm  XCEL trocar was uneventfully passed into the abdomen under direct vision with no visceral injury.  A fourth 5 mm  XCEL trocar was placed in the midclavicular line subcostally on the right side under direct vision.  The bed was then tilted in reverse Trendelenburg and tipped to the left. The patient tolerated the positioning and insufflation without difficulty.    The gallbladder was drained of its bilious contents with an ovarian aspirating needle.  Innumerable stones could be seen within the gallbladder.  The gallbladder was retracted upwards and outwards by grasping the top and the infundibulum with atraumatic graspers passed through the lateral ports. The peritoneum around the infundibulum was taken down for a distance of 1/3 of the length of the gallbladder on the anterior and posterior sides. The cystic duct and artery easily came into view as did the 5th segment of the liver behind these structures.     A Richardson clamp was placed across the infundibulum and a fluoroscopic cholangiogram was attempted by piercing the infundibulum with a needle and injecting contrast. This failed to demonstrate any filling of the biliary tree but had retrograde filling of the gallbladder.  The cystic duct was clipped at the junction of the cystic duct and gallbladder.  It was opened and cannulated with a 4 round ureteral catheter.  Contrast was injected during fluoroscopy and demonstrated good filling proximally and distally, intact bile ducts, no stones in the common duct, and good emptying into the duodenum.     The cholangiocath was removed and the cystic duct was doubly clipped and divided. The cystic artery was doubly clipped and divided in all branches. The gallbladder was then dissected out of the liver bed using electrocautery.  Near the very top of the liver bed, the gallbladder was  densely attached to the liver bed.  Bleeding was encountered as we dissected gallbladder away from the liver bed.  A small vein in the bed of the liver was noted to be open and was clipped on each side using the clip applier.  No further bleeding was noted.  I again returned to dissecting the gallbladder free of the liver bed.  Once it had been nearly completely excised, pressure in the abdomen was reduced to 8 mmHg with no further bleeding being noted from the liver bed.  To assure complete hemostasis, I did place a piece of Surgicel snow in the bed of the liver and observed it throughout the remainder of the case.  No bleeding was noted.  The remainder of the gallbladder was dissected free.  It was placed into an Endobag and brought out intact through the epigastric port along with several stones that had spilled into the liver bed.  The removal of the gallbladder required that the epigastric port site be considerably widened to bring out the gallbladder and the innumerable stones present within.  Once the gallbladder had been removed from the abdomen, trocars were replaced and the liver bed inspected for bleeding.  None was found.  No bile leak was seen.    The patient was then placed supine. Trocars were removed and the abdomen was allowed to flatten.  The epigastric port site was closed with interrupted 0 Vicryl sutures on fascia.  All port sites were closed with 4-0 subcuticular on the skin.    The procedure was terminated. It was well tolerated. Sponge and needle counts were correct, and the patient was transferred to recovery in satisfactory condition.            This document has been electronically signed by Benson Wasserman MD on June 5, 2020 17:59       Date: 6/5/2020  Time: 17:59

## 2020-06-05 NOTE — ANESTHESIA PREPROCEDURE EVALUATION
Anesthesia Evaluation     Patient summary reviewed and Nursing notes reviewed   NPO Solid Status: > 8 hours  NPO Liquid Status: > 8 hours           Airway   Mallampati: II  TM distance: >3 FB  Neck ROM: full  No difficulty expected  Dental      Pulmonary - negative pulmonary ROS and normal exam   Cardiovascular - normal exam  Exercise tolerance: good (4-7 METS)    (+) hypertension well controlled less than 2 medications,       Neuro/Psych- negative ROS  GI/Hepatic/Renal/Endo    (+) obesity,       Musculoskeletal (-) negative ROS    Abdominal  - normal exam   Substance History - negative use     OB/GYN          Other      history of cancer (rectAl and prostate) remission                      Anesthesia Plan    ASA 2     general     intravenous induction     Anesthetic plan, all risks, benefits, and alternatives have been provided, discussed and informed consent has been obtained with: patient.

## 2020-06-10 LAB
LAB AP CASE REPORT: NORMAL
PATH REPORT.FINAL DX SPEC: NORMAL

## 2020-06-12 ENCOUNTER — OFFICE VISIT (OUTPATIENT)
Dept: SURGERY | Facility: CLINIC | Age: 65
End: 2020-06-12

## 2020-06-12 VITALS
OXYGEN SATURATION: 97 % | TEMPERATURE: 97 F | SYSTOLIC BLOOD PRESSURE: 114 MMHG | WEIGHT: 216.4 LBS | HEIGHT: 68 IN | HEART RATE: 79 BPM | DIASTOLIC BLOOD PRESSURE: 70 MMHG | BODY MASS INDEX: 32.8 KG/M2

## 2020-06-12 DIAGNOSIS — Z90.49 S/P CHOLECYSTECTOMY: Primary | ICD-10-CM

## 2020-06-12 PROCEDURE — 99024 POSTOP FOLLOW-UP VISIT: CPT | Performed by: NURSE PRACTITIONER

## 2020-06-12 RX ORDER — PANTOPRAZOLE SODIUM 40 MG/1
TABLET, DELAYED RELEASE ORAL
COMMUNITY
Start: 2020-04-07

## 2020-06-12 RX ORDER — SILDENAFIL CITRATE 20 MG/1
20 TABLET ORAL DAILY PRN
COMMUNITY
Start: 2020-06-08 | End: 2021-07-20

## 2020-06-12 NOTE — PATIENT INSTRUCTIONS

## 2020-06-12 NOTE — PROGRESS NOTES
CHIEF COMPLAINT:   Chief Complaint   Patient presents with   • Post Operative Clinical Appointment     Laproscopic Cholecystectomy Performed 06/05/2020       HPI: This patient presents for a post-operative visit after undergoing a laparoscopic cholecystectomy.  Patient reports no problems. Eating well without any significant nausea. Having good bowel function. No problems with constipation or diarrhea. No urinary complaints. Denies fever. Ambulating well and slowly returning to normal activities.    PATHOLOGY:   Chronic cholecystitis, cholelithiasis.  See scanned report.    PHYSICAL EXAM:    ABD: Incisions are healing well without any erythema or signs of infection.    Patient's Body mass index is 32.9 kg/m². BMI is above normal parameters. Recommendations include: educational material.      ASSESSMENT:    Juan Antonio was seen today for post operative clinical appointment.    Diagnoses and all orders for this visit:    S/P cholecystectomy        PLAN:    1.The patient will follow-up on a prn basis unless there are any problems.  2. May shower.   3. May return to normal activity without restrictions.                      This document has been electronically signed by SUHAIL Roman on June 12, 2020 12:41

## 2020-08-31 ENCOUNTER — LAB (OUTPATIENT)
Dept: ONCOLOGY | Facility: HOSPITAL | Age: 65
End: 2020-08-31

## 2020-08-31 DIAGNOSIS — D64.9 ANEMIA, UNSPECIFIED TYPE: ICD-10-CM

## 2020-08-31 DIAGNOSIS — C21.0 ANAL CANCER (HCC): ICD-10-CM

## 2020-08-31 DIAGNOSIS — M89.9 BONE LESION: ICD-10-CM

## 2020-08-31 DIAGNOSIS — C61 PROSTATE CANCER (HCC): ICD-10-CM

## 2020-08-31 LAB
BASOPHILS # BLD AUTO: 0.02 10*3/MM3 (ref 0–0.2)
BASOPHILS NFR BLD AUTO: 0.4 % (ref 0–1.5)
DEPRECATED RDW RBC AUTO: 42.6 FL (ref 37–54)
EOSINOPHIL # BLD AUTO: 0.07 10*3/MM3 (ref 0–0.4)
EOSINOPHIL NFR BLD AUTO: 1.6 % (ref 0.3–6.2)
ERYTHROCYTE [DISTWIDTH] IN BLOOD BY AUTOMATED COUNT: 12.4 % (ref 12.3–15.4)
FERRITIN SERPL-MCNC: 84.39 NG/ML (ref 30–400)
FOLATE SERPL-MCNC: 13.1 NG/ML (ref 4.78–24.2)
HCT VFR BLD AUTO: 43.6 % (ref 37.5–51)
HGB BLD-MCNC: 14.7 G/DL (ref 13–17.7)
IMM GRANULOCYTES # BLD AUTO: 0.02 10*3/MM3 (ref 0–0.05)
IMM GRANULOCYTES NFR BLD AUTO: 0.4 % (ref 0–0.5)
IRON 24H UR-MRATE: 74 MCG/DL (ref 59–158)
IRON SATN MFR SERPL: 21 % (ref 20–50)
LYMPHOCYTES # BLD AUTO: 1.21 10*3/MM3 (ref 0.7–3.1)
LYMPHOCYTES NFR BLD AUTO: 26.8 % (ref 19.6–45.3)
MCH RBC QN AUTO: 31.6 PG (ref 26.6–33)
MCHC RBC AUTO-ENTMCNC: 33.7 G/DL (ref 31.5–35.7)
MCV RBC AUTO: 93.8 FL (ref 79–97)
MONOCYTES # BLD AUTO: 0.5 10*3/MM3 (ref 0.1–0.9)
MONOCYTES NFR BLD AUTO: 11.1 % (ref 5–12)
NEUTROPHILS NFR BLD AUTO: 2.69 10*3/MM3 (ref 1.7–7)
NEUTROPHILS NFR BLD AUTO: 59.7 % (ref 42.7–76)
NRBC BLD AUTO-RTO: 0 /100 WBC (ref 0–0.2)
PLATELET # BLD AUTO: 256 10*3/MM3 (ref 140–450)
PMV BLD AUTO: 8.9 FL (ref 6–12)
PSA SERPL-MCNC: <0.014 NG/ML (ref 0–4)
RBC # BLD AUTO: 4.65 10*6/MM3 (ref 4.14–5.8)
TIBC SERPL-MCNC: 349 MCG/DL (ref 298–536)
TRANSFERRIN SERPL-MCNC: 234 MG/DL (ref 200–360)
VIT B12 BLD-MCNC: >2000 PG/ML (ref 211–946)
WBC # BLD AUTO: 4.51 10*3/MM3 (ref 3.4–10.8)

## 2020-08-31 PROCEDURE — 82607 VITAMIN B-12: CPT | Performed by: INTERNAL MEDICINE

## 2020-08-31 PROCEDURE — 85025 COMPLETE CBC W/AUTO DIFF WBC: CPT | Performed by: INTERNAL MEDICINE

## 2020-08-31 PROCEDURE — 82746 ASSAY OF FOLIC ACID SERUM: CPT | Performed by: INTERNAL MEDICINE

## 2020-08-31 PROCEDURE — 82728 ASSAY OF FERRITIN: CPT | Performed by: INTERNAL MEDICINE

## 2020-08-31 PROCEDURE — 83540 ASSAY OF IRON: CPT | Performed by: INTERNAL MEDICINE

## 2020-08-31 PROCEDURE — 84466 ASSAY OF TRANSFERRIN: CPT | Performed by: INTERNAL MEDICINE

## 2020-08-31 PROCEDURE — 36415 COLL VENOUS BLD VENIPUNCTURE: CPT | Performed by: INTERNAL MEDICINE

## 2020-08-31 PROCEDURE — 84153 ASSAY OF PSA TOTAL: CPT | Performed by: INTERNAL MEDICINE

## 2020-09-01 ENCOUNTER — OFFICE VISIT (OUTPATIENT)
Dept: ONCOLOGY | Facility: CLINIC | Age: 65
End: 2020-09-01

## 2020-09-01 VITALS
WEIGHT: 217.4 LBS | HEART RATE: 69 BPM | BODY MASS INDEX: 32.95 KG/M2 | DIASTOLIC BLOOD PRESSURE: 84 MMHG | HEIGHT: 68 IN | TEMPERATURE: 98 F | SYSTOLIC BLOOD PRESSURE: 138 MMHG | RESPIRATION RATE: 18 BRPM

## 2020-09-01 DIAGNOSIS — M89.9 BONE LESION: ICD-10-CM

## 2020-09-01 DIAGNOSIS — C61 PROSTATE CANCER (HCC): Primary | ICD-10-CM

## 2020-09-01 DIAGNOSIS — C21.0 ANAL CANCER (HCC): ICD-10-CM

## 2020-09-01 DIAGNOSIS — D64.9 ANEMIA, UNSPECIFIED TYPE: ICD-10-CM

## 2020-09-01 PROCEDURE — G0463 HOSPITAL OUTPT CLINIC VISIT: HCPCS | Performed by: INTERNAL MEDICINE

## 2020-09-01 PROCEDURE — 99214 OFFICE O/P EST MOD 30 MIN: CPT | Performed by: INTERNAL MEDICINE

## 2020-09-01 RX ORDER — FOLIC ACID 1 MG/1
TABLET ORAL
Qty: 36 TABLET | Refills: 1 | Status: SHIPPED | OUTPATIENT
Start: 2020-09-01 | End: 2020-09-01 | Stop reason: SDUPTHER

## 2020-09-01 RX ORDER — LANOLIN ALCOHOL/MO/W.PET/CERES
CREAM (GRAM) TOPICAL
Qty: 36 TABLET | Refills: 1 | Status: SHIPPED | OUTPATIENT
Start: 2020-09-01

## 2020-09-01 RX ORDER — LANOLIN ALCOHOL/MO/W.PET/CERES
CREAM (GRAM) TOPICAL
Qty: 36 TABLET | Refills: 1 | Status: SHIPPED | OUTPATIENT
Start: 2020-09-01 | End: 2020-09-01 | Stop reason: SDUPTHER

## 2020-09-01 RX ORDER — FOLIC ACID 1 MG/1
TABLET ORAL
Qty: 36 TABLET | Refills: 1 | Status: SHIPPED | OUTPATIENT
Start: 2020-09-01 | End: 2021-04-05 | Stop reason: SDUPTHER

## 2020-09-01 NOTE — PROGRESS NOTES
DATE OF VISIT: 9/1/2020      REASON FOR VISIT: History of anal cancer, history of prostate cancer, sclerotic lesion of bone on CT scan of chest, anemia      HISTORY OF PRESENT ILLNESS:    65-year-old male with medical problem consisting of hypertension, prostatic adenocarcinoma status post robotic prostatectomy in May 2018, iron deficiency anemia, has been following up with  cancer Center since June 20, 2019 for squamous cell cancer of illness.  Patient is status post chemoradiation currently on surveillance.  Patient is here for follow-up appointment today.  Denies any bleeding.  Denies any new lymph node enlargement.  Denies any unusual weight loss recently.            PAST MEDICAL HISTORY:    Past Medical History:   Diagnosis Date   • Hypertension    • Prostate cancer (CMS/HCC) 05/16/2018   • Rectal cancer (CMS/HCC) 06/2019       SOCIAL HISTORY:    Social History     Tobacco Use   • Smoking status: Never Smoker   • Smokeless tobacco: Never Used   Substance Use Topics   • Alcohol use: Yes     Comment: rarely   • Drug use: No       Surgical History :  Past Surgical History:   Procedure Laterality Date   • CHOLECYSTECTOMY WITH INTRAOPERATIVE CHOLANGIOGRAM N/A 6/5/2020    Procedure: LAPAROSCOPIC POSSIBLE OPEN CHOLECYSTECTOMY WITH INTRAOPERATIVE CHOLANGIOGRAM           (C-ARM#1);  Surgeon: Benson Wasserman MD;  Location: [facility] OR;  Service: General;  Laterality: N/A;   • COLONOSCOPY N/A 5/30/2019    Procedure: COLONOSCOPY;  Surgeon: Jarek Gordillo DO;  Location: [facility] ENDOSCOPY;  Service: Gastroenterology   • ENDOSCOPY N/A 5/30/2019    Procedure: ESOPHAGOGASTRODUODENOSCOPY;  Surgeon: Jarek Gordillo DO;  Location: [facility] ENDOSCOPY;  Service: Gastroenterology   • PROSTATE SURGERY     • SIGMOIDOSCOPY N/A 11/5/2019    Procedure: SIGMOIDOSCOPY FLEXIBLE;  Surgeon: Jarek Gordillo DO;  Location: [facility] ENDOSCOPY;  Service: Gastroenterology   • SIGMOIDOSCOPY N/A 5/20/2020    Procedure: SIGMOIDOSCOPY FLEXIBLE;   "Surgeon: Jarek Gordillo DO;  Location: [facility] ENDOSCOPY;  Service: Gastroenterology;  Laterality: N/A;   • VENOUS ACCESS DEVICE (PORT) INSERTION N/A 6/27/2019    Procedure: INSERTION VENOUS ACCESS DEVICE (MEDIPORT)           (C-ARM#1);  Surgeon: Benson Wasserman MD;  Location: [facility] OR;  Service: General   • VENOUS ACCESS DEVICE (PORT) REMOVAL Right 7/20/2019    Procedure: REMOVAL VENOUS ACCESS DEVICE;  Surgeon: Benson Wasserman MD;  Location: [facility] OR;  Service: General       ALLERGIES:    No Known Allergies      FAMILY HISTORY:  Family History   Problem Relation Age of Onset   • Uterine cancer Mother            REVIEW OF SYSTEMS:      CONSTITUTIONAL:  Complains of fatigue. Denies any fever, chills or weight loss.     EYES: No visual disturbances. No discharge. No new lesions    ENMT:  No epistaxis, mouth sores or difficulty swallowing.    RESPIRATORY:  No new shortness of breath. No new cough or hemoptysis.    CARDIOVASCULAR:  No chest pain or palpitations.    GASTROINTESTINAL:  No abdominal pain nausea, vomiting or blood in the stool.    GENITOURINARY: No Dysuria or Hematuria.    MUSCULOSKELETAL:  No new back pain or arthralgia..    LYMPHATICS:  Denies any abnormal swollen glands anywhere in the body.    NEUROLOGICAL : No tingling or numbness. No headache or dizziness. No seizures or balance problems.    SKIN: No new skin lesions.    ENDOCRINE : No new hot or cold intolerance. No new polyuria . No polydipsia.        PHYSICAL EXAMINATION:      VITAL SIGNS:  /84   Pulse 69   Temp 98 °F (36.7 °C) (Temporal)   Resp 18   Ht 172.7 cm (67.99\")   Wt 98.6 kg (217 lb 6.4 oz)   BMI 33.06 kg/m²       09/01/20  0843   Weight: 98.6 kg (217 lb 6.4 oz)         ECOG performance status: 1    CONSTITUTIONAL:  Not in any distress.    EYES: Mild conjunctival Pallor. No Icterus. No Pterygium. Extraocular Movements intact.No ptosis.    ENMT:  Normocephalic, Atraumatic.No Facial Asymmetry noted.    NECK:  No " adenopathy.Trachea midline. NO JVD.    RESPIRATORY:  Fair air entry bilateral. No rhonchi or wheezing.Fair respiratory effort.    CARDIOVASCULAR:  S1, S2. Regular rate and rhythm. No murmur or gallop appreciated.    ABDOMEN:  Soft, obese, nontender. Bowel sounds present in all four quadrants.  No Hepatosplenomegaly appreciated.    MUSCULOSKELETAL:  No edema.No Calf Tenderness.Normal range of motion.    NEUROLOGIC:    No  Motor or sensory deficit appreciated. Cranial Nerves 2-12 grossly intact.    SKIN : No new skin lesion identified. Skin is warm and moist to touch.    LYMPHATICS: No new enlarged lymph nodes in neck or supraclavicular area.    PSYCHIATRY: Alert, awake and oriented ×3.Normal affect.  Normal judgment.  Makes good eye contact.        DIAGNOSTIC DATA:    Glucose   Date Value Ref Range Status   06/05/2020 98 65 - 99 mg/dL Final     Sodium   Date Value Ref Range Status   06/05/2020 139 136 - 145 mmol/L Final   07/11/2018 138 134 - 144 mmol/L Final   05/17/2018 135 (L) 136 - 145 mmol/L Final     Potassium   Date Value Ref Range Status   06/05/2020 4.0 3.5 - 5.2 mmol/L Final   07/11/2018 4 3.5 - 5.1 mmol/L Final   05/17/2018 4.3 3.3 - 5.0 mmol/L Final     CO2   Date Value Ref Range Status   06/05/2020 26.0 22.0 - 29.0 mmol/L Final     Total CO2   Date Value Ref Range Status   05/17/2018 26 20 - 31 mmol/L Final     Chloride   Date Value Ref Range Status   06/05/2020 100 98 - 107 mmol/L Final   07/11/2018 105 98 - 107 mmol/L Final   05/17/2018 104 99 - 111 mmol/L Final     Anion Gap   Date Value Ref Range Status   06/05/2020 13.0 5.0 - 15.0 mmol/L Final   05/17/2018 9 4 - 16 mmol/L Final     Creatinine   Date Value Ref Range Status   06/05/2020 0.74 (L) 0.76 - 1.27 mg/dL Final   07/11/2018 1 0.6 - 1.3 mg/dL Final   05/17/2018 1.0 0.6 - 1.3 mg/dL Final     BUN   Date Value Ref Range Status   06/05/2020 19 8 - 23 mg/dL Final   07/11/2018 34 (H) 7 - 18 mg/dL Final     BUN/Creatinine Ratio   Date Value Ref Range  Status   06/05/2020 25.7 (H) 7.0 - 25.0 Final     Calcium   Date Value Ref Range Status   06/05/2020 9.3 8.6 - 10.5 mg/dL Final   07/11/2018 8.8 8.8 - 10.5 mg/dL Final     eGFR Non  Amer   Date Value Ref Range Status   06/05/2020 106 >60 mL/min/1.73 Final     Alkaline Phosphatase   Date Value Ref Range Status   06/05/2020 73 39 - 117 U/L Final   07/11/2018 69 46 - 116 U/L Final     Total Protein   Date Value Ref Range Status   06/05/2020 7.2 6.0 - 8.5 g/dL Final   07/11/2018 7.4 6.4 - 8.2 g/dL Final     ALT (SGPT)   Date Value Ref Range Status   06/05/2020 13 1 - 41 U/L Final   07/11/2018 19 (L) 30 - 65 U/L Final     AST (SGOT)   Date Value Ref Range Status   06/05/2020 17 1 - 40 U/L Final   07/11/2018 17 15 - 37 U/L Final     Total Bilirubin   Date Value Ref Range Status   06/05/2020 0.6 0.2 - 1.2 mg/dL Final   07/11/2018 0.3 0 - 1.0 mg/dL Final     Albumin   Date Value Ref Range Status   06/05/2020 4.10 3.50 - 5.20 g/dL Final   07/11/2018 3.5 3.4 - 5.0 g/dL Final     Globulin   Date Value Ref Range Status   06/05/2020 3.1 gm/dL Final     Lab Results   Component Value Date    WBC 4.51 08/31/2020    HGB 14.7 08/31/2020    HCT 43.6 08/31/2020    MCV 93.8 08/31/2020     08/31/2020     Lab Results   Component Value Date    NEUTROABS 2.69 08/31/2020    IRON 74 08/31/2020    TIBC 349 08/31/2020    LABIRON 21 08/31/2020    FERRITIN 84.39 08/31/2020    [id number] >2,000 (H) 08/31/2020    FOLATE 13.10 08/31/2020     No results found for: , LABCA2, AFPTM, HCGQUANT, , CHROMGRNA, [id number], CEA, REFLABREPO        ASSESSMENT AND PLAN:      1.  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  Patient received both day 1 and day 929 of 5-FU and mitomycin.  - Flexible sigmoidoscopy done by Dr. Gordillo on November 5, 2019 did not show any evidence  of residual tumor.  - We will continue with clinical surveillance.  -We will ask patient to return to clinic in 3 months with repeat CBC CMP and anemia work-up and surveillance CT of chest abdomen and pelvis with contrast to be done prior to that.    2.  Prostatic adenocarcinoma, stage II [id number]  -Status post radical prostatectomy on May 16, 2018  -PSA done on August 31, 2020 is less than 0.014.  - We will repeat PSA level around March 2021.    3.  Sclerotic bone focus on vertebral body as well as ribs:  -Patient was found to have tiny sclerotic focus on vertebral body as well as a healing fracture on the rib.  - We will repeat CT of chest with contrast prior to next clinic visit in 3 months to follow-up on sclerotic lesion.    4.  Anemia:  -Hemoglobin is 14.7.  - Recommend decreasing B12 to 1 tablet 3 times a week.  Recommend continue with folic acid 1 tablet 3 times a week.  -We will repeat anemia work-up prior to next clinic visit in 3 months.    5.  Health maintenance: Patient does not smoke.  Had a colonoscopy in May 2019 by Dr. Gordillo.    6. .Advance Care Planning: For now patient remains full code and is able to make decisions.  Patient has health care surrogate mentioned on chart.         PHQ-9 Total Score: 0   -Patient is not homicidal or suicidal.  No acute intervention required.    Juan Antonio Shaw reports a pain score of 0.  Given his pain assessment as noted, treatment options were discussed and the following options were decided upon as a follow-up plan to address the patient's pain: No acute intervention required.         Terry Ly MD  9/1/2020  08:53        Part of this note may be an electronic transcription/translation of spoken language to printed text using the Dragon Dictation System.

## 2020-10-13 ENCOUNTER — OFFICE VISIT (OUTPATIENT)
Dept: RADIATION ONCOLOGY | Facility: HOSPITAL | Age: 65
End: 2020-10-13

## 2020-10-13 ENCOUNTER — HOSPITAL ENCOUNTER (OUTPATIENT)
Dept: RADIATION ONCOLOGY | Facility: HOSPITAL | Age: 65
Setting detail: RADIATION/ONCOLOGY SERIES
End: 2020-10-13

## 2020-10-13 VITALS
SYSTOLIC BLOOD PRESSURE: 140 MMHG | WEIGHT: 216.6 LBS | TEMPERATURE: 97.6 F | HEART RATE: 71 BPM | BODY MASS INDEX: 32.83 KG/M2 | HEIGHT: 68 IN | RESPIRATION RATE: 18 BRPM | DIASTOLIC BLOOD PRESSURE: 65 MMHG

## 2020-10-13 DIAGNOSIS — C21.0 ANAL CANCER (HCC): Primary | ICD-10-CM

## 2020-10-13 PROCEDURE — 99213 OFFICE O/P EST LOW 20 MIN: CPT | Performed by: NURSE PRACTITIONER

## 2020-10-13 NOTE — PROGRESS NOTES
DATE OF VISIT: 10/13/2020    REASON FOR VISIT:  Radiation oncology follow-up    HISTORY OF PRESENT ILLNESS:  Mr. Juan Antonio Shaw returns to our clinic today for a routine follow-up exam.  He is a 65-year-old white male who is now 14 months status post completion of concurrent chemo/XRT for Stage IIA squamous cell carcinoma of the anus (p16+).  Mr. Shaw received 5400 cGy to the anus, along with concurrent 5-FU/mitomycin chemotherapy, and completed his radiation therapy on 8/19/19.    He follows closely with Dr. Ly in medical oncology and is scheduled for CT scan in November with follow-up and scheduled for  sigmoidoscopy with Dr. Gordillo in November.  PAST MEDICAL HISTORY:    Past Medical History:   Diagnosis Date   • Hypertension    • Prostate cancer (CMS/HCC) 05/16/2018   • Rectal cancer (CMS/HCC) 06/2019       SOCIAL HISTORY:    Social History     Tobacco Use   • Smoking status: Never Smoker   • Smokeless tobacco: Never Used   Substance Use Topics   • Alcohol use: Yes     Comment: rarely   • Drug use: No       Surgical History :  Past Surgical History:   Procedure Laterality Date   • CHOLECYSTECTOMY WITH INTRAOPERATIVE CHOLANGIOGRAM N/A 6/5/2020    Procedure: LAPAROSCOPIC POSSIBLE OPEN CHOLECYSTECTOMY WITH INTRAOPERATIVE CHOLANGIOGRAM           (C-ARM#1);  Surgeon: Benson Wasserman MD;  Location: [facility] OR;  Service: General;  Laterality: N/A;   • COLONOSCOPY N/A 5/30/2019    Procedure: COLONOSCOPY;  Surgeon: Jarek Gordillo DO;  Location: [facility] ENDOSCOPY;  Service: Gastroenterology   • ENDOSCOPY N/A 5/30/2019    Procedure: ESOPHAGOGASTRODUODENOSCOPY;  Surgeon: Jarek Gordillo DO;  Location: [facility] ENDOSCOPY;  Service: Gastroenterology   • PROSTATE SURGERY     • SIGMOIDOSCOPY N/A 11/5/2019    Procedure: SIGMOIDOSCOPY FLEXIBLE;  Surgeon: Jarek Gordillo DO;  Location: [facility] ENDOSCOPY;  Service: Gastroenterology   • SIGMOIDOSCOPY N/A 5/20/2020    Procedure: SIGMOIDOSCOPY FLEXIBLE;  Surgeon: Jarek Gordillo  "DO;  Location: [facility] ENDOSCOPY;  Service: Gastroenterology;  Laterality: N/A;   • VENOUS ACCESS DEVICE (PORT) INSERTION N/A 6/27/2019    Procedure: INSERTION VENOUS ACCESS DEVICE (MEDIPORT)           (C-ARM#1);  Surgeon: Benson Wasserman MD;  Location: [facility] OR;  Service: General   • VENOUS ACCESS DEVICE (PORT) REMOVAL Right 7/20/2019    Procedure: REMOVAL VENOUS ACCESS DEVICE;  Surgeon: Benson Wasserman MD;  Location: [facility] OR;  Service: General       ALLERGIES:    No Known Allergies    REVIEW OF SYSTEMS:      CONSTITUTIONAL:  No fever, chills, or night sweats.     HEENT:  No epistaxis, mouth sores, or difficulty swallowing.    RESPIRATORY:  No new shortness of breath or cough at present.    CARDIOVASCULAR:  No chest pain or palpitations.    GASTROINTESTINAL:  No abdominal pain, nausea, vomiting, or blood in the stool.    GENITOURINARY:  No dysuria or hematuria.    MUSCULOSKELETAL:  No any new back pain or arthralgias.     NEUROLOGICAL:  No tingling or numbness. No new headache or dizziness.     LYMPHATICS:  Denies any abnormal swollen and anywhere in the body.    SKIN:  Denies any new skin rash.    PHYSICAL EXAMINATION:      VITAL SIGNS:  /65   Pulse 71   Temp 97.6 °F (36.4 °C) (Temporal)   Resp 18   Ht 172.7 cm (67.99\")   Wt 98.2 kg (216 lb 9.6 oz)   BMI 32.94 kg/m²     GENERAL:  Not in any distress.    HEENT:  Normocephalic, Atraumatic.Mild Conjunctival pallor. No icterus. Extraocular Movements Intact. No Facial Asymmetry noted.    NECK:  No adenopathy. No JVD.    RESPIRATORY:  Fair air entry bilateral. No rhonchi or wheezing.    CARDIOVASCULAR:  S1, S2. Regular rate and rhythm. No murmur or gallop appreciated.    ABDOMEN:  Soft, obese, nontender. Bowel sounds present in all four quadrants.  No organomegaly appreciated.    EXTREMITIES:  No edema.No Calf Tenderness.    NEUROLOGIC:  Alert, awake and oriented ×3.  No  Motor or sensory deficit appreciated. Cranial Nerves 2-12 grossly intact.    SKIN : No " new skin lesion identified  DIAGNOSTIC DATA:    Glucose   Date Value Ref Range Status   06/05/2020 98 65 - 99 mg/dL Final     Sodium   Date Value Ref Range Status   06/05/2020 139 136 - 145 mmol/L Final   07/11/2018 138 134 - 144 mmol/L Final   05/17/2018 135 (L) 136 - 145 mmol/L Final     Potassium   Date Value Ref Range Status   06/05/2020 4.0 3.5 - 5.2 mmol/L Final   07/11/2018 4 3.5 - 5.1 mmol/L Final   05/17/2018 4.3 3.3 - 5.0 mmol/L Final     CO2   Date Value Ref Range Status   06/05/2020 26.0 22.0 - 29.0 mmol/L Final     Total CO2   Date Value Ref Range Status   05/17/2018 26 20 - 31 mmol/L Final     Chloride   Date Value Ref Range Status   06/05/2020 100 98 - 107 mmol/L Final   07/11/2018 105 98 - 107 mmol/L Final   05/17/2018 104 99 - 111 mmol/L Final     Anion Gap   Date Value Ref Range Status   06/05/2020 13.0 5.0 - 15.0 mmol/L Final   05/17/2018 9 4 - 16 mmol/L Final     Creatinine   Date Value Ref Range Status   06/05/2020 0.74 (L) 0.76 - 1.27 mg/dL Final   07/11/2018 1 0.6 - 1.3 mg/dL Final   05/17/2018 1.0 0.6 - 1.3 mg/dL Final     BUN   Date Value Ref Range Status   06/05/2020 19 8 - 23 mg/dL Final   07/11/2018 34 (H) 7 - 18 mg/dL Final     BUN/Creatinine Ratio   Date Value Ref Range Status   06/05/2020 25.7 (H) 7.0 - 25.0 Final     Calcium   Date Value Ref Range Status   06/05/2020 9.3 8.6 - 10.5 mg/dL Final   07/11/2018 8.8 8.8 - 10.5 mg/dL Final     eGFR Non  Amer   Date Value Ref Range Status   06/05/2020 106 >60 mL/min/1.73 Final     Alkaline Phosphatase   Date Value Ref Range Status   06/05/2020 73 39 - 117 U/L Final   07/11/2018 69 46 - 116 U/L Final     Total Protein   Date Value Ref Range Status   06/05/2020 7.2 6.0 - 8.5 g/dL Final   07/11/2018 7.4 6.4 - 8.2 g/dL Final     ALT (SGPT)   Date Value Ref Range Status   06/05/2020 13 1 - 41 U/L Final   07/11/2018 19 (L) 30 - 65 U/L Final     AST (SGOT)   Date Value Ref Range Status   06/05/2020 17 1 - 40 U/L Final   07/11/2018 17 15 - 37  U/L Final     Total Bilirubin   Date Value Ref Range Status   06/05/2020 0.6 0.2 - 1.2 mg/dL Final   07/11/2018 0.3 0 - 1.0 mg/dL Final     Albumin   Date Value Ref Range Status   06/05/2020 4.10 3.50 - 5.20 g/dL Final   07/11/2018 3.5 3.4 - 5.0 g/dL Final     Globulin   Date Value Ref Range Status   06/05/2020 3.1 gm/dL Final     Lab Results   Component Value Date    WBC 4.51 08/31/2020    HGB 14.7 08/31/2020    HCT 43.6 08/31/2020    MCV 93.8 08/31/2020     08/31/2020     Lab Results   Component Value Date    NEUTROABS 2.69 08/31/2020    IRON 74 08/31/2020    TIBC 349 08/31/2020    LABIRON 21 08/31/2020    FERRITIN 84.39 08/31/2020    [id number] >2,000 (H) 08/31/2020    FOLATE 13.10 08/31/2020     No results found for: , LABCA2, AFPTM, HCGQUANT, , CHROMGRNA, [id number], CEA, REFLABREPO]        ASSESSMENT AND PLAN:       Mr. Juan Antonio Shaw is a 65-year-old white male who is now 14 months status post completion of concurrent chemo/XRT for Stage IIA squamous cell carcinoma of the anus (p16+).  He is doing well and has no evidence of persistent, recurrent, or metastatic disease at this time.  He continues to follow closely with Dr. Ly and is schedule for CT scan in November and scope with Dr. Gordillo in November as well. At this time will discharge from radiation oncology clinic and only see patient back as needed; pt agreeable this.   This document has been signed by SUHAIL Hook on October 13, 2020 14:32 CDT

## 2020-11-02 ENCOUNTER — LAB (OUTPATIENT)
Dept: LAB | Facility: HOSPITAL | Age: 65
End: 2020-11-02

## 2020-11-02 DIAGNOSIS — Z01.818 PREOP TESTING: Primary | ICD-10-CM

## 2020-11-02 PROCEDURE — U0003 INFECTIOUS AGENT DETECTION BY NUCLEIC ACID (DNA OR RNA); SEVERE ACUTE RESPIRATORY SYNDROME CORONAVIRUS 2 (SARS-COV-2) (CORONAVIRUS DISEASE [COVID-19]), AMPLIFIED PROBE TECHNIQUE, MAKING USE OF HIGH THROUGHPUT TECHNOLOGIES AS DESCRIBED BY CMS-2020-01-R: HCPCS

## 2020-11-02 PROCEDURE — C9803 HOPD COVID-19 SPEC COLLECT: HCPCS

## 2020-11-03 LAB
COVID LABCORP PRIORITY: NORMAL
SARS-COV-2 RNA RESP QL NAA+PROBE: NOT DETECTED

## 2020-11-05 ENCOUNTER — HOSPITAL ENCOUNTER (OUTPATIENT)
Facility: HOSPITAL | Age: 65
Setting detail: HOSPITAL OUTPATIENT SURGERY
Discharge: HOME | End: 2020-11-05
Attending: INTERNAL MEDICINE | Admitting: INTERNAL MEDICINE

## 2020-11-05 ENCOUNTER — ANESTHESIA EVENT (OUTPATIENT)
Dept: GASTROENTEROLOGY | Facility: HOSPITAL | Age: 65
End: 2020-11-05

## 2020-11-05 ENCOUNTER — ANESTHESIA (OUTPATIENT)
Dept: GASTROENTEROLOGY | Facility: HOSPITAL | Age: 65
End: 2020-11-05

## 2020-11-05 VITALS
DIASTOLIC BLOOD PRESSURE: 69 MMHG | SYSTOLIC BLOOD PRESSURE: 126 MMHG | BODY MASS INDEX: 32.28 KG/M2 | OXYGEN SATURATION: 96 % | WEIGHT: 213 LBS | HEIGHT: 68 IN | RESPIRATION RATE: 18 BRPM | HEART RATE: 69 BPM | TEMPERATURE: 97.3 F

## 2020-11-05 DIAGNOSIS — Z85.048 PERSONAL HISTORY OF RECTAL CANCER: ICD-10-CM

## 2020-11-05 PROCEDURE — 25010000002 PROPOFOL 10 MG/ML EMULSION: Performed by: NURSE ANESTHETIST, CERTIFIED REGISTERED

## 2020-11-05 RX ORDER — PROPOFOL 10 MG/ML
VIAL (ML) INTRAVENOUS AS NEEDED
Status: DISCONTINUED | OUTPATIENT
Start: 2020-11-05 | End: 2020-11-05 | Stop reason: SURG

## 2020-11-05 RX ORDER — DEXTROSE AND SODIUM CHLORIDE 5; .45 G/100ML; G/100ML
30 INJECTION, SOLUTION INTRAVENOUS CONTINUOUS PRN
Status: DISCONTINUED | OUTPATIENT
Start: 2020-11-05 | End: 2020-11-05 | Stop reason: HOSPADM

## 2020-11-05 RX ORDER — LIDOCAINE HYDROCHLORIDE 20 MG/ML
INJECTION, SOLUTION EPIDURAL; INFILTRATION; INTRACAUDAL; PERINEURAL AS NEEDED
Status: DISCONTINUED | OUTPATIENT
Start: 2020-11-05 | End: 2020-11-05 | Stop reason: SURG

## 2020-11-05 RX ADMIN — PROPOFOL 70 MG: 10 INJECTION, EMULSION INTRAVENOUS at 08:04

## 2020-11-05 RX ADMIN — LIDOCAINE HYDROCHLORIDE 50 MG: 20 INJECTION, SOLUTION EPIDURAL; INFILTRATION; INTRACAUDAL; PERINEURAL at 08:04

## 2020-11-05 RX ADMIN — PROPOFOL 20 MG: 10 INJECTION, EMULSION INTRAVENOUS at 08:06

## 2020-11-05 RX ADMIN — DEXTROSE AND SODIUM CHLORIDE 30 ML/HR: 5; 450 INJECTION, SOLUTION INTRAVENOUS at 07:32

## 2020-11-05 NOTE — ANESTHESIA POSTPROCEDURE EVALUATION
Patient: Juan Antonio Shaw    Procedure Summary     Date: 11/05/20 Room / Location: [facility] ENDOSCOPY 2 / [facility] ENDOSCOPY    Anesthesia Start: 0800 Anesthesia Stop: 0810    Procedure: SIGMOIDOSCOPY FLEXIBLE (N/A ) Diagnosis:       Personal history of rectal cancer      (Personal history of rectal cancer [Z85.048])    Surgeon: Jarek Gordillo DO Provider: Raphael Jimenez CRNA    Anesthesia Type: MAC ASA Status: 3          Anesthesia Type: MAC    Vitals  No vitals data found for the desired time range.          Post Anesthesia Care and Evaluation    Patient location during evaluation: bedside  Patient participation: waiting for patient participation  Level of consciousness: responsive to physical stimuli  Pain management: adequate  Airway patency: patent  Anesthetic complications: No anesthetic complications  PONV Status: none  Cardiovascular status: acceptable  Respiratory status: acceptable  Hydration status: acceptable

## 2020-11-05 NOTE — H&P
Sam Espinosa DO,[facility]  Gastroenterology  Hepatology  Endoscopy  Board Certified in Internal Medicine and gastroenterology  [address], [address]  [place]. [address]  - [phone]   F - [phone]     GASTROENTEROLOGY HISTORY AND PHYSICAL  NOTE   SAM ESPINOSA DO.         SUBJECTIVE:   11/5/2020    Name: Juan Antonio Shaw  DOD: 1955        Chief Complaint:       Subjective : Follow-up of anal cancer    Patient is 65 y.o. male presents with desire for elective flexible sigmoidoscopy.      ROS/HISTORY/ CURRENT MEDICATIONS/OBJECTIVE/VS/PE:   Review of Systems:  All systems unremarkable unless specified below.  Constitutional   HENT  Eyes   Respiratory    Cardiovascular  Gastrointestinal   Endocrine  Genitourinary    Musculoskeletal   Skin  Allergic/Immunologic    Neurological    Hematological  Psychiatric/Behavioral    History:     Past Medical History:   Diagnosis Date   • Hypertension    • Prostate cancer (CMS/HCC) 05/16/2018   • Rectal cancer (CMS/HCC) 06/2019     Past Surgical History:   Procedure Laterality Date   • CHOLECYSTECTOMY WITH INTRAOPERATIVE CHOLANGIOGRAM N/A 6/5/2020    Procedure: LAPAROSCOPIC POSSIBLE OPEN CHOLECYSTECTOMY WITH INTRAOPERATIVE CHOLANGIOGRAM           (C-ARM#1);  Surgeon: Benson Wasserman MD;  Location: [facility] OR;  Service: General;  Laterality: N/A;   • COLONOSCOPY N/A 5/30/2019    Procedure: COLONOSCOPY;  Surgeon: Sam Espinosa DO;  Location: [facility] ENDOSCOPY;  Service: Gastroenterology   • ENDOSCOPY N/A 5/30/2019    Procedure: ESOPHAGOGASTRODUODENOSCOPY;  Surgeon: Sam Espinosa DO;  Location: [facility] ENDOSCOPY;  Service: Gastroenterology   • PROSTATE SURGERY     • SIGMOIDOSCOPY N/A 11/5/2019    Procedure: SIGMOIDOSCOPY FLEXIBLE;  Surgeon: Sam Espinosa DO;  Location: [facility] ENDOSCOPY;  Service: Gastroenterology   • SIGMOIDOSCOPY N/A 5/20/2020    Procedure: SIGMOIDOSCOPY FLEXIBLE;  Surgeon: Sam Espinosa DO;  Location: [facility] ENDOSCOPY;   Service: Gastroenterology;  Laterality: N/A;   • VENOUS ACCESS DEVICE (PORT) INSERTION N/A 6/27/2019    Procedure: INSERTION VENOUS ACCESS DEVICE (MEDIPORT)           (C-ARM#1);  Surgeon: Benson Wasserman MD;  Location: [facility];  Service: General   • VENOUS ACCESS DEVICE (PORT) REMOVAL Right 7/20/2019    Procedure: REMOVAL VENOUS ACCESS DEVICE;  Surgeon: Benson Wasserman MD;  Location: [facility];  Service: General     Family History   Problem Relation Age of Onset   • Uterine cancer Mother      Social History     Tobacco Use   • Smoking status: Never Smoker   • Smokeless tobacco: Never Used   Substance Use Topics   • Alcohol use: Yes     Comment: rarely   • Drug use: No     Prior to Admission medications    Medication Sig Start Date End Date Taking? Authorizing Provider   folic acid (FOLVITE) 1 MG tablet Take 1 tablet by mouth on Monday, Wednesday and Friday. 9/1/20  Yes Terry Ly MD   nebivolol (BYSTOLIC) 10 MG tablet Take 10 mg by mouth Every Night.   Yes Andreea Chery MD   oxybutynin (DITROPAN) 5 MG tablet Take 1 tablet by mouth 3 (Three) Times a Day. 6/14/19  Yes Andreea Chery MD   pantoprazole (PROTONIX) 40 MG EC tablet TAKE 1 TABLET BY MOUTH EVERY MORNING 4/7/20  Yes Andreea Chery MD   vitamin B-12 (CYANOCOBALAMIN) 1000 MCG tablet Take 1 tablet by mouth on Monday, Wednesday and Friday 9/1/20  Yes Terry Ly MD   docusate sodium (COLACE) 100 MG capsule Take 1 capsule by mouth 2 (Two) Times a Day As Needed for Constipation. 10/24/19   Terry Ly MD   HYDROcodone-acetaminophen (NORCO) 7.5-325 MG per tablet Take 1 tablet by mouth Every 6 (Six) Hours As Needed for Moderate Pain  or Severe Pain . 6/5/20   Benson Wasserman MD   sildenafil (REVATIO) 20 MG tablet Take 20 mg by mouth Daily As Needed. 6/8/20   Andreea Chery MD     Allergies:  Patient has no known allergies.    I have reviewed the patients medical history, surgical history and family history in the available medical  record system.     Current Medications:     Current Facility-Administered Medications   Medication Dose Route Frequency Provider Last Rate Last Dose   • dextrose 5 % and sodium chloride 0.45 % infusion  30 mL/hr Intravenous Continuous PRN Jarek Gordillo DO 30 mL/hr at 11/05/20 0732 30 mL/hr at 11/05/20 0732       Objective     Physical Exam:   Temp:  [96.9 °F (36.1 °C)] 96.9 °F (36.1 °C)  Heart Rate:  [72] 72  Resp:  [18] 18  BP: (142)/(83) 142/83    Physical Exam:  General Appearance:    Alert, cooperative, in no acute distress   Head:    Normocephalic, without obvious abnormality, atraumatic   Eyes:            Lids and lashes normal, conjunctivae and sclerae normal, no   icterus, no pallor, corneas clear, PERRLA   Ears:    Ears appear intact with no abnormalities noted   Throat:   No oral lesions, no thrush, oral mucosa moist   Neck:   No adenopathy, supple, trachea midline, no thyromegaly, no     carotid bruit, no JVD   Back:     No kyphosis present, no scoliosis present, no skin lesions,       erythema or scars, no tenderness to percussion or                   palpation,   range of motion normal   Lungs:     Clear to auscultation,respirations regular, even and                   unlabored    Heart:    Regular rhythm and normal rate, normal S1 and S2, no            murmur, no gallop, no rub, no click   Breast Exam:    Deferred   Abdomen:     Normal bowel sounds, no masses, no organomegaly, soft        non-tender, non-distended, no guarding, no rebound                 tenderness   Genitalia:    Deferred   Extremities:   Moves all extremities well, no edema, no cyanosis, no              redness   Pulses:   Pulses palpable and equal bilaterally   Skin:   No bleeding, bruising or rash   Lymph nodes:   No palpable adenopathy   Neurologic:   Cranial nerves 2 - 12 grossly intact, sensation intact, DTR        present and equal bilaterally      Results Review:     Lab Results   Component Value Date    WBC 4.51  08/31/2020    WBC 4.97 06/01/2020    WBC 5.35 03/03/2020    HGB 14.7 08/31/2020    HGB 14.9 06/01/2020    HGB 14.3 03/03/2020    HCT 43.6 08/31/2020    HCT 42.2 06/01/2020    HCT 41.3 03/03/2020     08/31/2020     06/01/2020     03/03/2020             No results found for: LIPASE  Lab Results   Component Value Date    INR 1.02 05/09/2018     No results found for: CULTURE    Radiology Review:  Imaging Results (Last 72 Hours)     ** No results found for the last 72 hours. **           I reviewed the patient's new clinical results.  I reviewed the patient's new imaging results and agree with the interpretation.     ASSESSMENT/PLAN:   ASSESSMENT:  1.  Anal cancer, treated with radiation and chemotherapy with endoscopic resolution in the past    PLAN:  1.  Flexible sigmoidoscopy with biopsy as indicated    Risk and benefits associated with the procedure are reviewed with the patient.  The patient wished to proceed     Jarek Gordillo DO  11/05/20  07:53 CST

## 2020-11-05 NOTE — DISCHARGE INSTR - APPOINTMENTS
Dr. Jarek Gordillo, DO  [address]  [place] [address]  [phone]    Appointment date and time:    May 7, 2021 @  8:00 am

## 2020-11-05 NOTE — ANESTHESIA PREPROCEDURE EVALUATION
Anesthesia Evaluation     Patient summary reviewed and Nursing notes reviewed   NPO Solid Status: > 8 hours  NPO Liquid Status: > 8 hours           Airway   Mallampati: II  TM distance: >3 FB  Neck ROM: full  Possible difficult intubation  Dental - normal exam     Pulmonary    Cardiovascular     (+) hypertension,       Neuro/Psych  GI/Hepatic/Renal/Endo      Musculoskeletal     Abdominal    Substance History      OB/GYN          Other      history of cancer                    Anesthesia Plan    ASA 3     MAC     intravenous induction       Plan discussed with CRNA.

## 2020-11-06 LAB
LAB AP CASE REPORT: NORMAL
PATH REPORT.FINAL DX SPEC: NORMAL

## 2020-11-30 ENCOUNTER — HOSPITAL ENCOUNTER (OUTPATIENT)
Dept: CT IMAGING | Facility: HOSPITAL | Age: 65
Discharge: HOME | End: 2020-11-30

## 2020-11-30 ENCOUNTER — LAB (OUTPATIENT)
Dept: ONCOLOGY | Facility: HOSPITAL | Age: 65
End: 2020-11-30

## 2020-11-30 DIAGNOSIS — M89.9 BONE LESION: ICD-10-CM

## 2020-11-30 DIAGNOSIS — C21.0 ANAL CANCER (HCC): ICD-10-CM

## 2020-11-30 DIAGNOSIS — C61 PROSTATE CANCER (HCC): ICD-10-CM

## 2020-11-30 DIAGNOSIS — D64.9 ANEMIA, UNSPECIFIED TYPE: ICD-10-CM

## 2020-11-30 LAB
ALBUMIN SERPL-MCNC: 4.2 G/DL (ref 3.5–5.2)
ALBUMIN/GLOB SERPL: 1.2 G/DL
ALP SERPL-CCNC: 75 U/L (ref 39–117)
ALT SERPL W P-5'-P-CCNC: 17 U/L (ref 1–41)
ANION GAP SERPL CALCULATED.3IONS-SCNC: 10 MMOL/L (ref 5–15)
AST SERPL-CCNC: 31 U/L (ref 1–40)
BASOPHILS # BLD AUTO: 0.02 10*3/MM3 (ref 0–0.2)
BASOPHILS NFR BLD AUTO: 0.4 % (ref 0–1.5)
BILIRUB SERPL-MCNC: 0.6 MG/DL (ref 0–1.2)
BUN SERPL-MCNC: 20 MG/DL (ref 8–23)
BUN/CREAT SERPL: 22.7 (ref 7–25)
CALCIUM SPEC-SCNC: 9.6 MG/DL (ref 8.6–10.5)
CHLORIDE SERPL-SCNC: 100 MMOL/L (ref 98–107)
CO2 SERPL-SCNC: 27 MMOL/L (ref 22–29)
CREAT SERPL-MCNC: 0.88 MG/DL (ref 0.76–1.27)
DEPRECATED RDW RBC AUTO: 41.4 FL (ref 37–54)
EOSINOPHIL # BLD AUTO: 0.13 10*3/MM3 (ref 0–0.4)
EOSINOPHIL NFR BLD AUTO: 2.4 % (ref 0.3–6.2)
ERYTHROCYTE [DISTWIDTH] IN BLOOD BY AUTOMATED COUNT: 12.3 % (ref 12.3–15.4)
FERRITIN SERPL-MCNC: 116 NG/ML (ref 30–400)
GFR SERPL CREATININE-BSD FRML MDRD: 87 ML/MIN/1.73
GLOBULIN UR ELPH-MCNC: 3.6 GM/DL
GLUCOSE SERPL-MCNC: 114 MG/DL (ref 65–99)
HCT VFR BLD AUTO: 43.3 % (ref 37.5–51)
HGB BLD-MCNC: 14.9 G/DL (ref 13–17.7)
IMM GRANULOCYTES # BLD AUTO: 0.02 10*3/MM3 (ref 0–0.05)
IMM GRANULOCYTES NFR BLD AUTO: 0.4 % (ref 0–0.5)
IRON 24H UR-MRATE: 115 MCG/DL (ref 59–158)
IRON SATN MFR SERPL: 32 % (ref 20–50)
LYMPHOCYTES # BLD AUTO: 1.69 10*3/MM3 (ref 0.7–3.1)
LYMPHOCYTES NFR BLD AUTO: 31.1 % (ref 19.6–45.3)
MCH RBC QN AUTO: 31.5 PG (ref 26.6–33)
MCHC RBC AUTO-ENTMCNC: 34.4 G/DL (ref 31.5–35.7)
MCV RBC AUTO: 91.5 FL (ref 79–97)
MONOCYTES # BLD AUTO: 0.52 10*3/MM3 (ref 0.1–0.9)
MONOCYTES NFR BLD AUTO: 9.6 % (ref 5–12)
NEUTROPHILS NFR BLD AUTO: 3.06 10*3/MM3 (ref 1.7–7)
NEUTROPHILS NFR BLD AUTO: 56.1 % (ref 42.7–76)
NRBC BLD AUTO-RTO: 0 /100 WBC (ref 0–0.2)
PLATELET # BLD AUTO: 249 10*3/MM3 (ref 140–450)
PMV BLD AUTO: 8.9 FL (ref 6–12)
POTASSIUM SERPL-SCNC: 5.3 MMOL/L (ref 3.5–5.2)
PROT SERPL-MCNC: 7.8 G/DL (ref 6–8.5)
RBC # BLD AUTO: 4.73 10*6/MM3 (ref 4.14–5.8)
SODIUM SERPL-SCNC: 137 MMOL/L (ref 136–145)
TIBC SERPL-MCNC: 355 MCG/DL (ref 298–536)
TRANSFERRIN SERPL-MCNC: 238 MG/DL (ref 200–360)
WBC # BLD AUTO: 5.44 10*3/MM3 (ref 3.4–10.8)

## 2020-11-30 PROCEDURE — 85025 COMPLETE CBC W/AUTO DIFF WBC: CPT

## 2020-11-30 PROCEDURE — 83540 ASSAY OF IRON: CPT

## 2020-11-30 PROCEDURE — 82728 ASSAY OF FERRITIN: CPT

## 2020-11-30 PROCEDURE — 36415 COLL VENOUS BLD VENIPUNCTURE: CPT | Performed by: INTERNAL MEDICINE

## 2020-11-30 PROCEDURE — 80053 COMPREHEN METABOLIC PANEL: CPT

## 2020-11-30 PROCEDURE — 71260 CT THORAX DX C+: CPT

## 2020-11-30 PROCEDURE — 74177 CT ABD & PELVIS W/CONTRAST: CPT

## 2020-11-30 PROCEDURE — 84466 ASSAY OF TRANSFERRIN: CPT

## 2020-11-30 PROCEDURE — 25010000002 IOPAMIDOL 61 % SOLUTION: Performed by: INTERNAL MEDICINE

## 2020-11-30 RX ADMIN — IOPAMIDOL 90 ML: 612 INJECTION, SOLUTION INTRAVENOUS at 08:45

## 2020-12-01 ENCOUNTER — OFFICE VISIT (OUTPATIENT)
Dept: ONCOLOGY | Facility: CLINIC | Age: 65
End: 2020-12-01

## 2020-12-01 VITALS
HEART RATE: 70 BPM | HEIGHT: 68 IN | DIASTOLIC BLOOD PRESSURE: 76 MMHG | TEMPERATURE: 97.7 F | RESPIRATION RATE: 18 BRPM | BODY MASS INDEX: 32.31 KG/M2 | WEIGHT: 213.2 LBS | SYSTOLIC BLOOD PRESSURE: 137 MMHG

## 2020-12-01 DIAGNOSIS — D64.9 ANEMIA, UNSPECIFIED TYPE: ICD-10-CM

## 2020-12-01 DIAGNOSIS — M89.9 BONE LESION: ICD-10-CM

## 2020-12-01 DIAGNOSIS — N20.0 KIDNEY STONE: ICD-10-CM

## 2020-12-01 DIAGNOSIS — C61 PROSTATE CANCER (HCC): ICD-10-CM

## 2020-12-01 DIAGNOSIS — C21.0 ANAL CANCER (HCC): Primary | ICD-10-CM

## 2020-12-01 PROCEDURE — G0463 HOSPITAL OUTPT CLINIC VISIT: HCPCS | Performed by: INTERNAL MEDICINE

## 2020-12-01 PROCEDURE — 99214 OFFICE O/P EST MOD 30 MIN: CPT | Performed by: INTERNAL MEDICINE

## 2020-12-01 NOTE — PROGRESS NOTES
DATE OF VISIT: 12/1/2020      REASON FOR VISIT: History of anal cancer, history of prostate cancer, sclerotic lesion on bone on CT scan of chest, anemia      HISTORY OF PRESENT ILLNESS:    65-year-old male with medical problem consisting of hypertension, prostatic adenocarcinoma s/p robotic prostatectomy in 2018, iron deficiency anemia has been following up in  cancer Center since October 20, 2019 for squamous cell cancer of anal canal.  Patient is status post chemoradiation and currently on surveillance.  Patient is here for follow-up appointment today to discuss recently done blood work as well as CT scan.  Denies any bleeding.  Denies any new lymph node enlargement.  Denies any unusual weight loss recently.            PAST MEDICAL HISTORY:    Past Medical History:   Diagnosis Date   • Hypertension    • Prostate cancer (CMS/HCC) 05/16/2018   • Rectal cancer (CMS/HCC) 06/2019       SOCIAL HISTORY:    Social History     Tobacco Use   • Smoking status: Never Smoker   • Smokeless tobacco: Never Used   Substance Use Topics   • Alcohol use: Yes     Comment: rarely   • Drug use: No       Surgical History :  Past Surgical History:   Procedure Laterality Date   • CHOLECYSTECTOMY WITH INTRAOPERATIVE CHOLANGIOGRAM N/A 6/5/2020    Procedure: LAPAROSCOPIC POSSIBLE OPEN CHOLECYSTECTOMY WITH INTRAOPERATIVE CHOLANGIOGRAM           (C-ARM#1);  Surgeon: Benson Wasserman MD;  Location: [facility] OR;  Service: General;  Laterality: N/A;   • COLONOSCOPY N/A 5/30/2019    Procedure: COLONOSCOPY;  Surgeon: Jarek Gordillo DO;  Location: [facility] ENDOSCOPY;  Service: Gastroenterology   • ENDOSCOPY N/A 5/30/2019    Procedure: ESOPHAGOGASTRODUODENOSCOPY;  Surgeon: Jarek Gordillo DO;  Location: [facility] ENDOSCOPY;  Service: Gastroenterology   • PROSTATE SURGERY     • SIGMOIDOSCOPY N/A 11/5/2019    Procedure: SIGMOIDOSCOPY FLEXIBLE;  Surgeon: Jarek Gordillo DO;  Location: [facility] ENDOSCOPY;  Service: Gastroenterology   • SIGMOIDOSCOPY N/A  "5/20/2020    Procedure: SIGMOIDOSCOPY FLEXIBLE;  Surgeon: Jarek Gordillo DO;  Location: [facility] ENDOSCOPY;  Service: Gastroenterology;  Laterality: N/A;   • SIGMOIDOSCOPY N/A 11/5/2020    Procedure: SIGMOIDOSCOPY FLEXIBLE;  Surgeon: Jarek Gordillo DO;  Location: [facility] ENDOSCOPY;  Service: Gastroenterology;  Laterality: N/A;   • VENOUS ACCESS DEVICE (PORT) INSERTION N/A 6/27/2019    Procedure: INSERTION VENOUS ACCESS DEVICE (MEDIPORT)           (C-ARM#1);  Surgeon: Benson Wasserman MD;  Location: [facility] OR;  Service: General   • VENOUS ACCESS DEVICE (PORT) REMOVAL Right 7/20/2019    Procedure: REMOVAL VENOUS ACCESS DEVICE;  Surgeon: Benson Wasserman MD;  Location: [facility] OR;  Service: General       ALLERGIES:    No Known Allergies      FAMILY HISTORY:  Family History   Problem Relation Age of Onset   • Uterine cancer Mother            REVIEW OF SYSTEMS:      CONSTITUTIONAL:  Complains of fatigue.  Has lost 4 pounds since last clinic visit.  Denies any fever, chills .     EYES: No visual disturbances. No discharge. No new lesions    ENMT:  No epistaxis, mouth sores or difficulty swallowing.    RESPIRATORY:  No new shortness of breath. No new cough or hemoptysis.    CARDIOVASCULAR:  No chest pain or palpitations.    GASTROINTESTINAL:  No abdominal pain nausea, vomiting or blood in the stool.    GENITOURINARY: No Dysuria or Hematuria.    MUSCULOSKELETAL:  No new back pain or arthralgia..    LYMPHATICS:  Denies any abnormal swollen glands anywhere in the body.    NEUROLOGICAL : No tingling or numbness. No headache or dizziness. No seizures or balance problems.    SKIN: No new skin lesions.    ENDOCRINE : No new hot or cold intolerance. No new polyuria . No polydipsia.        PHYSICAL EXAMINATION:      VITAL SIGNS:  /76   Pulse 70   Temp 97.7 °F (36.5 °C) (Temporal)   Resp 18   Ht 172.7 cm (67.99\")   Wt 96.7 kg (213 lb 3.2 oz)   BMI 32.42 kg/m²       12/01/20  0818   Weight: 96.7 kg (213 lb 3.2 oz) "         ECOG performance status: 1    CONSTITUTIONAL:  Not in any distress.    EYES: Mild conjunctival Pallor. No Icterus. No Pterygium. Extraocular Movements intact.No ptosis.    ENMT:  Normocephalic, Atraumatic.No Facial Asymmetry noted.    NECK:  No adenopathy.Trachea midline. NO JVD.    RESPIRATORY:  Fair air entry bilateral. No rhonchi or wheezing.Fair respiratory effort.    CARDIOVASCULAR:  S1, S2. Regular rate and rhythm. No murmur or gallop appreciated.    ABDOMEN:  Soft, obese, nontender. Bowel sounds present in all four quadrants.  No Hepatosplenomegaly appreciated.    MUSCULOSKELETAL:  No edema.No Calf Tenderness.Normal range of motion.    NEUROLOGIC:    No  Motor or sensory deficit appreciated. Cranial Nerves 2-12 grossly intact.    SKIN : No new skin lesion identified. Skin is warm and moist to touch.    LYMPHATICS: No new enlarged lymph nodes in neck or supraclavicular area.    PSYCHIATRY: Alert, awake and oriented ×3.Normal affect.  Normal judgment.  Makes good eye contact.        DIAGNOSTIC DATA:    Glucose   Date Value Ref Range Status   11/30/2020 114 (H) 65 - 99 mg/dL Final     Sodium   Date Value Ref Range Status   11/30/2020 137 136 - 145 mmol/L Final   07/11/2018 138 134 - 144 mmol/L Final   05/17/2018 135 (L) 136 - 145 mmol/L Final     Potassium   Date Value Ref Range Status   11/30/2020 5.3 (H) 3.5 - 5.2 mmol/L Final     Comment:     Specimen hemolyzed.  Results may be affected.   07/11/2018 4 3.5 - 5.1 mmol/L Final   05/17/2018 4.3 3.3 - 5.0 mmol/L Final     CO2   Date Value Ref Range Status   11/30/2020 27.0 22.0 - 29.0 mmol/L Final     Total CO2   Date Value Ref Range Status   05/17/2018 26 20 - 31 mmol/L Final     Chloride   Date Value Ref Range Status   11/30/2020 100 98 - 107 mmol/L Final   07/11/2018 105 98 - 107 mmol/L Final   05/17/2018 104 99 - 111 mmol/L Final     Anion Gap   Date Value Ref Range Status   11/30/2020 10.0 5.0 - 15.0 mmol/L Final   05/17/2018 9 4 - 16 mmol/L Final      Creatinine   Date Value Ref Range Status   11/30/2020 0.88 0.76 - 1.27 mg/dL Final   07/11/2018 1 0.6 - 1.3 mg/dL Final   05/17/2018 1.0 0.6 - 1.3 mg/dL Final     BUN   Date Value Ref Range Status   11/30/2020 20 8 - 23 mg/dL Final   07/11/2018 34 (H) 7 - 18 mg/dL Final     BUN/Creatinine Ratio   Date Value Ref Range Status   11/30/2020 22.7 7.0 - 25.0 Final     Calcium   Date Value Ref Range Status   11/30/2020 9.6 8.6 - 10.5 mg/dL Final   07/11/2018 8.8 8.8 - 10.5 mg/dL Final     eGFR Non  Amer   Date Value Ref Range Status   11/30/2020 87 >60 mL/min/1.73 Final     Alkaline Phosphatase   Date Value Ref Range Status   11/30/2020 75 39 - 117 U/L Final   07/11/2018 69 46 - 116 U/L Final     Total Protein   Date Value Ref Range Status   11/30/2020 7.8 6.0 - 8.5 g/dL Final   07/11/2018 7.4 6.4 - 8.2 g/dL Final     ALT (SGPT)   Date Value Ref Range Status   11/30/2020 17 1 - 41 U/L Final     Comment:     Specimen hemolyzed.  Results may be affected.   07/11/2018 19 (L) 30 - 65 U/L Final     AST (SGOT)   Date Value Ref Range Status   11/30/2020 31 1 - 40 U/L Final     Comment:     Specimen hemolyzed.  Results may be affected.   07/11/2018 17 15 - 37 U/L Final     Total Bilirubin   Date Value Ref Range Status   11/30/2020 0.6 0.0 - 1.2 mg/dL Final   07/11/2018 0.3 0 - 1.0 mg/dL Final     Albumin   Date Value Ref Range Status   11/30/2020 4.20 3.50 - 5.20 g/dL Final   07/11/2018 3.5 3.4 - 5.0 g/dL Final     Globulin   Date Value Ref Range Status   11/30/2020 3.6 gm/dL Final     Lab Results   Component Value Date    WBC 5.44 11/30/2020    HGB 14.9 11/30/2020    HCT 43.3 11/30/2020    MCV 91.5 11/30/2020     11/30/2020     Lab Results   Component Value Date    NEUTROABS 3.06 11/30/2020    IRON 115 11/30/2020    TIBC 355 11/30/2020    LABIRON 32 11/30/2020    FERRITIN 116.00 11/30/2020    OFOYIISA08 >2,000 (H) 08/31/2020    FOLATE 13.10 08/31/2020     No results found for: , LABCA2, AFPTM, HCGQUANT,  , CHROMGRNA, [id number], CEA, REFLABREPO        PATHOLOGY:  * Cannot find OR log *         RADIOLOGY DATA :  Ct Chest With Contrast    Result Date: 11/30/2020  1. Stable appearance of the chest abdomen and pelvis without definitive evidence of local recurrence or distant metastasis in this patient with given history of anal cancer. 2. Stable small sclerotic lesion in the T6 vertebral body most compatible with bone island. 3. Bilateral renal cysts with mild complexity on the left as above. Electronically signed by:  Nimo Israel MD  11/30/2020 10:28 AM CST Workstation: [id number]    Ct Abdomen Pelvis With Contrast    Result Date: 11/30/2020  1. Stable appearance of the chest abdomen and pelvis without definitive evidence of local recurrence or distant metastasis in this patient with given history of anal cancer. 2. Stable small sclerotic lesion in the T6 vertebral body most compatible with bone island. 3. Bilateral renal cysts with mild complexity on the left as above. Electronically signed by:  Nimo Israel MD  11/30/2020 10:28 AM CST Workstation: [id number]        ASSESSMENT AND PLAN:      1.  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  Patient received day 1 as well as day 29 of 5-FU and mitomycin with radiation.  -Flexible sigmoidoscopy done by Dr. Gordillo on November 5, 2020 did not show any evidence of residual tumor.  -CT of chest, abdomen with contrast done on November 30, 2020 did not show any evidence of recurrence.  -We will ask patient to return to clinic in 4 months with repeat CBC, CMP, PSA and anemia work-up to be done prior to that  -Next surveillance CT scan of abdomen and pelvis with chest will be done in December 2021.     2.  Prostatic adenocarcinoma, stage II, PT 2 PN 0  -S/p radical prostatectomy on May 16, 2018  -PSA done  on August 31, 2020 is less than 0.014.  -We will recheck PSA upon next clinic visit in 4 months.    3.  Sclerotic bone lesion on vertebral body as well as ribs.  -Recent surveillance CT of chest shows stable sclerotic lesion involving T6 vertebral body as well as bilateral rib consistent with bone island.    4.  Anemia:  -Anemia work-up done on November 30, 2020 shows hemoglobin is 14.9  -Recommend continuing with B12 and folic acid 3 times a week.    5.  Hyperkalemia:  -Patient's potassium is borderline elevated at 5.3.  Patient was notified about elevated potassium was counseled about not eating food rich in potassium and keeping himself hydrated.    6.  Kidney stone: Problem is new to me  -Patient is found to have bilateral nonobstructing kidney stone.  Largest 1 is measuring on right side at 1.1 cm.  Patient was notified about CT scan showing kidney stone.  -Patient was counseled about increasing hydration.  -Patient is being followed by urologist, Dr. Minaya at [place].  He was encouraged to call Dr. Minaya office and get an appointment for evaluation of kidney stones.    7.  Health maintenance: Patient does not smoke.  Had a colonoscopy done in May 2019 by Dr. Gordillo    8. Advance Care Planning: For now patient remains full code and is able to make decisions.  Patient has health care surrogate mentioned on chart.    PHQ-9 Total Score: 0   -Patient is not homicidal or suicidal.  No acute intervention required.    Juan Antonio Shaw reports a pain score of 0.  Given his pain assessment as noted, treatment options were discussed and the following options were decided upon as a follow-up plan to address the patient's pain: No acute intervention required.         Terry Ly MD  12/1/2020  08:37 CST        Part of this note may be an electronic transcription/translation of spoken language to printed text using the Dragon Dictation System.

## 2021-03-31 ENCOUNTER — OFFICE VISIT (OUTPATIENT)
Dept: SURGERY | Facility: CLINIC | Age: 66
End: 2021-03-31

## 2021-03-31 VITALS
HEIGHT: 67 IN | HEART RATE: 68 BPM | DIASTOLIC BLOOD PRESSURE: 68 MMHG | WEIGHT: 220.2 LBS | OXYGEN SATURATION: 94 % | BODY MASS INDEX: 34.56 KG/M2 | SYSTOLIC BLOOD PRESSURE: 136 MMHG

## 2021-03-31 DIAGNOSIS — L82.1 SEBORRHEIC KERATOSIS: Primary | ICD-10-CM

## 2021-03-31 PROCEDURE — 17110 DESTRUCTION B9 LES UP TO 14: CPT | Performed by: SURGERY

## 2021-03-31 PROCEDURE — 99212 OFFICE O/P EST SF 10 MIN: CPT | Performed by: SURGERY

## 2021-04-02 ENCOUNTER — LAB (OUTPATIENT)
Dept: ONCOLOGY | Facility: HOSPITAL | Age: 66
End: 2021-04-02

## 2021-04-02 DIAGNOSIS — D64.9 ANEMIA, UNSPECIFIED TYPE: ICD-10-CM

## 2021-04-02 DIAGNOSIS — M89.9 BONE LESION: ICD-10-CM

## 2021-04-02 DIAGNOSIS — C21.0 ANAL CANCER (HCC): ICD-10-CM

## 2021-04-02 DIAGNOSIS — C61 PROSTATE CANCER (HCC): ICD-10-CM

## 2021-04-02 LAB
ALBUMIN SERPL-MCNC: 3.9 G/DL (ref 3.5–5.2)
ALBUMIN/GLOB SERPL: 1.1 G/DL
ALP SERPL-CCNC: 72 U/L (ref 39–117)
ALT SERPL W P-5'-P-CCNC: 19 U/L (ref 1–41)
ANION GAP SERPL CALCULATED.3IONS-SCNC: 8 MMOL/L (ref 5–15)
AST SERPL-CCNC: 18 U/L (ref 1–40)
BASOPHILS # BLD AUTO: 0.02 10*3/MM3 (ref 0–0.2)
BASOPHILS NFR BLD AUTO: 0.4 % (ref 0–1.5)
BILIRUB SERPL-MCNC: 0.4 MG/DL (ref 0–1.2)
BUN SERPL-MCNC: 27 MG/DL (ref 8–23)
BUN/CREAT SERPL: 33.3 (ref 7–25)
CALCIUM SPEC-SCNC: 9.5 MG/DL (ref 8.6–10.5)
CHLORIDE SERPL-SCNC: 103 MMOL/L (ref 98–107)
CO2 SERPL-SCNC: 29 MMOL/L (ref 22–29)
CREAT SERPL-MCNC: 0.81 MG/DL (ref 0.76–1.27)
DEPRECATED RDW RBC AUTO: 42.2 FL (ref 37–54)
EOSINOPHIL # BLD AUTO: 0.09 10*3/MM3 (ref 0–0.4)
EOSINOPHIL NFR BLD AUTO: 1.8 % (ref 0.3–6.2)
ERYTHROCYTE [DISTWIDTH] IN BLOOD BY AUTOMATED COUNT: 12.1 % (ref 12.3–15.4)
FERRITIN SERPL-MCNC: 107.3 NG/ML (ref 30–400)
FOLATE SERPL-MCNC: 15.5 NG/ML (ref 4.78–24.2)
GFR SERPL CREATININE-BSD FRML MDRD: 96 ML/MIN/1.73
GLOBULIN UR ELPH-MCNC: 3.5 GM/DL
GLUCOSE SERPL-MCNC: 66 MG/DL (ref 65–99)
HCT VFR BLD AUTO: 44.5 % (ref 37.5–51)
HGB BLD-MCNC: 14.9 G/DL (ref 13–17.7)
IMM GRANULOCYTES # BLD AUTO: 0.01 10*3/MM3 (ref 0–0.05)
IMM GRANULOCYTES NFR BLD AUTO: 0.2 % (ref 0–0.5)
IRON 24H UR-MRATE: 71 MCG/DL (ref 59–158)
IRON SATN MFR SERPL: 19 % (ref 20–50)
LYMPHOCYTES # BLD AUTO: 1.69 10*3/MM3 (ref 0.7–3.1)
LYMPHOCYTES NFR BLD AUTO: 33.6 % (ref 19.6–45.3)
MCH RBC QN AUTO: 31.4 PG (ref 26.6–33)
MCHC RBC AUTO-ENTMCNC: 33.5 G/DL (ref 31.5–35.7)
MCV RBC AUTO: 93.9 FL (ref 79–97)
MONOCYTES # BLD AUTO: 0.57 10*3/MM3 (ref 0.1–0.9)
MONOCYTES NFR BLD AUTO: 11.3 % (ref 5–12)
NEUTROPHILS NFR BLD AUTO: 2.65 10*3/MM3 (ref 1.7–7)
NEUTROPHILS NFR BLD AUTO: 52.7 % (ref 42.7–76)
NRBC BLD AUTO-RTO: 0 /100 WBC (ref 0–0.2)
PLATELET # BLD AUTO: 227 10*3/MM3 (ref 140–450)
PMV BLD AUTO: 8.7 FL (ref 6–12)
POTASSIUM SERPL-SCNC: 4.5 MMOL/L (ref 3.5–5.2)
PROT SERPL-MCNC: 7.4 G/DL (ref 6–8.5)
PSA SERPL-MCNC: <0.014 NG/ML (ref 0–4)
RBC # BLD AUTO: 4.74 10*6/MM3 (ref 4.14–5.8)
SODIUM SERPL-SCNC: 140 MMOL/L (ref 136–145)
TIBC SERPL-MCNC: 367 MCG/DL (ref 298–536)
TRANSFERRIN SERPL-MCNC: 246 MG/DL (ref 200–360)
VIT B12 BLD-MCNC: 1161 PG/ML (ref 211–946)
WBC # BLD AUTO: 5.03 10*3/MM3 (ref 3.4–10.8)

## 2021-04-02 PROCEDURE — 82607 VITAMIN B-12: CPT

## 2021-04-02 PROCEDURE — 85025 COMPLETE CBC W/AUTO DIFF WBC: CPT

## 2021-04-02 PROCEDURE — 82746 ASSAY OF FOLIC ACID SERUM: CPT

## 2021-04-02 PROCEDURE — 83540 ASSAY OF IRON: CPT

## 2021-04-02 PROCEDURE — 80053 COMPREHEN METABOLIC PANEL: CPT

## 2021-04-02 PROCEDURE — 36415 COLL VENOUS BLD VENIPUNCTURE: CPT | Performed by: INTERNAL MEDICINE

## 2021-04-02 PROCEDURE — 84466 ASSAY OF TRANSFERRIN: CPT

## 2021-04-02 PROCEDURE — 84153 ASSAY OF PSA TOTAL: CPT

## 2021-04-02 PROCEDURE — 82728 ASSAY OF FERRITIN: CPT

## 2021-04-02 NOTE — PATIENT INSTRUCTIONS
NOTICE TO PATIENTS-HEALTH RESULTS RELEASE    We believe in information transparency, and we believe you deserve to see your information as soon as it is available.      Lab Results    • We release ALL notes and results to you promptly.    • Therefore, you may see some results even before we do. Please give us 2 business days to review and let you know our thoughts.  • We look at every result. We will contact you with any results that concern us.  • Some results may show abnormal, but are not clinically important. An example is “MCHC” and “MCV”.   Other results (like “NICOLE”) are really challenging to interpret, and require reviewing other results and other information from your chart. Sometimes these are best discussed in person.    Imaging    CT scan, MRI, and PET reports can show new or re-occurring cancer  • These reports can contain words that are hard to understand  • These reports can also show unexpected results.  • We ALWAYS plan to review these results with you and decide on a plan together. We prefer to do this in person, by video visit, or phone.  • When possible, we will discuss the possible results with you BEFORE getting the test, and the next steps we would take with each result.  • Some patients prefer to see their results online as soon as possible. Because of possible “bad news,” other patients may feel more comfortable waiting to discuss results when their provider is available at their next video visit or in-person visit or by phone. As the patient, you can choose when to view your result

## 2021-04-03 NOTE — PROGRESS NOTES
Chief Complaint   Patient presents with   • spot on back        HPI  66-year-old white man with a slightly enlarging lesion on his back that is becoming increasingly symptomatic.  Primary symptom is itching.  No history of bleeding or ulceration.  Past Medical History:   Diagnosis Date   • Hypertension    • Prostate cancer (CMS/HCC) 05/16/2018   • Rectal cancer (CMS/HCC) 06/2019       Past Surgical History:   Procedure Laterality Date   • CHOLECYSTECTOMY WITH INTRAOPERATIVE CHOLANGIOGRAM N/A 6/5/2020    Procedure: LAPAROSCOPIC POSSIBLE OPEN CHOLECYSTECTOMY WITH INTRAOPERATIVE CHOLANGIOGRAM           (C-ARM#1);  Surgeon: Benson Wasserman MD;  Location: [facility] OR;  Service: General;  Laterality: N/A;   • COLONOSCOPY N/A 5/30/2019    Procedure: COLONOSCOPY;  Surgeon: Jarek Gordillo DO;  Location: [facility] ENDOSCOPY;  Service: Gastroenterology   • ENDOSCOPY N/A 5/30/2019    Procedure: ESOPHAGOGASTRODUODENOSCOPY;  Surgeon: Jarek Gordillo DO;  Location: [facility] ENDOSCOPY;  Service: Gastroenterology   • PROSTATE SURGERY     • SIGMOIDOSCOPY N/A 11/5/2019    Procedure: SIGMOIDOSCOPY FLEXIBLE;  Surgeon: Jarek Gordillo DO;  Location: [facility] ENDOSCOPY;  Service: Gastroenterology   • SIGMOIDOSCOPY N/A 5/20/2020    Procedure: SIGMOIDOSCOPY FLEXIBLE;  Surgeon: Jarek Gordillo DO;  Location: [facility] ENDOSCOPY;  Service: Gastroenterology;  Laterality: N/A;   • SIGMOIDOSCOPY N/A 11/5/2020    Procedure: SIGMOIDOSCOPY FLEXIBLE;  Surgeon: Jarek Gordillo DO;  Location: [facility] ENDOSCOPY;  Service: Gastroenterology;  Laterality: N/A;   • VENOUS ACCESS DEVICE (PORT) INSERTION N/A 6/27/2019    Procedure: INSERTION VENOUS ACCESS DEVICE (MEDIPORT)           (C-ARM#1);  Surgeon: Benson Wasserman MD;  Location: [facility] OR;  Service: General   • VENOUS ACCESS DEVICE (PORT) REMOVAL Right 7/20/2019    Procedure: REMOVAL VENOUS ACCESS DEVICE;  Surgeon: Benson Wasserman MD;  Location: [facility] OR;  Service: General         Current Outpatient  Medications:   •  folic acid (FOLVITE) 1 MG tablet, Take 1 tablet by mouth on Monday, Wednesday and Friday., Disp: 36 tablet, Rfl: 1  •  nebivolol (BYSTOLIC) 10 MG tablet, Take 10 mg by mouth Every Night., Disp: , Rfl:   •  pantoprazole (PROTONIX) 40 MG EC tablet, TAKE 1 TABLET BY MOUTH EVERY MORNING, Disp: , Rfl:   •  sildenafil (REVATIO) 20 MG tablet, Take 20 mg by mouth Daily As Needed., Disp: , Rfl:   •  vitamin B-12 (CYANOCOBALAMIN) 1000 MCG tablet, Take 1 tablet by mouth on Monday, Wednesday and Friday, Disp: 36 tablet, Rfl: 1  •  docusate sodium (COLACE) 100 MG capsule, Take 1 capsule by mouth 2 (Two) Times a Day As Needed for Constipation., Disp: 60 capsule, Rfl: 2  •  HYDROcodone-acetaminophen (NORCO) 7.5-325 MG per tablet, Take 1 tablet by mouth Every 6 (Six) Hours As Needed for Moderate Pain  or Severe Pain ., Disp: 18 tablet, Rfl: 0  •  oxybutynin (DITROPAN) 5 MG tablet, Take 1 tablet by mouth 3 (Three) Times a Day., Disp: , Rfl:     No Known Allergies    Family History   Problem Relation Age of Onset   • Uterine cancer Mother        Social History     Socioeconomic History   • Marital status:      Spouse name: Jeffrey   • Number of children: 2   • Years of education: 15   • Highest education level: Not on file   Tobacco Use   • Smoking status: Never Smoker   • Smokeless tobacco: Never Used   Vaping Use   • Vaping Use: Never used   Substance and Sexual Activity   • Alcohol use: Yes     Comment: rarely   • Drug use: No   • Sexual activity: Defer       Physical Exam  Skin:     General: Skin is warm and dry.      Coloration: Skin is not jaundiced or pale.      Findings: No bruising, erythema, lesion or rash.                  ASSESSMENT    Diagnoses and all orders for this visit:    1. Seborrheic keratosis (Primary)        PLAN    1.  Recheck in 1 month              This document has been electronically signed by Benson Wasserman MD on April 2, 2021 19:52 CDT  Answers for HPI/ROS submitted by the patient on  3/15/2021  What is the primary reason for your visit?: Other  Please describe your symptoms.: Spot on my back  Have you had these symptoms before?: No  How long have you been having these symptoms?: Greater than 2 weeks

## 2021-04-05 ENCOUNTER — OFFICE VISIT (OUTPATIENT)
Dept: ONCOLOGY | Facility: CLINIC | Age: 66
End: 2021-04-05

## 2021-04-05 VITALS
TEMPERATURE: 97.5 F | RESPIRATION RATE: 18 BRPM | WEIGHT: 217.6 LBS | HEART RATE: 60 BPM | HEIGHT: 68 IN | BODY MASS INDEX: 32.98 KG/M2 | DIASTOLIC BLOOD PRESSURE: 64 MMHG | SYSTOLIC BLOOD PRESSURE: 130 MMHG

## 2021-04-05 DIAGNOSIS — C21.0 ANAL CANCER (HCC): Primary | Chronic | ICD-10-CM

## 2021-04-05 DIAGNOSIS — C61 PROSTATE CANCER (HCC): Chronic | ICD-10-CM

## 2021-04-05 DIAGNOSIS — M89.9 BONE LESION: Chronic | ICD-10-CM

## 2021-04-05 PROBLEM — D64.9 ANEMIA: Chronic | Status: ACTIVE | Noted: 2019-10-22

## 2021-04-05 PROBLEM — K90.9 IRON MALABSORPTION: Status: ACTIVE | Noted: 2021-04-05

## 2021-04-05 PROCEDURE — 99214 OFFICE O/P EST MOD 30 MIN: CPT | Performed by: INTERNAL MEDICINE

## 2021-04-05 PROCEDURE — G0463 HOSPITAL OUTPT CLINIC VISIT: HCPCS | Performed by: INTERNAL MEDICINE

## 2021-04-05 RX ORDER — CETIRIZINE HYDROCHLORIDE 10 MG/1
10 TABLET ORAL ONCE
Status: CANCELLED | OUTPATIENT
Start: 2021-04-12 | End: 2021-04-12

## 2021-04-05 RX ORDER — SODIUM CHLORIDE 9 MG/ML
250 INJECTION, SOLUTION INTRAVENOUS ONCE
Status: CANCELLED | OUTPATIENT
Start: 2021-04-12

## 2021-04-05 RX ORDER — FAMOTIDINE 10 MG/ML
20 INJECTION, SOLUTION INTRAVENOUS ONCE
Status: CANCELLED | OUTPATIENT
Start: 2021-04-12 | End: 2021-04-12

## 2021-04-05 RX ORDER — FOLIC ACID 1 MG/1
TABLET ORAL
Qty: 36 TABLET | Refills: 1 | Status: SHIPPED | OUTPATIENT
Start: 2021-04-05 | End: 2021-08-12 | Stop reason: SDUPTHER

## 2021-04-05 RX ORDER — ACETAMINOPHEN 325 MG/1
650 TABLET ORAL ONCE
Status: CANCELLED | OUTPATIENT
Start: 2021-04-12 | End: 2021-04-12

## 2021-04-05 RX ORDER — DIPHENHYDRAMINE HCL 25 MG
25 CAPSULE ORAL ONCE
Status: CANCELLED | OUTPATIENT
Start: 2021-04-12 | End: 2021-04-12

## 2021-04-05 NOTE — PROGRESS NOTES
"DATE OF VISIT: 4/5/2021      REASON FOR VISIT: Anal cancer, history of prostate cancer, sclerotic lesion of bone, anemia      HISTORY OF PRESENT ILLNESS:   66-year-old male with medical problem consisting of hypertension, prostatic adenocarcinoma s/p robotic prostatectomy in 2018, iron deficiency anemia has been following up with [facility] since October 20, 2019 for squamous cell cancer of anal canal.  Patient is s/p chemoradiation and has been on surveillance since then.  Patient is here for follow-up appointment today to discuss recently done blood work.  Denies any bleeding.  Denies any new lymph node enlargement.  Denies any unusual weight loss recently.          Past Medical History, Past Surgical History, Social History, Family History have been reviewed and are without significant changes except as mentioned.    Review of Systems   Constitutional: Positive for fatigue.      A comprehensive 14 point review of systems was performed and was negative except as mentioned.    Medications:  The current medication list was reviewed in the EMR    ALLERGIES:  No Known Allergies    Objective      Vitals:    04/05/21 0745   BP: 130/64   Pulse: 60   Resp: 18   Temp: 97.5 °F (36.4 °C)   TempSrc: Temporal   Weight: 98.7 kg (217 lb 9.6 oz)   Height: 172.7 cm (67.99\")   PainSc: 0-No pain     Current Status 4/5/2021   ECOG score 0       Physical Exam  Cardiovascular:      Rate and Rhythm: Normal rate and regular rhythm.   Pulmonary:      Breath sounds: Normal breath sounds.   Neurological:      Mental Status: He is alert and oriented to person, place, and time.           RECENT LABS:  Glucose   Date Value Ref Range Status   04/02/2021 66 65 - 99 mg/dL Final     Sodium   Date Value Ref Range Status   04/02/2021 140 136 - 145 mmol/L Final   07/11/2018 138 134 - 144 mmol/L Final   05/17/2018 135 (L) 136 - 145 mmol/L Final     Potassium   Date Value Ref Range Status   04/02/2021 4.5 3.5 - 5.2 mmol/L Final     Comment:     " Slight hemolysis detected by analyzer. Results may be affected.   07/11/2018 4 3.5 - 5.1 mmol/L Final   05/17/2018 4.3 3.3 - 5.0 mmol/L Final     CO2   Date Value Ref Range Status   04/02/2021 29.0 22.0 - 29.0 mmol/L Final     Total CO2   Date Value Ref Range Status   05/17/2018 26 20 - 31 mmol/L Final     Chloride   Date Value Ref Range Status   04/02/2021 103 98 - 107 mmol/L Final   07/11/2018 105 98 - 107 mmol/L Final   05/17/2018 104 99 - 111 mmol/L Final     Anion Gap   Date Value Ref Range Status   04/02/2021 8.0 5.0 - 15.0 mmol/L Final   05/17/2018 9 4 - 16 mmol/L Final     Creatinine   Date Value Ref Range Status   04/02/2021 0.81 0.76 - 1.27 mg/dL Final   07/11/2018 1 0.6 - 1.3 mg/dL Final   05/17/2018 1.0 0.6 - 1.3 mg/dL Final     BUN   Date Value Ref Range Status   04/02/2021 27 (H) 8 - 23 mg/dL Final   07/11/2018 34 (H) 7 - 18 mg/dL Final     BUN/Creatinine Ratio   Date Value Ref Range Status   04/02/2021 33.3 (H) 7.0 - 25.0 Final     Calcium   Date Value Ref Range Status   04/02/2021 9.5 8.6 - 10.5 mg/dL Final   07/11/2018 8.8 8.8 - 10.5 mg/dL Final     eGFR Non  Amer   Date Value Ref Range Status   04/02/2021 96 >60 mL/min/1.73 Final     Alkaline Phosphatase   Date Value Ref Range Status   04/02/2021 72 39 - 117 U/L Final   07/11/2018 69 46 - 116 U/L Final     Total Protein   Date Value Ref Range Status   04/02/2021 7.4 6.0 - 8.5 g/dL Final   07/11/2018 7.4 6.4 - 8.2 g/dL Final     ALT (SGPT)   Date Value Ref Range Status   04/02/2021 19 1 - 41 U/L Final   07/11/2018 19 (L) 30 - 65 U/L Final     AST (SGOT)   Date Value Ref Range Status   04/02/2021 18 1 - 40 U/L Final   07/11/2018 17 15 - 37 U/L Final     Total Bilirubin   Date Value Ref Range Status   04/02/2021 0.4 0.0 - 1.2 mg/dL Final   07/11/2018 0.3 0 - 1.0 mg/dL Final     Albumin   Date Value Ref Range Status   04/02/2021 3.90 3.50 - 5.20 g/dL Final   07/11/2018 3.5 3.4 - 5.0 g/dL Final     Globulin   Date Value Ref Range Status    04/02/2021 3.5 gm/dL Final     Lab Results   Component Value Date    WBC 5.03 04/02/2021    HGB 14.9 04/02/2021    HCT 44.5 04/02/2021    MCV 93.9 04/02/2021     04/02/2021     Lab Results   Component Value Date    NEUTROABS 2.65 04/02/2021    IRON 71 04/02/2021    IRON 115 11/30/2020    IRON 74 08/31/2020    TIBC 367 04/02/2021    TIBC 355 11/30/2020    TIBC 349 08/31/2020    LABIRON 19 (L) 04/02/2021    LABIRON 32 11/30/2020    LABIRON 21 08/31/2020    FERRITIN 107.30 04/02/2021    FERRITIN 116.00 11/30/2020    FERRITIN 84.39 08/31/2020    EJZTTPYR53 1,161 (H) 04/02/2021    QTHHDJFQ76 >2,000 (H) 08/31/2020    ALCGKPLX22 757 06/01/2020    FOLATE 15.50 04/02/2021    FOLATE 13.10 08/31/2020    FOLATE 13.20 06/01/2020     No results found for: , LABCA2, AFPTM, HCGQUANT, , CHROMGRNA, [id number], CEA, REFLABREPO      PATHOLOGY:  * Cannot find OR log *         RADIOLOGY DATA :  No radiology results for the last 7 days        Assessment/Plan     1.  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  Patient received chemotherapy on day 1 and day 29 with 5-FU and mitomycin with radiation  -Flexible sigmoidoscopy done by Dr. Gordillo on November 5, 2020 did not show any evidence of residual tumor  -CT of chest abdomen and pelvis with contrast done on November 30, 2020 did not show any evidence of recurrence.  -We will continue with clinical surveillance for now.  -We will ask patient to return to clinic in 4 months with repeat CBC, CMP, PSA, iron studies, ferritin, B12 and folate to be done prior to that  -Neck schedule surveillance CT of chest abdomen and pelvis with contrast will be done in December 2021    2.  Prostatic adenocarcinoma, stage II, pT2 PN 0:  -S/p radical prostatectomy on May 16, 2018  -PSA done on April 2, 2021 is less than  0.014.  -We will recheck PSA for next clinic visit in 4 months    3.  Sclerotic bone lesion on vertebral body as well as ribs  -Recent surveillance CT of chest done on November 30, 2020 showed stable bone lesion consistent with bone island.  -We will repeat CT of chest in December 2021    4.  Anemia:  -Anemia work-up done on April 2, 2021 shows hemoglobin is 14.9  -Recommend continuing B12 and folic acid 3 times a week  -Iron studies are consistent with developing iron deficiency with iron saturation of 19%  -due to inability to absorb iron by mouth in the past.  We will start patient on intravenous Feraheme starting next week.  Side effect of Feraheme including allergic reaction were discussed with patient today.  -Prescription for folic acid has been sent to his pharmacy today    5.  Health maintenance: Patient does not smoke.  Had a colonoscopy in May 2019 by Dr. Gordillo    6. Advance Care Planning: For now patient remains full code and is able to make decisions.  Patient has health care surrogate mentioned on chart.               PHQ-9 Total Score: 0   -Patient is not homicidal or suicidal.  No acute intervention required.        Juan Antonio Shaw reports a pain score of 0.  Given his pain assessment as noted, treatment options were discussed and the following options were decided upon as a follow-up plan to address the patient's pain: No acute intervention required.         Terry Ly MD  4/5/2021  08:04 CDT        Part of this note may be an electronic transcription/translation of spoken language to printed text using the Dragon Dictation System.          CC:

## 2021-05-05 ENCOUNTER — OFFICE VISIT (OUTPATIENT)
Dept: SURGERY | Facility: CLINIC | Age: 66
End: 2021-05-05

## 2021-05-05 VITALS
HEART RATE: 65 BPM | SYSTOLIC BLOOD PRESSURE: 110 MMHG | BODY MASS INDEX: 34.09 KG/M2 | WEIGHT: 217.2 LBS | HEIGHT: 67 IN | DIASTOLIC BLOOD PRESSURE: 68 MMHG | OXYGEN SATURATION: 97 %

## 2021-05-05 DIAGNOSIS — L82.1 SEBORRHEIC KERATOSES: Primary | ICD-10-CM

## 2021-05-05 PROCEDURE — 99212 OFFICE O/P EST SF 10 MIN: CPT | Performed by: SURGERY

## 2021-05-05 RX ORDER — SILDENAFIL 100 MG/1
100 TABLET, FILM COATED ORAL AS NEEDED
COMMUNITY
Start: 2021-04-23 | End: 2021-07-20

## 2021-05-05 NOTE — PATIENT INSTRUCTIONS
"BMI for Adults  What is BMI?  Body mass index (BMI) is a number that is calculated from a person's weight and height. BMI can help estimate how much of a person's weight is composed of fat. BMI does not measure body fat directly. Rather, it is an alternative to procedures that directly measure body fat, which can be difficult and expensive.  BMI can help identify people who may be at higher risk for certain medical problems.  What are BMI measurements used for?  BMI is used as a screening tool to identify possible weight problems. It helps determine whether a person is obese, overweight, a healthy weight, or underweight.  BMI is useful for:  · Identifying a weight problem that may be related to a medical condition or may increase the risk for medical problems.  · Promoting changes, such as changes in diet and exercise, to help reach a healthy weight. BMI screening can be repeated to see if these changes are working.  How is BMI calculated?  BMI involves measuring your weight in relation to your height. Both height and weight are measured, and the BMI is calculated from those numbers. This can be done either in English (U[place] or metric measurements. Note that charts and online BMI calculators are available to help you find your BMI quickly and easily without having to do these calculations yourself.  To calculate your BMI in English (U.S[place] measurements:    1. Measure your weight in pounds (lb).  2. Multiply the number of pounds by 703.  ? For example, for a person who weighs 180 lb, multiply that number by 703, which equals 126,540.  3. Measure your height in inches. Then multiply that number by itself to get a measurement called \"inches squared.\"  ? For example, for a person who is 70 inches tall, the \"inches squared\" measurement is 70 inches x 70 inches, which equals 4,900 inches squared.  4. Divide the total from step 2 (number of lb x 703) by the total from step 3 (inches squared): 126,540 ÷ 4,900 = 25.8. This is " "your BMI.  To calculate your BMI in metric measurements:  1. Measure your weight in kilograms (kg).  2. Measure your height in meters (m). Then multiply that number by itself to get a measurement called \"meters squared.\"  ? For example, for a person who is 1.75 m tall, the \"meters squared\" measurement is 1.75 m x 1.75 m, which is equal to 3.1 meters squared.  3. Divide the number of kilograms (your weight) by the meters squared number. In this example: 70 ÷ 3.1 = 22.6. This is your BMI.  What do the results mean?  BMI charts are used to identify whether you are underweight, normal weight, overweight, or obese. The following guidelines will be used:  · Underweight: BMI less than 18.5.  · Normal weight: BMI between 18.5 and 24.9.  · Overweight: BMI between 25 and 29.9.  · Obese: BMI of 30 or above.  Keep these notes in mind:  · Weight includes both fat and muscle, so someone with a muscular build, such as an athlete, may have a BMI that is higher than 24.9. In cases like these, BMI is not an accurate measure of body fat.  · To determine if excess body fat is the cause of a BMI of 25 or higher, further assessments may need to be done by a health care provider.  · BMI is usually interpreted in the same way for men and women.  Where to find more information  For more information about BMI, including tools to quickly calculate your BMI, go to these websites:  · Centers for Disease Control and Prevention: www.cdc.gov  · American Heart Association: www.heart.org  · National Heart, Lung, and Blood [place]: www.nhlbi.nih.gov  Summary  · Body mass index (BMI) is a number that is calculated from a person's weight and height.  · BMI may help estimate how much of a person's weight is composed of fat. BMI can help identify those who may be at higher risk for certain medical problems.  · BMI can be measured using English measurements or metric measurements.  · BMI charts are used to identify whether you are underweight, normal " weight, overweight, or obese.  This information is not intended to replace advice given to you by your health care provider. Make sure you discuss any questions you have with your health care provider.  Document Revised: 09/09/2020 Document Reviewed: 07/17/2020  Elsevier Patient Education © 2021 [employer]

## 2021-05-05 NOTE — PROGRESS NOTES
Chief Complaint   Patient presents with   • Follow-up     recheck spot on back        HPI  Lesion has resolved with cryo.  Past Medical History:   Diagnosis Date   • Hypertension    • Prostate cancer (CMS/HCC) 05/16/2018   • Rectal cancer (CMS/HCC) 06/2019       Past Surgical History:   Procedure Laterality Date   • CHOLECYSTECTOMY WITH INTRAOPERATIVE CHOLANGIOGRAM N/A 6/5/2020    Procedure: LAPAROSCOPIC POSSIBLE OPEN CHOLECYSTECTOMY WITH INTRAOPERATIVE CHOLANGIOGRAM           (C-ARM#1);  Surgeon: Benson Wasserman MD;  Location: [facility] OR;  Service: General;  Laterality: N/A;   • COLONOSCOPY N/A 5/30/2019    Procedure: COLONOSCOPY;  Surgeon: Jarek Gordillo DO;  Location: [facility] ENDOSCOPY;  Service: Gastroenterology   • ENDOSCOPY N/A 5/30/2019    Procedure: ESOPHAGOGASTRODUODENOSCOPY;  Surgeon: Jarek Gordillo DO;  Location: [facility] ENDOSCOPY;  Service: Gastroenterology   • PROSTATE SURGERY     • SIGMOIDOSCOPY N/A 11/5/2019    Procedure: SIGMOIDOSCOPY FLEXIBLE;  Surgeon: Jarek Gordillo DO;  Location: [facility] ENDOSCOPY;  Service: Gastroenterology   • SIGMOIDOSCOPY N/A 5/20/2020    Procedure: SIGMOIDOSCOPY FLEXIBLE;  Surgeon: Jarek Gordillo DO;  Location: [facility] ENDOSCOPY;  Service: Gastroenterology;  Laterality: N/A;   • SIGMOIDOSCOPY N/A 11/5/2020    Procedure: SIGMOIDOSCOPY FLEXIBLE;  Surgeon: Jarek Gordillo DO;  Location: [facility] ENDOSCOPY;  Service: Gastroenterology;  Laterality: N/A;   • VENOUS ACCESS DEVICE (PORT) INSERTION N/A 6/27/2019    Procedure: INSERTION VENOUS ACCESS DEVICE (MEDIPORT)           (C-ARM#1);  Surgeon: Benson Wasserman MD;  Location: [facility] OR;  Service: General   • VENOUS ACCESS DEVICE (PORT) REMOVAL Right 7/20/2019    Procedure: REMOVAL VENOUS ACCESS DEVICE;  Surgeon: Benson Wasserman MD;  Location: [facility];  Service: General         Current Outpatient Medications:   •  docusate sodium (COLACE) 100 MG capsule, Take 1 capsule by mouth 2 (Two) Times a Day As Needed for  Constipation., Disp: 60 capsule, Rfl: 2  •  folic acid (FOLVITE) 1 MG tablet, Take 1 tablet by mouth on Monday, Wednesday and Friday., Disp: 36 tablet, Rfl: 1  •  HYDROcodone-acetaminophen (NORCO) 7.5-325 MG per tablet, Take 1 tablet by mouth Every 6 (Six) Hours As Needed for Moderate Pain  or Severe Pain ., Disp: 18 tablet, Rfl: 0  •  nebivolol (BYSTOLIC) 10 MG tablet, Take 10 mg by mouth Every Night., Disp: , Rfl:   •  oxybutynin (DITROPAN) 5 MG tablet, Take 1 tablet by mouth 3 (Three) Times a Day., Disp: , Rfl:   •  pantoprazole (PROTONIX) 40 MG EC tablet, TAKE 1 TABLET BY MOUTH EVERY MORNING, Disp: , Rfl:   •  vitamin B-12 (CYANOCOBALAMIN) 1000 MCG tablet, Take 1 tablet by mouth on Monday, Wednesday and Friday, Disp: 36 tablet, Rfl: 1  •  sildenafil (REVATIO) 20 MG tablet, Take 20 mg by mouth Daily As Needed., Disp: , Rfl:   •  sildenafil (VIAGRA) 100 MG tablet, Take 100 mg by mouth As Needed., Disp: , Rfl:     No Known Allergies    Family History   Problem Relation Age of Onset   • Uterine cancer Mother        Social History     Socioeconomic History   • Marital status:      Spouse name: Jeffrey   • Number of children: 2   • Years of education: 15   • Highest education level: Not on file   Tobacco Use   • Smoking status: Never Smoker   • Smokeless tobacco: Never Used   Vaping Use   • Vaping Use: Never used   Substance and Sexual Activity   • Alcohol use: Yes     Comment: rarely   • Drug use: No   • Sexual activity: Defer           Physical Exam  Skin:                 ASSESSMENT    Diagnoses and all orders for this visit:    1. Seborrheic keratoses (Primary)        PLAN    1. Recheck as needed              This document has been electronically signed by Benson Wasserman MD on May 5, 2021 09:18 CDT

## 2021-05-15 ENCOUNTER — LAB (OUTPATIENT)
Dept: LAB | Facility: HOSPITAL | Age: 66
End: 2021-05-15

## 2021-05-15 DIAGNOSIS — Z01.818 PREOP TESTING: Primary | ICD-10-CM

## 2021-05-15 LAB — SARS-COV-2 N GENE RESP QL NAA+PROBE: NOT DETECTED

## 2021-05-15 PROCEDURE — C9803 HOPD COVID-19 SPEC COLLECT: HCPCS

## 2021-05-15 PROCEDURE — 87635 SARS-COV-2 COVID-19 AMP PRB: CPT

## 2021-05-18 ENCOUNTER — ANESTHESIA (OUTPATIENT)
Dept: GASTROENTEROLOGY | Facility: HOSPITAL | Age: 66
End: 2021-05-18

## 2021-05-18 ENCOUNTER — HOSPITAL ENCOUNTER (OUTPATIENT)
Facility: HOSPITAL | Age: 66
Setting detail: HOSPITAL OUTPATIENT SURGERY
Discharge: HOME | End: 2021-05-18
Attending: INTERNAL MEDICINE | Admitting: INTERNAL MEDICINE

## 2021-05-18 ENCOUNTER — ANESTHESIA EVENT (OUTPATIENT)
Dept: GASTROENTEROLOGY | Facility: HOSPITAL | Age: 66
End: 2021-05-18

## 2021-05-18 VITALS
RESPIRATION RATE: 18 BRPM | HEART RATE: 67 BPM | SYSTOLIC BLOOD PRESSURE: 114 MMHG | WEIGHT: 212 LBS | TEMPERATURE: 97.2 F | BODY MASS INDEX: 31.4 KG/M2 | DIASTOLIC BLOOD PRESSURE: 66 MMHG | HEIGHT: 69 IN | OXYGEN SATURATION: 97 %

## 2021-05-18 DIAGNOSIS — C44.520 SQUAMOUS CELL CARCINOMA OF PERIANAL REGION: ICD-10-CM

## 2021-05-18 PROCEDURE — 25010000002 PROPOFOL 10 MG/ML EMULSION: Performed by: NURSE ANESTHETIST, CERTIFIED REGISTERED

## 2021-05-18 PROCEDURE — 25010000002 FENTANYL CITRATE (PF) 50 MCG/ML SOLUTION: Performed by: NURSE ANESTHETIST, CERTIFIED REGISTERED

## 2021-05-18 RX ORDER — FENTANYL CITRATE 50 UG/ML
INJECTION, SOLUTION INTRAMUSCULAR; INTRAVENOUS AS NEEDED
Status: DISCONTINUED | OUTPATIENT
Start: 2021-05-18 | End: 2021-05-18 | Stop reason: SURG

## 2021-05-18 RX ORDER — DEXTROSE AND SODIUM CHLORIDE 5; .45 G/100ML; G/100ML
30 INJECTION, SOLUTION INTRAVENOUS CONTINUOUS PRN
Status: DISCONTINUED | OUTPATIENT
Start: 2021-05-18 | End: 2021-05-18 | Stop reason: HOSPADM

## 2021-05-18 RX ORDER — PROPOFOL 10 MG/ML
VIAL (ML) INTRAVENOUS AS NEEDED
Status: DISCONTINUED | OUTPATIENT
Start: 2021-05-18 | End: 2021-05-18 | Stop reason: SURG

## 2021-05-18 RX ADMIN — PROPOFOL 50 MG: 10 INJECTION, EMULSION INTRAVENOUS at 09:32

## 2021-05-18 RX ADMIN — PROPOFOL 90 MG: 10 INJECTION, EMULSION INTRAVENOUS at 09:21

## 2021-05-18 RX ADMIN — FENTANYL CITRATE 70 MCG: 50 INJECTION INTRAMUSCULAR; INTRAVENOUS at 09:21

## 2021-05-18 RX ADMIN — PROPOFOL 10 MG: 10 INJECTION, EMULSION INTRAVENOUS at 09:26

## 2021-05-18 RX ADMIN — DEXTROSE AND SODIUM CHLORIDE 30 ML/HR: 5; 450 INJECTION, SOLUTION INTRAVENOUS at 08:26

## 2021-05-18 NOTE — ANESTHESIA POSTPROCEDURE EVALUATION
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          Post Anesthesia Care and Evaluation    Patient location during evaluation: PACU  Patient participation: complete - patient participated  Level of consciousness: awake and alert  Pain score: 0  Pain management: adequate  Airway patency: patent  Anesthetic complications: No anesthetic complications  PONV Status: none  Cardiovascular status: acceptable  Respiratory status: acceptable  Hydration status: acceptable    Comments: ---------------------------               05/18/21                      0936        ---------------------------   BP:          135/74         Pulse:         82           Resp:          18           Temp:   97.5 °F (36.4 °C)   SpO2:          99%         ---------------------------

## 2021-05-18 NOTE — ANESTHESIA PREPROCEDURE EVALUATION
Anesthesia Evaluation     Patient summary reviewed and Nursing notes reviewed   NPO Solid Status: > 8 hours  NPO Liquid Status: > 4 hours           Airway   Mallampati: II  TM distance: >3 FB  Neck ROM: full  Possible difficult intubation  Dental - normal exam     Pulmonary - negative pulmonary ROS and normal exam   Cardiovascular - normal exam    (+) hypertension well controlled less than 2 medications,       Neuro/Psych- negative ROS  GI/Hepatic/Renal/Endo - negative ROS     Musculoskeletal (-) negative ROS    Abdominal    Substance History      OB/GYN          Other      history of cancer      Other Comment: Anal and Prostate cancer                    Anesthesia Plan    ASA 3     MAC     intravenous induction       Plan discussed with CRNA.

## 2021-05-18 NOTE — H&P
Sam Espinosa DO,[facility]  Gastroenterology  Hepatology  Endoscopy  Board Certified in Internal Medicine and gastroenterology  [address], [address]  [place]. [address]  T- [phone]   F - [phone]     GASTROENTEROLOGY HISTORY AND PHYSICAL  NOTE   SAM ESPINOSA DO.         SUBJECTIVE:   5/18/2021    Name: Juan Antonio Shaw  DOD: 1955        Chief Complaint:     Subjective : Personal history of anal cancer    Patient is 66 y.o. male presents with desire for elective colonoscopy     ROS/HISTORY/ CURRENT MEDICATIONS/OBJECTIVE/VS/PE:   Review of Systems:  All systems unremarkable unless specified below.  Constitutional   HENT  Eyes   Respiratory    Cardiovascular  Gastrointestinal   Endocrine  Genitourinary    Musculoskeletal   Skin  Allergic/Immunologic    Neurological    Hematological  Psychiatric/Behavioral    History:     Past Medical History:   Diagnosis Date   • Hypertension    • Prostate cancer (CMS/HCC) 05/16/2018   • Rectal cancer (CMS/HCC) 06/2019     Past Surgical History:   Procedure Laterality Date   • CHOLECYSTECTOMY WITH INTRAOPERATIVE CHOLANGIOGRAM N/A 6/5/2020    Procedure: LAPAROSCOPIC POSSIBLE OPEN CHOLECYSTECTOMY WITH INTRAOPERATIVE CHOLANGIOGRAM           (C-ARM#1);  Surgeon: Benson Wasserman MD;  Location: [facility] OR;  Service: General;  Laterality: N/A;   • COLONOSCOPY N/A 5/30/2019    Procedure: COLONOSCOPY;  Surgeon: Sam Espinosa DO;  Location: [facility] ENDOSCOPY;  Service: Gastroenterology   • ENDOSCOPY N/A 5/30/2019    Procedure: ESOPHAGOGASTRODUODENOSCOPY;  Surgeon: Sam Espinosa DO;  Location: [facility] ENDOSCOPY;  Service: Gastroenterology   • PROSTATE SURGERY     • SIGMOIDOSCOPY N/A 11/5/2019    Procedure: SIGMOIDOSCOPY FLEXIBLE;  Surgeon: Sam Espinosa DO;  Location: [facility] ENDOSCOPY;  Service: Gastroenterology   • SIGMOIDOSCOPY N/A 5/20/2020    Procedure: SIGMOIDOSCOPY FLEXIBLE;  Surgeon: Sam Espinosa DO;  Location: [facility] ENDOSCOPY;  Service:  Gastroenterology;  Laterality: N/A;   • SIGMOIDOSCOPY N/A 11/5/2020    Procedure: SIGMOIDOSCOPY FLEXIBLE;  Surgeon: Jarek Gordillo DO;  Location: [facility] ENDOSCOPY;  Service: Gastroenterology;  Laterality: N/A;   • VENOUS ACCESS DEVICE (PORT) INSERTION N/A 6/27/2019    Procedure: INSERTION VENOUS ACCESS DEVICE (MEDIPORT)           (C-ARM#1);  Surgeon: Benson Wasserman MD;  Location: [facility] OR;  Service: General   • VENOUS ACCESS DEVICE (PORT) REMOVAL Right 7/20/2019    Procedure: REMOVAL VENOUS ACCESS DEVICE;  Surgeon: Benson Wasserman MD;  Location: [facility] OR;  Service: General     Family History   Problem Relation Age of Onset   • Uterine cancer Mother      Social History     Tobacco Use   • Smoking status: Never Smoker   • Smokeless tobacco: Never Used   Vaping Use   • Vaping Use: Never used   Substance Use Topics   • Alcohol use: Yes     Comment: rarely   • Drug use: No     Prior to Admission medications    Medication Sig Start Date End Date Taking? Authorizing Provider   docusate sodium (COLACE) 100 MG capsule Take 1 capsule by mouth 2 (Two) Times a Day As Needed for Constipation. 10/24/19  Yes Terry Ly MD   folic acid (FOLVITE) 1 MG tablet Take 1 tablet by mouth on Monday, Wednesday and Friday. 4/5/21  Yes Terry Ly MD   nebivolol (BYSTOLIC) 10 MG tablet Take 10 mg by mouth Every Night.   Yes Andreea Chery MD   vitamin B-12 (CYANOCOBALAMIN) 1000 MCG tablet Take 1 tablet by mouth on Monday, Wednesday and Friday 9/1/20  Yes Terry Ly MD   HYDROcodone-acetaminophen (NORCO) 7.5-325 MG per tablet Take 1 tablet by mouth Every 6 (Six) Hours As Needed for Moderate Pain  or Severe Pain . 6/5/20   Benson Wasserman MD   oxybutynin (DITROPAN) 5 MG tablet Take 1 tablet by mouth 3 (Three) Times a Day. 6/14/19   Andreea Chery MD   pantoprazole (PROTONIX) 40 MG EC tablet TAKE 1 TABLET BY MOUTH EVERY MORNING 4/7/20   Andreea Chery MD   sildenafil (REVATIO) 20 MG tablet Take 20 mg by mouth  Daily As Needed. 6/8/20   ProviderAndreea MD   sildenafil (VIAGRA) 100 MG tablet Take 100 mg by mouth As Needed. 4/23/21   Andreea Chery MD     Allergies:  Patient has no known allergies.    I have reviewed the patients medical history, surgical history and family history in the available medical record system.     Current Medications:     Current Facility-Administered Medications   Medication Dose Route Frequency Provider Last Rate Last Admin   • dextrose 5 % and sodium chloride 0.45 % infusion  30 mL/hr Intravenous Continuous PRN Jarek Gordillo DO 30 mL/hr at 05/18/21 0826 30 mL/hr at 05/18/21 0826       Objective     Physical Exam:   Temp:  [97.5 °F (36.4 °C)] 97.5 °F (36.4 °C)  Heart Rate:  [82] 82  Resp:  [18] 18  BP: (135)/(74) 135/74    Physical Exam:  General Appearance:    Alert, cooperative, in no acute distress   Head:    Normocephalic, without obvious abnormality, atraumatic   Eyes:            Lids and lashes normal, conjunctivae and sclerae normal, no icterus, no pallor, corneas clear, PERRLA   Ears:    Ears appear intact with no abnormalities noted   Throat:   No oral lesions, no thrush, oral mucosa moist   Neck:   No adenopathy, supple, trachea midline, no thyromegaly, no  carotid bruit, no JVD   Back:     No kyphosis present, no scoliosis present, no skin lesions,   erythema or scars, no tenderness to percussion or                 palpation,  range of motion normal   Lungs:     Clear to auscultation,respirations regular, even and         unlabored    Heart:    Regular rhythm and normal rate, normal S1 and S2, no  murmur, no gallop, no rub, no click   Breast Exam:    Deferred   Abdomen:     Normal bowel sounds, no masses, no organomegaly, soft  nontender, nondistended, no guarding, no rebound                 tenderness   Genitalia:    Deferred   Extremities:   Moves all extremities well, no edema, no cyanosis, no          redness   Pulses:   Pulses palpable and equal bilaterally    Skin:   No bleeding, bruising or rash   Lymph nodes:   No palpable adenopathy   Neurologic:   Cranial nerves 2 - 12 grossly intact, sensation intact, DTR     present and equal bilaterally      Results Review:     Lab Results   Component Value Date    WBC 5.03 04/02/2021    WBC 5.44 11/30/2020    WBC 4.51 08/31/2020    HGB 14.9 04/02/2021    HGB 14.9 11/30/2020    HGB 14.7 08/31/2020    HCT 44.5 04/02/2021    HCT 43.3 11/30/2020    HCT 43.6 08/31/2020     04/02/2021     11/30/2020     08/31/2020             No results found for: LIPASE  Lab Results   Component Value Date    INR 1.02 05/09/2018     No results found for: RESPCX    Radiology Review:  Imaging Results (Last 72 Hours)     ** No results found for the last 72 hours. **           I reviewed the patient's new clinical results.  I reviewed the patient's new imaging results and agree with the interpretation.     ASSESSMENT/PLAN:   ASSESSMENT:  1.  Personal history of anal cancer    PLAN:  1.  Colonoscopy    Risk and benefits associated with the procedure are reviewed with the patient.  The patient wished to proceed     Jarek Gordillo DO  05/18/21  09:06 CDT

## 2021-05-20 LAB
LAB AP CASE REPORT: NORMAL
PATH REPORT.FINAL DX SPEC: NORMAL

## 2021-06-29 ENCOUNTER — TRANSCRIBE ORDERS (OUTPATIENT)
Dept: ORTHOPEDIC SURGERY | Facility: CLINIC | Age: 66
End: 2021-06-29

## 2021-06-29 DIAGNOSIS — M25.562 LEFT KNEE PAIN, UNSPECIFIED CHRONICITY: Primary | ICD-10-CM

## 2021-07-20 ENCOUNTER — OFFICE VISIT (OUTPATIENT)
Dept: ORTHOPEDIC SURGERY | Facility: CLINIC | Age: 66
End: 2021-07-20

## 2021-07-20 VITALS
HEIGHT: 69 IN | OXYGEN SATURATION: 95 % | BODY MASS INDEX: 31.15 KG/M2 | HEART RATE: 55 BPM | DIASTOLIC BLOOD PRESSURE: 80 MMHG | WEIGHT: 210.3 LBS | SYSTOLIC BLOOD PRESSURE: 140 MMHG

## 2021-07-20 DIAGNOSIS — M25.562 LEFT KNEE PAIN, UNSPECIFIED CHRONICITY: Primary | ICD-10-CM

## 2021-07-20 DIAGNOSIS — M23.92 INTERNAL DERANGEMENT OF LEFT KNEE: Primary | ICD-10-CM

## 2021-07-20 DIAGNOSIS — M25.562 ACUTE PAIN OF LEFT KNEE: ICD-10-CM

## 2021-07-20 PROCEDURE — 99204 OFFICE O/P NEW MOD 45 MIN: CPT | Performed by: ORTHOPAEDIC SURGERY

## 2021-07-20 RX ORDER — MELOXICAM 15 MG/1
TABLET ORAL
Qty: 30 TABLET | Refills: 3 | Status: SHIPPED | OUTPATIENT
Start: 2021-07-20

## 2021-07-20 NOTE — PROGRESS NOTES
Juan Antonio Shaw is a 66 y.o. male   Primary provider:  Sukumar Aleman MD       Chief Complaint   Patient presents with   • Left Knee - Pain   • Initial Evaluation     Xray today       HISTORY OF PRESENT ILLNESS:    Patient states that he has been having pain in his left knee for approximately 6 weeks.  He has pain with lunging, twisting.  This type of movement gives him a sharp stabbing pain.  He has a diffuse achy pain all the time.  He does report pain at night.  He had a specific injury 6 weeks ago at which time he had a twist and felt a distinct pop in his knee.  He has had pain since that time.    Pain  This is a new problem. Episode onset: 6 weeks ago. The problem occurs intermittently. Associated symptoms comments: Grinding, stabbing, clicking, popping, bruising, swelling. The symptoms are aggravated by standing and walking. He has tried rest for the symptoms. The treatment provided mild relief.        CONCURRENT MEDICAL HISTORY:    Past Medical History:   Diagnosis Date   • Hypertension    • Prostate cancer (CMS/[facility]) 05/16/2018   • Rectal cancer (CMS/[facility]) 06/2019       No Known Allergies      Current Outpatient Medications:   •  folic acid (FOLVITE) 1 MG tablet, Take 1 tablet by mouth on Monday, Wednesday and Friday., Disp: 36 tablet, Rfl: 1  •  nebivolol (BYSTOLIC) 10 MG tablet, Take 10 mg by mouth Every Night., Disp: , Rfl:   •  pantoprazole (PROTONIX) 40 MG EC tablet, TAKE 1 TABLET BY MOUTH EVERY MORNING, Disp: , Rfl:   •  vitamin B-12 (CYANOCOBALAMIN) 1000 MCG tablet, Take 1 tablet by mouth on Monday, Wednesday and Friday, Disp: 36 tablet, Rfl: 1  •  meloxicam (MOBIC) 15 MG tablet, 1 PO Daily with food., Disp: 30 tablet, Rfl: 3    Past Surgical History:   Procedure Laterality Date   • CHOLECYSTECTOMY WITH INTRAOPERATIVE CHOLANGIOGRAM N/A 6/5/2020    Procedure: LAPAROSCOPIC POSSIBLE OPEN CHOLECYSTECTOMY WITH INTRAOPERATIVE CHOLANGIOGRAM           (C-ARM#1);  Surgeon: Benson Wasserman MD;  Location:   [facility] OR;  Service: General;  Laterality: N/A;   • COLONOSCOPY N/A 5/30/2019    Procedure: COLONOSCOPY;  Surgeon: Jarek Gordillo DO;  Location: [facility] ENDOSCOPY;  Service: Gastroenterology   • COLONOSCOPY N/A 5/18/2021    Procedure: COLONOSCOPY;  Surgeon: Jarek Gordillo DO;  Location: [facility] ENDOSCOPY;  Service: Gastroenterology;  Laterality: N/A;   • ENDOSCOPY N/A 5/30/2019    Procedure: ESOPHAGOGASTRODUODENOSCOPY;  Surgeon: Jarek Gordillo DO;  Location: [facility] ENDOSCOPY;  Service: Gastroenterology   • PROSTATE SURGERY     • SIGMOIDOSCOPY N/A 11/5/2019    Procedure: SIGMOIDOSCOPY FLEXIBLE;  Surgeon: Jarek Gordillo DO;  Location: [facility] ENDOSCOPY;  Service: Gastroenterology   • SIGMOIDOSCOPY N/A 5/20/2020    Procedure: SIGMOIDOSCOPY FLEXIBLE;  Surgeon: Jarek Gordillo DO;  Location: [facility] ENDOSCOPY;  Service: Gastroenterology;  Laterality: N/A;   • SIGMOIDOSCOPY N/A 11/5/2020    Procedure: SIGMOIDOSCOPY FLEXIBLE;  Surgeon: Jarek Gordillo DO;  Location: [facility] ENDOSCOPY;  Service: Gastroenterology;  Laterality: N/A;   • VENOUS ACCESS DEVICE (PORT) INSERTION N/A 6/27/2019    Procedure: INSERTION VENOUS ACCESS DEVICE (MEDIPORT)           (C-ARM#1);  Surgeon: Benson Wasserman MD;  Location: [facility] OR;  Service: General   • VENOUS ACCESS DEVICE (PORT) REMOVAL Right 7/20/2019    Procedure: REMOVAL VENOUS ACCESS DEVICE;  Surgeon: Benson Wasserman MD;  Location: [facility] OR;  Service: General       Family History   Problem Relation Age of Onset   • Uterine cancer Mother        Social History     Socioeconomic History   • Marital status:      Spouse name: Jeffrey   • Number of children: 2   • Years of education: 15   • Highest education level: Not on file   Tobacco Use   • Smoking status: Never Smoker   • Smokeless tobacco: Never Used   Vaping Use   • Vaping Use: Never used   Substance and Sexual Activity   • Alcohol use: Yes     Comment: rarely   • Drug use: No   • Sexual activity: Defer        Review of  "Systems   Constitutional: Negative.    HENT: Negative.    Eyes: Negative.    Respiratory: Negative.    Cardiovascular: Negative.    Gastrointestinal: Negative.    Endocrine: Negative.    Genitourinary: Negative.    Musculoskeletal:        Left knee pain   Skin: Negative.    Allergic/Immunologic: Negative.    Neurological: Negative.    Hematological: Negative.    Psychiatric/Behavioral: Negative.        PHYSICAL EXAMINATION:       /80   Pulse 55   Ht 175.3 cm (69\")   Wt 95.4 kg (210 lb 4.8 oz)   SpO2 95%   BMI 31.06 kg/m²     Physical Exam  Constitutional:       General: He is not in acute distress.     Appearance: Normal appearance.   Pulmonary:      Effort: Pulmonary effort is normal. No respiratory distress.   Musculoskeletal:      Left knee:      Instability Tests: Medial Katy test positive.   Neurological:      Mental Status: He is alert and oriented to person, place, and time.         GAIT:     [x]  Normal  []  Antalgic    Assistive device: [x]  None  []  Walker     []  Crutches  []  Cane     []  Wheelchair  []  Stretcher    Left Knee Exam     Muscle Strength   The patient has normal left knee strength.    Tenderness   The patient is experiencing tenderness in the medial joint line and medial retinaculum.    Range of Motion   Extension: 0   Flexion: 80     Tests   Katy:  Medial - positive   Varus: negative Valgus: negative    Other   Erythema: absent  Sensation: normal  Pulse: present  Swelling: mild                  XR Knee 1 or 2 View Left    Result Date: 7/20/2021  Narrative: Ordering Provider:  Sergio Menon MD Ordering Diagnosis/Indication:  Left knee pain, unspecified chronicity Procedure:  XR KNEE 1 OR 2 VW LEFT Exam Date:  7/20/21 COMPARISON:  Not applicable, no relevant images available.     Impression:  AP bilateral standing of the knees with lateral of the left knee show acceptable position alignment of the right knee with mild medial joint space narrowing and mild " lateral subluxation of the tibia.  The left knee shows moderate to severe medial joint space narrowing with near complete medial joint space collapse.  Sclerosis of the tibial plateau is noted.  The lateral view shows mild to moderate arthritic change.  No acute findings. Sergio Menon MD 7/20/21     XR Knee Bilateral AP Standing    Result Date: 7/20/2021  Narrative: Ordering Provider:  Sergio Menon MD Ordering Diagnosis/Indication:  Left knee pain, unspecified chronicity Procedure:  XR KNEE BILATERAL AP STANDING Exam Date:  7/20/21 COMPARISON:  Not applicable, no relevant images available.     Impression:  AP bilateral standing of the knees with lateral of the left knee show acceptable position alignment of the right knee with mild medial joint space narrowing and mild lateral subluxation of the tibia.  The left knee shows moderate to severe medial joint space narrowing with near complete medial joint space collapse.  Sclerosis of the tibial plateau is noted.  The lateral view shows mild to moderate arthritic change.  No acute findings. Sergio Menon MD 7/20/21           ASSESSMENT:    Diagnoses and all orders for this visit:    Internal derangement of left knee  -     MRI Knee Left Without Contrast; Future    Acute pain of left knee  -     MRI Knee Left Without Contrast; Future    Other orders  -     meloxicam (MOBIC) 15 MG tablet; 1 PO Daily with food.          PLAN    We discussed continued activity modification.  Avoid specific motions to avoid specific pain  He has a specific injury and has had pain since that time.  His symptoms are consistent with meniscal tear.  He can only flex his knee to about 80 degrees.  The expectation is that he has a meniscal fragment or loose body that is locked preventing further flexion.    Plan is to proceed with MRI of the left knee.    We discussed meloxicam for anti-inflammatory usage.  We discussed risks and benefits of anti-inflammatory usage as  well as taking with food and watching for GI side effects.    Return for recheck for MRI results.    Sergio Menon MD

## 2021-07-26 ENCOUNTER — HOSPITAL ENCOUNTER (OUTPATIENT)
Dept: MRI IMAGING | Facility: HOSPITAL | Age: 66
Discharge: HOME | End: 2021-07-26
Admitting: ORTHOPAEDIC SURGERY

## 2021-07-26 DIAGNOSIS — M23.92 INTERNAL DERANGEMENT OF LEFT KNEE: ICD-10-CM

## 2021-07-26 DIAGNOSIS — M25.562 ACUTE PAIN OF LEFT KNEE: ICD-10-CM

## 2021-07-26 PROCEDURE — 73721 MRI JNT OF LWR EXTRE W/O DYE: CPT

## 2021-08-04 ENCOUNTER — TELEPHONE (OUTPATIENT)
Dept: ONCOLOGY | Facility: HOSPITAL | Age: 66
End: 2021-08-04

## 2021-08-05 ENCOUNTER — LAB (OUTPATIENT)
Dept: LAB | Facility: HOSPITAL | Age: 66
End: 2021-08-05

## 2021-08-05 ENCOUNTER — APPOINTMENT (OUTPATIENT)
Dept: ONCOLOGY | Facility: HOSPITAL | Age: 66
End: 2021-08-05

## 2021-08-05 DIAGNOSIS — C21.0 ANAL CANCER (HCC): ICD-10-CM

## 2021-08-05 DIAGNOSIS — C61 PROSTATE CANCER (HCC): ICD-10-CM

## 2021-08-05 DIAGNOSIS — M89.9 BONE LESION: ICD-10-CM

## 2021-08-05 LAB
ALBUMIN SERPL-MCNC: 4 G/DL (ref 3.5–5.2)
ALBUMIN/GLOB SERPL: 1.3 G/DL
ALP SERPL-CCNC: 68 U/L (ref 39–117)
ALT SERPL W P-5'-P-CCNC: 15 U/L (ref 1–41)
ANION GAP SERPL CALCULATED.3IONS-SCNC: 7 MMOL/L (ref 5–15)
AST SERPL-CCNC: 17 U/L (ref 1–40)
BASOPHILS # BLD AUTO: 0.03 10*3/MM3 (ref 0–0.2)
BASOPHILS NFR BLD AUTO: 0.6 % (ref 0–1.5)
BILIRUB SERPL-MCNC: 0.4 MG/DL (ref 0–1.2)
BUN SERPL-MCNC: 24 MG/DL (ref 8–23)
BUN/CREAT SERPL: 31.2 (ref 7–25)
CALCIUM SPEC-SCNC: 9.3 MG/DL (ref 8.6–10.5)
CHLORIDE SERPL-SCNC: 102 MMOL/L (ref 98–107)
CO2 SERPL-SCNC: 28 MMOL/L (ref 22–29)
CREAT SERPL-MCNC: 0.77 MG/DL (ref 0.76–1.27)
DEPRECATED RDW RBC AUTO: 44.6 FL (ref 37–54)
EOSINOPHIL # BLD AUTO: 0.07 10*3/MM3 (ref 0–0.4)
EOSINOPHIL NFR BLD AUTO: 1.4 % (ref 0.3–6.2)
ERYTHROCYTE [DISTWIDTH] IN BLOOD BY AUTOMATED COUNT: 12.9 % (ref 12.3–15.4)
FERRITIN SERPL-MCNC: 92.63 NG/ML (ref 30–400)
FOLATE SERPL-MCNC: 15.8 NG/ML (ref 4.78–24.2)
GFR SERPL CREATININE-BSD FRML MDRD: 101 ML/MIN/1.73
GLOBULIN UR ELPH-MCNC: 3 GM/DL
GLUCOSE SERPL-MCNC: 106 MG/DL (ref 65–99)
HCT VFR BLD AUTO: 41.6 % (ref 37.5–51)
HGB BLD-MCNC: 13.8 G/DL (ref 13–17.7)
IMM GRANULOCYTES # BLD AUTO: 0.02 10*3/MM3 (ref 0–0.05)
IMM GRANULOCYTES NFR BLD AUTO: 0.4 % (ref 0–0.5)
IRON 24H UR-MRATE: 88 MCG/DL (ref 59–158)
IRON SATN MFR SERPL: 26 % (ref 20–50)
LYMPHOCYTES # BLD AUTO: 1.73 10*3/MM3 (ref 0.7–3.1)
LYMPHOCYTES NFR BLD AUTO: 34.9 % (ref 19.6–45.3)
MCH RBC QN AUTO: 31.2 PG (ref 26.6–33)
MCHC RBC AUTO-ENTMCNC: 33.2 G/DL (ref 31.5–35.7)
MCV RBC AUTO: 93.9 FL (ref 79–97)
MONOCYTES # BLD AUTO: 0.57 10*3/MM3 (ref 0.1–0.9)
MONOCYTES NFR BLD AUTO: 11.5 % (ref 5–12)
NEUTROPHILS NFR BLD AUTO: 2.54 10*3/MM3 (ref 1.7–7)
NEUTROPHILS NFR BLD AUTO: 51.2 % (ref 42.7–76)
NRBC BLD AUTO-RTO: 0 /100 WBC (ref 0–0.2)
PLATELET # BLD AUTO: 264 10*3/MM3 (ref 140–450)
PMV BLD AUTO: 9.3 FL (ref 6–12)
POTASSIUM SERPL-SCNC: 4.4 MMOL/L (ref 3.5–5.2)
PROT SERPL-MCNC: 7 G/DL (ref 6–8.5)
PSA SERPL-MCNC: <0.014 NG/ML (ref 0–4)
RBC # BLD AUTO: 4.43 10*6/MM3 (ref 4.14–5.8)
SODIUM SERPL-SCNC: 137 MMOL/L (ref 136–145)
TIBC SERPL-MCNC: 340 MCG/DL (ref 298–536)
TRANSFERRIN SERPL-MCNC: 228 MG/DL (ref 200–360)
VIT B12 BLD-MCNC: 1001 PG/ML (ref 211–946)
WBC # BLD AUTO: 4.96 10*3/MM3 (ref 3.4–10.8)

## 2021-08-05 PROCEDURE — 82728 ASSAY OF FERRITIN: CPT

## 2021-08-05 PROCEDURE — 85025 COMPLETE CBC W/AUTO DIFF WBC: CPT

## 2021-08-05 PROCEDURE — 36415 COLL VENOUS BLD VENIPUNCTURE: CPT

## 2021-08-05 PROCEDURE — 83540 ASSAY OF IRON: CPT

## 2021-08-05 PROCEDURE — 84153 ASSAY OF PSA TOTAL: CPT

## 2021-08-05 PROCEDURE — 82607 VITAMIN B-12: CPT

## 2021-08-05 PROCEDURE — 80053 COMPREHEN METABOLIC PANEL: CPT

## 2021-08-05 PROCEDURE — 82746 ASSAY OF FOLIC ACID SERUM: CPT

## 2021-08-05 PROCEDURE — 84466 ASSAY OF TRANSFERRIN: CPT

## 2021-08-07 NOTE — PROGRESS NOTES
DATE OF VISIT: 8/12/2021      REASON FOR VISIT: Annual cancer, history of prostate cancer, sclerotic lesion of bone, anemia      HISTORY OF PRESENT ILLNESS:   66-year-old male with medical problem consisting of hypertension, prostatic adenocarcinoma s/p robotic prostatectomy in 2018, iron deficiency anemia has been following up with [facility] since October 20, 2019 for squamous cell cancer of anal canal.  Patient is s/p chemoradiation and currently on surveillance.  Patient is here for follow-up appointment today to discuss recently done blood work .  Denies any new lymph node enlargement.  Denies any unusual weight loss.          Past Medical History, Past Surgical History, Social History, Family History have been reviewed and are without significant changes except as mentioned.    Review of Systems   A comprehensive 14 point review of systems was performed and was negative except as mentioned in HPI.    Medications:  The current medication list was reviewed in the EMR    ALLERGIES:  No Known Allergies    Objective      Vitals:    08/12/21 0827   BP: 126/76   Pulse: 67   Resp: 18   Temp: 97.1 °F (36.2 °C)   TempSrc: Temporal   Weight: 95.3 kg (210 lb 3.2 oz)   PainSc: 0-No pain     Current Status 8/12/2021   ECOG score 0       Physical Exam  Cardiovascular:      Rate and Rhythm: Normal rate and regular rhythm.   Pulmonary:      Breath sounds: Normal breath sounds.   Neurological:      Mental Status: He is alert and oriented to person, place, and time.           RECENT LABS:  Glucose   Date Value Ref Range Status   08/05/2021 106 (H) 65 - 99 mg/dL Final     Sodium   Date Value Ref Range Status   08/05/2021 137 136 - 145 mmol/L Final   07/11/2018 138 134 - 144 mmol/L Final   05/17/2018 135 (L) 136 - 145 mmol/L Final     Potassium   Date Value Ref Range Status   08/05/2021 4.4 3.5 - 5.2 mmol/L Final   07/11/2018 4 3.5 - 5.1 mmol/L Final   05/17/2018 4.3 3.3 - 5.0 mmol/L Final     CO2   Date Value Ref Range  Status   08/05/2021 28.0 22.0 - 29.0 mmol/L Final     Total CO2   Date Value Ref Range Status   05/17/2018 26 20 - 31 mmol/L Final     Chloride   Date Value Ref Range Status   08/05/2021 102 98 - 107 mmol/L Final   07/11/2018 105 98 - 107 mmol/L Final   05/17/2018 104 99 - 111 mmol/L Final     Anion Gap   Date Value Ref Range Status   08/05/2021 7.0 5.0 - 15.0 mmol/L Final   05/17/2018 9 4 - 16 mmol/L Final     Creatinine   Date Value Ref Range Status   08/05/2021 0.77 0.76 - 1.27 mg/dL Final   07/11/2018 1 0.6 - 1.3 mg/dL Final   05/17/2018 1.0 0.6 - 1.3 mg/dL Final     BUN   Date Value Ref Range Status   08/05/2021 24 (H) 8 - 23 mg/dL Final   07/11/2018 34 (H) 7 - 18 mg/dL Final     BUN/Creatinine Ratio   Date Value Ref Range Status   08/05/2021 31.2 (H) 7.0 - 25.0 Final     Calcium   Date Value Ref Range Status   08/05/2021 9.3 8.6 - 10.5 mg/dL Final   07/11/2018 8.8 8.8 - 10.5 mg/dL Final     eGFR Non  Amer   Date Value Ref Range Status   08/05/2021 101 >60 mL/min/1.73 Final     Alkaline Phosphatase   Date Value Ref Range Status   08/05/2021 68 39 - 117 U/L Final   07/11/2018 69 46 - 116 U/L Final     Total Protein   Date Value Ref Range Status   08/05/2021 7.0 6.0 - 8.5 g/dL Final   07/11/2018 7.4 6.4 - 8.2 g/dL Final     ALT (SGPT)   Date Value Ref Range Status   08/05/2021 15 1 - 41 U/L Final   07/11/2018 19 (L) 30 - 65 U/L Final     AST (SGOT)   Date Value Ref Range Status   08/05/2021 17 1 - 40 U/L Final   07/11/2018 17 15 - 37 U/L Final     Total Bilirubin   Date Value Ref Range Status   08/05/2021 0.4 0.0 - 1.2 mg/dL Final   07/11/2018 0.3 0 - 1.0 mg/dL Final     Albumin   Date Value Ref Range Status   08/05/2021 4.00 3.50 - 5.20 g/dL Final   07/11/2018 3.5 3.4 - 5.0 g/dL Final     Globulin   Date Value Ref Range Status   08/05/2021 3.0 gm/dL Final     Lab Results   Component Value Date    WBC 4.96 08/05/2021    HGB 13.8 08/05/2021    HCT 41.6 08/05/2021    MCV 93.9 08/05/2021     08/05/2021      Lab Results   Component Value Date    NEUTROABS 2.54 08/05/2021    IRON 88 08/05/2021    IRON 71 04/02/2021    IRON 115 11/30/2020    TIBC 340 08/05/2021    TIBC 367 04/02/2021    TIBC 355 11/30/2020    LABIRON 26 08/05/2021    LABIRON 19 (L) 04/02/2021    LABIRON 32 11/30/2020    FERRITIN 92.63 08/05/2021    FERRITIN 107.30 04/02/2021    FERRITIN 116.00 11/30/2020    [id number] 1,001 (H) 08/05/2021    [id number] 1,161 (H) 04/02/2021    [id number] >2,000 (H) 08/31/2020    FOLATE 15.80 08/05/2021    FOLATE 15.50 04/02/2021    FOLATE 13.10 08/31/2020     No results found for: , LABCA2, AFPTM, HCGQUANT, , CHROMGRNA, [id number], CEA, REFLABREPO      PATHOLOGY:  * Cannot find OR log *         RADIOLOGY DATA :  No radiology results for the last 7 days        Assessment/Plan     1.  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  -Patient received concurrent chemoradiation with 5-FU and mitomycin between July 8, 2019 and August 19, 2019.  -Flexible sigmoidoscopy done by Dr. Gordillo on November 5, 2020 did not show any evidence of residual tumor.  -We will continue with clinical surveillance for now  -We will ask patient to return to clinic in 4 months with repeat CBC, CMP, PSA, iron studies, ferritin, B12, folate and CT of chest, abdomen and pelvis with contrast to be done prior to that.    2.  Prostatic adenocarcinoma stage II, PT2PN0  -S/p radical prostatectomy on May 16, 2018  -PSA done on August 4, 2021 is less than 0.014  -We will recheck PSA upon next clinic visit in 4 months    3.  Sclerotic bone lesion on vertebral body as well as ribs  -We will repeat CT of chest in December 2021      4.  Anemia:  -Anemia work-up done on August 5, 2021 shows hemoglobin is 13.8.  -Due to iron deficiency anemia patient received intravenous Feraheme in the past.  -Recommend continue with B12 and folic acid 3 times a week    5.  Health maintenance: Patient does not  smoke.  Had a colonoscopy in May 2021 by Dr. Gordillo    6. Advance Care Planning: For now patient remains full code and is able to make decisions.  Patient has health care surrogate mentioned on chart.      7.  Prescriptions: Prescription for folic acid has been sent to his pharmacy today           PHQ-9 Total Score: 0   -Patient is not homicidal or suicidal.  No acute intervention required.    Juan Antonio Shaw reports a pain score of 0.  Given his pain assessment as noted, treatment options were discussed and the following options were decided upon as a follow-up plan to address the patient's pain: No acute intervention required.         Terry Ly MD  8/12/2021  08:38 CDT        Part of this note may be an electronic transcription/translation of spoken language to printed text using the Dragon Dictation System.          CC:

## 2021-08-12 ENCOUNTER — OFFICE VISIT (OUTPATIENT)
Dept: ONCOLOGY | Facility: CLINIC | Age: 66
End: 2021-08-12

## 2021-08-12 VITALS
RESPIRATION RATE: 18 BRPM | TEMPERATURE: 97.1 F | BODY MASS INDEX: 31.04 KG/M2 | DIASTOLIC BLOOD PRESSURE: 76 MMHG | HEART RATE: 67 BPM | SYSTOLIC BLOOD PRESSURE: 126 MMHG | WEIGHT: 210.2 LBS

## 2021-08-12 DIAGNOSIS — D50.8 OTHER IRON DEFICIENCY ANEMIA: Chronic | ICD-10-CM

## 2021-08-12 DIAGNOSIS — C61 PROSTATE CANCER (HCC): Chronic | ICD-10-CM

## 2021-08-12 DIAGNOSIS — M89.9 BONE LESION: Chronic | ICD-10-CM

## 2021-08-12 DIAGNOSIS — C21.0 ANAL CANCER (HCC): Primary | Chronic | ICD-10-CM

## 2021-08-12 PROCEDURE — 99214 OFFICE O/P EST MOD 30 MIN: CPT | Performed by: INTERNAL MEDICINE

## 2021-08-12 PROCEDURE — G0463 HOSPITAL OUTPT CLINIC VISIT: HCPCS | Performed by: INTERNAL MEDICINE

## 2021-08-12 RX ORDER — FOLIC ACID 1 MG/1
TABLET ORAL
Qty: 36 TABLET | Refills: 1 | Status: SHIPPED | OUTPATIENT
Start: 2021-08-12 | End: 2022-04-15 | Stop reason: SDUPTHER

## 2021-12-03 ENCOUNTER — HOSPITAL ENCOUNTER (OUTPATIENT)
Dept: CT IMAGING | Facility: HOSPITAL | Age: 66
Discharge: HOME | End: 2021-12-03

## 2021-12-03 ENCOUNTER — LAB (OUTPATIENT)
Dept: LAB | Facility: HOSPITAL | Age: 66
End: 2021-12-03

## 2021-12-03 DIAGNOSIS — M89.9 BONE LESION: ICD-10-CM

## 2021-12-03 DIAGNOSIS — D50.8 OTHER IRON DEFICIENCY ANEMIA: ICD-10-CM

## 2021-12-03 DIAGNOSIS — C21.0 ANAL CANCER (HCC): Chronic | ICD-10-CM

## 2021-12-03 DIAGNOSIS — C61 PROSTATE CANCER (HCC): ICD-10-CM

## 2021-12-03 DIAGNOSIS — M89.9 BONE LESION: Chronic | ICD-10-CM

## 2021-12-03 DIAGNOSIS — D50.8 OTHER IRON DEFICIENCY ANEMIA: Chronic | ICD-10-CM

## 2021-12-03 DIAGNOSIS — C21.0 ANAL CANCER (HCC): ICD-10-CM

## 2021-12-03 DIAGNOSIS — C61 PROSTATE CANCER (HCC): Chronic | ICD-10-CM

## 2021-12-03 LAB
ALBUMIN SERPL-MCNC: 4.5 G/DL (ref 3.5–5.2)
ALBUMIN/GLOB SERPL: 1.4 G/DL
ALP SERPL-CCNC: 75 U/L (ref 39–117)
ALT SERPL W P-5'-P-CCNC: 16 U/L (ref 1–41)
ANION GAP SERPL CALCULATED.3IONS-SCNC: 7 MMOL/L (ref 5–15)
AST SERPL-CCNC: 18 U/L (ref 1–40)
BASOPHILS # BLD AUTO: 0.03 10*3/MM3 (ref 0–0.2)
BASOPHILS NFR BLD AUTO: 0.6 % (ref 0–1.5)
BILIRUB SERPL-MCNC: 0.4 MG/DL (ref 0–1.2)
BUN SERPL-MCNC: 25 MG/DL (ref 8–23)
BUN/CREAT SERPL: 28.4 (ref 7–25)
CALCIUM SPEC-SCNC: 9.1 MG/DL (ref 8.6–10.5)
CHLORIDE SERPL-SCNC: 100 MMOL/L (ref 98–107)
CO2 SERPL-SCNC: 29 MMOL/L (ref 22–29)
CREAT SERPL-MCNC: 0.88 MG/DL (ref 0.76–1.27)
DEPRECATED RDW RBC AUTO: 42.7 FL (ref 37–54)
EOSINOPHIL # BLD AUTO: 0.07 10*3/MM3 (ref 0–0.4)
EOSINOPHIL NFR BLD AUTO: 1.4 % (ref 0.3–6.2)
ERYTHROCYTE [DISTWIDTH] IN BLOOD BY AUTOMATED COUNT: 12.8 % (ref 12.3–15.4)
FERRITIN SERPL-MCNC: 109.6 NG/ML (ref 30–400)
FOLATE SERPL-MCNC: 14.5 NG/ML (ref 4.78–24.2)
GFR SERPL CREATININE-BSD FRML MDRD: 87 ML/MIN/1.73
GLOBULIN UR ELPH-MCNC: 3.3 GM/DL
GLUCOSE SERPL-MCNC: 96 MG/DL (ref 65–99)
HCT VFR BLD AUTO: 42.8 % (ref 37.5–51)
HGB BLD-MCNC: 14.6 G/DL (ref 13–17.7)
IMM GRANULOCYTES # BLD AUTO: 0.01 10*3/MM3 (ref 0–0.05)
IMM GRANULOCYTES NFR BLD AUTO: 0.2 % (ref 0–0.5)
IRON 24H UR-MRATE: 96 MCG/DL (ref 59–158)
IRON SATN MFR SERPL: 26 % (ref 20–50)
LYMPHOCYTES # BLD AUTO: 1.72 10*3/MM3 (ref 0.7–3.1)
LYMPHOCYTES NFR BLD AUTO: 33.8 % (ref 19.6–45.3)
MCH RBC QN AUTO: 31.2 PG (ref 26.6–33)
MCHC RBC AUTO-ENTMCNC: 34.1 G/DL (ref 31.5–35.7)
MCV RBC AUTO: 91.5 FL (ref 79–97)
MONOCYTES # BLD AUTO: 0.53 10*3/MM3 (ref 0.1–0.9)
MONOCYTES NFR BLD AUTO: 10.4 % (ref 5–12)
NEUTROPHILS NFR BLD AUTO: 2.73 10*3/MM3 (ref 1.7–7)
NEUTROPHILS NFR BLD AUTO: 53.6 % (ref 42.7–76)
NRBC BLD AUTO-RTO: 0 /100 WBC (ref 0–0.2)
PLATELET # BLD AUTO: 237 10*3/MM3 (ref 140–450)
PMV BLD AUTO: 8.9 FL (ref 6–12)
POTASSIUM SERPL-SCNC: 4.2 MMOL/L (ref 3.5–5.2)
PROT SERPL-MCNC: 7.8 G/DL (ref 6–8.5)
PSA SERPL-MCNC: <0.014 NG/ML (ref 0–4)
RBC # BLD AUTO: 4.68 10*6/MM3 (ref 4.14–5.8)
SODIUM SERPL-SCNC: 136 MMOL/L (ref 136–145)
TIBC SERPL-MCNC: 373 MCG/DL (ref 298–536)
TRANSFERRIN SERPL-MCNC: 250 MG/DL (ref 200–360)
VIT B12 BLD-MCNC: 1068 PG/ML (ref 211–946)
WBC NRBC COR # BLD: 5.09 10*3/MM3 (ref 3.4–10.8)

## 2021-12-03 PROCEDURE — 71260 CT THORAX DX C+: CPT

## 2021-12-03 PROCEDURE — 82607 VITAMIN B-12: CPT

## 2021-12-03 PROCEDURE — 85025 COMPLETE CBC W/AUTO DIFF WBC: CPT

## 2021-12-03 PROCEDURE — 82746 ASSAY OF FOLIC ACID SERUM: CPT

## 2021-12-03 PROCEDURE — 83540 ASSAY OF IRON: CPT

## 2021-12-03 PROCEDURE — 84466 ASSAY OF TRANSFERRIN: CPT

## 2021-12-03 PROCEDURE — 84153 ASSAY OF PSA TOTAL: CPT

## 2021-12-03 PROCEDURE — 80053 COMPREHEN METABOLIC PANEL: CPT

## 2021-12-03 PROCEDURE — 82728 ASSAY OF FERRITIN: CPT

## 2021-12-03 PROCEDURE — 25010000002 IOPAMIDOL 61 % SOLUTION: Performed by: INTERNAL MEDICINE

## 2021-12-03 PROCEDURE — 36415 COLL VENOUS BLD VENIPUNCTURE: CPT

## 2021-12-03 PROCEDURE — 74177 CT ABD & PELVIS W/CONTRAST: CPT

## 2021-12-03 RX ADMIN — IOPAMIDOL 90 ML: 612 INJECTION, SOLUTION INTRAVENOUS at 12:24

## 2021-12-16 NOTE — PROGRESS NOTES
DATE OF VISIT: 12/17/2021      REASON FOR VISIT: Anal cancer, history of prostate cancer, sclerotic lesion of bone, anemia      HISTORY OF PRESENT ILLNESS:   66-year-old male with medical problem consisting of hypertension, prostatic adenocarcinoma status post robotic prostatectomy in 2018, iron deficiency anemia has been following up with [facility] since October 20, 2019 for squamous cell cancer of anal canal.  Patient is currently on surveillance.  Patient is here to discuss recently done blood work as well as CT scan for surveillance purposes.  Denies any new lymph node enlargement.  Denies any bleeding.  Denies any major weight loss.          Past Medical History, Past Surgical History, Social History, Family History have been reviewed and are without significant changes except as mentioned.    Review of Systems   A comprehensive 14 point review of systems was performed and was negative except as mentioned in HPI.    Medications:  The current medication list was reviewed in the EMR    ALLERGIES:  No Known Allergies    Objective      Vitals:    12/17/21 0757   BP: 142/78   Pulse: 66   SpO2: 97%   Weight: 99.2 kg (218 lb 9.6 oz)   PainSc: 0-No pain     Current Status 8/12/2021   ECOG score 0       Physical Exam  Pulmonary:      Breath sounds: Normal breath sounds.   Lymphadenopathy:      Cervical: No cervical adenopathy.   Neurological:      Mental Status: He is alert and oriented to person, place, and time.           RECENT LABS:  Glucose   Date Value Ref Range Status   12/03/2021 96 65 - 99 mg/dL Final     Sodium   Date Value Ref Range Status   12/03/2021 136 136 - 145 mmol/L Final   07/11/2018 138 134 - 144 mmol/L Final   05/17/2018 135 (L) 136 - 145 mmol/L Final     Potassium   Date Value Ref Range Status   12/03/2021 4.2 3.5 - 5.2 mmol/L Final   07/11/2018 4 3.5 - 5.1 mmol/L Final   05/17/2018 4.3 3.3 - 5.0 mmol/L Final     CO2   Date Value Ref Range Status   12/03/2021 29.0 22.0 - 29.0 mmol/L Final      Total CO2   Date Value Ref Range Status   05/17/2018 26 20 - 31 mmol/L Final     Chloride   Date Value Ref Range Status   12/03/2021 100 98 - 107 mmol/L Final   07/11/2018 105 98 - 107 mmol/L Final   05/17/2018 104 99 - 111 mmol/L Final     Anion Gap   Date Value Ref Range Status   12/03/2021 7.0 5.0 - 15.0 mmol/L Final   05/17/2018 9 4 - 16 mmol/L Final     Creatinine   Date Value Ref Range Status   12/03/2021 0.88 0.76 - 1.27 mg/dL Final   07/11/2018 1 0.6 - 1.3 mg/dL Final   05/17/2018 1.0 0.6 - 1.3 mg/dL Final     BUN   Date Value Ref Range Status   12/03/2021 25 (H) 8 - 23 mg/dL Final   07/11/2018 34 (H) 7 - 18 mg/dL Final     BUN/Creatinine Ratio   Date Value Ref Range Status   12/03/2021 28.4 (H) 7.0 - 25.0 Final     Calcium   Date Value Ref Range Status   12/03/2021 9.1 8.6 - 10.5 mg/dL Final   07/11/2018 8.8 8.8 - 10.5 mg/dL Final     eGFR Non  Amer   Date Value Ref Range Status   12/03/2021 87 >60 mL/min/1.73 Final     Alkaline Phosphatase   Date Value Ref Range Status   12/03/2021 75 39 - 117 U/L Final   07/11/2018 69 46 - 116 U/L Final     Total Protein   Date Value Ref Range Status   12/03/2021 7.8 6.0 - 8.5 g/dL Final   07/11/2018 7.4 6.4 - 8.2 g/dL Final     ALT (SGPT)   Date Value Ref Range Status   12/03/2021 16 1 - 41 U/L Final   07/11/2018 19 (L) 30 - 65 U/L Final     AST (SGOT)   Date Value Ref Range Status   12/03/2021 18 1 - 40 U/L Final   07/11/2018 17 15 - 37 U/L Final     Total Bilirubin   Date Value Ref Range Status   12/03/2021 0.4 0.0 - 1.2 mg/dL Final   07/11/2018 0.3 0 - 1.0 mg/dL Final     Albumin   Date Value Ref Range Status   12/03/2021 4.50 3.50 - 5.20 g/dL Final   07/11/2018 3.5 3.4 - 5.0 g/dL Final     Globulin   Date Value Ref Range Status   12/03/2021 3.3 gm/dL Final     Lab Results   Component Value Date    WBC 5.09 12/03/2021    HGB 14.6 12/03/2021    HCT 42.8 12/03/2021    MCV 91.5 12/03/2021     12/03/2021     Lab Results   Component Value Date     NEUTROABS 2.73 12/03/2021    IRON 96 12/03/2021    IRON 88 08/05/2021    IRON 71 04/02/2021    TIBC 373 12/03/2021    TIBC 340 08/05/2021    TIBC 367 04/02/2021    LABIRON 26 12/03/2021    LABIRON 26 08/05/2021    LABIRON 19 (L) 04/02/2021    FERRITIN 109.60 12/03/2021    FERRITIN 92.63 08/05/2021    FERRITIN 107.30 04/02/2021    [id number] 1,068 (H) 12/03/2021    ABWTSCJE52 1,001 (H) 08/05/2021    QLVCNOJY84 1,161 (H) 04/02/2021    FOLATE 14.50 12/03/2021    FOLATE 15.80 08/05/2021    FOLATE 15.50 04/02/2021     No results found for: , LABCA2, AFPTM, HCGQUANT, , CHROMGRNA, [id number], CEA, REFLABREPO      PATHOLOGY:  * Cannot find OR log *         RADIOLOGY DATA :  CT of chest, abdomen and pelvis with contrast done on December 3, 2021 showed:    IMPRESSION:        1. A small cirrhotic focus in the T6 vertebral body is stable  since at least 6/27/2019 and appear to reflect a benign bone  island.  2. No evidence for metastatic disease in the chest, abdomen, or  pelvis.       No radiology results for the last 7 days        Assessment/Plan     1.  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 Gordillo on November 5, 2020 did not show any evidence of residual tumor.  -Recent CT of chest, abdomen and pelvis with contrast done on December 3, 2021 shows stable finding without any evidence of progression and recurrence of disease which was discussed with patient  -We will continue with clinical surveillance.  -We will have patient return to clinic in 4 months with repeat CBC, CMP, PSA, iron studies, ferritin, B12 and folate to be done on that day.  -Next surveillance CT of chest, abdomen and pelvis with contrast will be done around December 2022    2.  Prostatic adenocarcinoma stage II, PT2PN0  -S/p radical  prostatectomy on May 16, 2018  -PSA done on December 3, 2021 is undetectable at less than 0.014  -We will repeat PSA upon next clinic visit in 4 months.    3.  Sclerotic bone lesion on vertebral body as well as ribs  -Recent CT of chest shows stable lesion.  Likely benign in etiology.    4.  Anemia:  -Anemia work-up done on December 3, 2021 shows hemoglobin is 14.6.  -Due to iron deficiency and iron malabsorption patient received intravenous Feraheme in the past  -Iron studies are adequate no need to start any intravenous iron replacement at present.  -Due to elevated B12 level patient is stopped taking B12 supplement for last few days.  Recommend continue holding B12 for now.  -Recommend continue  folic acid 3 times a week    5.  Health maintenance: Patient does not smoke.  Had a colonoscopy in May 2021 by Dr. Gordillo    6. Advance Care Planning: For now patient remains full code and is able to make decisions.  Patient has health care surrogate mentioned on chart.                 PHQ-9 Total Score: 1   -Patient is not homicidal or suicidal.  No acute intervention required.    Juan Antonio Shaw reports a pain score of 0.  Given his pain assessment as noted, treatment options were discussed and the following options were decided upon as a follow-up plan to address the patient's pain: continuation of current treatment plan for pain.         Terry Ly MD  12/17/2021  08:04 CST        Part of this note may be an electronic transcription/translation of spoken language to printed text using the Dragon Dictation System.          CC:         Addendum:    1.  In assessment and plan #1 please read invasive nonkeratinizing squamous cell cancer of anal canal.  It was transcribed as squamous cell cancer of spinal canal by mistake.

## 2021-12-17 ENCOUNTER — OFFICE VISIT (OUTPATIENT)
Dept: ONCOLOGY | Facility: CLINIC | Age: 66
End: 2021-12-17

## 2021-12-17 VITALS
WEIGHT: 218.6 LBS | SYSTOLIC BLOOD PRESSURE: 142 MMHG | DIASTOLIC BLOOD PRESSURE: 78 MMHG | OXYGEN SATURATION: 97 % | HEART RATE: 66 BPM | BODY MASS INDEX: 32.28 KG/M2

## 2021-12-17 DIAGNOSIS — C61 PROSTATE CANCER (HCC): Chronic | ICD-10-CM

## 2021-12-17 DIAGNOSIS — M89.9 BONE LESION: Chronic | ICD-10-CM

## 2021-12-17 DIAGNOSIS — C21.0 ANAL CANCER (HCC): Primary | Chronic | ICD-10-CM

## 2021-12-17 DIAGNOSIS — D50.8 OTHER IRON DEFICIENCY ANEMIA: Chronic | ICD-10-CM

## 2021-12-17 PROCEDURE — G0463 HOSPITAL OUTPT CLINIC VISIT: HCPCS | Performed by: INTERNAL MEDICINE

## 2021-12-17 PROCEDURE — 99214 OFFICE O/P EST MOD 30 MIN: CPT | Performed by: INTERNAL MEDICINE

## 2022-04-13 ENCOUNTER — LAB (OUTPATIENT)
Dept: ONCOLOGY | Facility: HOSPITAL | Age: 67
End: 2022-04-13

## 2022-04-13 DIAGNOSIS — C21.0 ANAL CANCER: ICD-10-CM

## 2022-04-13 DIAGNOSIS — C61 PROSTATE CANCER: ICD-10-CM

## 2022-04-13 DIAGNOSIS — D50.8 OTHER IRON DEFICIENCY ANEMIA: ICD-10-CM

## 2022-04-13 DIAGNOSIS — M89.9 BONE LESION: ICD-10-CM

## 2022-04-13 LAB
ALBUMIN SERPL-MCNC: 4.2 G/DL (ref 3.5–5.2)
ALBUMIN/GLOB SERPL: 1.3 G/DL
ALP SERPL-CCNC: 74 U/L (ref 39–117)
ALT SERPL W P-5'-P-CCNC: 19 U/L (ref 1–41)
ANION GAP SERPL CALCULATED.3IONS-SCNC: 9 MMOL/L (ref 5–15)
AST SERPL-CCNC: 21 U/L (ref 1–40)
BASOPHILS # BLD AUTO: 0.02 10*3/MM3 (ref 0–0.2)
BASOPHILS NFR BLD AUTO: 0.4 % (ref 0–1.5)
BILIRUB SERPL-MCNC: 0.5 MG/DL (ref 0–1.2)
BUN SERPL-MCNC: 23 MG/DL (ref 8–23)
BUN/CREAT SERPL: 26.7 (ref 7–25)
CALCIUM SPEC-SCNC: 9.6 MG/DL (ref 8.6–10.5)
CHLORIDE SERPL-SCNC: 102 MMOL/L (ref 98–107)
CO2 SERPL-SCNC: 29 MMOL/L (ref 22–29)
CREAT SERPL-MCNC: 0.86 MG/DL (ref 0.76–1.27)
DEPRECATED RDW RBC AUTO: 42.2 FL (ref 37–54)
EGFRCR SERPLBLD CKD-EPI 2021: 95.5 ML/MIN/1.73
EOSINOPHIL # BLD AUTO: 0.08 10*3/MM3 (ref 0–0.4)
EOSINOPHIL NFR BLD AUTO: 1.6 % (ref 0.3–6.2)
ERYTHROCYTE [DISTWIDTH] IN BLOOD BY AUTOMATED COUNT: 12.5 % (ref 12.3–15.4)
FERRITIN SERPL-MCNC: 112.9 NG/ML (ref 30–400)
FOLATE SERPL-MCNC: 13.4 NG/ML (ref 4.78–24.2)
GLOBULIN UR ELPH-MCNC: 3.3 GM/DL
GLUCOSE SERPL-MCNC: 100 MG/DL (ref 65–99)
HCT VFR BLD AUTO: 43.1 % (ref 37.5–51)
HGB BLD-MCNC: 14.6 G/DL (ref 13–17.7)
IMM GRANULOCYTES # BLD AUTO: 0.03 10*3/MM3 (ref 0–0.05)
IMM GRANULOCYTES NFR BLD AUTO: 0.6 % (ref 0–0.5)
IRON 24H UR-MRATE: 103 MCG/DL (ref 59–158)
IRON SATN MFR SERPL: 28 % (ref 20–50)
LYMPHOCYTES # BLD AUTO: 1.48 10*3/MM3 (ref 0.7–3.1)
LYMPHOCYTES NFR BLD AUTO: 29.5 % (ref 19.6–45.3)
MCH RBC QN AUTO: 31.1 PG (ref 26.6–33)
MCHC RBC AUTO-ENTMCNC: 33.9 G/DL (ref 31.5–35.7)
MCV RBC AUTO: 91.7 FL (ref 79–97)
MONOCYTES # BLD AUTO: 0.53 10*3/MM3 (ref 0.1–0.9)
MONOCYTES NFR BLD AUTO: 10.6 % (ref 5–12)
NEUTROPHILS NFR BLD AUTO: 2.88 10*3/MM3 (ref 1.7–7)
NEUTROPHILS NFR BLD AUTO: 57.3 % (ref 42.7–76)
NRBC BLD AUTO-RTO: 0 /100 WBC (ref 0–0.2)
PLATELET # BLD AUTO: 259 10*3/MM3 (ref 140–450)
PMV BLD AUTO: 8.9 FL (ref 6–12)
POTASSIUM SERPL-SCNC: 4.5 MMOL/L (ref 3.5–5.2)
PROT SERPL-MCNC: 7.5 G/DL (ref 6–8.5)
RBC # BLD AUTO: 4.7 10*6/MM3 (ref 4.14–5.8)
SODIUM SERPL-SCNC: 140 MMOL/L (ref 136–145)
TIBC SERPL-MCNC: 368 MCG/DL (ref 298–536)
TRANSFERRIN SERPL-MCNC: 247 MG/DL (ref 200–360)
VIT B12 BLD-MCNC: 911 PG/ML (ref 211–946)
WBC NRBC COR # BLD: 5.02 10*3/MM3 (ref 3.4–10.8)

## 2022-04-13 PROCEDURE — 82728 ASSAY OF FERRITIN: CPT

## 2022-04-13 PROCEDURE — 83540 ASSAY OF IRON: CPT

## 2022-04-13 PROCEDURE — 85025 COMPLETE CBC W/AUTO DIFF WBC: CPT

## 2022-04-13 PROCEDURE — 82607 VITAMIN B-12: CPT

## 2022-04-13 PROCEDURE — 80053 COMPREHEN METABOLIC PANEL: CPT

## 2022-04-13 PROCEDURE — 84466 ASSAY OF TRANSFERRIN: CPT

## 2022-04-13 PROCEDURE — 36415 COLL VENOUS BLD VENIPUNCTURE: CPT | Performed by: INTERNAL MEDICINE

## 2022-04-13 PROCEDURE — 82746 ASSAY OF FOLIC ACID SERUM: CPT

## 2022-04-15 ENCOUNTER — OFFICE VISIT (OUTPATIENT)
Dept: ONCOLOGY | Facility: CLINIC | Age: 67
End: 2022-04-15

## 2022-04-15 VITALS
DIASTOLIC BLOOD PRESSURE: 74 MMHG | BODY MASS INDEX: 31.37 KG/M2 | OXYGEN SATURATION: 97 % | TEMPERATURE: 97.3 F | WEIGHT: 212.4 LBS | HEART RATE: 61 BPM | SYSTOLIC BLOOD PRESSURE: 134 MMHG

## 2022-04-15 DIAGNOSIS — D50.8 OTHER IRON DEFICIENCY ANEMIA: Chronic | ICD-10-CM

## 2022-04-15 DIAGNOSIS — C21.0 ANAL CANCER: Primary | Chronic | ICD-10-CM

## 2022-04-15 DIAGNOSIS — C61 PROSTATE CANCER: Chronic | ICD-10-CM

## 2022-04-15 DIAGNOSIS — M89.9 BONE LESION: Chronic | ICD-10-CM

## 2022-04-15 PROCEDURE — G0463 HOSPITAL OUTPT CLINIC VISIT: HCPCS | Performed by: INTERNAL MEDICINE

## 2022-04-15 PROCEDURE — 99214 OFFICE O/P EST MOD 30 MIN: CPT | Performed by: INTERNAL MEDICINE

## 2022-04-15 RX ORDER — FOLIC ACID 1 MG/1
TABLET ORAL
Qty: 36 TABLET | Refills: 1 | Status: SHIPPED | OUTPATIENT
Start: 2022-04-15

## 2022-04-15 NOTE — PROGRESS NOTES
DATE OF VISIT: 4/15/2022      REASON FOR VISIT: Anal cancer, history of prostate cancer, sclerotic lesion of bone, anemia      HISTORY OF PRESENT ILLNESS:   67-year-old male with medical problem consisting of hypertension, prostatic adenocarcinoma s/p robotic prostatectomy in 2018, iron deficiency anemia has been following up with [facility] since October 20, 2019 for squamous cell cancer of anal canal.  Patient is currently on surveillance.  Patient is here to discuss recently done blood work.  Denies any new lymph node enlargement.  Denies any bleeding.  Denies any major weight loss.          Past Medical History, Past Surgical History, Social History, Family History have been reviewed and are without significant changes except as mentioned.    Review of Systems   Constitutional:        Positive for 8 pound of intentional weight loss since last clinic visit      A comprehensive 14 point review of systems was performed and was negative except as mentioned in HPI.    Medications:  The current medication list was reviewed in the EMR    ALLERGIES:  No Known Allergies    Objective      Vitals:    04/15/22 0757   BP: 134/74   Pulse: 61   Temp: 97.3 °F (36.3 °C)   TempSrc: Temporal   SpO2: 97%   Weight: 96.3 kg (212 lb 6.4 oz)   PainSc: 0-No pain     Current Status 8/12/2021   ECOG score 0       Physical Exam  Pulmonary:      Breath sounds: Normal breath sounds.   Neurological:      Mental Status: He is alert and oriented to person, place, and time.           RECENT LABS:  Glucose   Date Value Ref Range Status   04/13/2022 100 (H) 65 - 99 mg/dL Final     Sodium   Date Value Ref Range Status   04/13/2022 140 136 - 145 mmol/L Final   07/11/2018 138 134 - 144 mmol/L Final   05/17/2018 135 (L) 136 - 145 mmol/L Final     Potassium   Date Value Ref Range Status   04/13/2022 4.5 3.5 - 5.2 mmol/L Final   07/11/2018 4 3.5 - 5.1 mmol/L Final   05/17/2018 4.3 3.3 - 5.0 mmol/L Final     CO2   Date Value Ref Range Status    04/13/2022 29.0 22.0 - 29.0 mmol/L Final     Total CO2   Date Value Ref Range Status   05/17/2018 26 20 - 31 mmol/L Final     Chloride   Date Value Ref Range Status   04/13/2022 102 98 - 107 mmol/L Final   07/11/2018 105 98 - 107 mmol/L Final   05/17/2018 104 99 - 111 mmol/L Final     Anion Gap   Date Value Ref Range Status   04/13/2022 9.0 5.0 - 15.0 mmol/L Final   05/17/2018 9 4 - 16 mmol/L Final     Creatinine   Date Value Ref Range Status   04/13/2022 0.86 0.76 - 1.27 mg/dL Final   07/11/2018 1 0.6 - 1.3 mg/dL Final   05/17/2018 1.0 0.6 - 1.3 mg/dL Final     BUN   Date Value Ref Range Status   04/13/2022 23 8 - 23 mg/dL Final   07/11/2018 34 (H) 7 - 18 mg/dL Final     BUN/Creatinine Ratio   Date Value Ref Range Status   04/13/2022 26.7 (H) 7.0 - 25.0 Final     Calcium   Date Value Ref Range Status   04/13/2022 9.6 8.6 - 10.5 mg/dL Final   07/11/2018 8.8 8.8 - 10.5 mg/dL Final     eGFR Non  Amer   Date Value Ref Range Status   12/03/2021 87 >60 mL/min/1.73 Final     Alkaline Phosphatase   Date Value Ref Range Status   04/13/2022 74 39 - 117 U/L Final   07/11/2018 69 46 - 116 U/L Final     Total Protein   Date Value Ref Range Status   04/13/2022 7.5 6.0 - 8.5 g/dL Final   07/11/2018 7.4 6.4 - 8.2 g/dL Final     ALT (SGPT)   Date Value Ref Range Status   04/13/2022 19 1 - 41 U/L Final   07/11/2018 19 (L) 30 - 65 U/L Final     AST (SGOT)   Date Value Ref Range Status   04/13/2022 21 1 - 40 U/L Final   07/11/2018 17 15 - 37 U/L Final     Total Bilirubin   Date Value Ref Range Status   04/13/2022 0.5 0.0 - 1.2 mg/dL Final   07/11/2018 0.3 0 - 1.0 mg/dL Final     Albumin   Date Value Ref Range Status   04/13/2022 4.20 3.50 - 5.20 g/dL Final   07/11/2018 3.5 3.4 - 5.0 g/dL Final     Globulin   Date Value Ref Range Status   04/13/2022 3.3 gm/dL Final     Lab Results   Component Value Date    WBC 5.02 04/13/2022    HGB 14.6 04/13/2022    HCT 43.1 04/13/2022    MCV 91.7 04/13/2022     04/13/2022     Lab  Results   Component Value Date    NEUTROABS 2.88 04/13/2022    IRON 103 04/13/2022    IRON 96 12/03/2021    IRON 88 08/05/2021    TIBC 368 04/13/2022    TIBC 373 12/03/2021    TIBC 340 08/05/2021    LABIRON 28 04/13/2022    LABIRON 26 12/03/2021    LABIRON 26 08/05/2021    FERRITIN 112.90 04/13/2022    FERRITIN 109.60 12/03/2021    FERRITIN 92.63 08/05/2021    [id number] 911 04/13/2022    GQWHSPRK16 1,068 (H) 12/03/2021    VTKDCNZD82 1,001 (H) 08/05/2021    FOLATE 13.40 04/13/2022    FOLATE 14.50 12/03/2021    FOLATE 15.80 08/05/2021     No results found for: , LABCA2, AFPTM, HCGQUANT, , CHROMGRNA, [id number], CEA, REFLABREPO      PATHOLOGY:  * Cannot find OR log *         RADIOLOGY DATA :  No radiology results for the last 7 days        Assessment/Plan     1.  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  -Patient received concurrent chemoradiation with 5-FU and mitomycin between July 8, 2019 and August 19, 2019  -Flexible sigmoidoscopy done by Dr. Gordillo on November 5, 2020 did not show any evidence of residual tumor.  -We will continue with clinical surveillance for now  -We will have patient return to clinic in 4 months with repeat CBC, CMP, PSA, iron studies, ferritin, B12 and folate to be done prior to that.  -Neck surveillance CT of chest, abdomen and pelvis with contrast will be done around December 2022    2.  Prostatic adenocarcinoma, stage II, PT2PN0  -S/p radical prostatectomy on May 16, 2018  -We will recheck PSA upon next clinic visit in 4 months    3.  Sclerotic lesion of vertebral body as well as the ribs  -Patient sclerotic lesion has not changed since diagnosis.  Likely benign in etiology    4.  Anemia:  -Recent anemia work-up shows hemoglobin is 14.6  -Due to iron malabsorption patient has received intravenous Feraheme in the past  -Iron studies are not showing any iron deficiency.  -Recommend continue with folic acid 3  times a week.    5.  Health maintenance: Patient does not smoke.  Had a colonoscopy in May 2021 by Dr. Gordillo    6. Advance Care Planning: For now patient remains full code and is able to make decisions.  Patient has health care surrogate mentioned on chart.    7.  Prescriptions: Prescription for folic acid 3-month supply with 1 refill has been sent to his pharmacy today             PHQ-9 Total Score: 0   -Patient is not homicidal or suicidal.  No acute intervention required.    Juan Antonio Shaw reports a pain score of 0.  Given his pain assessment as noted, treatment options were discussed and the following options were decided upon as a follow-up plan to address the patient's pain: No acute intervention required.         Terry Ly MD  4/15/2022  08:09 CDT        Part of this note may be an electronic transcription/translation of spoken language to printed text using the Dragon Dictation System.          CC:

## 2022-08-10 ENCOUNTER — LAB (OUTPATIENT)
Dept: LAB | Facility: HOSPITAL | Age: 67
End: 2022-08-10

## 2022-08-10 PROCEDURE — 82728 ASSAY OF FERRITIN: CPT | Performed by: INTERNAL MEDICINE

## 2022-08-10 PROCEDURE — 82746 ASSAY OF FOLIC ACID SERUM: CPT | Performed by: INTERNAL MEDICINE

## 2022-08-10 PROCEDURE — 82607 VITAMIN B-12: CPT | Performed by: INTERNAL MEDICINE

## 2022-08-10 PROCEDURE — 85025 COMPLETE CBC W/AUTO DIFF WBC: CPT | Performed by: INTERNAL MEDICINE

## 2022-08-10 PROCEDURE — 83540 ASSAY OF IRON: CPT | Performed by: INTERNAL MEDICINE

## 2022-08-10 PROCEDURE — 80053 COMPREHEN METABOLIC PANEL: CPT | Performed by: INTERNAL MEDICINE

## 2022-08-10 PROCEDURE — 84153 ASSAY OF PSA TOTAL: CPT | Performed by: INTERNAL MEDICINE

## 2022-08-10 PROCEDURE — 84466 ASSAY OF TRANSFERRIN: CPT | Performed by: INTERNAL MEDICINE

## 2022-08-11 NOTE — PROGRESS NOTES
DATE OF VISIT: 8/12/2022      REASON FOR VISIT: Annual cancer, history of prostate cancer, sclerotic lesion of bone, anemia      HISTORY OF PRESENT ILLNESS:   67-year-old male with medical problem consisting of hypertension, prostatic adenocarcinoma s/p robotic prostatectomy in 2018, iron deficiency anemia has been following up with [facility] since October 20 22,019 for squamous cell cancer of anal canal.  Patient is currently on surveillance.  Patient is here to discuss recently done blood work.  Denies any worsening fatigue.  Denies any new lymph node enlargement.  Denies any bleeding.  Denies any major unexpected weight loss.          Past Medical History, Past Surgical History, Social History, Family History have been reviewed and are without significant changes except as mentioned.    Review of Systems   Constitutional:        Positive for 18 pound of intentional weight loss since last clinic visit      A comprehensive 14 point review of systems was performed and was negative except as mentioned.    Medications:  The current medication list was reviewed in the EMR    ALLERGIES:  No Known Allergies    Objective      Vitals:    08/12/22 0804   BP: 136/76   Pulse: 68   Temp: 98 °F (36.7 °C)   SpO2: 96%   Weight: 88 kg (194 lb)   PainSc: 0-No pain     Current Status 8/12/2021   ECOG score 0       Physical Exam  Pulmonary:      Breath sounds: Normal breath sounds.   Neurological:      Mental Status: He is alert and oriented to person, place, and time.           RECENT LABS:  Glucose   Date Value Ref Range Status   08/10/2022 94 65 - 99 mg/dL Final     Sodium   Date Value Ref Range Status   08/10/2022 139 136 - 145 mmol/L Final   07/11/2018 138 134 - 144 mmol/L Final   05/17/2018 135 (L) 136 - 145 mmol/L Final     Potassium   Date Value Ref Range Status   08/10/2022 4.2 3.5 - 5.2 mmol/L Final   07/11/2018 4 3.5 - 5.1 mmol/L Final   05/17/2018 4.3 3.3 - 5.0 mmol/L Final     CO2   Date Value Ref Range Status    08/10/2022 30.0 (H) 22.0 - 29.0 mmol/L Final     Total CO2   Date Value Ref Range Status   05/17/2018 26 20 - 31 mmol/L Final     Chloride   Date Value Ref Range Status   08/10/2022 101 98 - 107 mmol/L Final   07/11/2018 105 98 - 107 mmol/L Final   05/17/2018 104 99 - 111 mmol/L Final     Anion Gap   Date Value Ref Range Status   08/10/2022 8.0 5.0 - 15.0 mmol/L Final   05/17/2018 9 4 - 16 mmol/L Final     Creatinine   Date Value Ref Range Status   08/10/2022 0.74 (L) 0.76 - 1.27 mg/dL Final   07/11/2018 1 0.6 - 1.3 mg/dL Final   05/17/2018 1.0 0.6 - 1.3 mg/dL Final     BUN   Date Value Ref Range Status   08/10/2022 23 8 - 23 mg/dL Final   07/11/2018 34 (H) 7 - 18 mg/dL Final     BUN/Creatinine Ratio   Date Value Ref Range Status   08/10/2022 31.1 (H) 7.0 - 25.0 Final     Calcium   Date Value Ref Range Status   08/10/2022 9.1 8.6 - 10.5 mg/dL Final   07/11/2018 8.8 8.8 - 10.5 mg/dL Final     eGFR Non  Amer   Date Value Ref Range Status   12/03/2021 87 >60 mL/min/1.73 Final     Alkaline Phosphatase   Date Value Ref Range Status   08/10/2022 62 39 - 117 U/L Final   07/11/2018 69 46 - 116 U/L Final     Total Protein   Date Value Ref Range Status   08/10/2022 7.4 6.0 - 8.5 g/dL Final   07/11/2018 7.4 6.4 - 8.2 g/dL Final     ALT (SGPT)   Date Value Ref Range Status   08/10/2022 16 1 - 41 U/L Final   07/11/2018 19 (L) 30 - 65 U/L Final     AST (SGOT)   Date Value Ref Range Status   08/10/2022 21 1 - 40 U/L Final   07/11/2018 17 15 - 37 U/L Final     Total Bilirubin   Date Value Ref Range Status   08/10/2022 0.4 0.0 - 1.2 mg/dL Final   07/11/2018 0.3 0 - 1.0 mg/dL Final     Albumin   Date Value Ref Range Status   08/10/2022 4.40 3.50 - 5.20 g/dL Final   07/11/2018 3.5 3.4 - 5.0 g/dL Final     Globulin   Date Value Ref Range Status   08/10/2022 3.0 gm/dL Final     Lab Results   Component Value Date    WBC 5.45 08/10/2022    HGB 13.8 08/10/2022    HCT 39.8 08/10/2022    MCV 91.9 08/10/2022     08/10/2022      Lab Results   Component Value Date    NEUTROABS 3.27 08/10/2022    IRON 57 (L) 08/10/2022    IRON 103 04/13/2022    IRON 96 12/03/2021    TIBC 289 (L) 08/10/2022    TIBC 368 04/13/2022    TIBC 373 12/03/2021    LABIRON 20 08/10/2022    LABIRON 28 04/13/2022    LABIRON 26 12/03/2021    FERRITIN 165.40 08/10/2022    FERRITIN 112.90 04/13/2022    FERRITIN 109.60 12/03/2021    [id number] 769 08/10/2022    [id number] 911 04/13/2022    [id number] 1,068 (H) 12/03/2021    FOLATE >20.00 08/10/2022    FOLATE 13.40 04/13/2022    FOLATE 14.50 12/03/2021     No results found for: , LABCA2, AFPTM, HCGQUANT, , CHROMGRNA, [id number], CEA, REFLABREPO      PATHOLOGY:  * Cannot find OR log *         RADIOLOGY DATA :  No radiology results for the last 7 days        Assessment & Plan     1.  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  - Patient received concurrent chemoradiation with 5-FU and mitomycin between July 8, 2019 and August 19, 2019  - Colonoscopy in May 2021 by Dr. Gordillo did not show any evidence of residual tumor.  - We will continue with clinical surveillance for now.  - Patient will return to clinic in 4 months with repeat CBC, CMP, PSA, iron studies, ferritin, B12, folate and CT of chest abdomen and pelvis with contrast to be done prior to that for surveillance purposes and to follow-up on sclerotic bone lesion    2.  Prostatic adenocarcinoma stage II, PT2PN0  - S/p radical prostatectomy on May 16, 2018  - Recent PSA is undetectable at less than 0.014    3.  Sclerotic lesion of vertebral body as well as ribs  - We will repeat CT of chest abdomen and pelvis with contrast prior to next clinic visit in 4 months to follow-up on sclerotic lesion    4.  Anemia:  - Recent anemia work-up shows hemoglobin is 13.8  - Due to iron malabsorption patient has received intravenous Feraheme in the past  - Iron studies does not show any iron deficiency  -  Recommend continue with folic acid 3 times a week    5.  Health maintenance: Patient does not smoke.  Had a colonoscopy in May 2021 by Dr. Gordillo    6. Advance Care Planning: For now patient remains full code and is able to make decisions.  Patient has health care surrogate mentioned on chart.                 PHQ-9 Total Score: 0   -Patient is not homicidal or suicidal.  No acute intervention required.    Juan Antonio Shaw reports a pain score of 0.  Given his pain assessment as noted, treatment options were discussed and the following options were decided upon as a follow-up plan to address the patient's pain: continuation of current treatment plan for pain.         Terry Ly MD  8/12/2022  08:08 CDT        Part of this note may be an electronic transcription/translation of spoken language to printed text using the Dragon Dictation System.          CC:

## 2022-08-12 ENCOUNTER — OFFICE VISIT (OUTPATIENT)
Dept: ONCOLOGY | Facility: CLINIC | Age: 67
End: 2022-08-12

## 2022-08-12 VITALS
BODY MASS INDEX: 28.65 KG/M2 | SYSTOLIC BLOOD PRESSURE: 136 MMHG | OXYGEN SATURATION: 96 % | DIASTOLIC BLOOD PRESSURE: 76 MMHG | HEART RATE: 68 BPM | TEMPERATURE: 98 F | WEIGHT: 194 LBS

## 2022-08-12 DIAGNOSIS — C61 PROSTATE CANCER: Chronic | ICD-10-CM

## 2022-08-12 DIAGNOSIS — C21.0 ANAL CANCER: Primary | Chronic | ICD-10-CM

## 2022-08-12 DIAGNOSIS — D50.8 OTHER IRON DEFICIENCY ANEMIA: Chronic | ICD-10-CM

## 2022-08-12 DIAGNOSIS — M89.9 BONE LESION: Chronic | ICD-10-CM

## 2022-08-12 PROCEDURE — 99214 OFFICE O/P EST MOD 30 MIN: CPT | Performed by: INTERNAL MEDICINE

## 2022-08-12 PROCEDURE — G0463 HOSPITAL OUTPT CLINIC VISIT: HCPCS | Performed by: INTERNAL MEDICINE

## 2022-08-12 RX ORDER — FLUTICASONE PROPIONATE 50 MCG
SPRAY, SUSPENSION (ML) NASAL
COMMUNITY
Start: 2022-06-20

## 2022-08-12 RX ORDER — SILDENAFIL 100 MG/1
1 TABLET, FILM COATED ORAL AS NEEDED
COMMUNITY
Start: 2022-02-21

## 2022-08-12 RX ORDER — FLUOROURACIL 50 MG/G
CREAM TOPICAL
COMMUNITY
Start: 2022-06-23

## 2022-12-06 ENCOUNTER — HOSPITAL ENCOUNTER (OUTPATIENT)
Dept: CT IMAGING | Facility: HOSPITAL | Age: 67
Discharge: HOME | End: 2022-12-06

## 2022-12-06 ENCOUNTER — LAB (OUTPATIENT)
Dept: LAB | Facility: HOSPITAL | Age: 67
End: 2022-12-06

## 2022-12-06 DIAGNOSIS — C61 PROSTATE CANCER: Chronic | ICD-10-CM

## 2022-12-06 DIAGNOSIS — C61 PROSTATE CANCER: ICD-10-CM

## 2022-12-06 DIAGNOSIS — D50.8 OTHER IRON DEFICIENCY ANEMIA: Chronic | ICD-10-CM

## 2022-12-06 DIAGNOSIS — M89.9 BONE LESION: Chronic | ICD-10-CM

## 2022-12-06 DIAGNOSIS — C21.0 ANAL CANCER: Chronic | ICD-10-CM

## 2022-12-06 DIAGNOSIS — C21.0 ANAL CANCER: ICD-10-CM

## 2022-12-06 DIAGNOSIS — M89.9 BONE LESION: ICD-10-CM

## 2022-12-06 DIAGNOSIS — D50.8 OTHER IRON DEFICIENCY ANEMIA: ICD-10-CM

## 2022-12-06 LAB
ALBUMIN SERPL-MCNC: 4.4 G/DL (ref 3.5–5.2)
ALBUMIN/GLOB SERPL: 1.4 G/DL
ALP SERPL-CCNC: 65 U/L (ref 39–117)
ALT SERPL W P-5'-P-CCNC: 15 U/L (ref 1–41)
ANION GAP SERPL CALCULATED.3IONS-SCNC: 8 MMOL/L (ref 5–15)
AST SERPL-CCNC: 17 U/L (ref 1–40)
BASOPHILS # BLD AUTO: 0.01 10*3/MM3 (ref 0–0.2)
BASOPHILS NFR BLD AUTO: 0.2 % (ref 0–1.5)
BILIRUB SERPL-MCNC: 0.5 MG/DL (ref 0–1.2)
BUN SERPL-MCNC: 23 MG/DL (ref 8–23)
BUN/CREAT SERPL: 30.7 (ref 7–25)
CALCIUM SPEC-SCNC: 9.6 MG/DL (ref 8.6–10.5)
CHLORIDE SERPL-SCNC: 104 MMOL/L (ref 98–107)
CO2 SERPL-SCNC: 30 MMOL/L (ref 22–29)
CREAT SERPL-MCNC: 0.75 MG/DL (ref 0.76–1.27)
DEPRECATED RDW RBC AUTO: 42.9 FL (ref 37–54)
EGFRCR SERPLBLD CKD-EPI 2021: 99.5 ML/MIN/1.73
EOSINOPHIL # BLD AUTO: 0.04 10*3/MM3 (ref 0–0.4)
EOSINOPHIL NFR BLD AUTO: 1 % (ref 0.3–6.2)
ERYTHROCYTE [DISTWIDTH] IN BLOOD BY AUTOMATED COUNT: 12.5 % (ref 12.3–15.4)
FERRITIN SERPL-MCNC: 161.3 NG/ML (ref 30–400)
FOLATE SERPL-MCNC: >20 NG/ML (ref 4.78–24.2)
GLOBULIN UR ELPH-MCNC: 3.1 GM/DL
GLUCOSE SERPL-MCNC: 94 MG/DL (ref 65–99)
HCT VFR BLD AUTO: 43.1 % (ref 37.5–51)
HGB BLD-MCNC: 14.1 G/DL (ref 13–17.7)
IMM GRANULOCYTES # BLD AUTO: 0.01 10*3/MM3 (ref 0–0.05)
IMM GRANULOCYTES NFR BLD AUTO: 0.2 % (ref 0–0.5)
IRON 24H UR-MRATE: 104 MCG/DL (ref 59–158)
IRON SATN MFR SERPL: 32 % (ref 20–50)
LYMPHOCYTES # BLD AUTO: 1.36 10*3/MM3 (ref 0.7–3.1)
LYMPHOCYTES NFR BLD AUTO: 33.5 % (ref 19.6–45.3)
MCH RBC QN AUTO: 30.7 PG (ref 26.6–33)
MCHC RBC AUTO-ENTMCNC: 32.7 G/DL (ref 31.5–35.7)
MCV RBC AUTO: 93.9 FL (ref 79–97)
MONOCYTES # BLD AUTO: 0.36 10*3/MM3 (ref 0.1–0.9)
MONOCYTES NFR BLD AUTO: 8.9 % (ref 5–12)
NEUTROPHILS NFR BLD AUTO: 2.28 10*3/MM3 (ref 1.7–7)
NEUTROPHILS NFR BLD AUTO: 56.2 % (ref 42.7–76)
NRBC BLD AUTO-RTO: 0 /100 WBC (ref 0–0.2)
PLATELET # BLD AUTO: 218 10*3/MM3 (ref 140–450)
PMV BLD AUTO: 8.8 FL (ref 6–12)
POTASSIUM SERPL-SCNC: 4.5 MMOL/L (ref 3.5–5.2)
PROT SERPL-MCNC: 7.5 G/DL (ref 6–8.5)
PSA SERPL-MCNC: <0.014 NG/ML (ref 0–4)
RBC # BLD AUTO: 4.59 10*6/MM3 (ref 4.14–5.8)
SODIUM SERPL-SCNC: 142 MMOL/L (ref 136–145)
TIBC SERPL-MCNC: 322 MCG/DL (ref 298–536)
TRANSFERRIN SERPL-MCNC: 216 MG/DL (ref 200–360)
VIT B12 BLD-MCNC: 772 PG/ML (ref 211–946)
WBC NRBC COR # BLD: 4.06 10*3/MM3 (ref 3.4–10.8)

## 2022-12-06 PROCEDURE — 84466 ASSAY OF TRANSFERRIN: CPT

## 2022-12-06 PROCEDURE — 25010000002 IOPAMIDOL 61 % SOLUTION: Performed by: INTERNAL MEDICINE

## 2022-12-06 PROCEDURE — 84153 ASSAY OF PSA TOTAL: CPT

## 2022-12-06 PROCEDURE — 83540 ASSAY OF IRON: CPT

## 2022-12-06 PROCEDURE — 71260 CT THORAX DX C+: CPT

## 2022-12-06 PROCEDURE — 82728 ASSAY OF FERRITIN: CPT

## 2022-12-06 PROCEDURE — 82746 ASSAY OF FOLIC ACID SERUM: CPT

## 2022-12-06 PROCEDURE — 82607 VITAMIN B-12: CPT

## 2022-12-06 PROCEDURE — 74177 CT ABD & PELVIS W/CONTRAST: CPT

## 2022-12-06 PROCEDURE — 85025 COMPLETE CBC W/AUTO DIFF WBC: CPT

## 2022-12-06 PROCEDURE — 80053 COMPREHEN METABOLIC PANEL: CPT

## 2022-12-06 RX ADMIN — IOPAMIDOL 90 ML: 612 INJECTION, SOLUTION INTRAVENOUS at 11:21

## 2022-12-09 ENCOUNTER — OFFICE VISIT (OUTPATIENT)
Dept: ONCOLOGY | Facility: CLINIC | Age: 67
End: 2022-12-09

## 2022-12-09 VITALS
OXYGEN SATURATION: 97 % | SYSTOLIC BLOOD PRESSURE: 112 MMHG | WEIGHT: 181.1 LBS | BODY MASS INDEX: 26.74 KG/M2 | HEART RATE: 65 BPM | TEMPERATURE: 97.5 F | DIASTOLIC BLOOD PRESSURE: 64 MMHG

## 2022-12-09 DIAGNOSIS — D50.8 OTHER IRON DEFICIENCY ANEMIA: Chronic | ICD-10-CM

## 2022-12-09 DIAGNOSIS — M89.9 BONE LESION: Chronic | ICD-10-CM

## 2022-12-09 DIAGNOSIS — C21.0 ANAL CANCER: Primary | Chronic | ICD-10-CM

## 2022-12-09 DIAGNOSIS — C61 PROSTATE CANCER: Chronic | ICD-10-CM

## 2022-12-09 PROCEDURE — G0463 HOSPITAL OUTPT CLINIC VISIT: HCPCS | Performed by: INTERNAL MEDICINE

## 2022-12-09 PROCEDURE — 99214 OFFICE O/P EST MOD 30 MIN: CPT | Performed by: INTERNAL MEDICINE

## 2022-12-09 RX ORDER — SILDENAFIL 50 MG/1
TABLET, FILM COATED ORAL
COMMUNITY
End: 2022-12-09

## 2022-12-09 NOTE — PROGRESS NOTES
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  Denies any recent worsening of fatigue.  Denies any new lymph node enlargement.  Denies any bleeding.  Denies any major unexpected weight loss.              Past Medical History, Past Surgical History, Social History, Family History have been reviewed and are without significant changes except as mentioned.    Review of Systems   A comprehensive 14 point review of systems was performed and was negative except as mentioned in HPI.    Medications:  The current medication list was reviewed in the EMR    ALLERGIES:  No Known Allergies    Objective      Vitals:    12/09/22 0803   BP: 112/64   Pulse: 65   Temp: 97.5 °F (36.4 °C)   TempSrc: Temporal   SpO2: 97%   Weight: 82.1 kg (181 lb 1.6 oz)     Current Status 8/12/2021   ECOG score 0       Physical Exam  Pulmonary:      Breath sounds: Normal breath sounds.   Neurological:      Mental Status: He is alert and oriented to person, place, and time.           RECENT LABS:  Glucose   Date Value Ref Range Status   12/06/2022 94 65 - 99 mg/dL Final     Sodium   Date Value Ref Range Status   12/06/2022 142 136 - 145 mmol/L Final   07/11/2018 138 134 - 144 mmol/L Final   05/17/2018 135 (L) 136 - 145 mmol/L Final     Potassium   Date Value Ref Range Status   12/06/2022 4.5 3.5 - 5.2 mmol/L Final   07/11/2018 4 3.5 - 5.1 mmol/L Final   05/17/2018 4.3 3.3 - 5.0 mmol/L Final     CO2   Date Value Ref Range Status   12/06/2022 30.0 (H) 22.0 - 29.0 mmol/L Final     Total CO2   Date Value  Ref Range Status   05/17/2018 26 20 - 31 mmol/L Final     Chloride   Date Value Ref Range Status   12/06/2022 104 98 - 107 mmol/L Final   07/11/2018 105 98 - 107 mmol/L Final   05/17/2018 104 99 - 111 mmol/L Final     Anion Gap   Date Value Ref Range Status   12/06/2022 8.0 5.0 - 15.0 mmol/L Final   05/17/2018 9 4 - 16 mmol/L Final     Creatinine   Date Value Ref Range Status   12/06/2022 0.75 (L) 0.76 - 1.27 mg/dL Final   07/11/2018 1 0.6 - 1.3 mg/dL Final   05/17/2018 1.0 0.6 - 1.3 mg/dL Final     BUN   Date Value Ref Range Status   12/06/2022 23 8 - 23 mg/dL Final   07/11/2018 34 (H) 7 - 18 mg/dL Final     BUN/Creatinine Ratio   Date Value Ref Range Status   12/06/2022 30.7 (H) 7.0 - 25.0 Final     Calcium   Date Value Ref Range Status   12/06/2022 9.6 8.6 - 10.5 mg/dL Final   07/11/2018 8.8 8.8 - 10.5 mg/dL Final     eGFR Non  Amer   Date Value Ref Range Status   12/03/2021 87 >60 mL/min/1.73 Final     Alkaline Phosphatase   Date Value Ref Range Status   12/06/2022 65 39 - 117 U/L Final   07/11/2018 69 46 - 116 U/L Final     Total Protein   Date Value Ref Range Status   12/06/2022 7.5 6.0 - 8.5 g/dL Final   07/11/2018 7.4 6.4 - 8.2 g/dL Final     ALT (SGPT)   Date Value Ref Range Status   12/06/2022 15 1 - 41 U/L Final   07/11/2018 19 (L) 30 - 65 U/L Final     AST (SGOT)   Date Value Ref Range Status   12/06/2022 17 1 - 40 U/L Final   07/11/2018 17 15 - 37 U/L Final     Total Bilirubin   Date Value Ref Range Status   12/06/2022 0.5 0.0 - 1.2 mg/dL Final   07/11/2018 0.3 0 - 1.0 mg/dL Final     Albumin   Date Value Ref Range Status   12/06/2022 4.40 3.50 - 5.20 g/dL Final   07/11/2018 3.5 3.4 - 5.0 g/dL Final     Globulin   Date Value Ref Range Status   12/06/2022 3.1 gm/dL Final     Lab Results   Component Value Date    WBC 4.06 12/06/2022    HGB 14.1 12/06/2022    HCT 43.1 12/06/2022    MCV 93.9 12/06/2022     12/06/2022     Lab Results   Component Value Date    NEUTROABS 2.28 12/06/2022    IRON  104 12/06/2022    IRON 57 (L) 08/10/2022    IRON 103 04/13/2022    TIBC 322 12/06/2022    TIBC 289 (L) 08/10/2022    TIBC 368 04/13/2022    LABIRON 32 12/06/2022    LABIRON 20 08/10/2022    LABIRON 28 04/13/2022    FERRITIN 161.30 12/06/2022    FERRITIN 165.40 08/10/2022    FERRITIN 112.90 04/13/2022    [id number] 772 12/06/2022    [id number] 769 08/10/2022    BGNEKIKA43 911 04/13/2022    FOLATE >20.00 12/06/2022    FOLATE >20.00 08/10/2022    FOLATE 13.40 04/13/2022     No results found for: , LABCA2, AFPTM, HCGQUANT, , CHROMGRNA, [id number], CEA, REFLABREPO    Component PSA   Latest Ref Rng & Units 0.000 - 4.000 ng/mL   1/8/2018 7.36 (H)   4/17/2018 6.7 (H)   7/11/2018 0   11/8/2018 <0.1   2/11/2019 <0.1   5/13/2019 <0.1   1/23/2020 <0.1   3/3/2020 <0.014   8/31/2020 <0.014   12/31/2020 <0.1   4/2/2021 <0.014   8/5/2021 <0.014   12/3/2021 <0.014   1/5/2022 <0.01   8/10/2022 <0.014   12/6/2022 <0.014           PATHOLOGY:  * Cannot find OR log *         RADIOLOGY DATA :  CT Chest With Contrast Diagnostic    Result Date: 12/8/2022  1. Stable exam without evidence for metastatic disease in the chest, abdomen, or pelvis. 2. A small sclerotic focus in the T6 vertebral body is unchanged relative to 6/27/2019 and again appears to reflect a benign bone island. Electronically signed by:  Joce Saucedo MD  12/8/2022 2:28 PM [facility] Workstation: [id number]    CT Abdomen Pelvis With Contrast    Result Date: 12/8/2022  1. Stable exam without evidence for metastatic disease in the chest, abdomen, or pelvis. 2. A small sclerotic focus in the T6 vertebral body is unchanged relative to 6/27/2019 and again appears to reflect a benign bone island. Electronically signed by:  Joce Saucedo MD  12/8/2022 2:28 PM [facility] Workstation: [id number]          Assessment & Plan     1.  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  - Colonoscopy done by Dr. Gordillo in May 2021 did not show any evidence of residual tumor.  - Recent CT of chest, abdomen and pelvis with contrast done on December 6, 2022 shows stable sclerotic bone island without any evidence of recurrence or metastasis.  Results were discussed with patient  - Patient will return to clinic in 6 months with repeat CBC, CMP, PSA, iron studies, ferritin, B12, folate, PSA to be done prior to that.  - We will do surveillance CT of chest abdomen and pelvis with contrast around December 2023    2.  Prostatic adenocarcinoma stage II, PT2PN0  The s/p radical prostatectomy on May 16, 2018  - Recent PSA is less than 0.014    3.  Sclerotic lesion of vertebral body as well as rib  - Recent CT of chest abdomen and pelvis shows stable sclerotic lesion consistent with benign bone island    4.  Anemia:  - Recent anemia work-up shows hemoglobin is 14.1.  - Due to iron malabsorption patient has received intravenous Feraheme in the past  - Iron studies did not show any iron deficiency  - Recommend continue with folic acid 3 times a week    5.  Health maintenance: Patient does not smoke.  Had a colonoscopy in May 2021 by Dr. Gordillo    6. Advance Care Planning: For now patient remains full code and is able to make decisions.  Patient has health care surrogate mentioned on chart.                 PHQ-9 Total Score: 0   -Patient is not homicidal or suicidal.  No acute intervention required.    Juan Antonio Shaw reports a pain score of 0.  Given his pain assessment as noted, treatment options were discussed and the following options were decided upon as a follow-up plan to address the patient's pain: continuation of current treatment plan for pain.         Terry Ly MD  12/9/2022  08:13 CST        Part of this note may be an electronic transcription/translation of spoken language to printed text using the Dragon Dictation System.          CC:

## 2023-06-14 ENCOUNTER — LAB (OUTPATIENT)
Dept: LAB | Facility: HOSPITAL | Age: 68
End: 2023-06-14
Payer: COMMERCIAL

## 2023-06-14 DIAGNOSIS — M89.9 BONE LESION: ICD-10-CM

## 2023-06-14 DIAGNOSIS — C21.0 ANAL CANCER: ICD-10-CM

## 2023-06-14 DIAGNOSIS — D50.8 OTHER IRON DEFICIENCY ANEMIA: ICD-10-CM

## 2023-06-14 DIAGNOSIS — C61 PROSTATE CANCER: ICD-10-CM

## 2023-06-14 LAB
ALBUMIN SERPL-MCNC: 4.3 G/DL (ref 3.5–5.2)
ALBUMIN/GLOB SERPL: 1.4 G/DL
ALP SERPL-CCNC: 59 U/L (ref 39–117)
ALT SERPL W P-5'-P-CCNC: 17 U/L (ref 1–41)
ANION GAP SERPL CALCULATED.3IONS-SCNC: 9 MMOL/L (ref 5–15)
AST SERPL-CCNC: 20 U/L (ref 1–40)
BASOPHILS # BLD AUTO: 0.02 10*3/MM3 (ref 0–0.2)
BASOPHILS NFR BLD AUTO: 0.5 % (ref 0–1.5)
BILIRUB SERPL-MCNC: 0.5 MG/DL (ref 0–1.2)
BUN SERPL-MCNC: 20 MG/DL (ref 8–23)
BUN/CREAT SERPL: 28.2 (ref 7–25)
CALCIUM SPEC-SCNC: 9.7 MG/DL (ref 8.6–10.5)
CHLORIDE SERPL-SCNC: 102 MMOL/L (ref 98–107)
CO2 SERPL-SCNC: 28 MMOL/L (ref 22–29)
CREAT SERPL-MCNC: 0.71 MG/DL (ref 0.76–1.27)
DEPRECATED RDW RBC AUTO: 43.1 FL (ref 37–54)
EGFRCR SERPLBLD CKD-EPI 2021: 100.6 ML/MIN/1.73
EOSINOPHIL # BLD AUTO: 0.05 10*3/MM3 (ref 0–0.4)
EOSINOPHIL NFR BLD AUTO: 1.1 % (ref 0.3–6.2)
ERYTHROCYTE [DISTWIDTH] IN BLOOD BY AUTOMATED COUNT: 12.2 % (ref 12.3–15.4)
FERRITIN SERPL-MCNC: 153.8 NG/ML (ref 30–400)
FOLATE SERPL-MCNC: 15.5 NG/ML (ref 4.78–24.2)
GLOBULIN UR ELPH-MCNC: 3 GM/DL
GLUCOSE SERPL-MCNC: 84 MG/DL (ref 65–99)
HCT VFR BLD AUTO: 41.9 % (ref 37.5–51)
HGB BLD-MCNC: 14.2 G/DL (ref 13–17.7)
IMM GRANULOCYTES # BLD AUTO: 0.02 10*3/MM3 (ref 0–0.05)
IMM GRANULOCYTES NFR BLD AUTO: 0.5 % (ref 0–0.5)
IRON 24H UR-MRATE: 96 MCG/DL (ref 59–158)
IRON SATN MFR SERPL: 27 % (ref 20–50)
LYMPHOCYTES # BLD AUTO: 1.54 10*3/MM3 (ref 0.7–3.1)
LYMPHOCYTES NFR BLD AUTO: 35.2 % (ref 19.6–45.3)
MCH RBC QN AUTO: 32.1 PG (ref 26.6–33)
MCHC RBC AUTO-ENTMCNC: 33.9 G/DL (ref 31.5–35.7)
MCV RBC AUTO: 94.6 FL (ref 79–97)
MONOCYTES # BLD AUTO: 0.45 10*3/MM3 (ref 0.1–0.9)
MONOCYTES NFR BLD AUTO: 10.3 % (ref 5–12)
NEUTROPHILS NFR BLD AUTO: 2.3 10*3/MM3 (ref 1.7–7)
NEUTROPHILS NFR BLD AUTO: 52.4 % (ref 42.7–76)
NRBC BLD AUTO-RTO: 0 /100 WBC (ref 0–0.2)
PLATELET # BLD AUTO: 219 10*3/MM3 (ref 140–450)
PMV BLD AUTO: 8.7 FL (ref 6–12)
POTASSIUM SERPL-SCNC: 4.3 MMOL/L (ref 3.5–5.2)
PROT SERPL-MCNC: 7.3 G/DL (ref 6–8.5)
PSA SERPL-MCNC: <0.014 NG/ML (ref 0–4)
RBC # BLD AUTO: 4.43 10*6/MM3 (ref 4.14–5.8)
SODIUM SERPL-SCNC: 139 MMOL/L (ref 136–145)
TIBC SERPL-MCNC: 350 MCG/DL (ref 298–536)
TRANSFERRIN SERPL-MCNC: 235 MG/DL (ref 200–360)
VIT B12 BLD-MCNC: 901 PG/ML (ref 211–946)
WBC NRBC COR # BLD: 4.38 10*3/MM3 (ref 3.4–10.8)

## 2023-06-14 PROCEDURE — 85025 COMPLETE CBC W/AUTO DIFF WBC: CPT

## 2023-06-14 PROCEDURE — 82728 ASSAY OF FERRITIN: CPT

## 2023-06-14 PROCEDURE — 84466 ASSAY OF TRANSFERRIN: CPT

## 2023-06-14 PROCEDURE — 84153 ASSAY OF PSA TOTAL: CPT

## 2023-06-14 PROCEDURE — 82607 VITAMIN B-12: CPT

## 2023-06-14 PROCEDURE — 36415 COLL VENOUS BLD VENIPUNCTURE: CPT

## 2023-06-14 PROCEDURE — 83540 ASSAY OF IRON: CPT

## 2023-06-14 PROCEDURE — 80053 COMPREHEN METABOLIC PANEL: CPT

## 2023-06-14 PROCEDURE — 82746 ASSAY OF FOLIC ACID SERUM: CPT

## 2023-06-15 NOTE — PROGRESS NOTES
DATE OF VISIT: 6/16/2023      REASON FOR VISIT: Anal cancer, history of prostate cancer, sclerotic lesion of bone, anemia      HISTORY OF PRESENT ILLNESS:   68-year-old male with medical problem consisting of hypertension, prostatic adenocarcinoma s/p robotic prostatectomy in 2018, iron deficiency anemia, squamous cell cancer of anal cancer diagnosed in October 2019 currently on surveillance is here for follow-up appointment today to discuss recently done blood work.  Denies any worsening fatigue.  Denies any new lymph node enlargement.  Denies any bleeding.  Denies any unexpected major weight loss.          Past Medical History, Past Surgical History, Social History, Family History have been reviewed and are without significant changes except as mentioned.    Review of Systems   A comprehensive 14 point review of systems was performed and was negative except as mentioned in HPI.    Medications:  The current medication list was reviewed in the EMR    ALLERGIES:  No Known Allergies    Objective      Vitals:    06/16/23 0800   BP: 118/69   Pulse: 69   SpO2: 97%   Weight: 80.6 kg (177 lb 12.8 oz)   PainSc: 0-No pain         8/12/2021     8:30 AM   Current Status   ECOG score 0       Physical Exam  Pulmonary:      Breath sounds: Normal breath sounds.   Lymphadenopathy:      Cervical: No cervical adenopathy.   Neurological:      Mental Status: He is alert and oriented to person, place, and time.         RECENT LABS:  Glucose   Date Value Ref Range Status   06/14/2023 84 65 - 99 mg/dL Final     Sodium   Date Value Ref Range Status   06/14/2023 139 136 - 145 mmol/L Final   07/11/2018 138 134 - 144 mmol/L Final   05/17/2018 135 (L) 136 - 145 mmol/L Final     Potassium   Date Value Ref Range Status   06/14/2023 4.3 3.5 - 5.2 mmol/L Final   07/11/2018 4 3.5 - 5.1 mmol/L Final   05/17/2018 4.3 3.3 - 5.0 mmol/L Final     CO2   Date Value Ref Range Status   06/14/2023 28.0 22.0 - 29.0 mmol/L Final     Total CO2   Date Value Ref  Range Status   05/17/2018 26 20 - 31 mmol/L Final     Chloride   Date Value Ref Range Status   06/14/2023 102 98 - 107 mmol/L Final   07/11/2018 105 98 - 107 mmol/L Final   05/17/2018 104 99 - 111 mmol/L Final     Anion Gap   Date Value Ref Range Status   06/14/2023 9.0 5.0 - 15.0 mmol/L Final   05/17/2018 9 4 - 16 mmol/L Final     Creatinine   Date Value Ref Range Status   06/14/2023 0.71 (L) 0.76 - 1.27 mg/dL Final   07/11/2018 1 0.6 - 1.3 mg/dL Final   05/17/2018 1.0 0.6 - 1.3 mg/dL Final     BUN   Date Value Ref Range Status   06/14/2023 20 8 - 23 mg/dL Final   07/11/2018 34 (H) 7 - 18 mg/dL Final     BUN/Creatinine Ratio   Date Value Ref Range Status   06/14/2023 28.2 (H) 7.0 - 25.0 Final     Calcium   Date Value Ref Range Status   06/14/2023 9.7 8.6 - 10.5 mg/dL Final   07/11/2018 8.8 8.8 - 10.5 mg/dL Final     eGFR Non  Amer   Date Value Ref Range Status   12/03/2021 87 >60 mL/min/1.73 Final     Alkaline Phosphatase   Date Value Ref Range Status   06/14/2023 59 39 - 117 U/L Final   07/11/2018 69 46 - 116 U/L Final     Total Protein   Date Value Ref Range Status   06/14/2023 7.3 6.0 - 8.5 g/dL Final   07/11/2018 7.4 6.4 - 8.2 g/dL Final     ALT (SGPT)   Date Value Ref Range Status   06/14/2023 17 1 - 41 U/L Final   07/11/2018 19 (L) 30 - 65 U/L Final     AST (SGOT)   Date Value Ref Range Status   06/14/2023 20 1 - 40 U/L Final   07/11/2018 17 15 - 37 U/L Final     Total Bilirubin   Date Value Ref Range Status   06/14/2023 0.5 0.0 - 1.2 mg/dL Final   07/11/2018 0.3 0 - 1.0 mg/dL Final     Albumin   Date Value Ref Range Status   06/14/2023 4.3 3.5 - 5.2 g/dL Final   07/11/2018 3.5 3.4 - 5.0 g/dL Final     Globulin   Date Value Ref Range Status   06/14/2023 3.0 gm/dL Final     Lab Results   Component Value Date    WBC 4.38 06/14/2023    HGB 14.2 06/14/2023    HCT 41.9 06/14/2023    MCV 94.6 06/14/2023     06/14/2023     Lab Results   Component Value Date    NEUTROABS 2.30 06/14/2023    IRON 96  06/14/2023    IRON 104 12/06/2022    IRON 57 (L) 08/10/2022    TIBC 350 06/14/2023    TIBC 322 12/06/2022    TIBC 289 (L) 08/10/2022    LABIRON 27 06/14/2023    LABIRON 32 12/06/2022    LABIRON 20 08/10/2022    FERRITIN 153.80 06/14/2023    FERRITIN 161.30 12/06/2022    FERRITIN 165.40 08/10/2022    [id number] 901 06/14/2023    DFOLHXEJ24 772 12/06/2022    BMEFQGBU92 769 08/10/2022    FOLATE 15.50 06/14/2023    FOLATE >20.00 12/06/2022    FOLATE >20.00 08/10/2022     No results found for: , LABCA2, AFPTM, HCGQUANT, , CHROMGRNA, [id number], CEA, REFLABREPO      PATHOLOGY:  * Cannot find OR log *         RADIOLOGY DATA :  No radiology results for the last 7 days        Assessment & Plan     1.  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 Gordillo in May 2021 was negative for any residual tumor.  - We will continue with clinical surveillance for now  - We will have patient return to clinic in 6 months with repeat CBC, CMP, PSA, iron studies, ferritin, B12, folate, CT of chest abdomen and pelvis with contrast to be done prior to that.    2.  Prostatic adenocarcinoma stage II, PT2PN0  - S/p radical prostatectomy on May 16, 2018.  - Recent PSA is less than 0.014    3.  Sclerotic lesion of vertebral body as well as rib:  - CT of chest abdomen and pelvis in December 2022 shows stable sclerotic lesion consistent with benign bone island.  - Plan is to repeat CT of chest abdomen and pelvis with contrast prior to next clinic visit in December 2023      4.  Anemia:  - Due to iron malabsorption patient has received intravenous Feraheme in the past  - Remains on folic acid 3 times a week  - Recent anemia work-up shows hemoglobin is 14.2.  Iron and B12 level is adequate.  No need for any iron or B12 replacement at  present    5.  Health maintenance: Patient does not smoke.  Had a colonoscopy in May 2021 by Dr. Gordillo.    6.  Advance care planning:  - CODE STATUS and resuscitation were discussed with patient today.  Patient remains full code.     7.  Prescriptions: Prescription for folic acid has been sent to patient's pharmacy today.      PHQ-9 Total Score: 0   -Patient is not homicidal or suicidal.  No acute intervention required.     Juan Antonio Shaw reports a pain score of 0.  Given his pain assessment as noted, treatment options were discussed and the following options were decided upon as a follow-up plan to address the patient's pain: continuation of current treatment plan for pain.         Terry Ly MD  6/16/2023  08:07 CDT        Part of this note may be an electronic transcription/translation of spoken language to printed text using the Dragon Dictation System.          CC:

## 2023-06-16 ENCOUNTER — OFFICE VISIT (OUTPATIENT)
Dept: ONCOLOGY | Facility: CLINIC | Age: 68
End: 2023-06-16
Payer: COMMERCIAL

## 2023-06-16 VITALS
OXYGEN SATURATION: 97 % | SYSTOLIC BLOOD PRESSURE: 118 MMHG | BODY MASS INDEX: 26.26 KG/M2 | HEART RATE: 69 BPM | DIASTOLIC BLOOD PRESSURE: 69 MMHG | WEIGHT: 177.8 LBS

## 2023-06-16 DIAGNOSIS — C21.0 ANAL CANCER: Primary | Chronic | ICD-10-CM

## 2023-06-16 DIAGNOSIS — M89.9 BONE LESION: Chronic | ICD-10-CM

## 2023-06-16 DIAGNOSIS — C61 PROSTATE CANCER: Chronic | ICD-10-CM

## 2023-06-16 DIAGNOSIS — D50.8 OTHER IRON DEFICIENCY ANEMIA: Chronic | ICD-10-CM

## 2023-06-16 RX ORDER — ROSUVASTATIN CALCIUM 10 MG/1
TABLET, COATED ORAL
COMMUNITY

## 2023-06-16 RX ORDER — FOLIC ACID 1 MG/1
TABLET ORAL
Qty: 36 TABLET | Refills: 1 | Status: SHIPPED | OUTPATIENT
Start: 2023-06-16

## 2024-05-28 NOTE — PROGRESS NOTES
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  ICD 10 Code: C21.0      was seen today for an on treatment visit.  He is receiving radiation therapy to the pelvis/anus. He has received 2160 cGy in 12 fractions out of a planned dose of 5400 cGy in 30 fractions.  He is receiving concurrent 5-FU/mitomycin chemotherapy per Dr. Ly, and received his 1st cycle on 7/8-12/2019.  He is scheduled to receive his second cycle on 8/5/2019.     Mr. Shaw has been released from [facility] after 8 days of IV antibiotics for cellulitis at his port site.  He is currently on IV antibiotics at home.  Today on exam the patient is tolerating radiation therapy well and has no new disease or treatment-related complaints.  He has no complaints of diarrhea, and continues to use a stool softener.  He has no complaints of nausea/vomiting.                                            Vitals:     Vitals:    07/24/19 0833   BP: 139/78   Pulse: 67   Resp: 16   Temp: 96.6 °F (35.9 °C)       Weight:   Wt Readings from Last 3 Encounters:   07/24/19 99.2 kg (218 lb 9.6 oz)   07/22/19 100 kg (220 lb 9.6 oz)   07/17/19 98 kg (216 lb)      Pain:    Pain Score    07/24/19 0833   PainSc: 0-No pain         Plan: We plan to continue radiation therapy as prescribed.    Brady Lopez MD  Radiation Oncology    Electronically signed by Brady Lopez MD 7/24/2019  8:44 AM     cc: Dr. Jarek Aleman                             
not applicable

## (undated) DEVICE — CANN SMPL SOFTECH BIFLO ETCO2 A/M 7FT

## (undated) DEVICE — SINGLE USE INJECTOR: Brand: SINGLE USE INJECTOR

## (undated) DEVICE — DECANT BG O JET

## (undated) DEVICE — MONOPOLAR METZENBAUM SCISSOR TIP, DISPOSABLE: Brand: MONOPOLAR METZENBAUM SCISSOR TIP, DISPOSABLE

## (undated) DEVICE — GLV SURG SENSICARE PI PF LF 7 GRN STRL

## (undated) DEVICE — ADHS LIQ MASTISOL 2/3ML

## (undated) DEVICE — GLV SURG TRIUMPH LT PF LTX 6.5 STRL

## (undated) DEVICE — BITEBLOCK ENDO W/STRAP 60F A/ LF DISP

## (undated) DEVICE — STERILE SLEEVE: Brand: CONVERTORS

## (undated) DEVICE — SOL IRR NACL 0.9PCT BT 1000ML

## (undated) DEVICE — THE KUMAR CATHETER®, USED IN CONJUNCTION WITH KUMAR CHOLANGIOGRAPHY® CLAMP, IS MEANT TO PROVIDE A MEANS OF LAPAROSCOPIC CHOLANGIOGRAPHY. IT COMPRISES A TRANSLUCENT TUBING ( 76 CM. LENGTH AND 16 GA. ) THAT CARRIES A 19 GA., 1.25 CM LONG NEEDLE AT THE END. THE KUMAR CATHETER® IS USED TO PUNCTURE THE HARTMANN'S POUCH OF THE GALLBLADDER FOR BILIARY ACCESS AND / OR ASPIRATION. PRODUCT IS LATEX FREE.: Brand: KUMAR CATHETER®

## (undated) DEVICE — PK MAJ PROC LF 60

## (undated) DEVICE — PAD GRND E/S W/CORD SPLT A/

## (undated) DEVICE — GLV SURG SENSICARE POLYISPRN W/ALOE PF LF 6 GRN STRL

## (undated) DEVICE — GLV SURG SENSICARE POLYISPRN W/ALOE PF LF 6.5 GRN STRL

## (undated) DEVICE — SYR LL TP 10ML STRL

## (undated) DEVICE — STERILE POLYISOPRENE POWDER-FREE SURGICAL GLOVES WITH EMOLLIENT COATING: Brand: PROTEXIS

## (undated) DEVICE — GLV SURG TRIUMPH PF LTX 6.5 STRL

## (undated) DEVICE — LIGHT HANDLE: Brand: DEVON

## (undated) DEVICE — SINGLE-USE BIOPSY FORCEPS: Brand: RADIAL JAW 4

## (undated) DEVICE — 3M™ STERI-STRIP™ REINFORCED ADHESIVE SKIN CLOSURES, R1547, 1/2 IN X 4 IN (12 MM X 100 MM), 6 STRIPS/ENVELOPE: Brand: 3M™ STERI-STRIP™

## (undated) DEVICE — ANTIBACTERIAL UNDYED BRAIDED (POLYGLACTIN 910), SYNTHETIC ABSORBABLE SUTURE: Brand: COATED VICRYL

## (undated) DEVICE — GLV SURG SENSICARE PI LF PF 8 GRN STRL

## (undated) DEVICE — SUT VIC 3/0 TIES 18IN J110T

## (undated) DEVICE — CVR SURG EQUIP BND RECTG 36X28

## (undated) DEVICE — UNDYED BRAIDED (POLYGLACTIN 910), SYNTHETIC ABSORBABLE SUTURE: Brand: COATED VICRYL

## (undated) DEVICE — MARKR SPOT ENDOSCOPIC LESION INJ

## (undated) DEVICE — RETRV BG SPECI ENDOBAG 3X6IN 20.9CM

## (undated) DEVICE — CONTAINER,SPECIMEN,OR STERILE,4OZ: Brand: MEDLINE

## (undated) DEVICE — ENDOPATH XCEL BLADELESS TROCARS WITH STABILITY SLEEVES: Brand: ENDOPATH XCEL

## (undated) DEVICE — GLV SURG SENSICARE MICRO PF LF 7.5 STRL

## (undated) DEVICE — GOWN,PREVENTION PLUS,XLARGE,STERILE: Brand: MEDLINE

## (undated) DEVICE — CORE TRUMPET FOR SINGLE SOLUTION BAG: Brand: CORE DYNAMICS

## (undated) DEVICE — VISUALIZATION SYSTEM: Brand: CLEARIFY

## (undated) DEVICE — GLV SURG TRIUMPH LT PF LTX 7.5 STRL

## (undated) DEVICE — INTENDED FOR TISSUE SEPARATION, AND OTHER PROCEDURES THAT REQUIRE A SHARP SURGICAL BLADE TO PUNCTURE OR CUT.: Brand: BARD-PARKER ® CARBON RIB-BACK BLADES

## (undated) DEVICE — KT BRST TISS EXPANDR CUSTOM FILL

## (undated) DEVICE — GLV SURG TRIUMPH LT PF LTX 6 STRL

## (undated) DEVICE — SUT VIC 3/0 RB1 27IN J215H

## (undated) DEVICE — TROCAR SITE CLOSURE DEVICE: Brand: ENDO CLOSE

## (undated) DEVICE — PK LAP CHOLE LF 60

## (undated) DEVICE — GOWN,AURORA,NOREINF,RAGLAN,XL,STERILE: Brand: MEDLINE

## (undated) DEVICE — COATED BRAIDED POLYESTER: Brand: TI-CRON

## (undated) DEVICE — SUT PROLN 3/0 RB1 D/A 36IN 8558H

## (undated) DEVICE — ST CVR PROB PULLUP ULTRASND 5X48IN

## (undated) DEVICE — SUT VIC 3/0 SH 27IN J416H

## (undated) DEVICE — GLV SURG TRIUMPH PF LTX 6 STRL

## (undated) DEVICE — DRSNG SPNG GZ WOVN 8PLY 4X4IN 2PK LF STRL BX/50PK

## (undated) DEVICE — TOWEL,OR,DSP,ST,BLUE,DLX,4/PK,20PK/CS: Brand: MEDLINE

## (undated) DEVICE — OPEN END URETERAL CATHETER: Brand: URETERAL CATHETER

## (undated) DEVICE — SOL IRRIG NACL 1000ML